# Patient Record
Sex: FEMALE | Race: WHITE | NOT HISPANIC OR LATINO | Employment: OTHER | URBAN - METROPOLITAN AREA
[De-identification: names, ages, dates, MRNs, and addresses within clinical notes are randomized per-mention and may not be internally consistent; named-entity substitution may affect disease eponyms.]

---

## 2020-01-22 PROBLEM — M54.2 NECK PAIN, CHRONIC: Status: ACTIVE | Noted: 2020-01-22

## 2020-01-22 PROBLEM — G89.29 NECK PAIN, CHRONIC: Status: ACTIVE | Noted: 2020-01-22

## 2020-01-27 PROBLEM — M50.90 CERVICAL DISC DISEASE: Status: ACTIVE | Noted: 2020-01-27

## 2020-07-01 PROBLEM — G56.21 ULNAR NEUROPATHY AT ELBOW OF RIGHT UPPER EXTREMITY: Status: ACTIVE | Noted: 2020-07-01

## 2020-12-11 PROBLEM — G43.019 INTRACTABLE MIGRAINE WITHOUT AURA AND WITHOUT STATUS MIGRAINOSUS: Status: ACTIVE | Noted: 2020-12-11

## 2022-04-19 PROBLEM — D25.1 INTRAMURAL LEIOMYOMA OF UTERUS: Status: ACTIVE | Noted: 2022-04-19

## 2022-04-19 PROBLEM — G43.019 INTRACTABLE MIGRAINE WITHOUT AURA AND WITHOUT STATUS MIGRAINOSUS: Status: RESOLVED | Noted: 2020-12-11 | Resolved: 2022-04-19

## 2022-04-19 PROBLEM — N92.1 MENOMETRORRHAGIA: Status: ACTIVE | Noted: 2022-04-19

## 2022-04-19 PROBLEM — R10.2 PAIN IN PELVIS: Status: ACTIVE | Noted: 2022-04-19

## 2022-07-19 PROBLEM — N92.1 MENOMETRORRHAGIA: Status: RESOLVED | Noted: 2022-04-19 | Resolved: 2022-07-19

## 2022-07-19 PROBLEM — D25.1 INTRAMURAL LEIOMYOMA OF UTERUS: Status: RESOLVED | Noted: 2022-04-19 | Resolved: 2022-07-19

## 2022-07-19 PROBLEM — R10.2 PAIN IN PELVIS: Status: RESOLVED | Noted: 2022-04-19 | Resolved: 2022-07-19

## 2022-07-19 PROBLEM — Z90.710 STATUS POST HYSTERECTOMY: Status: ACTIVE | Noted: 2022-07-19

## 2022-09-07 PROBLEM — R50.9 FEVER: Status: ACTIVE | Noted: 2022-09-07

## 2022-09-07 PROBLEM — J18.9 PNEUMONIA: Status: ACTIVE | Noted: 2022-09-07

## 2022-09-07 PROBLEM — E87.6 HYPOKALEMIA: Status: ACTIVE | Noted: 2022-09-07

## 2022-09-07 PROBLEM — E87.1 HYPONATREMIA: Status: ACTIVE | Noted: 2022-09-07

## 2022-09-07 PROBLEM — D64.9 ANEMIA: Status: ACTIVE | Noted: 2022-09-07

## 2022-09-07 PROBLEM — D72.829 LEUKOCYTOSIS: Status: ACTIVE | Noted: 2022-09-07

## 2022-09-07 PROBLEM — R79.89 ELEVATED LIVER FUNCTION TESTS: Status: ACTIVE | Noted: 2022-09-07

## 2022-09-07 PROBLEM — N17.9 AKI (ACUTE KIDNEY INJURY) (HCC): Status: ACTIVE | Noted: 2022-09-07

## 2022-09-07 PROBLEM — A41.9 SEPSIS (HCC): Status: ACTIVE | Noted: 2022-09-07

## 2022-09-08 PROBLEM — I33.0 INFECTIVE ENDOCARDITIS OF TRICUSPID VALVE: Status: ACTIVE | Noted: 2022-09-08

## 2022-09-08 PROBLEM — B95.2 BACTEREMIA DUE TO ENTEROCOCCUS: Status: ACTIVE | Noted: 2022-09-08

## 2022-09-08 PROBLEM — R78.81 POSITIVE BLOOD CULTURE: Status: ACTIVE | Noted: 2022-09-08

## 2022-09-09 ENCOUNTER — HOSPITAL ENCOUNTER (INPATIENT)
Facility: MEDICAL CENTER | Age: 52
LOS: 15 days | DRG: 219 | End: 2022-09-24
Attending: INTERNAL MEDICINE | Admitting: INTERNAL MEDICINE
Payer: COMMERCIAL

## 2022-09-09 DIAGNOSIS — J90 PLEURAL EFFUSION: ICD-10-CM

## 2022-09-09 DIAGNOSIS — I33.0 INFECTIVE ENDOCARDITIS OF TRICUSPID VALVE: ICD-10-CM

## 2022-09-09 DIAGNOSIS — I33.0 ACUTE BACTERIAL ENDOCARDITIS: ICD-10-CM

## 2022-09-09 DIAGNOSIS — F41.9 ANXIETY: ICD-10-CM

## 2022-09-09 DIAGNOSIS — Z98.890 S/P TRICUSPID VALVE REPAIR: ICD-10-CM

## 2022-09-09 DIAGNOSIS — J94.2 HEMOTHORAX: ICD-10-CM

## 2022-09-09 DIAGNOSIS — Z98.890 S/P AORTIC VALVE REPAIR: ICD-10-CM

## 2022-09-09 DIAGNOSIS — R78.81 BACTEREMIA DUE TO ENTEROCOCCUS: ICD-10-CM

## 2022-09-09 DIAGNOSIS — B95.2 BACTEREMIA DUE TO ENTEROCOCCUS: ICD-10-CM

## 2022-09-09 DIAGNOSIS — K27.9 PEPTIC ULCER DISEASE: ICD-10-CM

## 2022-09-09 PROBLEM — I38 ENDOCARDITIS: Status: ACTIVE | Noted: 2022-09-09

## 2022-09-09 LAB
FIBRINOGEN PPP-MCNC: 586 MG/DL (ref 215–460)
HGB BLD-MCNC: 9.7 G/DL (ref 12–16)
HGB RETIC QN AUTO: 29.4 PG/CELL (ref 29–35)
IMM RETICS NFR: 17.8 % (ref 9.3–17.4)
LDH SERPL L TO P-CCNC: 313 U/L (ref 107–266)
RETICS # AUTO: 0.08 M/UL (ref 0.04–0.06)
RETICS/RBC NFR: 2.5 % (ref 0.8–2.1)

## 2022-09-09 PROCEDURE — 700111 HCHG RX REV CODE 636 W/ 250 OVERRIDE (IP): Performed by: INTERNAL MEDICINE

## 2022-09-09 PROCEDURE — 700105 HCHG RX REV CODE 258: Performed by: INTERNAL MEDICINE

## 2022-09-09 PROCEDURE — 83010 ASSAY OF HAPTOGLOBIN QUANT: CPT

## 2022-09-09 PROCEDURE — 99223 1ST HOSP IP/OBS HIGH 75: CPT | Performed by: INTERNAL MEDICINE

## 2022-09-09 PROCEDURE — 700102 HCHG RX REV CODE 250 W/ 637 OVERRIDE(OP): Performed by: INTERNAL MEDICINE

## 2022-09-09 PROCEDURE — 85384 FIBRINOGEN ACTIVITY: CPT

## 2022-09-09 PROCEDURE — 770020 HCHG ROOM/CARE - TELE (206)

## 2022-09-09 PROCEDURE — 85046 RETICYTE/HGB CONCENTRATE: CPT

## 2022-09-09 PROCEDURE — 83615 LACTATE (LD) (LDH) ENZYME: CPT

## 2022-09-09 PROCEDURE — A9270 NON-COVERED ITEM OR SERVICE: HCPCS | Performed by: INTERNAL MEDICINE

## 2022-09-09 PROCEDURE — 36415 COLL VENOUS BLD VENIPUNCTURE: CPT

## 2022-09-09 PROCEDURE — 85018 HEMOGLOBIN: CPT | Mod: 91

## 2022-09-09 RX ORDER — OXYCODONE HYDROCHLORIDE 5 MG/1
5 TABLET ORAL EVERY 6 HOURS PRN
Status: DISCONTINUED | OUTPATIENT
Start: 2022-09-09 | End: 2022-09-09

## 2022-09-09 RX ORDER — ONDANSETRON 4 MG/1
4 TABLET, ORALLY DISINTEGRATING ORAL EVERY 4 HOURS PRN
Status: DISCONTINUED | OUTPATIENT
Start: 2022-09-09 | End: 2022-09-16

## 2022-09-09 RX ORDER — SODIUM CHLORIDE 9 MG/ML
INJECTION, SOLUTION INTRAVENOUS CONTINUOUS
Status: DISCONTINUED | OUTPATIENT
Start: 2022-09-09 | End: 2022-09-12

## 2022-09-09 RX ORDER — GUAIFENESIN 600 MG/1
1200 TABLET, EXTENDED RELEASE ORAL EVERY 12 HOURS
Status: DISCONTINUED | OUTPATIENT
Start: 2022-09-09 | End: 2022-09-24 | Stop reason: HOSPADM

## 2022-09-09 RX ORDER — OXYCODONE HYDROCHLORIDE 10 MG/1
10 TABLET ORAL
Status: DISCONTINUED | OUTPATIENT
Start: 2022-09-09 | End: 2022-09-10

## 2022-09-09 RX ORDER — OXYCODONE HYDROCHLORIDE 5 MG/1
5 TABLET ORAL
Status: DISCONTINUED | OUTPATIENT
Start: 2022-09-09 | End: 2022-09-10

## 2022-09-09 RX ORDER — BENZONATATE 100 MG/1
100 CAPSULE ORAL 3 TIMES DAILY PRN
Status: DISCONTINUED | OUTPATIENT
Start: 2022-09-09 | End: 2022-09-16

## 2022-09-09 RX ORDER — BISACODYL 10 MG
10 SUPPOSITORY, RECTAL RECTAL
Status: DISCONTINUED | OUTPATIENT
Start: 2022-09-09 | End: 2022-09-16

## 2022-09-09 RX ORDER — AMOXICILLIN 250 MG
2 CAPSULE ORAL 2 TIMES DAILY
Status: DISCONTINUED | OUTPATIENT
Start: 2022-09-09 | End: 2022-09-16

## 2022-09-09 RX ORDER — HYDROMORPHONE HYDROCHLORIDE 1 MG/ML
0.5 INJECTION, SOLUTION INTRAMUSCULAR; INTRAVENOUS; SUBCUTANEOUS
Status: DISCONTINUED | OUTPATIENT
Start: 2022-09-09 | End: 2022-09-10

## 2022-09-09 RX ORDER — ACETAMINOPHEN 325 MG/1
650 TABLET ORAL EVERY 6 HOURS PRN
Status: DISCONTINUED | OUTPATIENT
Start: 2022-09-09 | End: 2022-09-14

## 2022-09-09 RX ORDER — POLYETHYLENE GLYCOL 3350 17 G/17G
1 POWDER, FOR SOLUTION ORAL
Status: DISCONTINUED | OUTPATIENT
Start: 2022-09-09 | End: 2022-09-16

## 2022-09-09 RX ORDER — PROMETHAZINE HYDROCHLORIDE 25 MG/1
12.5-25 TABLET ORAL EVERY 4 HOURS PRN
Status: DISCONTINUED | OUTPATIENT
Start: 2022-09-09 | End: 2022-09-16

## 2022-09-09 RX ORDER — PROMETHAZINE HYDROCHLORIDE 25 MG/1
12.5-25 SUPPOSITORY RECTAL EVERY 4 HOURS PRN
Status: DISCONTINUED | OUTPATIENT
Start: 2022-09-09 | End: 2022-09-16

## 2022-09-09 RX ORDER — DEXTROSE MONOHYDRATE 25 G/50ML
50 INJECTION, SOLUTION INTRAVENOUS PRN
Status: DISCONTINUED | OUTPATIENT
Start: 2022-09-09 | End: 2022-09-16

## 2022-09-09 RX ORDER — PROCHLORPERAZINE EDISYLATE 5 MG/ML
5-10 INJECTION INTRAMUSCULAR; INTRAVENOUS EVERY 4 HOURS PRN
Status: DISCONTINUED | OUTPATIENT
Start: 2022-09-09 | End: 2022-09-16

## 2022-09-09 RX ORDER — ONDANSETRON 2 MG/ML
4 INJECTION INTRAMUSCULAR; INTRAVENOUS EVERY 4 HOURS PRN
Status: DISCONTINUED | OUTPATIENT
Start: 2022-09-09 | End: 2022-09-14

## 2022-09-09 RX ADMIN — ONDANSETRON 4 MG: 2 INJECTION INTRAMUSCULAR; INTRAVENOUS at 17:40

## 2022-09-09 RX ADMIN — SODIUM CHLORIDE: 9 INJECTION, SOLUTION INTRAVENOUS at 15:23

## 2022-09-09 RX ADMIN — CEFEPIME 2 G: 2 INJECTION, POWDER, FOR SOLUTION INTRAVENOUS at 17:40

## 2022-09-09 RX ADMIN — ACETAMINOPHEN 650 MG: 325 TABLET, FILM COATED ORAL at 20:08

## 2022-09-09 RX ADMIN — DOCUSATE SODIUM 50 MG AND SENNOSIDES 8.6 MG 2 TABLET: 8.6; 5 TABLET, FILM COATED ORAL at 17:40

## 2022-09-09 RX ADMIN — VANCOMYCIN HYDROCHLORIDE 750 MG: 500 INJECTION, POWDER, LYOPHILIZED, FOR SOLUTION INTRAVENOUS at 15:33

## 2022-09-09 RX ADMIN — OXYCODONE HYDROCHLORIDE 10 MG: 10 TABLET ORAL at 19:18

## 2022-09-09 RX ADMIN — GUAIFENESIN 1200 MG: 600 TABLET, EXTENDED RELEASE ORAL at 17:40

## 2022-09-09 ASSESSMENT — ENCOUNTER SYMPTOMS
FALLS: 0
BACK PAIN: 0
DIARRHEA: 0
ABDOMINAL PAIN: 0
HALLUCINATIONS: 0
PALPITATIONS: 0
CHILLS: 1
COUGH: 0
BLURRED VISION: 0
DOUBLE VISION: 0
SEIZURES: 0
LOSS OF CONSCIOUSNESS: 0
VOMITING: 0
FEVER: 1
SHORTNESS OF BREATH: 0
SORE THROAT: 0
NAUSEA: 0

## 2022-09-09 ASSESSMENT — LIFESTYLE VARIABLES: SUBSTANCE_ABUSE: 0

## 2022-09-09 NOTE — ASSESSMENT & PLAN NOTE
Reports history of several weeks of melena at home, in the context of max dosages of NSAIDs for ongoing back pain. Melena has not recurred during admission.  Initial fecal occult blood is negative.  She required 2 units of RBCs at outside hospital prior to transfer.  Anemia may be multifactorial with contributions from hemolysis due to endocarditis, iron deficiency anemia due to GI losses.

## 2022-09-09 NOTE — H&P
Hospital Medicine History & Physical Note    Date of Service  9/9/2022    Primary Care Physician  CASSANDRA Marshall.    Consultants  cardiology and infectious disease    Specialist Names: Dr. Abbasi, Dr. Wyatt    Code Status  Full Code    Chief Complaint  No chief complaint on file.      History of Presenting Illness  Sarah Buckner is a 51 y.o. female who presented 9/9/2022 with endocarditis.  Patient has medical history significant for chronic back pain.  Patient presented to outside facility with fever, malaise, cough.  Patient was treated for pneumonia with IV antibiotics as well as TAZ which improved.  Patient is now on room air.  She was found to have Enterococcus bacteremia.  TTE demonstrated vegetation of the tricuspid valve.  Patient works in healthcare.  Patient denies IV drug use, recent travel, patient was born in the [place].  Patient additionally noted to have melanotic stool.  Patient endorses taking significant amount of NSAIDs over the last several weeks for a back injury.  Patient has occult negative.  General surgery at outside facility did not feel necessary for endoscopic intervention.  Patient was transferred to [facility] for continuation of care and further investigation of endocarditis.  We will plan for KEO and infectious disease consultation.    I discussed the plan of care with patient and cardiology and infectious disease.    Review of Systems  Review of Systems   Constitutional:  Positive for chills and fever.   HENT:  Negative for congestion and sore throat.    Eyes:  Negative for blurred vision and double vision.   Respiratory:  Negative for cough and shortness of breath.    Cardiovascular:  Negative for chest pain and palpitations.   Gastrointestinal:  Negative for abdominal pain, diarrhea, nausea and vomiting.   Genitourinary:  Negative for dysuria and frequency.   Musculoskeletal:  Negative for back pain and falls.   Skin:  Negative for rash.   Neurological:   "Negative for seizures and loss of consciousness.   Psychiatric/Behavioral:  Negative for hallucinations and substance abuse.      Past Medical History   has a past medical history of Hypertension, Migraine, and PONV (postoperative nausea and vomiting).    Surgical History   has a past surgical history that includes primary c section; lumpectomy; other orthopedic surgery; pr lap,rmv  adnexal structure (Bilateral, 12/8/2020); vaginal hysterectomy scope total (N/A, 12/8/2020); and cystoscopy (N/A, 12/8/2020).     Family History  family history includes Clotting Disorder in her mother.   Family history reviewed with patient. There Dictation #1  MRN:[id number]  CSN:[id number]  family history that is pertinent to the chief complaint.     Social History   reports that she has never smoked. She has never used smokeless tobacco. She reports current alcohol use of about 3.6 oz per week. She reports that she does not use drugs.    Allergies  Allergies   Allergen Reactions    Hydrocodone-Acetaminophen Unspecified     hallucinations      Morphine Unspecified     Severe itchiness  \"It makes me itch\"       Medications  Prior to Admission Medications   Prescriptions Last Dose Informant Patient Reported? Taking?   Melatonin Gummies 2.5 MG Chew Tab   Yes No   Sig: Chew.   NS 0.9% SOLN 100 mL with ampicillin 2 GM SOLR 2 g   No No   Sig: Infuse 2 g into a venous catheter every 4 hours.   NS SOLN 100 mL with gentamicin 40 MG/ML SOLN 60 mg   No No   Sig: Infuse 60 mg into a venous catheter every 8 hours.   Vancomycin HCl (VANCOMYCIN 750 MG/250ML) 750 mg   No No   Sig: Infuse 250 mL into a venous catheter every 12 hours.   asa/apap/caffeine (EXCEDRIN) 250-250-65 MG Tab   Yes No   Sig: Take 1 Tab by mouth every 6 hours as needed for Headache.   benzonatate (TESSALON) 100 MG Cap   No No   Sig: Take 1 Capsule by mouth 3 times a day as needed for Cough.   cyclobenzaprine (FLEXERIL) 10 mg Tab   No No   Sig: Take 1 Tablet by mouth 2 times a day as " needed for Moderate Pain or Muscle Spasms.   guaiFENesin ER 1200 MG TABLET SR 12 HR   No No   Sig: Take 1 Tablet by mouth every 12 hours.   meloxicam (MOBIC) 7.5 MG Tab   Yes No   Sig: Take 7.5 mg by mouth 1 time a day as needed.   oxyCODONE immediate-release (ROXICODONE) 5 MG Tab   No No   Sig: Take 1 Tablet by mouth every 6 hours as needed for Severe Pain for up to 1 day.   sumatriptan (IMITREX) 100 MG tablet   Yes No   Sig: TAKE 1 TABLET BY MOUTH AS NEEDED FOR MIGRAINE, REPEAT IN 3 HOURS AS NEEDED . DO NOT EXCEED 3 TABS PER WEEK      Facility-Administered Medications: None       Physical Exam  Temp:  [36.8 °C (98.2 °F)-38.9 °C (102 °F)] 37.1 °C (98.8 °F)  Pulse:  [105-139] 116  Resp:  [16-27] 18  BP: (108-146)/(66-86) 146/82  SpO2:  [90 %-98 %] 92 %                          Physical Exam  Vitals and nursing note reviewed.   Constitutional:       General: She is not in acute distress.     Appearance: She is normal weight. She is ill-appearing. She is not toxic-appearing.      Comments: Pleasant, conversational   HENT:      Head: Normocephalic.      Right Ear: External ear normal.      Left Ear: External ear normal.      Nose: No congestion.      Mouth/Throat:      Mouth: Mucous membranes are moist.      Pharynx: No oropharyngeal exudate.   Eyes:      General: No scleral icterus.     Pupils: Pupils are equal, round, and reactive to light.   Cardiovascular:      Rate and Rhythm: Normal rate and regular rhythm.      Heart sounds: No murmur heard.  Pulmonary:      Breath sounds: No wheezing.   Abdominal:      Palpations: Abdomen is soft.      Tenderness: There is no abdominal tenderness. There is no guarding or rebound.   Musculoskeletal:         General: No deformity.      Cervical back: No rigidity or tenderness.   Skin:     Findings: No bruising.   Neurological:      General: No focal deficit present.      Mental Status: She is alert and oriented to person, place, and time.      Motor: No weakness.   Psychiatric:          Mood and Affect: Mood normal.         Behavior: Behavior normal.       Laboratory:  Recent Labs     09/07/22  1556 09/08/22  0605 09/08/22  1200 09/08/22 2125 09/08/22  2135 09/09/22  0432 09/09/22  0910   WBC 14.5* 7.2  --   --   --  11.5*  --    RBC 3.32* 2.61*  --   --   --  2.64*  --    HEMOGLOBIN 9.1* 7.2*   < > 6.4*  --  7.4* 8.9*   HEMATOCRIT 28.5* 22.4*  --   --  20.1* 22.7*  --    MCV 85.8 85.8  --   --   --  86.0  --    MCH 27.4 27.6  --   --   --  28.0  --    MCHC 31.9* 32.1*  --   --   --  32.6*  --    RDW 16.3* 16.4*  --   --   --  16.8*  --    PLATELETCT 547* 426*  --   --   --  486*  --    MPV 11.0* 11.0*  --   --   --  10.6*  --     < > = values in this interval not displayed.     Recent Labs     09/07/22  1556 09/08/22  0605 09/09/22 0432   SODIUM 134* 138 139   POTASSIUM 3.4* 3.5 3.6   CHLORIDE 97* 103 105   CO2 23 25 24   GLUCOSE 103* 99 102*   BUN 35* 26* 11   CREATININE 1.7* 1.1* 0.7   CALCIUM 9.1 8.6 8.3*     Recent Labs     09/07/22  1556 09/08/22  0605 09/09/22  0432   ALTSGPT 211* 128* 81*   ASTSGOT 319* 137* 60*   ALKPHOSPHAT 1433* 979* 752*   TBILIRUBIN 0.9 0.6 0.5   LIPASE 414*  --   --    GLUCOSE 103* 99 102*     Recent Labs     09/07/22  1556   INR 1.10     No results for input(s): NTPROBNP in the last 72 hours.      No results for input(s): TROPONINT in the last 72 hours.    Imaging:  EC-KEO W/O CONT    (Results Pending)       Assessment/Plan:  Justification for Admission Status  I anticipate this patient will require at least two midnights for appropriate medical management, necessitating inpatient admission because endocarditis requiring IV antibiotics and KEO will require greater than 48 hours to stabilize and treat    Patient will need a Telemetry bed on CARDIOLOGY service .  The need is secondary to endocarditis.    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 Wyatt  Plan for KEO 09/12 - Dr. Abbasi aware    Endocarditis  Assessment & Plan  TTE with concern for tricuspid vegetation  Continue Vanc    TAZ (acute kidney injury) (HCC)- (present on admission)  Assessment & Plan  resolved    Anemia- (present on admission)  Assessment & Plan  Reportedly occult negative at [facility]   No indication for scope per GS at [facility]  Received transfusions  Will monitor for now  Hemolysis workup -haptoglobin, fibrinogen, LDH, reticulocyte count    Pneumonia- (present on admission)  Assessment & Plan  As demonstrated on CT chest  Cefepime, Vanc    Cavitary lesion if left lower lobe      VTE prophylaxis: SCDs/TEDs   - - -

## 2022-09-09 NOTE — PROGRESS NOTES
Pharmacy Vancomycin Kinetics Note for 9/9/2022     51 y.o. female on Vancomycin day # 2     Vancomycin Indication (AUC Dosing): Endocarditis  Provider specified end date: 09/16/22    Active Antibiotics (From admission, onward)      Ordered     Ordering Provider       Fri Sep 9, 2022  1:27 PM    09/09/22 1327  vancomycin (VANCOCIN) 750 mg in  mL IVPB  (vancomycin (VANCOCIN) IV (LD + Maintenance))  EVERY 12 HOURS         Edi Doll M.D.       Fri Sep 9, 2022 12:56 PM    09/09/22 1256  MD Alert...Vancomycin per Pharmacy  (Vancomycin Dosing Per Pharmacy)  PRN        Question:  Indication(s) for vancomycin?  Answer:  Other (comments)  Comment:  endocarditis    Antonio Goodman M.D.            Dosing Weight: 60.8 kg (134 lb 0.6 oz)      Admission History: Admitted on (Not on file) for Endocarditis [I38]  Pertinent history: 50 y/o F presenting as [facility] transfer for e faecalis bacteremia + endocarditis.  Awaiting ID c/s, was started on vanc/gent/ampicillin at OSH.    Allergies:     Hydrocodone-acetaminophen and Morphine     Pertinent cultures to date:     Results       Procedure Component Value Units Date/Time    MRSA By PCR (Amp) [[id number]]     Order Status: No result Specimen: Respirate from Nares             Labs:     Estimated Creatinine Clearance: 85.8 mL/min (by C-G formula based on SCr of 0.7 mg/dL).  Recent Labs     09/07/22  1556 09/08/22  0605 09/09/22  0432   WBC 14.5* 7.2 11.5*     Recent Labs     09/07/22  1556 09/08/22  0605 09/09/22  0432   BUN 35* 26* 11   CREATININE 1.7* 1.1* 0.7   ALBUMIN 2.4* 1.8* 1.8*     No intake or output data in the 24 hours ending 09/09/22 1325   There were no vitals taken for this visit. No data recorded.      List concerns for Vancomycin clearance:     Nephrotoxic drugs;Malnutrition/Low albumin    Pharmacokinetics:   AUC kinetics:   Ke (hr ^-1): 0.0756 hr^-1  Half life: 9.17 hr  Clearance: 2.988  Estimated TDD: 1494  Estimated Dose: 697  Estimated  interval: 11.2      A/P:     -  Vancomycin dose: received 1250 mg load 9/8@1402 and maintenance of 750 mg q12h (last dose 9/9@0200).      -  Next vancomycin level(s): if continued, due 9/11    -  Predicted vancomycin AUC from initial AUC test calculator: 502 mg·hr/L    -  Comments: Reached out to ID Pharmacy at [facility] and discussion had regarding discontinuing gentamicin and ampicillin.  Vancomycin monotherapy appropriate until final susceptibilities result for enterococcus faecalis.      Zenaida Pizano, PharmD  [id number]

## 2022-09-09 NOTE — PROGRESS NOTES
[facility]PITAL[facility] TRIAGE OFFICER DIRECT ADMISSION REPORT  Transferring facility: [facility]  Transferring physician: Dr Garcia  Transferring facility/physician contact number:   Chief complaint: Fever, cough, weight loss  Pertinent history & patient course: 51 y.o. female with past medical history of migraine, who was admitted at [facility] for 1 month history of fever, cough, night sweats, and unintentional weight loss.  blood cultures came back positive for Enterococcus faecalis x 2.  An echocardiogram was obtained which showed probable vegetation on the tricuspid valve, with mild tricuspid regurgitation, with normal EF of 60% and normal diastolic function.  Her antibiotics was expanded to IV cefepime and vancomycin.  Cardiology recommended a KEO, and due to this need, along with need for ID consult  Pertinent imaging & lab results:   Code Status: full per transferring provider, I personally verified with the transferring provider patient's code status and the transferring provider has confirmed this with the patient.  Further work up or recommendations per triage officer prior to transfer:   Consultants called prior to transfer and pertinent input from consultants:   Patient accepted for transfer: Yes  Consultants to be called upon arrival: ID, card  Admission status: .   Floor requested: tele  If ICU transfer, name of intensivist case discussed with and pertinent input from critical care:     Please inform the triage officer upon arrival of the patient to [facility] for assignment of a hospitalist to perform admission.     For any question or concerns regarding the care of this patient, please reach out to the assigned hospitalist.

## 2022-09-09 NOTE — ASSESSMENT & PLAN NOTE
TTE with concern for tricuspid vegetation, Enterococcus faecalis bacteremia.  Last positive blood culture was 9/8.    Denies IV drug use, possible dental carries (chronic), melena reported on scale of weeks. GI consulted to work-up for possible GI source of Enterococcus bacteremia, including possible malignancy.   Colonoscopy and EGD 9/12 showing healed erosive gastritis, few sigmoid diverticula, 7mm pendunculated sigmoid polyp  Status post aortic valve and tricuspid valve repairs by Dr. Bradley on 9/16  Infectious disease has consulted  IV ampicillin and rocephin through 10/27/22. Weekly CBC and CMP while on IV antibiotics.  Likely post-acute placement for IV antibiotics and further rehab purposes.   IWill reach out to ID regarding other options for q4hrs ampicillin

## 2022-09-10 ENCOUNTER — APPOINTMENT (OUTPATIENT)
Dept: RADIOLOGY | Facility: MEDICAL CENTER | Age: 52
DRG: 219 | End: 2022-09-10
Attending: STUDENT IN AN ORGANIZED HEALTH CARE EDUCATION/TRAINING PROGRAM
Payer: COMMERCIAL

## 2022-09-10 LAB
ALBUMIN SERPL BCP-MCNC: 2.4 G/DL (ref 3.2–4.9)
BASOPHILS # BLD AUTO: 0.4 % (ref 0–1.8)
BASOPHILS # BLD: 0.05 K/UL (ref 0–0.12)
BUN SERPL-MCNC: 5 MG/DL (ref 8–22)
CALCIUM SERPL-MCNC: 8.6 MG/DL (ref 8.5–10.5)
CHLORIDE SERPL-SCNC: 105 MMOL/L (ref 96–112)
CO2 SERPL-SCNC: 23 MMOL/L (ref 20–33)
CREAT SERPL-MCNC: 0.49 MG/DL (ref 0.5–1.4)
EOSINOPHIL # BLD AUTO: 0 K/UL (ref 0–0.51)
EOSINOPHIL NFR BLD: 0 % (ref 0–6.9)
ERYTHROCYTE [DISTWIDTH] IN BLOOD BY AUTOMATED COUNT: 51.5 FL (ref 35.9–50)
GFR SERPLBLD CREATININE-BSD FMLA CKD-EPI: 114 ML/MIN/1.73 M 2
GLUCOSE SERPL-MCNC: 92 MG/DL (ref 65–99)
HCT VFR BLD AUTO: 27.4 % (ref 37–47)
HGB BLD-MCNC: 10.3 G/DL (ref 12–16)
HGB BLD-MCNC: 8.9 G/DL (ref 12–16)
HGB BLD-MCNC: 9 G/DL (ref 12–16)
IMM GRANULOCYTES # BLD AUTO: 0.19 K/UL (ref 0–0.11)
IMM GRANULOCYTES NFR BLD AUTO: 1.4 % (ref 0–0.9)
LYMPHOCYTES # BLD AUTO: 2.27 K/UL (ref 1–4.8)
LYMPHOCYTES NFR BLD: 17 % (ref 22–41)
MAGNESIUM SERPL-MCNC: 1.9 MG/DL (ref 1.5–2.5)
MCH RBC QN AUTO: 28.6 PG (ref 27–33)
MCHC RBC AUTO-ENTMCNC: 32.8 G/DL (ref 33.6–35)
MCV RBC AUTO: 87 FL (ref 81.4–97.8)
MONOCYTES # BLD AUTO: 0.9 K/UL (ref 0–0.85)
MONOCYTES NFR BLD AUTO: 6.7 % (ref 0–13.4)
NEUTROPHILS # BLD AUTO: 9.95 K/UL (ref 2–7.15)
NEUTROPHILS NFR BLD: 74.5 % (ref 44–72)
NRBC # BLD AUTO: 0 K/UL
NRBC BLD-RTO: 0 /100 WBC
PHOSPHATE SERPL-MCNC: 4.5 MG/DL (ref 2.5–4.5)
PLATELET # BLD AUTO: 471 K/UL (ref 164–446)
PMV BLD AUTO: 10.3 FL (ref 9–12.9)
POTASSIUM SERPL-SCNC: 4.3 MMOL/L (ref 3.6–5.5)
RBC # BLD AUTO: 3.15 M/UL (ref 4.2–5.4)
SODIUM SERPL-SCNC: 137 MMOL/L (ref 135–145)
WBC # BLD AUTO: 13.4 K/UL (ref 4.8–10.8)

## 2022-09-10 PROCEDURE — 36415 COLL VENOUS BLD VENIPUNCTURE: CPT

## 2022-09-10 PROCEDURE — 700105 HCHG RX REV CODE 258: Performed by: INTERNAL MEDICINE

## 2022-09-10 PROCEDURE — 85018 HEMOGLOBIN: CPT

## 2022-09-10 PROCEDURE — A9270 NON-COVERED ITEM OR SERVICE: HCPCS | Performed by: INTERNAL MEDICINE

## 2022-09-10 PROCEDURE — 87077 CULTURE AEROBIC IDENTIFY: CPT

## 2022-09-10 PROCEDURE — 83735 ASSAY OF MAGNESIUM: CPT

## 2022-09-10 PROCEDURE — 99254 IP/OBS CNSLTJ NEW/EST MOD 60: CPT | Performed by: INTERNAL MEDICINE

## 2022-09-10 PROCEDURE — 700102 HCHG RX REV CODE 250 W/ 637 OVERRIDE(OP): Performed by: INTERNAL MEDICINE

## 2022-09-10 PROCEDURE — 87186 SC STD MICRODIL/AGAR DIL: CPT

## 2022-09-10 PROCEDURE — 770020 HCHG ROOM/CARE - TELE (206)

## 2022-09-10 PROCEDURE — 87040 BLOOD CULTURE FOR BACTERIA: CPT

## 2022-09-10 PROCEDURE — 99233 SBSQ HOSP IP/OBS HIGH 50: CPT | Mod: GC | Performed by: HOSPITALIST

## 2022-09-10 PROCEDURE — 700111 HCHG RX REV CODE 636 W/ 250 OVERRIDE (IP): Performed by: INTERNAL MEDICINE

## 2022-09-10 PROCEDURE — 700111 HCHG RX REV CODE 636 W/ 250 OVERRIDE (IP): Performed by: STUDENT IN AN ORGANIZED HEALTH CARE EDUCATION/TRAINING PROGRAM

## 2022-09-10 PROCEDURE — 80069 RENAL FUNCTION PANEL: CPT

## 2022-09-10 PROCEDURE — 70355 PANORAMIC X-RAY OF JAWS: CPT

## 2022-09-10 PROCEDURE — A9270 NON-COVERED ITEM OR SERVICE: HCPCS | Performed by: STUDENT IN AN ORGANIZED HEALTH CARE EDUCATION/TRAINING PROGRAM

## 2022-09-10 PROCEDURE — 700102 HCHG RX REV CODE 250 W/ 637 OVERRIDE(OP): Performed by: STUDENT IN AN ORGANIZED HEALTH CARE EDUCATION/TRAINING PROGRAM

## 2022-09-10 PROCEDURE — 85025 COMPLETE CBC W/AUTO DIFF WBC: CPT

## 2022-09-10 RX ORDER — SUMATRIPTAN 50 MG/1
50 TABLET, FILM COATED ORAL EVERY 6 HOURS PRN
Status: DISCONTINUED | OUTPATIENT
Start: 2022-09-10 | End: 2022-09-10

## 2022-09-10 RX ORDER — HYDROMORPHONE HYDROCHLORIDE 1 MG/ML
0.5 INJECTION, SOLUTION INTRAMUSCULAR; INTRAVENOUS; SUBCUTANEOUS EVERY 4 HOURS PRN
Status: DISCONTINUED | OUTPATIENT
Start: 2022-09-10 | End: 2022-09-14

## 2022-09-10 RX ORDER — SUMATRIPTAN 25 MG/1
TABLET, FILM COATED ORAL
Status: CANCELLED | OUTPATIENT
Start: 2022-09-10

## 2022-09-10 RX ORDER — SUMATRIPTAN 50 MG/1
100 TABLET, FILM COATED ORAL EVERY 6 HOURS PRN
Status: DISCONTINUED | OUTPATIENT
Start: 2022-09-10 | End: 2022-09-16

## 2022-09-10 RX ORDER — TRAMADOL HYDROCHLORIDE 50 MG/1
50 TABLET ORAL EVERY 6 HOURS PRN
Status: DISCONTINUED | OUTPATIENT
Start: 2022-09-10 | End: 2022-09-14

## 2022-09-10 RX ORDER — DIPHENHYDRAMINE HCL 25 MG
25 TABLET ORAL EVERY 6 HOURS PRN
Status: DISCONTINUED | OUTPATIENT
Start: 2022-09-10 | End: 2022-09-16

## 2022-09-10 RX ORDER — OXYCODONE HYDROCHLORIDE 5 MG/1
5 TABLET ORAL EVERY 4 HOURS PRN
Status: DISCONTINUED | OUTPATIENT
Start: 2022-09-10 | End: 2022-09-14

## 2022-09-10 RX ADMIN — CEFTRIAXONE SODIUM 2 G: 10 INJECTION, POWDER, FOR SOLUTION INTRAVENOUS at 17:02

## 2022-09-10 RX ADMIN — AMPICILLIN SODIUM 2000 MG: 2 INJECTION, POWDER, FOR SOLUTION INTRAVENOUS at 22:13

## 2022-09-10 RX ADMIN — VANCOMYCIN HYDROCHLORIDE 750 MG: 500 INJECTION, POWDER, LYOPHILIZED, FOR SOLUTION INTRAVENOUS at 04:15

## 2022-09-10 RX ADMIN — AMPICILLIN SODIUM 2000 MG: 2 INJECTION, POWDER, FOR SOLUTION INTRAVENOUS at 14:19

## 2022-09-10 RX ADMIN — AMPICILLIN SODIUM 2000 MG: 2 INJECTION, POWDER, FOR SOLUTION INTRAVENOUS at 18:00

## 2022-09-10 RX ADMIN — TRAMADOL HYDROCHLORIDE 50 MG: 50 TABLET, COATED ORAL at 12:01

## 2022-09-10 RX ADMIN — GUAIFENESIN 1200 MG: 600 TABLET, EXTENDED RELEASE ORAL at 17:02

## 2022-09-10 RX ADMIN — CEFTRIAXONE SODIUM 2 G: 10 INJECTION, POWDER, FOR SOLUTION INTRAVENOUS at 10:44

## 2022-09-10 RX ADMIN — CEFEPIME 2 G: 2 INJECTION, POWDER, FOR SOLUTION INTRAVENOUS at 05:23

## 2022-09-10 RX ADMIN — OXYCODONE 5 MG: 5 TABLET ORAL at 19:45

## 2022-09-10 RX ADMIN — HYDROMORPHONE HYDROCHLORIDE 0.5 MG: 1 INJECTION, SOLUTION INTRAMUSCULAR; INTRAVENOUS; SUBCUTANEOUS at 14:58

## 2022-09-10 RX ADMIN — DOCUSATE SODIUM 50 MG AND SENNOSIDES 8.6 MG 2 TABLET: 8.6; 5 TABLET, FILM COATED ORAL at 17:01

## 2022-09-10 RX ADMIN — SODIUM CHLORIDE: 9 INJECTION, SOLUTION INTRAVENOUS at 10:06

## 2022-09-10 RX ADMIN — ACETAMINOPHEN 650 MG: 325 TABLET, FILM COATED ORAL at 08:17

## 2022-09-10 RX ADMIN — GUAIFENESIN 1200 MG: 600 TABLET, EXTENDED RELEASE ORAL at 05:23

## 2022-09-10 RX ADMIN — SUMATRIPTAN SUCCINATE 50 MG: 50 TABLET ORAL at 10:43

## 2022-09-10 ASSESSMENT — ENCOUNTER SYMPTOMS
FEVER: 0
ABDOMINAL PAIN: 0
HEADACHES: 1
CHILLS: 0
WEAKNESS: 0
SHORTNESS OF BREATH: 0
BLURRED VISION: 0
SPEECH CHANGE: 0
SORE THROAT: 0
COUGH: 0
BACK PAIN: 1
SENSORY CHANGE: 0
LOSS OF CONSCIOUSNESS: 0
MYALGIAS: 0

## 2022-09-10 ASSESSMENT — PAIN DESCRIPTION - PAIN TYPE
TYPE: ACUTE PAIN
TYPE: ACUTE PAIN

## 2022-09-10 ASSESSMENT — FIBROSIS 4 INDEX
FIB4 SCORE: 0.71
FIB4 SCORE: 0.71

## 2022-09-10 ASSESSMENT — LIFESTYLE VARIABLES: SUBSTANCE_ABUSE: 0

## 2022-09-10 NOTE — PROGRESS NOTES
Report received from Rn. Assumed pt care. Pt A&O x 4. Fall precautions in place, call light and belongings within reach, bed in lowest position. No signs of distress.      Agree with previous RN's assessment.

## 2022-09-10 NOTE — CONSULTS
KLEBER Goodman yesterday am to switch patient to ampicillin +ceftriaxone at endocarditis dosing  Attempted to place order but Epic not allowing orders to be placed-stating antibiotics cannot be ordered as there is no patient weight-however weight is documented  Will kleber pharmacy and hospitalist

## 2022-09-10 NOTE — PROGRESS NOTES
4 Eyes Skin Assessment Completed by KEDAR Spaulding and KEDAR Young.    Head WDL  Ears WDL  Nose WDL  Mouth WDL  Neck WDL  Breast/Chest WDL  Shoulder Blades WDL  Spine WDL  (R) Arm/Elbow/Hand WDL  (L) Arm/Elbow/Hand WDL  Abdomen WDL  Groin WDL  Scrotum/Coccyx/Buttocks WDL  (R) Leg WDL  (L) Leg WDL  (R) Heel/Foot/Toe WDL  (L) Heel/Foot/Toe WDL          Devices In Places Tele Box      Interventions In Place N/A    Possible Skin Injury No    Pictures Uploaded Into Epic N/A  Wound Consult Placed N/A  RN Wound Prevention Protocol Ordered No

## 2022-09-10 NOTE — PROGRESS NOTES
[facility] Internal Medicine Daily Progress Note    Date of Service  9/10/2022    R Team: R IM White Team   Attending: ZOHAIB Williamson M.d.  Senior Resident: Dr. Evans  Intern:  Dr. Rowley  Contact Number: [phone]    Chief Complaint  Sarah Buckner is a 51 y.o. female admitted 9/9/2022 with bacterial endocarditis growing out enterococcus faecaecalis on Bcs.     Hospital Course  No notes on file    Interval Problem Update  -Admitted overnight  -ID changed abx form vanc/ cefepime to ampicillin ceftriaxone  -repeat blood cultures drawn  -ordered dental imaging, noted to have possible carries on left side of jaw  -plan for KEO on 9/12    I have discussed this patient's plan of care and discharge plan at IDT rounds today with Case Management, Nursing, Nursing leadership, and other members of the IDT team.    Consultants/Specialty  cardiology    Code Status  Full Code    Disposition  Patient is not medically cleared for discharge.   Anticipate discharge to to home with close outpatient follow-up.  I have placed the appropriate orders for post-discharge needs.    Review of Systems  Review of Systems   Constitutional:  Negative for chills and fever.   HENT:  Negative for sore throat.    Eyes:  Negative for blurred vision.   Respiratory:  Negative for cough and shortness of breath.    Cardiovascular:  Negative for chest pain.   Gastrointestinal:  Negative for abdominal pain.   Genitourinary:  Negative for dysuria and urgency.   Musculoskeletal:  Positive for back pain and joint pain. Negative for myalgias.   Neurological:  Positive for headaches. Negative for sensory change, speech change, loss of consciousness and weakness.   Psychiatric/Behavioral:  Negative for substance abuse.       Physical Exam  Temp:  [36.1 °C (97 °F)-37.8 °C (100 °F)] 36.1 °C (97 °F)  Pulse:  [] 116  Resp:  [18] 18  BP: (100-137)/(69-93) 136/88  SpO2:  [90 %-96 %] 95 %    Physical Exam  Vitals and nursing note reviewed.    Constitutional:       Appearance: Normal appearance.   HENT:      Head: Normocephalic.      Mouth/Throat:      Mouth: Mucous membranes are moist.      Comments: Possible carries on left side of jaw teeth  Eyes:      General: No scleral icterus.  Cardiovascular:      Rate and Rhythm: Normal rate.      Pulses: Normal pulses.   Pulmonary:      Effort: No respiratory distress.      Breath sounds: No wheezing.   Abdominal:      Palpations: Abdomen is soft.      Tenderness: There is no abdominal tenderness.   Musculoskeletal:      Right lower leg: No edema.      Left lower leg: No edema.   Skin:     General: Skin is warm and dry.   Neurological:      Mental Status: She is alert and oriented to person, place, and time.      Cranial Nerves: No cranial nerve deficit.      Motor: No weakness.   Psychiatric:         Mood and Affect: Mood normal.         Behavior: Behavior normal.       Fluids    Intake/Output Summary (Last 24 hours) at 9/10/2022 1538  Last data filed at 9/10/2022 0615  Gross per 24 hour   Intake 2096.25 ml   Output --   Net 2096.25 ml       Laboratory  Recent Labs     09/08/22  0605 09/08/22  1200 09/08/22  2135 09/09/22  0432 09/09/22  0910 09/09/22  1642 09/10/22  0049 09/10/22  0940   WBC 7.2  --   --  11.5*  --   --  13.4*  --    RBC 2.61*  --   --  2.64*  --   --  3.15*  --    HEMOGLOBIN 7.2*   < >  --  7.4*   < > 9.7* 9.0* 8.9*   HEMATOCRIT 22.4*  --  20.1* 22.7*  --   --  27.4*  --    MCV 85.8  --   --  86.0  --   --  87.0  --    MCH 27.6  --   --  28.0  --   --  28.6  --    MCHC 32.1*  --   --  32.6*  --   --  32.8*  --    RDW 16.4*  --   --  16.8*  --   --  51.5*  --    PLATELETCT 426*  --   --  486*  --   --  471*  --    MPV 11.0*  --   --  10.6*  --   --  10.3  --     < > = values in this interval not displayed.     Recent Labs     09/08/22  0605 09/09/22  0432 09/10/22  0049   SODIUM 138 139 137   POTASSIUM 3.5 3.6 4.3   CHLORIDE 103 105 105   CO2 25 24 23   GLUCOSE 99 102* 92   BUN 26* 11 5*    CREATININE 1.1* 0.7 0.49*   CALCIUM 8.6 8.3* 8.6     Recent Labs     09/07/22  1556   INR 1.10               Imaging  [id number]   Final Result      Multiple dental caries.      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 Waytt, appreciate recs  -Antibiotics: ampicillin, ceftriaxone  -repeat blood cultures drawn on 9/10, repeat every 48 hours per ID  -Plan for KEO 09/12 - Dr. Abbasi aware, NPO 9/11 midnight  -Dental panoramic XR to assess for possible source    Endocarditis  Assessment & Plan  TTE with concern for tricuspid vegetation  See above problem    TAZ (acute kidney injury) (HCC)- (present on admission)  Assessment & Plan  resolved    Anemia- (present on admission)  Assessment & Plan  -reports melena at home in context of NSAID use  Occult negative  No indication for scope per GS at [facility]  Received transfusions at [facility]  -LDH elevated likely from infection  -iron studies suggestive of likely inflammatory, high ferritin  PLAN  -will continue to monitor  -transfuse at hgb less than 7  -no nsaids  -haptoglobin level pending      Pneumonia- (present on admission)  Assessment & Plan  As demonstrated on CT chest  -antibiotics as above    Cavitary lesion noted in left lower lobe    Migraine with aura- (present on admission)  Assessment & Plan  -resumed home imitrex       VTE prophylaxis: SCDs/TEDs    I have performed a physical exam and reviewed and updated ROS and Plan today (9/10/2022). In review of yesterday's note (9/9/2022), there are no changes except as documented above.

## 2022-09-10 NOTE — CONSULTS
"INFECTIOUS DISEASES INPATIENT CONSULT NOTE     Date of Service:9/10/2022    Consult Requested By: Valentin Vera M.D.    Reason for Consultation: endocarditis-antibiotic recommendation    History of Present Illness:   Sarah Buckner is a 51 y.o. female admitted 9/9/2022 as transfer from [place] for KEO.  Per admitting MD she presented there with \"fever, malaise, cough.  Patient was treated for pneumonia with IV antibiotics as well as TAZ which improved... She was found to have Enterococcus bacteremia.  TTE demonstrated vegetation of the tricuspid valve.  Patient works in healthcare.  Patient denies IV drug use, recent travel, patient was born in the [place].  Patient additionally noted to have melanotic stool.  Patient endorses taking significant amount of NSAIDs over the last several weeks for a back injury.  Patient has occult negative.  General surgery at outside facility did not feel necessary for endoscopic intervention.  Patient was transferred to [facility] for continuation of care and further investigation of endocarditis. \"  Te  Afebrile since transfer. +Leukocytosis 13 Currently sleeping soundly and did not awaken to voice.  On vancomycin and cefepime  Infectious Diseases consulted for antibiotic recommendation and management  Records reviewed care everywhere    Review Of Systems:  No fever or dyspnea limited by somnolence      PMH:   Past Medical History:   Diagnosis Date    Hypertension     Migraine     PONV (postoperative nausea and vomiting)          PSH:  Past Surgical History:   Procedure Laterality Date    AL LAP,RMV  ADNEXAL STRUCTURE Bilateral 12/8/2020    Procedure: SALPINGO-OOPHORECTOMY, BILATERAL, LAPAROSCOPIC;  Surgeon: Brayan Hunt M.D.;  Location: SURGERY [facility];  Service: Gynecology    VAGINAL HYSTERECTOMY SCOPE TOTAL N/A 12/8/2020    Procedure: LAPAROSCOPIC  SUBTOTAL HYSTERECTOMY;  Surgeon: Brayan Hunt M.D.;  Location: SURGERY [facility];  Service: " "Gynecology    CYSTOSCOPY N/A 12/8/2020    Procedure: CYSTOSCOPY;  Surgeon: Brayan Hunt M.D.;  Location: SURGERY [facility];  Service: Gynecology    LUMPECTOMY      breast reduction, implants     OTHER ORTHOPEDIC SURGERY      orif right wrist hardware    PRIMARY C SECTION      3       FAMILY HX:  Family History   Problem Relation Age of Onset    Clotting Disorder Mother    Colon cancer mother    SOCIAL HX:  Social History     Socioeconomic History    Marital status:      Spouse name: Not on file    Number of children: Not on file    Years of education: Not on file    Highest education level: Not on file   Occupational History    Not on file   Tobacco Use    Smoking status: Never    Smokeless tobacco: Never   Vaping Use    Vaping Use: Never used   Substance and Sexual Activity    Alcohol use: Yes     Alcohol/week: 3.6 oz     Types: 6 Glasses of wine per week    Drug use: Never    Sexual activity: Not on file   Other Topics Concern    Not on file   Social History Narrative    Not on file     Social Determinants of Health     Financial Resource Strain: Not on file   Food Insecurity: Not on file   Transportation Needs: Not on file   Physical Activity: Not on file   Stress: Not on file   Social Connections: Not on file   Intimate Partner Violence: Not on file   Housing Stability: Not on file     Social History     Tobacco Use   Smoking Status Never   Smokeless Tobacco Never     Social History     Substance and Sexual Activity   Alcohol Use Yes    Alcohol/week: 3.6 oz    Types: 6 Glasses of wine per week       Allergies/Intolerances:  Allergies   Allergen Reactions    Hydrocodone-Acetaminophen Unspecified     hallucinations      Morphine Unspecified     Severe itchiness  \"It makes me itch\"         Other Current Medications:    Current Facility-Administered Medications:     traMADol (Ultram) 50 MG tablet 50 mg, 50 mg, Oral, Q6HRS PRN, Garry Evans M.D.    SUMAtriptan (IMITREX) tablet 50 mg, 50 mg, Oral, Q6HRS " PRN, Garry Evans M.D., 50 mg at 09/10/22 1043    ampicillin (Omnipen) 2,000 mg in  mL IVPB, 2,000 mg, Intravenous, Q4HRS, Maria Luisa Wyatt M.D.    cefTRIAXone (Rocephin) syringe 2 g, 2 g, Intravenous, Q12HRS, Maria Luisa Wyatt M.D., 2 g at 09/10/22 1044    acetaminophen (Tylenol) tablet 650 mg, 650 mg, Oral, Q6HRS PRN, Antonio Goodman M.D., 650 mg at 09/10/22 0817    benzonatate (TESSALON) capsule 100 mg, 100 mg, Oral, TID PRN, Antonio Goodman M.D.    dextrose 50% (D50W) injection 50 mL, 50 mL, Intravenous, PRN, Antonio Goodman M.D.    guaiFENesin ER (MUCINEX) tablet 1,200 mg, 1,200 mg, Oral, Q12HRS, Antonio Goodman M.D., 1,200 mg at 09/10/22 0523    NS infusion, , Intravenous, Continuous, Antonio Goodman M.D., Last Rate: 75 mL/hr at 09/10/22 1006, New Bag at 09/10/22 1006    senna-docusate (PERICOLACE or SENOKOT S) 8.6-50 MG per tablet 2 Tablet, 2 Tablet, Oral, BID, 2 Tablet at 09/09/22 1740 **AND** polyethylene glycol/lytes (MIRALAX) PACKET 1 Packet, 1 Packet, Oral, QDAY PRN **AND** magnesium hydroxide (MILK OF MAGNESIA) suspension 30 mL, 30 mL, Oral, QDAY PRN **AND** bisacodyl (DULCOLAX) suppository 10 mg, 10 mg, Rectal, QDAY PRN, Vladislav Vera DConcettaO.    ondansetron (ZOFRAN) syringe/vial injection 4 mg, 4 mg, Intravenous, Q4HRS PRN, Vladislav Vera DConcettaO., 4 mg at 09/09/22 1740    ondansetron (ZOFRAN ODT) dispertab 4 mg, 4 mg, Oral, Q4HRS PRN, Vladislav Vera D.O.    promethazine (PHENERGAN) tablet 12.5-25 mg, 12.5-25 mg, Oral, Q4HRS PRN, Vladislav Vera D.O.    promethazine (PHENERGAN) suppository 12.5-25 mg, 12.5-25 mg, Rectal, Q4HRS PRN, Vladislav Vera D.O.    prochlorperazine (COMPAZINE) injection 5-10 mg, 5-10 mg, Intravenous, Q4HRS PRN, Vladislav Vera D.O.    Pharmacy Consult Request ...Pain Management Review 1 Each, 1 Each, Other, PHARMACY TO DOSE, Vladislav Vera D.O.      Most Recent Vital Signs:  /69   Pulse (!) 105   Temp 36.3  °C (97.3 °F) (Temporal)   Resp 18   Wt 66.1 kg (145 lb 12.8 oz)   LMP 2020   SpO2 90%   BMI 24.41 kg/m²   Temp  Av.8 °C (98.3 °F)  Min: 36.3 °C (97.3 °F)  Max: 37.8 °C (100 °F)    Physical Exam:  General: well-appearing, well nourished no acute distress  HEENT: NCAT,  anicteric, Pno eye drainage  Neck: supple, no JVD  Chest: CTAB, unlabored.No wheeze  Cardiac: RRR,  no m/r/g   Abdomen: non-distended  Extremities: No cyanosis, clubbing. no edema  Skin: no visiblerashes   Neuro: somnolent  Psych: not able to assess    Pertinent Lab Results:  Recent Labs     09/08/22  0605 09/09/22  0432 09/10/22  0049   WBC 7.2 11.5* 13.4*      Recent Labs     22  0622  1200 22  2135 22  04322  0910 22  1642 09/10/22  0049 09/10/22  0940   HEMOGLOBIN 7.2*   < >  --  7.4*   < > 9.7* 9.0* 8.9*   HEMATOCRIT 22.4*  --  20.1* 22.7*  --   --  27.4*  --    MCV 85.8  --   --  86.0  --   --  87.0  --    MCH 27.6  --   --  28.0  --   --  28.6  --    PLATELETCT 426*  --   --  486*  --   --  471*  --     < > = values in this interval not displayed.         Recent Labs     09/08/22  0605 09/09/22  0432 09/10/22  0049   SODIUM 138 139 137   POTASSIUM 3.5 3.6 4.3   CHLORIDE 103 105 105   CO2 25 24 23   CREATININE 1.1* 0.7 0.49*        Recent Labs     22  0605 09/09/22  0432 09/10/22  0049   ALBUMIN 1.8* 1.8* 2.4*        Pertinent Micro:  Results       Procedure Component Value Units Date/Time    MRSA By PCR (Amp) [[id number]]     Order Status: Canceled Specimen: Respirate from Nares           Blood Culture   Date Value Ref Range Status   2022 (A)  Final    Growth detected by Bactec instrument.  Enterococcus isolated: See blood culture result from specimen  drawn on 22 at 15:56 for identification and  sensitivities.          Studies:    IMPRESSION:   TV endocarditis  Sepsis E faecalis with TAZ and abnormal LFTs  Fam history colon cancer  Thrombocytosis  ABnormal alk phos and  LFTs  TAZ resolved  Elevated lipase  Anemia    PLAN:     DC vanco and cefepime  Start ampicillin/ceftraixone at endocarditis dosing  BCxs + E faecalis 9/7 and 9/8  Repeat Bcxs every 48 hours until neg  PICC when Bcxs neg 48 hours  Recommend KEO   Anticipate 6 weeks IV abx from date of negative blood cxs  Needs GI work up for source enterococcus    Plan of care discussed with LEATHA Vera/Mari Goodman M.D. Will continue to follow    Maria Luisa Wyatt M.D.

## 2022-09-11 PROBLEM — R74.8 ELEVATED ALKALINE PHOSPHATASE LEVEL: Status: ACTIVE | Noted: 2022-09-11

## 2022-09-11 LAB
ALBUMIN SERPL BCP-MCNC: 2.2 G/DL (ref 3.2–4.9)
ALBUMIN/GLOB SERPL: 0.6 G/DL
ALP SERPL-CCNC: 592 U/L (ref 30–99)
ALT SERPL-CCNC: 32 U/L (ref 2–50)
ANION GAP SERPL CALC-SCNC: 8 MMOL/L (ref 7–16)
AST SERPL-CCNC: 22 U/L (ref 12–45)
BASOPHILS # BLD AUTO: 0 % (ref 0–1.8)
BASOPHILS # BLD AUTO: 0.2 % (ref 0–1.8)
BASOPHILS # BLD: 0 K/UL (ref 0–0.12)
BASOPHILS # BLD: 0.03 K/UL (ref 0–0.12)
BILIRUB SERPL-MCNC: 0.4 MG/DL (ref 0.1–1.5)
BUN SERPL-MCNC: 4 MG/DL (ref 8–22)
CALCIUM SERPL-MCNC: 8.3 MG/DL (ref 8.5–10.5)
CHLORIDE SERPL-SCNC: 100 MMOL/L (ref 96–112)
CO2 SERPL-SCNC: 23 MMOL/L (ref 20–33)
CREAT SERPL-MCNC: 0.47 MG/DL (ref 0.5–1.4)
CRP SERPL HS-MCNC: 11.05 MG/DL (ref 0–0.75)
EOSINOPHIL # BLD AUTO: 0 K/UL (ref 0–0.51)
EOSINOPHIL # BLD AUTO: 0 K/UL (ref 0–0.51)
EOSINOPHIL NFR BLD: 0 % (ref 0–6.9)
EOSINOPHIL NFR BLD: 0 % (ref 0–6.9)
ERYTHROCYTE [DISTWIDTH] IN BLOOD BY AUTOMATED COUNT: 50.3 FL (ref 35.9–50)
ERYTHROCYTE [DISTWIDTH] IN BLOOD BY AUTOMATED COUNT: 51 FL (ref 35.9–50)
GFR SERPLBLD CREATININE-BSD FMLA CKD-EPI: 115 ML/MIN/1.73 M 2
GGT SERPL-CCNC: 172 U/L (ref 7–34)
GLOBULIN SER CALC-MCNC: 3.8 G/DL (ref 1.9–3.5)
GLUCOSE SERPL-MCNC: 94 MG/DL (ref 65–99)
HAPTOGLOB SERPL-MCNC: 331 MG/DL (ref 30–200)
HCT VFR BLD AUTO: 25.3 % (ref 37–47)
HCT VFR BLD AUTO: 30.7 % (ref 37–47)
HGB BLD-MCNC: 10.1 G/DL (ref 12–16)
HGB BLD-MCNC: 8.3 G/DL (ref 12–16)
IMM GRANULOCYTES # BLD AUTO: 0.15 K/UL (ref 0–0.11)
IMM GRANULOCYTES NFR BLD AUTO: 1.2 % (ref 0–0.9)
LYMPHOCYTES # BLD AUTO: 1.2 K/UL (ref 1–4.8)
LYMPHOCYTES # BLD AUTO: 2.13 K/UL (ref 1–4.8)
LYMPHOCYTES NFR BLD: 16.6 % (ref 22–41)
LYMPHOCYTES NFR BLD: 9.3 % (ref 22–41)
MAGNESIUM SERPL-MCNC: 1.6 MG/DL (ref 1.5–2.5)
MANUAL DIFF BLD: ABNORMAL
MCH RBC QN AUTO: 28.4 PG (ref 27–33)
MCH RBC QN AUTO: 28.5 PG (ref 27–33)
MCHC RBC AUTO-ENTMCNC: 32.8 G/DL (ref 33.6–35)
MCHC RBC AUTO-ENTMCNC: 32.9 G/DL (ref 33.6–35)
MCV RBC AUTO: 86.5 FL (ref 81.4–97.8)
MCV RBC AUTO: 86.6 FL (ref 81.4–97.8)
MONOCYTES # BLD AUTO: 0.22 K/UL (ref 0–0.85)
MONOCYTES # BLD AUTO: 0.98 K/UL (ref 0–0.85)
MONOCYTES NFR BLD AUTO: 1.7 % (ref 0–13.4)
MONOCYTES NFR BLD AUTO: 7.6 % (ref 0–13.4)
NEUTROPHILS # BLD AUTO: 11.48 K/UL (ref 2–7.15)
NEUTROPHILS # BLD AUTO: 9.57 K/UL (ref 2–7.15)
NEUTROPHILS NFR BLD: 74.4 % (ref 44–72)
NEUTROPHILS NFR BLD: 89 % (ref 44–72)
NRBC # BLD AUTO: 0 K/UL
NRBC BLD-RTO: 0 /100 WBC
PLATELET # BLD AUTO: 458 K/UL (ref 164–446)
PLATELET # BLD AUTO: 538 K/UL (ref 164–446)
PMV BLD AUTO: 9.6 FL (ref 9–12.9)
PMV BLD AUTO: 9.7 FL (ref 9–12.9)
POTASSIUM SERPL-SCNC: 3.8 MMOL/L (ref 3.6–5.5)
PROT SERPL-MCNC: 6 G/DL (ref 6–8.2)
RBC # BLD AUTO: 2.92 M/UL (ref 4.2–5.4)
RBC # BLD AUTO: 3.55 M/UL (ref 4.2–5.4)
SODIUM SERPL-SCNC: 131 MMOL/L (ref 135–145)
WBC # BLD AUTO: 12.9 K/UL (ref 4.8–10.8)
WBC # BLD AUTO: 12.9 K/UL (ref 4.8–10.8)

## 2022-09-11 PROCEDURE — 700105 HCHG RX REV CODE 258: Performed by: INTERNAL MEDICINE

## 2022-09-11 PROCEDURE — 85007 BL SMEAR W/DIFF WBC COUNT: CPT

## 2022-09-11 PROCEDURE — 85652 RBC SED RATE AUTOMATED: CPT

## 2022-09-11 PROCEDURE — 82977 ASSAY OF GGT: CPT

## 2022-09-11 PROCEDURE — 700102 HCHG RX REV CODE 250 W/ 637 OVERRIDE(OP): Performed by: STUDENT IN AN ORGANIZED HEALTH CARE EDUCATION/TRAINING PROGRAM

## 2022-09-11 PROCEDURE — A9270 NON-COVERED ITEM OR SERVICE: HCPCS | Performed by: STUDENT IN AN ORGANIZED HEALTH CARE EDUCATION/TRAINING PROGRAM

## 2022-09-11 PROCEDURE — 700111 HCHG RX REV CODE 636 W/ 250 OVERRIDE (IP): Performed by: INTERNAL MEDICINE

## 2022-09-11 PROCEDURE — A9270 NON-COVERED ITEM OR SERVICE: HCPCS | Performed by: NURSE PRACTITIONER

## 2022-09-11 PROCEDURE — 85027 COMPLETE CBC AUTOMATED: CPT

## 2022-09-11 PROCEDURE — 99233 SBSQ HOSP IP/OBS HIGH 50: CPT | Performed by: INTERNAL MEDICINE

## 2022-09-11 PROCEDURE — A9270 NON-COVERED ITEM OR SERVICE: HCPCS | Performed by: INTERNAL MEDICINE

## 2022-09-11 PROCEDURE — 700102 HCHG RX REV CODE 250 W/ 637 OVERRIDE(OP): Performed by: INTERNAL MEDICINE

## 2022-09-11 PROCEDURE — 99233 SBSQ HOSP IP/OBS HIGH 50: CPT | Mod: GC | Performed by: HOSPITALIST

## 2022-09-11 PROCEDURE — 80053 COMPREHEN METABOLIC PANEL: CPT

## 2022-09-11 PROCEDURE — 86140 C-REACTIVE PROTEIN: CPT

## 2022-09-11 PROCEDURE — 83735 ASSAY OF MAGNESIUM: CPT

## 2022-09-11 PROCEDURE — 36415 COLL VENOUS BLD VENIPUNCTURE: CPT

## 2022-09-11 PROCEDURE — 700111 HCHG RX REV CODE 636 W/ 250 OVERRIDE (IP)

## 2022-09-11 PROCEDURE — 700102 HCHG RX REV CODE 250 W/ 637 OVERRIDE(OP): Performed by: NURSE PRACTITIONER

## 2022-09-11 PROCEDURE — A9270 NON-COVERED ITEM OR SERVICE: HCPCS

## 2022-09-11 PROCEDURE — 85025 COMPLETE CBC W/AUTO DIFF WBC: CPT

## 2022-09-11 PROCEDURE — 700102 HCHG RX REV CODE 250 W/ 637 OVERRIDE(OP)

## 2022-09-11 PROCEDURE — 770020 HCHG ROOM/CARE - TELE (206)

## 2022-09-11 RX ORDER — PEG-3350, SODIUM SULFATE, SODIUM CHLORIDE, POTASSIUM CHLORIDE, SODIUM ASCORBATE AND ASCORBIC ACID 7.5-2.691G
100 KIT ORAL 2 TIMES DAILY
Status: COMPLETED | OUTPATIENT
Start: 2022-09-11 | End: 2022-09-12

## 2022-09-11 RX ORDER — MAGNESIUM SULFATE HEPTAHYDRATE 40 MG/ML
2 INJECTION, SOLUTION INTRAVENOUS ONCE
Status: COMPLETED | OUTPATIENT
Start: 2022-09-11 | End: 2022-09-11

## 2022-09-11 RX ORDER — POTASSIUM CHLORIDE 20 MEQ/1
20 TABLET, EXTENDED RELEASE ORAL ONCE
Status: COMPLETED | OUTPATIENT
Start: 2022-09-11 | End: 2022-09-11

## 2022-09-11 RX ADMIN — GUAIFENESIN 1200 MG: 600 TABLET, EXTENDED RELEASE ORAL at 16:48

## 2022-09-11 RX ADMIN — SUMATRIPTAN SUCCINATE 100 MG: 50 TABLET ORAL at 16:52

## 2022-09-11 RX ADMIN — POTASSIUM CHLORIDE 20 MEQ: 1500 TABLET, EXTENDED RELEASE ORAL at 08:52

## 2022-09-11 RX ADMIN — AMPICILLIN SODIUM 2000 MG: 2 INJECTION, POWDER, FOR SOLUTION INTRAVENOUS at 10:00

## 2022-09-11 RX ADMIN — AMPICILLIN SODIUM 2000 MG: 2 INJECTION, POWDER, FOR SOLUTION INTRAVENOUS at 16:55

## 2022-09-11 RX ADMIN — SODIUM CHLORIDE: 9 INJECTION, SOLUTION INTRAVENOUS at 03:37

## 2022-09-11 RX ADMIN — OXYCODONE 5 MG: 5 TABLET ORAL at 14:28

## 2022-09-11 RX ADMIN — AMPICILLIN SODIUM 2000 MG: 2 INJECTION, POWDER, FOR SOLUTION INTRAVENOUS at 21:22

## 2022-09-11 RX ADMIN — AMPICILLIN SODIUM 2000 MG: 2 INJECTION, POWDER, FOR SOLUTION INTRAVENOUS at 03:36

## 2022-09-11 RX ADMIN — TRAMADOL HYDROCHLORIDE 50 MG: 50 TABLET, COATED ORAL at 19:31

## 2022-09-11 RX ADMIN — OXYCODONE 5 MG: 5 TABLET ORAL at 07:38

## 2022-09-11 RX ADMIN — SUMATRIPTAN SUCCINATE 100 MG: 50 TABLET ORAL at 09:45

## 2022-09-11 RX ADMIN — CEFTRIAXONE SODIUM 2 G: 10 INJECTION, POWDER, FOR SOLUTION INTRAVENOUS at 18:00

## 2022-09-11 RX ADMIN — ONDANSETRON 4 MG: 4 TABLET, ORALLY DISINTEGRATING ORAL at 08:53

## 2022-09-11 RX ADMIN — PEG-3350, SODIUM SULFATE, SODIUM CHLORIDE, POTASSIUM CHLORIDE, SODIUM ASCORBATE AND ASCORBIC ACID 100 G: KIT at 15:04

## 2022-09-11 RX ADMIN — GUAIFENESIN 1200 MG: 600 TABLET, EXTENDED RELEASE ORAL at 04:36

## 2022-09-11 RX ADMIN — MAGNESIUM SULFATE HEPTAHYDRATE 2 G: 40 INJECTION, SOLUTION INTRAVENOUS at 08:53

## 2022-09-11 RX ADMIN — CEFTRIAXONE SODIUM 2 G: 10 INJECTION, POWDER, FOR SOLUTION INTRAVENOUS at 05:44

## 2022-09-11 RX ADMIN — AMPICILLIN SODIUM 2000 MG: 2 INJECTION, POWDER, FOR SOLUTION INTRAVENOUS at 14:26

## 2022-09-11 RX ADMIN — TRAMADOL HYDROCHLORIDE 50 MG: 50 TABLET, COATED ORAL at 04:35

## 2022-09-11 RX ADMIN — AMPICILLIN SODIUM 2000 MG: 2 INJECTION, POWDER, FOR SOLUTION INTRAVENOUS at 05:44

## 2022-09-11 RX ADMIN — DOCUSATE SODIUM 50 MG AND SENNOSIDES 8.6 MG 2 TABLET: 8.6; 5 TABLET, FILM COATED ORAL at 16:48

## 2022-09-11 ASSESSMENT — ENCOUNTER SYMPTOMS
NERVOUS/ANXIOUS: 1
MYALGIAS: 0
COUGH: 0
FEVER: 1
SENSORY CHANGE: 0
CHILLS: 1
SPEECH CHANGE: 0
BACK PAIN: 1
WEAKNESS: 0
ABDOMINAL PAIN: 0
HEADACHES: 1
SHORTNESS OF BREATH: 0
CHILLS: 0
LOSS OF CONSCIOUSNESS: 0
CARDIOVASCULAR NEGATIVE: 1
WEAKNESS: 1
SHORTNESS OF BREATH: 1
CONSTIPATION: 1
BLURRED VISION: 0
EYES NEGATIVE: 1
PSYCHIATRIC NEGATIVE: 1
SORE THROAT: 0
FEVER: 0

## 2022-09-11 ASSESSMENT — PAIN DESCRIPTION - PAIN TYPE: TYPE: ACUTE PAIN

## 2022-09-11 ASSESSMENT — LIFESTYLE VARIABLES: SUBSTANCE_ABUSE: 0

## 2022-09-11 NOTE — HOSPITAL COURSE
Sarah Buckner is a 51 y.o. female admitted 9/9/2022 with bacterial endocarditis growing enterococcus faecalis on blood cultures prior to 09/08/22, with possible streptococcus on 09/10/22 blood cultures.  Also found to have significant anemia, reportedly had melena outpatient but none inpatient. Patient was placed on ampicillin and ceftriaxone IV. Work-up of Enterococcus bacteremia including EGD, Colonoscopy revealed mild diverticulosis, antral gastritis and sigmoid polypectomy of 7mm pedunculated polyp. Cardiology was consulted and ordered a KEO found a perferation in the tricuspid valve, with open PFO and aortic valve involvement as well.  At that point, cardiothoracic surgery was consulted for assessment and possible valve replacement/repair.     Patient was placed on 6 weeks of antibiotic therapy starting from last negative blood cultures taken 09/13/2022.

## 2022-09-11 NOTE — CONSULTS
Gastroenterology Consult Note:    JOSE Martínez  Date & Time note created:    9/11/2022   12:54 PM     Referring MD:  Dr. Dami Williamson    Patient ID:  Name:             Sarah Buckner   YOB: 1970  Age:                 51 y.o.  female   MRN:               [id number]                                                             Reason for Consult:      TV endocarditis    History of Present Illness:    This is a 52 yo female PMH HTN, Kidney stones who was admitted to the hospital 9/9/22 after being transferred from [facility] for KEO. For the past couple of  months, complaints of fevers, malaise, cough. Diagnosed with pneumonia. Culture: positive for enterococcus bacteremia. KEO demonstrated vegetation of tricuspid valve. ID was consulted who recommended endoscopic evaluation to evaluate source of enterococcus. For a couple of months, complaints of AM emesis of phlegm but she attributed this to taking multiple OTC medications including NSAID's. In Feb. 2022, she had a couple episodes of hematochezia with defecation which resolved.  Denies abdominal pain, GERD, changes in bowel habits. Has noticed black stools but FOBT negative. Never had a colonoscopy/EGD. Notable labs: WBC: 12.9. Hgb: 10.3-->8.3. Sodium: 131. BUN: 4. Crt: 0.47. Alkaline phosphatase: 592. Lipase: 414. LDH: 313. Iron: 19. TIB: 166. % sat: 11. Ferritin: 1842. CT scan abdomen/chest: pneumonia. No acute GI abnormalities. US: mild hepatomegaly.         Review of Systems:      Review of Systems   Constitutional:  Positive for chills, fever and malaise/fatigue.   HENT: Negative.     Eyes: Negative.    Respiratory:  Positive for shortness of breath.    Cardiovascular: Negative.    Gastrointestinal:  Positive for constipation.   Genitourinary: Negative.    Musculoskeletal:  Positive for back pain.   Skin: Negative.    Neurological:  Positive for weakness.   Psychiatric/Behavioral: Negative.             Physical  Exam:  Vitals/ General Appearance:   Weight/BMI: Body mass index is 25.77 kg/m².    Vitals:    09/11/22 0424 09/11/22 0441 09/11/22 0727 09/11/22 1127   BP: 125/83 123/53 125/83 (!) 152/95   Pulse: (!) 116 78 (!) 106 (!) 110   Resp: 18 18 18 18   Temp: 36.7 °C (98 °F) 36.6 °C (97.8 °F) 36.4 °C (97.6 °F) 36.5 °C (97.7 °F)   TempSrc: Temporal Temporal Temporal Temporal   SpO2: 93% 93% 96% 92%   Weight:         Oxygen Therapy:  Pulse Oximetry: 92 %, O2 (LPM): 1, O2 Delivery Device: Silicone Nasal Cannula    Physical Exam  Vitals reviewed.   Constitutional:       Appearance: She is ill-appearing.   HENT:      Head: Normocephalic and atraumatic.      Nose: Nose normal.      Mouth/Throat:      Mouth: Mucous membranes are moist.   Eyes:      Extraocular Movements: Extraocular movements intact.      Pupils: Pupils are equal, round, and reactive to light.   Cardiovascular:      Rate and Rhythm: Regular rhythm. Tachycardia present.      Pulses: Normal pulses.      Heart sounds: Normal heart sounds.   Pulmonary:      Breath sounds: Decreased air movement present. Examination of the right-lower field reveals wheezing. Examination of the left-lower field reveals wheezing. Wheezing present.   Abdominal:      General: Bowel sounds are normal.      Palpations: Abdomen is soft.      Tenderness: There is no abdominal tenderness.   Skin:     General: Skin is warm and dry.      Coloration: Skin is pale.   Neurological:      Mental Status: She is alert and oriented to person, place, and time.   Psychiatric:         Attention and Perception: Attention normal.         Mood and Affect: Mood normal.         Speech: Speech normal.         Behavior: Behavior normal.         Thought Content: Thought content normal.         Cognition and Memory: Cognition normal.         Judgment: Judgment normal.       Past Medical History:   Past Medical History:   Diagnosis Date    Hypertension     Migraine     PONV (postoperative nausea and vomiting)         Past Surgical History:  Past Surgical History:   Procedure Laterality Date    WA LAP,RMV  ADNEXAL STRUCTURE Bilateral 12/8/2020    Procedure: SALPINGO-OOPHORECTOMY, BILATERAL, LAPAROSCOPIC;  Surgeon: Brayan Hunt M.D.;  Location: SURGERY [facility];  Service: Gynecology    VAGINAL HYSTERECTOMY SCOPE TOTAL N/A 12/8/2020    Procedure: LAPAROSCOPIC  SUBTOTAL HYSTERECTOMY;  Surgeon: Brayan Hunt M.D.;  Location: [facility];  Service: Gynecology    CYSTOSCOPY N/A 12/8/2020    Procedure: CYSTOSCOPY;  Surgeon: Brayan Hunt M.D.;  Location: [facility];  Service: Gynecology    LUMPECTOMY      breast reduction, implants     OTHER ORTHOPEDIC SURGERY      orif right wrist hardware    PRIMARY C SECTION      3       Hospital Medications:    Current Facility-Administered Medications:     traMADol (Ultram) 50 MG tablet 50 mg, 50 mg, Oral, Q6HRS PRN, Garry Evans M.D., 50 mg at 09/11/22 0435    ampicillin (Omnipen) 2,000 mg in  mL IVPB, 2,000 mg, Intravenous, Q4HRS, Maria Luisa Wyatt M.D., Stopped at 09/11/22 1030    cefTRIAXone (Rocephin) syringe 2 g, 2 g, Intravenous, Q12HRS, Maria Luisa Wyatt M.D., 2 g at 09/11/22 0544    HYDROmorphone (Dilaudid) injection 0.5 mg, 0.5 mg, Intravenous, Q4HRS PRN, Garry Evans M.D., 0.5 mg at 09/10/22 1458    oxyCODONE immediate-release (ROXICODONE) tablet 5 mg, 5 mg, Oral, Q4HRS PRN, Garry Evans M.D., 5 mg at 09/11/22 1149    diphenhydrAMINE (BENADRYL) tablet/capsule 25 mg, 25 mg, Oral, Q6HRS PRN, Garry Evans M.D.    SUMAtriptan (IMITREX) tablet 100 mg, 100 mg, Oral, Q6HRS PRN, Garry Evans M.D., 100 mg at 09/11/22 0945    acetaminophen (Tylenol) tablet 650 mg, 650 mg, Oral, Q6HRS PRN, Antonio Goodman M.D., 650 mg at 09/10/22 0817    benzonatate (TESSALON) capsule 100 mg, 100 mg, Oral, TID PRN, Antonio Goodman M.D.    dextrose 50% (D50W) injection 50 mL, 50 mL, Intravenous, PRN, Antonio Goodman M.D.     guaiFENesin ER (MUCINEX) tablet 1,200 mg, 1,200 mg, Oral, Q12HRS, Antoino Goodman M.D., 1,200 mg at 09/11/22 0436    NS infusion, , Intravenous, Continuous, Antonio Goodman M.D., Last Rate: 75 mL/hr at 09/11/22 0337, New Bag at 09/11/22 0337    senna-docusate (PERICOLACE or SENOKOT S) 8.6-50 MG per tablet 2 Tablet, 2 Tablet, Oral, BID, 2 Tablet at 09/10/22 1701 **AND** polyethylene glycol/lytes (MIRALAX) PACKET 1 Packet, 1 Packet, Oral, QDAY PRN **AND** magnesium hydroxide (MILK OF MAGNESIA) suspension 30 mL, 30 mL, Oral, QDAY PRN **AND** bisacodyl (DULCOLAX) suppository 10 mg, 10 mg, Rectal, QDAY PRN, Vladislav Vera D.O.    ondansetron (ZOFRAN) syringe/vial injection 4 mg, 4 mg, Intravenous, Q4HRS PRN, MIGUEL A AcostaO., 4 mg at 09/09/22 1740    ondansetron (ZOFRAN ODT) dispertab 4 mg, 4 mg, Oral, Q4HRS PRN, MIGUEL A AcostaO., 4 mg at 09/11/22 0853    promethazine (PHENERGAN) tablet 12.5-25 mg, 12.5-25 mg, Oral, Q4HRS PRN, Vladislav eVra D.O.    promethazine (PHENERGAN) suppository 12.5-25 mg, 12.5-25 mg, Rectal, Q4HRS PRN, Vladislav Vera D.O.    prochlorperazine (COMPAZINE) injection 5-10 mg, 5-10 mg, Intravenous, Q4HRS PRN, Vladislav Vera D.O.    Pharmacy Consult Request ...Pain Management Review 1 Each, 1 Each, Other, PHARMACY TO DOSE, Vladislav Vera D.O.    Current Outpatient Medications:  Current Facility-Administered Medications   Medication Dose Route Frequency Provider Last Rate Last Admin    traMADol (Ultram) 50 MG tablet 50 mg  50 mg Oral Q6HRS PRN Garry Evans M.D.   50 mg at 09/11/22 0435    ampicillin (Omnipen) 2,000 mg in  mL IVPB  2,000 mg Intravenous Q4HRS Maria Luisa Wyatt M.D.   Stopped at 09/11/22 1030    cefTRIAXone (Rocephin) syringe 2 g  2 g Intravenous Q12HRS Maria Luisa Wyatt M.D.   2 g at 09/11/22 0544    HYDROmorphone (Dilaudid) injection 0.5 mg  0.5 mg Intravenous Q4HRS PRN Garry Evans M.D.   0.5 mg at 09/10/22 6618    oxyCODONE  immediate-release (ROXICODONE) tablet 5 mg  5 mg Oral Q4HRS PRN Garry Evans M.D.   5 mg at 09/11/22 1149    diphenhydrAMINE (BENADRYL) tablet/capsule 25 mg  25 mg Oral Q6HRS PRN Garry Evans M.D.        SUMAtriptan (IMITREX) tablet 100 mg  100 mg Oral Q6HRS PRN Garry Evans M.D.   100 mg at 09/11/22 0945    acetaminophen (Tylenol) tablet 650 mg  650 mg Oral Q6HRS PRN Antonio Goodman M.D.   650 mg at 09/10/22 0817    benzonatate (TESSALON) capsule 100 mg  100 mg Oral TID PRN Antonio Goodman M.D.        dextrose 50% (D50W) injection 50 mL  50 mL Intravenous PRN Antonio Goodman M.D.        guaiFENesin ER (MUCINEX) tablet 1,200 mg  1,200 mg Oral Q12HRS Antonio Goodman M.D.   1,200 mg at 09/11/22 0436    NS infusion   Intravenous Continuous Antonio Goodman M.D. 75 mL/hr at 09/11/22 0337 New Bag at 09/11/22 0337    senna-docusate (PERICOLACE or SENOKOT S) 8.6-50 MG per tablet 2 Tablet  2 Tablet Oral BID Vladislav Vera D.O.   2 Tablet at 09/10/22 1701    And    polyethylene glycol/lytes (MIRALAX) PACKET 1 Packet  1 Packet Oral QDAY PRN Vladislav Vera D.O.        And    magnesium hydroxide (MILK OF MAGNESIA) suspension 30 mL  30 mL Oral QDAY PRN Vladislav Vera D.O.        And    bisacodyl (DULCOLAX) suppository 10 mg  10 mg Rectal QDAY PRN Vladislav Vera D.O.        ondansetron (ZOFRAN) syringe/vial injection 4 mg  4 mg Intravenous Q4HRS PRN Vladislav Vera D.O.   4 mg at 09/09/22 1740    ondansetron (ZOFRAN ODT) dispertab 4 mg  4 mg Oral Q4HRS PRN Vladislav Vera D.O.   4 mg at 09/11/22 0853    promethazine (PHENERGAN) tablet 12.5-25 mg  12.5-25 mg Oral Q4HRS PRN Vladislav Vera D.O.        promethazine (PHENERGAN) suppository 12.5-25 mg  12.5-25 mg Rectal Q4HRS PRN Vladislav Vera D.O.        prochlorperazine (COMPAZINE) injection 5-10 mg  5-10 mg Intravenous Q4HRS PRN Vladislav Vera D.O.        Pharmacy Consult Request ...Pain Management Review 1 Each  1  "Each Other PHARMACY TO DOSE Vladislav Vera D.O.           Medication Allergy:  Allergies   Allergen Reactions    Hydrocodone-Acetaminophen Unspecified     hallucinations      Morphine Unspecified     Severe itchiness  \"It makes me itch\"       Family History:  Family History   Problem Relation Age of Onset    Clotting Disorder Mother        Social History:  Social History     Socioeconomic History    Marital status:      Spouse name: Not on file    Number of children: Not on file    Years of education: Not on file    Highest education level: Not on file   Occupational History    Not on file   Tobacco Use    Smoking status: Never    Smokeless tobacco: Never   Vaping Use    Vaping Use: Never used   Substance and Sexual Activity    Alcohol use: Yes     Alcohol/week: 3.6 oz     Types: 6 Glasses of wine per week    Drug use: Never    Sexual activity: Not on file   Other Topics Concern    Not on file   Social History Narrative    Not on file     Social Determinants of Health     Financial Resource Strain: Not on file   Food Insecurity: Not on file   Transportation Needs: Not on file   Physical Activity: Not on file   Stress: Not on file   Social Connections: Not on file   Intimate Partner Violence: Not on file   Housing Stability: Not on file         MDM (Data Review):     Records reviewed and summarized in current documentation    Lab Data Review:  Recent Results (from the past 24 hour(s))   HGB    Collection Time: 09/10/22  4:51 PM   Result Value Ref Range    Hemoglobin 10.3 (L) 12.0 - 16.0 g/dL   BLOOD CULTURE    Collection Time: 09/10/22  4:51 PM    Specimen: Peripheral; Blood   Result Value Ref Range    Significant Indicator NEG     Source BLD     Site PERIPHERAL     Culture Result       No Growth  Note: Blood cultures are incubated for 5 days and  are monitored continuously.Positive blood cultures  are called to the RN and reported as soon as  they are identified.     BLOOD CULTURE    Collection Time: " 09/10/22  4:51 PM    Specimen: Peripheral; Blood   Result Value Ref Range    Significant Indicator NEG     Source BLD     Site PERIPHERAL     Culture Result       No Growth  Note: Blood cultures are incubated for 5 days and  are monitored continuously.Positive blood cultures  are called to the RN and reported as soon as  they are identified.     CBC WITH DIFFERENTIAL    Collection Time: 09/11/22  1:29 AM   Result Value Ref Range    WBC 12.9 (H) 4.8 - 10.8 K/uL    RBC 2.92 (L) 4.20 - 5.40 M/uL    Hemoglobin 8.3 (L) 12.0 - 16.0 g/dL    Hematocrit 25.3 (L) 37.0 - 47.0 %    MCV 86.6 81.4 - 97.8 fL    MCH 28.4 27.0 - 33.0 pg    MCHC 32.8 (L) 33.6 - 35.0 g/dL    RDW 51.0 (H) 35.9 - 50.0 fL    Platelet Count 458 (H) 164 - 446 K/uL    MPV 9.7 9.0 - 12.9 fL    Neutrophils-Polys 74.40 (H) 44.00 - 72.00 %    Lymphocytes 16.60 (L) 22.00 - 41.00 %    Monocytes 7.60 0.00 - 13.40 %    Eosinophils 0.00 0.00 - 6.90 %    Basophils 0.20 0.00 - 1.80 %    Immature Granulocytes 1.20 (H) 0.00 - 0.90 %    Nucleated RBC 0.00 /100 WBC    Neutrophils (Absolute) 9.57 (H) 2.00 - 7.15 K/uL    Lymphs (Absolute) 2.13 1.00 - 4.80 K/uL    Monos (Absolute) 0.98 (H) 0.00 - 0.85 K/uL    Eos (Absolute) 0.00 0.00 - 0.51 K/uL    Baso (Absolute) 0.03 0.00 - 0.12 K/uL    Immature Granulocytes (abs) 0.15 (H) 0.00 - 0.11 K/uL    NRBC (Absolute) 0.00 K/uL   Comp Metabolic Panel    Collection Time: 09/11/22  1:29 AM   Result Value Ref Range    Sodium 131 (L) 135 - 145 mmol/L    Potassium 3.8 3.6 - 5.5 mmol/L    Chloride 100 96 - 112 mmol/L    Co2 23 20 - 33 mmol/L    Anion Gap 8.0 7.0 - 16.0    Glucose 94 65 - 99 mg/dL    Bun 4 (L) 8 - 22 mg/dL    Creatinine 0.47 (L) 0.50 - 1.40 mg/dL    Calcium 8.3 (L) 8.5 - 10.5 mg/dL    AST(SGOT) 22 12 - 45 U/L    ALT(SGPT) 32 2 - 50 U/L    Alkaline Phosphatase 592 (H) 30 - 99 U/L    Total Bilirubin 0.4 0.1 - 1.5 mg/dL    Albumin 2.2 (L) 3.2 - 4.9 g/dL    Total Protein 6.0 6.0 - 8.2 g/dL    Globulin 3.8 (H) 1.9 - 3.5 g/dL     A-G Ratio 0.6 g/dL   MAGNESIUM    Collection Time: 09/11/22  1:29 AM   Result Value Ref Range    Magnesium 1.6 1.5 - 2.5 mg/dL   ESTIMATED GFR    Collection Time: 09/11/22  1:29 AM   Result Value Ref Range    GFR (CKD-EPI) 115 >60 mL/min/1.73 m 2       Imaging/Procedures Review:      [id number]   Final Result      Multiple dental caries.      EC-KEO W/O CONT    (Results Pending)   MR-LUMBAR SPINE-WITH & W/O    (Results Pending)        MDM (Assessment and Plan):     Patient Active Problem List    Diagnosis Date Noted    Endocarditis 09/09/2022    Bacteremia due to Enterococcus 09/08/2022    Infective endocarditis of tricuspid valve 09/08/2022    Fever 09/07/2022    Sepsis (HCC) 09/07/2022    Pneumonia 09/07/2022    Leukocytosis 09/07/2022    Anemia 09/07/2022    TAZ (acute kidney injury) (HCC) 09/07/2022    Elevated liver function tests 09/07/2022    Hyponatremia 09/07/2022    Hypokalemia 09/07/2022    Status post hysterectomy 07/19/2022    Ulnar neuropathy at elbow of right upper extremity 07/01/2020    Cervical disc disease 01/27/2020    Neck pain, chronic 01/22/2020    Migraine with aura 03/10/2015    Calculus of kidney 05/11/2010     This is a 52 yo female, who works in Healthcare as a scrub technician, admitted to the hospital 9/9/22 for KEO due to endocarditis. Previously admitted to [facility] for fevers, chills, malaise for a couple of months. Findings of pneumonia and endocarditis. Culture came back with Enterococcus Bacteremia. KEO: multiple vegetation of Tricuspid valve. Denies IV drug use, recent travel. For several months, she has been experiencing daily emesis of clear phlegm in the morning, which she attributed to taking multiple OTC medications and NSAID's. Denies abdominal pain, GERD, changes in bowel habits. Over past 2 weeks, noticed dark colored stools. FOBT negative. Infectious Disease consulted who recommended endoscopic evaluation to identify Enterococcus source. Never had a  colonoscopy.     ASSESSMENT:  Endocarditis  Enterococcus Bacteremia concern for colon source   Pneumonia  Anemia plan to do an EGD for further evaluation of anemia  Elevated alkaline phosphatase  Elevated lipase with no abdominal pain    PLAN:  1.Risks, benefits, and alternatives of EGD/colonoscopy procedures were discussed with patient and/or family member(s).  Consenting person(s) were given opportunities to ask questions and discuss other options.  Risks including but not limited to perforation, infection, bleeding, missed lesion(s), possible need for surgery(ies) and/or interventional radiology, possible need for repeat procedure(s) and/or additional testing, hospitalization possibly prolonged, cardiac and/or pulmonary event, aspiration, hypoxia, stroke, medication and/or anesthesia reaction, indefinite diagnosis, discomfort, unsuccessful and/or incomplete procedure, ineffective therapy and/or persistent symptoms, damage to adjacent organs and/or vascular structures, and other adverse events possibly life-threatening.  Interactive discussion was undertaken with Layman's terms. I answered questions in full and to satisfaction.  Consenting person(s) stated understanding and acceptance of these risks, and wished to proceed.  Informed consent was given in clear state of mind.        -Clear liquids, no reds  -NPO after midnight  -Movie Prep  -Trend Hgb and transfuse >7  -Appreciate infectious disease recommendations  -Iso enzymes of alkaline phosphatase and GGT    Thank your for the opportunity to assist in the care of your patient.  Please call for any questions or concerns.    SHAYNA Martínez.

## 2022-09-11 NOTE — CARE PLAN
Problem: Knowledge Deficit - Standard  Goal: Patient and family/care givers will demonstrate understanding of plan of care, disease process/condition, diagnostic tests and medications  Outcome: Progressing     Problem: Pain - Standard  Goal: Alleviation of pain or a reduction in pain to the patient’s comfort goal  Outcome: Progressing   The patient is Stable - Low risk of patient condition declining or worsening    Shift Goals  Clinical Goals: pain control and remain hemodynamically stable  Patient Goals: rest and pain control    Progress made toward(s) clinical / shift goals:      Patient is not progressing towards the following goals:

## 2022-09-11 NOTE — PROGRESS NOTES
"Infectious Disease Progress Note    Author: Maria Luisa Wyatt M.D. Date & Time of service: 2022  1:52 PM    Chief Complaint:  TV endocarditis    Interval History:  51 y.o. female admitted 2022 as transfer from [place] for [facility]. Blood cultures +Efaecalis. TTE with vegetation TV and acute anemia Hgb  down to 6.4 (baseline 13-14)   AF WBC 12.9 Patient alert today. Additional history obtained.  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  Normally active and regularly able to 50+#.  States she felt well until end of May after a 3 hour car ride had the sudden onset of back pain-she saw her PCP, spine and was referred to pain management.  She did not want to take all the narcotics she was given so had been self treating with NSAIDS.  The pain in her back was initially upper back but would change-sometimes low back and varied from side to side.  She was concerned that \"something else\" was going on. MRI done 2022 showed possible myelofibrosis.  On , started feeling feverish, very fatigued, and noted pallor \"I looked sick... like a zombie\"     Labs Reviewed, Medications Reviewed, and Radiology Reviewed.    Review of Systems:  Review of Systems   Constitutional:  Positive for malaise/fatigue. Negative for fever.   Musculoskeletal:  Positive for back pain.   Neurological:  Positive for weakness.   Psychiatric/Behavioral:  The patient is nervous/anxious.      Hemodynamics:  Temp (24hrs), Av.6 °C (97.8 °F), Min:36.1 °C (97 °F), Max:36.8 °C (98.3 °F)  Temperature: 36.5 °C (97.7 °F)  Pulse  Av.3  Min: 78  Max: 144   Blood Pressure: (!) 152/95       Physical Exam:  Physical Exam  Vitals and nursing note reviewed.   Constitutional:       General: She is not in acute distress.     Appearance: She is not ill-appearing, toxic-appearing or diaphoretic.   HENT:      Nose: No rhinorrhea.   Eyes:      General: No scleral icterus.     Extraocular Movements: Extraocular movements intact.      " Pupils: Pupils are equal, round, and reactive to light.   Cardiovascular:      Rate and Rhythm: Tachycardia present.      Heart sounds: Murmur heard.   Abdominal:      General: There is no distension.      Palpations: Abdomen is soft.      Tenderness: There is no abdominal tenderness.   Skin:     Coloration: Skin is pale. Skin is not jaundiced.      Comments: No osler or Janeway lesions   Neurological:      General: No focal deficit present.      Mental Status: She is alert and oriented to person, place, and time.       Meds:    Current Facility-Administered Medications:     peg-electrolyte solution    traMADol    ampicillin    cefTRIAXone (ROCEPHIN) IV    HYDROmorphone    oxyCODONE immediate-release    diphenhydrAMINE    SUMAtriptan    acetaminophen    benzonatate    dextrose 50%    guaiFENesin ER    NS    senna-docusate **AND** polyethylene glycol/lytes **AND** magnesium hydroxide **AND** bisacodyl    ondansetron    ondansetron    promethazine    promethazine    prochlorperazine    Pharmacy Consult Request    Labs:  Recent Labs     09/09/22  0432 09/09/22  0910 09/10/22  0049 09/10/22  0940 09/10/22  1651 09/11/22  0129   WBC 11.5*  --  13.4*  --   --  12.9*   RBC 2.64*  --  3.15*  --   --  2.92*   HEMOGLOBIN 7.4*   < > 9.0* 8.9* 10.3* 8.3*   HEMATOCRIT 22.7*  --  27.4*  --   --  25.3*   MCV 86.0  --  87.0  --   --  86.6   MCH 28.0  --  28.6  --   --  28.4   RDW 16.8*  --  51.5*  --   --  51.0*   PLATELETCT 486*  --  471*  --   --  458*   MPV 10.6*  --  10.3  --   --  9.7   NEUTSPOLYS  --   --  74.50*  --   --  74.40*   LYMPHOCYTES  --   --  17.00*  --   --  16.60*   MONOCYTES  --   --  6.70  --   --  7.60   EOSINOPHILS  --   --  0.00  --   --  0.00   BASOPHILS  --   --  0.40  --   --  0.20    < > = values in this interval not displayed.     Recent Labs     09/09/22  0432 09/10/22  0049 09/11/22  0129   SODIUM 139 137 131*   POTASSIUM 3.6 4.3 3.8   CHLORIDE 105 105 100   CO2 24 23 23   GLUCOSE 102* 92 94   BUN 11  5* 4*     Recent Labs     09/09/22  0432 09/10/22  0049 09/11/22  0129   ALBUMIN 1.8* 2.4* 2.2*   TBILIRUBIN 0.5  --  0.4   ALKPHOSPHAT 752*  --  592*   TOTPROTEIN 6.3*  --  6.0   ALTSGPT 81*  --  32   ASTSGOT 60*  --  22   CREATININE 0.7 0.49* 0.47*       Imaging:  CT-CHEST (THORAX) WITH    Result Date: 9/8/2022   CT of the chest, abdomen and pelvis HISTORY/REASON FOR EXAM:  concern for malignancy TECHNIQUE/EXAM DESCRIPTION: CT scan of the chest, abdomen and pelvis with contrast.  Both automated exposure control and Automated adjustment of tube current based on patient size are utilized.  9/8/2022 8:33 AM. Total DLP: 470.34 mGy*cm COMPARISON: None. FINDINGS: Chest: There are multifocal areas of consolidation involving predominantly the upper lobes and to a lesser degree the lower lobes.  One of them is cavitary in the left lower lobe measuring 1.3 cm.  No pleural effusion or pneumothorax. The heart is normal in size.  There is trace pericardial effusion. The great vessel origins are unremarkable.  The aorta is normal in caliber. No pathologically enlarged adenopathy. There are no concerning osseous lesions.  No fractures.  Bilateral breast implants. Abdomen pelvis: Solid organs: The liver, pancreas and adrenal glands are unremarkable.  There is a cyst in the anterior spleen. The gallbladder is present. Kidneys and Bladder: There is no nephrolithiasis or hydronephrosis.  Mild focal scarring left upper pole kidney. No enhancing renal masses. The kidneys enhance symmetrically. No ureteral or bladder stones are seen. The bladder is grossly unremarkable. Bowel and Appendix: No dilated bowel. No perienteric inflammatory changes. No extraluminal air or free fluid. The appendix is visualized and appears normal. No pelvic masses. Lymphatics:  No abdominal, pelvic, or retroperitoneal lymphadenopathy. Other: No concerning osseous lesions. No fractures.     1.  Multifocal consolidation concerning for pneumonia, with small  cavitary lesion in the left lower lobe. 2.  No acute abnormality or evidence of malignancy to the abdomen or pelvis. Jazzy Huberbury 9/8/2022 9:00 AM DLP Reporting Thresholds for Incorrect/Repeated Exams - DLP in mGy*cm Head/Neck:  0-year-old 3840, 1-year-old 5880, 5-year-old 8770, 10-year-old 32518 and adult 70109 Head:  0-year-old 4540, 1-year-old 7460, 5-year-old 01922, 10-year-old 03040 and adult 50023 Neck:  0-year-old 2940, 1-year-old 4160, 5-year-old 4550, 10-year-old 6320 and adult 8470 Chest:  0-year-old 550, 1-year-old 830, 5-year-old 1200, 10-year-old 3840 and adult 3570 Abd/pelvis:  0-year-old 440, 1-year-old 720, 5-year-old 1080, 10-year-old 3330 and adult 3330 Trunk(C/A/P):  0-year-old 490, 1-year-old 770, 5-year-old 1140, 10-year-old 3570 and adult 3330    CT-HEAD W/O    Result Date: 9/7/2022   CT head without contrast HISTORY/REASON FOR EXAM:  Headache, new or worsening (Age >= 50y). TECHNIQUE/EXAM DESCRIPTION: CT scan of the brain without contrast. Both automated exposure control and Automated adjustment of tube current based on patient size are utilized. 9/7/2022 4:19 PM. CT scan of the brain was carried out without contrast in the normal manner. Total DLP: 766.10 mGy*cm COMPARISON: None. FINDINGS: The brain parenchyma is unremarkable. The gray-white matter differentiation is well preserved throughout. There is no evidence of mass, mass effect, hemorrhage, or extraaxial fluid collection.  There is no midline shift. The orbits and calvarium are intact. No evidence of fracture. The visualized paranasal sinuses are clear. The mastoid air cells are well pneumatized.     No acute intracranial abnormality. Jazzy Huberbury 9/7/2022 4:23 PM DLP Reporting Thresholds for Incorrect/Repeated Exams - DLP in mGy*cm Head/Neck:  0-year-old 3840, 1-year-old 5880, 5-year-old 8770, 10-year-old 99319 and adult 89573 Head:  0-year-old 4540, 1-year-old 7460, 5-year-old 82833, 10-year-old 68786 and adult 19713 Neck:   0-year-old 2940, 1-year-old 4160, 5-year-old 4550, 10-year-old 6320 and adult 8470 Chest:  0-year-old 550, 1-year-old 830, 5-year-old 1200, 10-year-old 3840 and adult 3570 Abd/pelvis:  0-year-old 440, 1-year-old 720, 5-year-old 1080, 10-year-old 3330 and adult 3330 Trunk(C/A/P):  0-year-old 490, 1-year-old 770, 5-year-old 1140, 10-year-old 3570 and adult 3330    DX-CHEST-PORTABLE (1 VIEW)    Result Date: 9/7/2022  HISTORY/REASON FOR EXAM:  Cough. TECHNIQUE/EXAM DESCRIPTION AND NUMBER OF VIEWS: Portable chest (one view), 9/7/2022 4:22 PM. COMPARISON: None. FINDINGS: There is mild multifocal groundglass consolidation left upper lobe and right upper lobe, to a lesser degree left lower lobe.  No pleural effusion or pneumothorax. The cardiomediastinal silhouette is normal in size. The bones are intact.     Multifocal bilateral consolidation concerning for pneumonia. Jazzy Huberbury 9/7/2022 8:06 PM    [id number]    Result Date: 9/10/2022  9/10/2022 1:54 PM HISTORY/REASON FOR EXAM:  Bacterial endocarditis. TECHNIQUE/EXAM DESCRIPTION AND NUMBER OF VIEWS:  1 view(s) of the mandible. COMPARISON:  None. FINDINGS: No evidence of fracture. No lytic or sclerotic lesion. There are multiple dental caries.     Multiple dental caries.    US-ABDOMEN COMPLETE SURVEY    Result Date: 9/7/2022  HISTORY/REASON FOR EXAM: Abnormal Labs; elevated liver function tests, with elevated alk phos. TECHNIQUE/EXAM DESCRIPTION: Ultrasound abdomen complete.9/7/2022 7:57 PM COMPARISON: None FINDINGS: Pancreas: The tail is suboptimally visualized due to overlying bowel gas. Aorta and IVC: Normal in caliber. Liver: Normal.  The Liver Measures 19.6 cm. The portal vein is patent with flow in the proper direction into the liver.  This is normal. Gallbladder: Normal. Common bile duct: 3 mm, which is normal. Spleen: Normal in size and echogenicity. Kidneys: The kidneys are normal in size and echogenicity. There is No ascites.     Mild  hepatomegaly.  Otherwise unremarkable exam. Jazzy Bellamy 9/7/2022 8:27 PM    CT ABDOMEN-PELVIS WITH IV CONTRAST    Result Date: 9/8/2022   CT of the chest, abdomen and pelvis HISTORY/REASON FOR EXAM:  concern for malignancy TECHNIQUE/EXAM DESCRIPTION: CT scan of the chest, abdomen and pelvis with contrast.  Both automated exposure control and Automated adjustment of tube current based on patient size are utilized.  9/8/2022 8:33 AM. Total DLP: 470.34 mGy*cm COMPARISON: None. FINDINGS: Chest: There are multifocal areas of consolidation involving predominantly the upper lobes and to a lesser degree the lower lobes.  One of them is cavitary in the left lower lobe measuring 1.3 cm.  No pleural effusion or pneumothorax. The heart is normal in size.  There is trace pericardial effusion. The great vessel origins are unremarkable.  The aorta is normal in caliber. No pathologically enlarged adenopathy. There are no concerning osseous lesions.  No fractures.  Bilateral breast implants. Abdomen pelvis: Solid organs: The liver, pancreas and adrenal glands are unremarkable.  There is a cyst in the anterior spleen. The gallbladder is present. Kidneys and Bladder: There is no nephrolithiasis or hydronephrosis.  Mild focal scarring left upper pole kidney. No enhancing renal masses. The kidneys enhance symmetrically. No ureteral or bladder stones are seen. The bladder is grossly unremarkable. Bowel and Appendix: No dilated bowel. No perienteric inflammatory changes. No extraluminal air or free fluid. The appendix is visualized and appears normal. No pelvic masses. Lymphatics:  No abdominal, pelvic, or retroperitoneal lymphadenopathy. Other: No concerning osseous lesions. No fractures.     1.  Multifocal consolidation concerning for pneumonia, with small cavitary lesion in the left lower lobe. 2.  No acute abnormality or evidence of malignancy to the abdomen or pelvis. Jazzy Bellamy 9/8/2022 9:00 AM DLP Reporting Thresholds  for Incorrect/Repeated Exams - DLP in mGy*cm Head/Neck:  0-year-old 3840, 1-year-old 5880, 5-year-old 8770, 10-year-old 88125 and adult 96264 Head:  0-year-old 4540, 1-year-old 7460, 5-year-old 24964, 10-year-old 13001 and adult 83674 Neck:  0-year-old 2940, 1-year-old 4160, 5-year-old 4550, 10-year-old 6320 and adult 8470 Chest:  0-year-old 550, 1-year-old 830, 5-year-old 1200, 10-year-old 3840 and adult 3570 Abd/pelvis:  0-year-old 440, 1-year-old 720, 5-year-old 1080, 10-year-old 3330 and adult 3330 Trunk(C/A/P):  0-year-old 490, 1-year-old 770, 5-year-old 1140, 10-year-old 3570 and adult 3330    EC-ECHOCARDIOGRAM COMPLETE W/O CONT    Result Date: 2022  Transthoracic Echo Report Echocardiography Laboratory CONCLUSIONS No prior study is available for comparison. Technically difficult study - adequate information is obtained. Probable vegetation seen on the tricuspid valve in subcostal view. Normal left ventricular systolic function. The left ventricular ejection fraction is visually estimated to be 60%. Normal diastolic function. The right ventricle is normal in size and systolic function. Mild aortic insufficiency. These findings were communicated to the medical service. AMAURYRUDDY Exam Date:         2022                    14:12 Exam Location:     Echo Lab Priority:          Routine Ordering Physician:        CATHI CAMARENA Referring Physician: Sonographer:               Sissy Ontiveros Age:    51     Gender:    F MRN:    [id number] :    1970 BSA:    1.65   Ht (in):    64.6   Wt (lb):    132 Exam Type:     Complete Indications:     Bacteremia ICD Codes: CPT Codes:       04634 BP:   113    /   77     HR:   117 Technical Quality:       Technically difficult study -                          adequate information is obtained MEASUREMENTS  (Male / Female) Normal Values 2D ECHO LV Diastolic Diameter PLAX        4.2 cm                4.2 - 5.9 / 3.9 - 5.3 cm LV  Systolic Diameter PLAX         3 cm                  2.1 - 4.0 cm IVS Diastolic Thickness           0.67 cm               LVPW Diastolic Thickness          0.68 cm               LVOT Diameter                     2 cm                  Aortic Root Diameter              2.6 cm                Estimated LV Ejection Fraction    60 %                  IVC Diameter                      1.8 cm                DOPPLER AV Peak Velocity                  2.3 m/s               AV Peak Gradient                  21.6 mmHg             AV Mean Gradient                  14.3 mmHg             AI Pressure Half Time             500 ms                LVOT Peak Velocity                1.4 m/s               AV Area Cont Eq vti               1.5 cm2               MV Velocity Time Integral         13.7 cm               Mitral E Point Velocity           0.61 m/s              Mitral E to A Ratio               0.65                  MV Pressure Half Time             45.5 ms               MV Area PHT                       4.8 cm2               MV Deceleration Time              157 ms                TR Peak Velocity                  186 cm/s              Right Atrial Pressure             8 mmHg                Right Ventricular Systolic Press  21.8 mmHg             PV Peak Velocity                  1 m/s                 PV Peak Gradient                  4.3 mmHg              RVOT Peak Velocity                0.65 m/s              * Indicates values subject to auto-interpretation LV EF:  60    % FINDINGS Left Ventricle The left ventricular ejection fraction is visually estimated to be 60%. Normal left ventricular systolic function. Normal left ventricular wall thickness. Normal left ventricular chamber size. Abnormal regional wall motion. Abnormal septal motion. Normal diastolic function. Right Ventricle The right ventricle is normal in size and systolic function. Right Atrium The right atrium is normal in size. Normal inferior vena cava size without  inspiratory collapse. Left Atrium The left atrium is normal in size. Left atrial volume index is 10 mL/sq m. Mitral Valve Structurally normal mitral valve without significant stenosis. Trace mitral regurgitation. Aortic Valve Mild aortic insufficiency. Pressure half time is 500 msec. Aortic valve sclerosis without significant stenosis. Tricuspid aortic valve. Tricuspid Valve Structurally normal tricuspid valve without significant stenosis. Mild tricuspid regurgitation. Right atrial pressure is estimated to be 8 mmHg. Estimated right ventricular systolic pressure is 20 mmHg. Probable vegetation seen on the tricuspid valve in subcostal view. Pulmonic Valve Structurally normal pulmonic valve without significant stenosis or regurgitation. Pericardium Plural effusion present. Aorta Normal aortic root for body surface area. The ascending aorta diameter is 2.9 cm. Mark Sykes MD (Electronically Signed) Final Date:     08 September 2022                 17:56    MRI 7/31/2022  FINDINGS:  Spinal column:  There is decreased T1/T2 signal noted within the vertebral marrow homogeneously throughout the spinal column, raising the question of infiltrative disease, such as myelofibrosis.     No destructive osseous process is appreciated.     Vertebral body morphology is normal.     No fracture is identified..     Spinal cord:     The conus medullaris is somewhat low, noted the level of L2-L3.     I see no evidence of syrinx.     Distal spinal cord appears unremarkable.     Cauda equina appear unremarkable.     Intervertebral disks:     At the level of L1-L2, the intervertebral disk, the central spinal canal, and the neural foramina appear normal.     At the level of L2-L3,  there is mild diffuse disc desiccation. There is minimal facet arthrosis.     Central spinal canal and neural foramina appear normal.     At the level of L3-L4, there is mild disc desiccation. There is a minimal circumferential disc bulge as well as  "minimal facet arthrosis. There is no significant foraminal, or central canal narrowing.     At the level of L4-L5, there is mild disc desiccation.     There is mild facet arthrosis.     Central spinal canal and neural foramina appear normal.     At the level of L5-S1, there is mild disc space narrowing.  There is mild disc desiccation.  Central spinal canal and neural foramina appear normal.     IMPRESSION:     I see no evidence of destructive osseous process and no evidence of significant narrowing of central spinal canal or the neural foramina.       Micro:  Results       Procedure Component Value Units Date/Time    BLOOD CULTURE [[id number]] Collected: 09/10/22 1651    Order Status: Completed Specimen: Blood from Peripheral Updated: 09/11/22 0742     Significant Indicator NEG     Source BLD     Site PERIPHERAL     Culture Result No Growth  Note: Blood cultures are incubated for 5 days and  are monitored continuously.Positive blood cultures  are called to the RN and reported as soon as  they are identified.      Narrative:      Per Hospital Policy: Only change Specimen Src: to \"Line\" if  specified by physician order.  Left AC    BLOOD CULTURE [[id number]] Collected: 09/10/22 1651    Order Status: Completed Specimen: Blood from Peripheral Updated: 09/11/22 0742     Significant Indicator NEG     Source BLD     Site PERIPHERAL     Culture Result No Growth  Note: Blood cultures are incubated for 5 days and  are monitored continuously.Positive blood cultures  are called to the RN and reported as soon as  they are identified.      Narrative:      Per Hospital Policy: Only change Specimen Src: to \"Line\" if  specified by physician order.  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]     TAZ (acute kidney injury) ([facility]) [N17.9]     Pneumonia [J18.9]     " Anemia [D64.9]     Migraine with aura [G43.109]      TV endocarditis by TTE with mild AI  Sepsis E faecalis with TAZ and abnormal LFTs  Fam history colon cancer  Thrombocytosis  ABnormal alk phos and LFTs  TAZ resolved  Elevated lipase  Anemia     PLAN:   Continue ampicillin/ceftraixone at endocarditis dosing  BCxs + E faecalis 9/7 and 9/8  Repeat Bcxs every 48 hours until neg  PICC when Bcxs neg 48 hours  Recommend KEO   Anticipate 6 weeks IV abx from date of negative blood cxs  Appreciate GI consult and plan to work up for source enterococcus and anemia with EGD and colonoscopy  Efaecalis is intestinal not oral keyon: Her dental caries may be related to oral streps such as strep mutans, but not Efaecalis

## 2022-09-11 NOTE — ASSESSMENT & PLAN NOTE
Alk phos has been significantly elevated throughout admission.  Isozymes confirm liver source.  Alk phos went as high as 1,433 and has trended down  Follow weekly and this is appropriate for outpatient follow up.

## 2022-09-11 NOTE — PROGRESS NOTES
Received bedside report from KEDAR valles, pt care assumed. VS stable, pt assessment complete. Pt A&Ox4, chronic c/o  migraine headache pain at this time. POC discussed with pt and verbalizes no questions. Pt denies any additional needs at this time. Bed locked and in lowest position, bed alarm off. Pt educated on fall risk and verbalized understanding, call light within reach, hourly rounding initiated.  In a sinus rhythm.

## 2022-09-11 NOTE — PROGRESS NOTES
[facility] Internal Medicine Daily Progress Note    Date of Service  9/11/2022    [employer] Team: R IM White Team   Attending: ZOHAIB Williamson M.d.  Senior Resident: Dr. Evans  Intern:  Dr. Rowley  Contact Number: [phone]    Chief Complaint  Sarah Buckner is a 51 y.o. female admitted 9/9/2022 with bacterial endocarditis growing out enterococcus faecaecalis on Bcs.     Hospital Course  Sarah Buckner is a 51 y.o. female admitted 9/9/2022 with bacterial endocarditis growing out enterococcus faecalis on blood cultures.  Also found to have significant anemia, reportedly had melena outpatient but none inpatient.  Ongoing work-up for possible etiology of Enterococcus bacteremia.    Interval Problem Update  -GI consulted  -plan for KEO, EGD, colonoscopy on 9/12  -Patient has no new symptoms    I have discussed this patient's plan of care and discharge plan at IDT rounds today with Case Management, Nursing, Nursing leadership, and other members of the IDT team.    Consultants/Specialty  cardiology    Code Status  Full Code    Disposition  Patient is not medically cleared for discharge.   Anticipate discharge to to home with close outpatient follow-up.  I have placed the appropriate orders for post-discharge needs.    Review of Systems  Review of Systems   Constitutional:  Negative for chills and fever.   HENT:  Negative for sore throat.    Eyes:  Negative for blurred vision.   Respiratory:  Negative for cough and shortness of breath.    Cardiovascular:  Negative for chest pain.   Gastrointestinal:  Negative for abdominal pain.   Genitourinary:  Negative for dysuria and urgency.   Musculoskeletal:  Positive for back pain and joint pain. Negative for myalgias.   Neurological:  Positive for headaches. Negative for sensory change, speech change, loss of consciousness and weakness.   Psychiatric/Behavioral:  Negative for substance abuse.       Physical Exam  Temp:  [36.4 °C (97.6 °F)-36.8 °C (98.3 °F)] 36.7 °C (98  °F)  Pulse:  [] 120  Resp:  [18] 18  BP: (123-152)/(53-95) 148/95  SpO2:  [92 %-96 %] 93 %    Physical Exam  Vitals and nursing note reviewed.   Constitutional:       Appearance: Normal appearance.   HENT:      Head: Normocephalic.      Mouth/Throat:      Mouth: Mucous membranes are moist.      Comments: Possible carries on left side of jaw teeth  Eyes:      General: No scleral icterus.  Cardiovascular:      Rate and Rhythm: Normal rate.      Pulses: Normal pulses.   Pulmonary:      Effort: No respiratory distress.      Breath sounds: No wheezing.   Abdominal:      Palpations: Abdomen is soft.      Tenderness: There is no abdominal tenderness.   Musculoskeletal:      Right lower leg: No edema.      Left lower leg: No edema.   Skin:     General: Skin is warm and dry.   Neurological:      Mental Status: She is alert and oriented to person, place, and time.      Cranial Nerves: No cranial nerve deficit.      Motor: No weakness.   Psychiatric:         Mood and Affect: Mood normal.         Behavior: Behavior normal.       Fluids  No intake or output data in the 24 hours ending 09/11/22 1629      Laboratory  Recent Labs     09/10/22  0049 09/10/22  0940 09/10/22  1651 09/11/22  0129 09/11/22  1308   WBC 13.4*  --   --  12.9* 12.9*   RBC 3.15*  --   --  2.92* 3.55*   HEMOGLOBIN 9.0*   < > 10.3* 8.3* 10.1*   HEMATOCRIT 27.4*  --   --  25.3* 30.7*   MCV 87.0  --   --  86.6 86.5   MCH 28.6  --   --  28.4 28.5   MCHC 32.8*  --   --  32.8* 32.9*   RDW 51.5*  --   --  51.0* 50.3*   PLATELETCT 471*  --   --  458* 538*   MPV 10.3  --   --  9.7 9.6    < > = values in this interval not displayed.     Recent Labs     09/09/22  0432 09/10/22  0049 09/11/22  0129   SODIUM 139 137 131*   POTASSIUM 3.6 4.3 3.8   CHLORIDE 105 105 100   CO2 24 23 23   GLUCOSE 102* 92 94   BUN 11 5* 4*   CREATININE 0.7 0.49* 0.47*   CALCIUM 8.3* 8.6 8.3*                     Imaging  [id number]   Final Result      Multiple dental caries.     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  Last positive blood culture was 9/8.    She works as a surgical technologist.  No reported history of IV drug use.  Dental panoramic does show multiple dental caries, which can cause bacteremia and endocarditis, however Enterococcus would be unusual as it is typically an enteric, not an oral source. GI consulted to work-up for possible GI source of Enterococcus bacteremia, including possible malignancy.   Of note, she has had a several month history of back pain that was initially upper and most recently lower. She had MRI L spine 7/19 which showed \"decreased T1/T2 signal noted within the vertebral marrow homogeneously throughout the spinal column, raising the question of infiltrative disease, such as myelofibrosis.\"  Given to the new finding of Enterococcus faecalis bacteremia and endocarditis, will repeat lumbar spine MRI to evaluate for possible infectious process.  -MRI L spine  -N.p.o. at midnight for colonoscopy and EGD 9/12  -Follow-up urine drug screen  - ID consulted, Dr. Wyatt, appreciate recs  -Antibiotics: ampicillin, ceftriaxone  -repeat blood cultures drawn on 9/10, repeat every 48 hours per ID  -Plan for KEO 09/12 - Dr. Abbasi aware, NPO 9/11 midnight  -Anticipate 6 weeks IV antibiotics via PICC from date of negative blood cxs    Anemia- (present on admission)  Assessment & Plan  Reports history of several weeks of melena at home, in the context of max dosages of NSAIDs for ongoing back pain. Melena has not recurred during admission.  Initial fecal occult blood is negative.  She required 2 units of RBCs at outside hospital prior to transfer.  Anemia may be multifactorial with contributions from " hemolysis due to endocarditis, iron deficiency anemia due to GI losses.  LDH is high, which can be seen during infection.  Haptoglobin pending.  No schistocytes seen on peripheral smear.  -Repeat fecal occult blood x3  -GI consult, planning for EGD and colonoscopy 9/12  -will continue to monitor  -transfuse at hgb less than 7  -no nsaids, hold DVT prophylaxis  -haptoglobin level pending      Elevated alkaline phosphatase level  Assessment & Plan  Alk phos has been significantly elevated throughout admission.  GGT is also elevated, indicating that this may be hepatic in origin.  Another less likely possibility is myelofibrosis, which was suggested on MRI of the lumbar spine in July 2022.  Myelofibrosis often presents with anemia, thrombocytosis, leukocytosis, constitutional symptoms, joint pain, all of which the patient has, but may be better explained by endocarditis and/or GI bleed.  Peripheral blood smear was unrevealing (no dacrocytes).  Myelofibrosis is a diagnosis of exclusion.  -Follow-up lumbar spine MRI  - GI following  -If suspicion remains for myelofibrosis, consider genetic testing (JAK2 mutation: 50-60% of patients, CALR mutation: 18-32% of patients, MPL mutation: 5-9% of patients)    Endocarditis  Assessment & Plan  TTE with concern for tricuspid vegetation  See above problem    TAZ (acute kidney injury) (HCC)- (present on admission)  Assessment & Plan  resolved    Pneumonia- (present on admission)  Assessment & Plan  As demonstrated on CT chest  -antibiotics as above    Cavitary lesion noted in left lower lobe    Migraine with aura- (present on admission)  Assessment & Plan  -resumed home imitrex       VTE prophylaxis: SCDs/TEDs    I have performed a physical exam and reviewed and updated ROS and Plan today (9/11/2022). In review of yesterday's note (9/10/2022), there are no changes except as documented above.

## 2022-09-12 ENCOUNTER — ANESTHESIA EVENT (OUTPATIENT)
Dept: SURGERY | Facility: MEDICAL CENTER | Age: 52
DRG: 219 | End: 2022-09-12
Payer: COMMERCIAL

## 2022-09-12 ENCOUNTER — ANESTHESIA (OUTPATIENT)
Dept: SURGERY | Facility: MEDICAL CENTER | Age: 52
DRG: 219 | End: 2022-09-12
Payer: COMMERCIAL

## 2022-09-12 LAB
ALBUMIN SERPL BCP-MCNC: 2.5 G/DL (ref 3.2–4.9)
ALBUMIN/GLOB SERPL: 0.7 G/DL
ALP SERPL-CCNC: 545 U/L (ref 30–99)
ALT SERPL-CCNC: 31 U/L (ref 2–50)
ANION GAP SERPL CALC-SCNC: 9 MMOL/L (ref 7–16)
AST SERPL-CCNC: 24 U/L (ref 12–45)
BASOPHILS # BLD AUTO: 0.3 % (ref 0–1.8)
BASOPHILS # BLD: 0.03 K/UL (ref 0–0.12)
BILIRUB SERPL-MCNC: 0.5 MG/DL (ref 0.1–1.5)
BUN SERPL-MCNC: 2 MG/DL (ref 8–22)
CALCIUM SERPL-MCNC: 8.1 MG/DL (ref 8.5–10.5)
CHLORIDE SERPL-SCNC: 102 MMOL/L (ref 96–112)
CO2 SERPL-SCNC: 25 MMOL/L (ref 20–33)
CREAT SERPL-MCNC: 0.39 MG/DL (ref 0.5–1.4)
EKG IMPRESSION: NORMAL
EOSINOPHIL # BLD AUTO: 0.01 K/UL (ref 0–0.51)
EOSINOPHIL NFR BLD: 0.1 % (ref 0–6.9)
ERYTHROCYTE [DISTWIDTH] IN BLOOD BY AUTOMATED COUNT: 50.2 FL (ref 35.9–50)
ERYTHROCYTE [SEDIMENTATION RATE] IN BLOOD BY WESTERGREN METHOD: 102 MM/HOUR (ref 0–25)
GFR SERPLBLD CREATININE-BSD FMLA CKD-EPI: 121 ML/MIN/1.73 M 2
GGT SERPL-CCNC: 143 U/L (ref 7–34)
GLOBULIN SER CALC-MCNC: 3.7 G/DL (ref 1.9–3.5)
GLUCOSE SERPL-MCNC: 107 MG/DL (ref 65–99)
HCT VFR BLD AUTO: 28.1 % (ref 37–47)
HGB BLD-MCNC: 9.1 G/DL (ref 12–16)
IMM GRANULOCYTES # BLD AUTO: 0.16 K/UL (ref 0–0.11)
IMM GRANULOCYTES NFR BLD AUTO: 1.5 % (ref 0–0.9)
LYMPHOCYTES # BLD AUTO: 2.09 K/UL (ref 1–4.8)
LYMPHOCYTES NFR BLD: 19.4 % (ref 22–41)
MAGNESIUM SERPL-MCNC: 1.9 MG/DL (ref 1.5–2.5)
MCH RBC QN AUTO: 28 PG (ref 27–33)
MCHC RBC AUTO-ENTMCNC: 32.4 G/DL (ref 33.6–35)
MCV RBC AUTO: 86.5 FL (ref 81.4–97.8)
MONOCYTES # BLD AUTO: 0.77 K/UL (ref 0–0.85)
MONOCYTES NFR BLD AUTO: 7.1 % (ref 0–13.4)
NEUTROPHILS # BLD AUTO: 7.74 K/UL (ref 2–7.15)
NEUTROPHILS NFR BLD: 71.6 % (ref 44–72)
NRBC # BLD AUTO: 0 K/UL
NRBC BLD-RTO: 0 /100 WBC
PATHOLOGY CONSULT NOTE: NORMAL
PHOSPHATE SERPL-MCNC: 2.6 MG/DL (ref 2.5–4.5)
PLATELET # BLD AUTO: 492 K/UL (ref 164–446)
PMV BLD AUTO: 9.3 FL (ref 9–12.9)
POTASSIUM SERPL-SCNC: 3.5 MMOL/L (ref 3.6–5.5)
PROT SERPL-MCNC: 6.2 G/DL (ref 6–8.2)
RBC # BLD AUTO: 3.25 M/UL (ref 4.2–5.4)
SODIUM SERPL-SCNC: 136 MMOL/L (ref 135–145)
TROPONIN T SERPL-MCNC: 7 NG/L (ref 6–19)
VIT B12 SERPL-MCNC: 1911 PG/ML (ref 211–911)
WBC # BLD AUTO: 10.8 K/UL (ref 4.8–10.8)

## 2022-09-12 PROCEDURE — 160203 HCHG ENDO MINUTES - 1ST 30 MINS LEVEL 4: Performed by: INTERNAL MEDICINE

## 2022-09-12 PROCEDURE — 99233 SBSQ HOSP IP/OBS HIGH 50: CPT | Mod: GC | Performed by: HOSPITALIST

## 2022-09-12 PROCEDURE — 110371 HCHG SHELL REV 272: Performed by: INTERNAL MEDICINE

## 2022-09-12 PROCEDURE — 99140 ANES COMP EMERGENCY COND: CPT | Performed by: ANESTHESIOLOGY

## 2022-09-12 PROCEDURE — 85025 COMPLETE CBC W/AUTO DIFF WBC: CPT

## 2022-09-12 PROCEDURE — 160048 HCHG OR STATISTICAL LEVEL 1-5: Performed by: INTERNAL MEDICINE

## 2022-09-12 PROCEDURE — 93010 ELECTROCARDIOGRAM REPORT: CPT | Performed by: INTERNAL MEDICINE

## 2022-09-12 PROCEDURE — A9270 NON-COVERED ITEM OR SERVICE: HCPCS | Performed by: NURSE PRACTITIONER

## 2022-09-12 PROCEDURE — A9270 NON-COVERED ITEM OR SERVICE: HCPCS | Performed by: INTERNAL MEDICINE

## 2022-09-12 PROCEDURE — 83735 ASSAY OF MAGNESIUM: CPT

## 2022-09-12 PROCEDURE — 0DBN8ZX EXCISION OF SIGMOID COLON, VIA NATURAL OR ARTIFICIAL OPENING ENDOSCOPIC, DIAGNOSTIC: ICD-10-PCS | Performed by: INTERNAL MEDICINE

## 2022-09-12 PROCEDURE — 700105 HCHG RX REV CODE 258: Performed by: ANESTHESIOLOGY

## 2022-09-12 PROCEDURE — 84100 ASSAY OF PHOSPHORUS: CPT

## 2022-09-12 PROCEDURE — 700102 HCHG RX REV CODE 250 W/ 637 OVERRIDE(OP): Performed by: NURSE PRACTITIONER

## 2022-09-12 PROCEDURE — 700111 HCHG RX REV CODE 636 W/ 250 OVERRIDE (IP): Performed by: ANESTHESIOLOGY

## 2022-09-12 PROCEDURE — 770001 HCHG ROOM/CARE - MED/SURG/GYN PRIV*

## 2022-09-12 PROCEDURE — 84075 ASSAY ALKALINE PHOSPHATASE: CPT

## 2022-09-12 PROCEDURE — 99253 IP/OBS CNSLTJ NEW/EST LOW 45: CPT | Performed by: INTERNAL MEDICINE

## 2022-09-12 PROCEDURE — 160009 HCHG ANES TIME/MIN: Performed by: INTERNAL MEDICINE

## 2022-09-12 PROCEDURE — 0DB68ZX EXCISION OF STOMACH, VIA NATURAL OR ARTIFICIAL OPENING ENDOSCOPIC, DIAGNOSTIC: ICD-10-PCS | Performed by: INTERNAL MEDICINE

## 2022-09-12 PROCEDURE — 700105 HCHG RX REV CODE 258: Performed by: INTERNAL MEDICINE

## 2022-09-12 PROCEDURE — 84080 ASSAY ALKALINE PHOSPHATASES: CPT

## 2022-09-12 PROCEDURE — 700102 HCHG RX REV CODE 250 W/ 637 OVERRIDE(OP): Performed by: STUDENT IN AN ORGANIZED HEALTH CARE EDUCATION/TRAINING PROGRAM

## 2022-09-12 PROCEDURE — 700102 HCHG RX REV CODE 250 W/ 637 OVERRIDE(OP): Performed by: INTERNAL MEDICINE

## 2022-09-12 PROCEDURE — 82977 ASSAY OF GGT: CPT

## 2022-09-12 PROCEDURE — 88305 TISSUE EXAM BY PATHOLOGIST: CPT

## 2022-09-12 PROCEDURE — 700101 HCHG RX REV CODE 250: Performed by: ANESTHESIOLOGY

## 2022-09-12 PROCEDURE — 80053 COMPREHEN METABOLIC PANEL: CPT

## 2022-09-12 PROCEDURE — 84484 ASSAY OF TROPONIN QUANT: CPT

## 2022-09-12 PROCEDURE — 160208 HCHG ENDO MINUTES - EA ADDL 1 MIN LEVEL 4: Performed by: INTERNAL MEDICINE

## 2022-09-12 PROCEDURE — 93005 ELECTROCARDIOGRAM TRACING: CPT

## 2022-09-12 PROCEDURE — 36415 COLL VENOUS BLD VENIPUNCTURE: CPT

## 2022-09-12 PROCEDURE — 160002 HCHG RECOVERY MINUTES (STAT): Performed by: INTERNAL MEDICINE

## 2022-09-12 PROCEDURE — 00813 ANES UPR LWR GI NDSC PX: CPT | Performed by: ANESTHESIOLOGY

## 2022-09-12 PROCEDURE — 88312 SPECIAL STAINS GROUP 1: CPT

## 2022-09-12 PROCEDURE — 82607 VITAMIN B-12: CPT

## 2022-09-12 PROCEDURE — 160035 HCHG PACU - 1ST 60 MINS PHASE I: Performed by: INTERNAL MEDICINE

## 2022-09-12 PROCEDURE — 700111 HCHG RX REV CODE 636 W/ 250 OVERRIDE (IP): Performed by: INTERNAL MEDICINE

## 2022-09-12 PROCEDURE — A9270 NON-COVERED ITEM OR SERVICE: HCPCS | Performed by: STUDENT IN AN ORGANIZED HEALTH CARE EDUCATION/TRAINING PROGRAM

## 2022-09-12 PROCEDURE — 0DBN8ZZ EXCISION OF SIGMOID COLON, VIA NATURAL OR ARTIFICIAL OPENING ENDOSCOPIC: ICD-10-PCS | Performed by: INTERNAL MEDICINE

## 2022-09-12 RX ORDER — ALBUTEROL SULFATE 2.5 MG/3ML
2.5 SOLUTION RESPIRATORY (INHALATION)
Status: DISCONTINUED | OUTPATIENT
Start: 2022-09-12 | End: 2022-09-12 | Stop reason: HOSPADM

## 2022-09-12 RX ORDER — SODIUM CHLORIDE, SODIUM LACTATE, POTASSIUM CHLORIDE, CALCIUM CHLORIDE 600; 310; 30; 20 MG/100ML; MG/100ML; MG/100ML; MG/100ML
INJECTION, SOLUTION INTRAVENOUS CONTINUOUS
Status: DISCONTINUED | OUTPATIENT
Start: 2022-09-12 | End: 2022-09-12 | Stop reason: HOSPADM

## 2022-09-12 RX ORDER — HALOPERIDOL 5 MG/ML
1 INJECTION INTRAMUSCULAR
Status: DISCONTINUED | OUTPATIENT
Start: 2022-09-12 | End: 2022-09-12 | Stop reason: HOSPADM

## 2022-09-12 RX ORDER — HYDRALAZINE HYDROCHLORIDE 20 MG/ML
5 INJECTION INTRAMUSCULAR; INTRAVENOUS
Status: DISCONTINUED | OUTPATIENT
Start: 2022-09-12 | End: 2022-09-12 | Stop reason: HOSPADM

## 2022-09-12 RX ORDER — MIDAZOLAM HYDROCHLORIDE 1 MG/ML
INJECTION INTRAMUSCULAR; INTRAVENOUS PRN
Status: DISCONTINUED | OUTPATIENT
Start: 2022-09-12 | End: 2022-09-12 | Stop reason: SURG

## 2022-09-12 RX ORDER — OMEPRAZOLE 20 MG/1
20 CAPSULE, DELAYED RELEASE ORAL DAILY
Status: DISCONTINUED | OUTPATIENT
Start: 2022-09-13 | End: 2022-09-16

## 2022-09-12 RX ORDER — SODIUM CHLORIDE, SODIUM LACTATE, POTASSIUM CHLORIDE, CALCIUM CHLORIDE 600; 310; 30; 20 MG/100ML; MG/100ML; MG/100ML; MG/100ML
INJECTION, SOLUTION INTRAVENOUS
Status: DISCONTINUED | OUTPATIENT
Start: 2022-09-12 | End: 2022-09-12 | Stop reason: SURG

## 2022-09-12 RX ORDER — ONDANSETRON 2 MG/ML
4 INJECTION INTRAMUSCULAR; INTRAVENOUS
Status: DISCONTINUED | OUTPATIENT
Start: 2022-09-12 | End: 2022-09-12 | Stop reason: HOSPADM

## 2022-09-12 RX ORDER — GAUZE BANDAGE 2" X 2"
100 BANDAGE TOPICAL DAILY
Status: COMPLETED | OUTPATIENT
Start: 2022-09-13 | End: 2022-09-14

## 2022-09-12 RX ORDER — DIPHENHYDRAMINE HYDROCHLORIDE 50 MG/ML
12.5 INJECTION INTRAMUSCULAR; INTRAVENOUS
Status: DISCONTINUED | OUTPATIENT
Start: 2022-09-12 | End: 2022-09-12 | Stop reason: HOSPADM

## 2022-09-12 RX ORDER — PHENYLEPHRINE HYDROCHLORIDE 10 MG/ML
INJECTION, SOLUTION INTRAMUSCULAR; INTRAVENOUS; SUBCUTANEOUS PRN
Status: DISCONTINUED | OUTPATIENT
Start: 2022-09-12 | End: 2022-09-12 | Stop reason: SURG

## 2022-09-12 RX ORDER — KETAMINE HYDROCHLORIDE 50 MG/ML
INJECTION, SOLUTION INTRAMUSCULAR; INTRAVENOUS PRN
Status: DISCONTINUED | OUTPATIENT
Start: 2022-09-12 | End: 2022-09-12 | Stop reason: SURG

## 2022-09-12 RX ORDER — SODIUM CHLORIDE 9 MG/ML
500 INJECTION, SOLUTION INTRAVENOUS
Status: DISCONTINUED | OUTPATIENT
Start: 2022-09-12 | End: 2022-09-12 | Stop reason: HOSPADM

## 2022-09-12 RX ORDER — METOPROLOL TARTRATE 1 MG/ML
1 INJECTION, SOLUTION INTRAVENOUS
Status: DISCONTINUED | OUTPATIENT
Start: 2022-09-12 | End: 2022-09-12 | Stop reason: HOSPADM

## 2022-09-12 RX ORDER — NEOMYCIN SULFATE, POLYMYXIN B SULFATE AND GRAMICIDIN 1.75; 10000; .025 MG/ML; [USP'U]/ML; MG/ML
1 SOLUTION/ DROPS OPHTHALMIC EVERY 4 HOURS
Status: CANCELLED | OUTPATIENT
Start: 2022-09-12

## 2022-09-12 RX ORDER — POTASSIUM CHLORIDE 20 MEQ/1
40 TABLET, EXTENDED RELEASE ORAL ONCE
Status: DISCONTINUED | OUTPATIENT
Start: 2022-09-12 | End: 2022-09-13

## 2022-09-12 RX ORDER — LABETALOL HYDROCHLORIDE 5 MG/ML
5 INJECTION, SOLUTION INTRAVENOUS
Status: DISCONTINUED | OUTPATIENT
Start: 2022-09-12 | End: 2022-09-12 | Stop reason: HOSPADM

## 2022-09-12 RX ADMIN — AMPICILLIN SODIUM 2000 MG: 2 INJECTION, POWDER, FOR SOLUTION INTRAVENOUS at 21:00

## 2022-09-12 RX ADMIN — BENZONATATE 100 MG: 100 CAPSULE ORAL at 21:47

## 2022-09-12 RX ADMIN — SUMATRIPTAN SUCCINATE 100 MG: 50 TABLET ORAL at 14:59

## 2022-09-12 RX ADMIN — MIDAZOLAM HYDROCHLORIDE 2 MG: 1 INJECTION, SOLUTION INTRAMUSCULAR; INTRAVENOUS at 13:32

## 2022-09-12 RX ADMIN — GUAIFENESIN 1200 MG: 600 TABLET, EXTENDED RELEASE ORAL at 05:48

## 2022-09-12 RX ADMIN — CEFTRIAXONE SODIUM 2 G: 10 INJECTION, POWDER, FOR SOLUTION INTRAVENOUS at 06:10

## 2022-09-12 RX ADMIN — AMPICILLIN SODIUM 2000 MG: 2 INJECTION, POWDER, FOR SOLUTION INTRAVENOUS at 02:03

## 2022-09-12 RX ADMIN — OXYCODONE 5 MG: 5 TABLET ORAL at 10:06

## 2022-09-12 RX ADMIN — SODIUM CHLORIDE, POTASSIUM CHLORIDE, SODIUM LACTATE AND CALCIUM CHLORIDE: 600; 310; 30; 20 INJECTION, SOLUTION INTRAVENOUS at 13:05

## 2022-09-12 RX ADMIN — BENZONATATE 100 MG: 100 CAPSULE ORAL at 00:09

## 2022-09-12 RX ADMIN — AMPICILLIN SODIUM 2000 MG: 2 INJECTION, POWDER, FOR SOLUTION INTRAVENOUS at 10:00

## 2022-09-12 RX ADMIN — GUAIFENESIN 1200 MG: 600 TABLET, EXTENDED RELEASE ORAL at 16:34

## 2022-09-12 RX ADMIN — KETAMINE HYDROCHLORIDE 10 MG: 50 INJECTION INTRAMUSCULAR; INTRAVENOUS at 13:32

## 2022-09-12 RX ADMIN — SUMATRIPTAN SUCCINATE 100 MG: 50 TABLET ORAL at 05:48

## 2022-09-12 RX ADMIN — OXYCODONE 5 MG: 5 TABLET ORAL at 00:08

## 2022-09-12 RX ADMIN — AMPICILLIN SODIUM 2000 MG: 2 INJECTION, POWDER, FOR SOLUTION INTRAVENOUS at 05:53

## 2022-09-12 RX ADMIN — KETAMINE HYDROCHLORIDE 30 MG: 50 INJECTION INTRAMUSCULAR; INTRAVENOUS at 13:13

## 2022-09-12 RX ADMIN — CEFTRIAXONE SODIUM 2 G: 10 INJECTION, POWDER, FOR SOLUTION INTRAVENOUS at 18:00

## 2022-09-12 RX ADMIN — AMPICILLIN SODIUM 2000 MG: 2 INJECTION, POWDER, FOR SOLUTION INTRAVENOUS at 16:36

## 2022-09-12 RX ADMIN — OXYCODONE 5 MG: 5 TABLET ORAL at 15:04

## 2022-09-12 RX ADMIN — PROPOFOL 100 MG: 10 INJECTION, EMULSION INTRAVENOUS at 13:09

## 2022-09-12 RX ADMIN — PEG-3350, SODIUM SULFATE, SODIUM CHLORIDE, POTASSIUM CHLORIDE, SODIUM ASCORBATE AND ASCORBIC ACID 100 G: KIT at 02:04

## 2022-09-12 RX ADMIN — PHENYLEPHRINE HYDROCHLORIDE 100 MCG: 10 INJECTION INTRAVENOUS at 13:29

## 2022-09-12 RX ADMIN — KETAMINE HYDROCHLORIDE 10 MG: 50 INJECTION INTRAMUSCULAR; INTRAVENOUS at 13:17

## 2022-09-12 RX ADMIN — OXYCODONE 5 MG: 5 TABLET ORAL at 18:52

## 2022-09-12 ASSESSMENT — PAIN DESCRIPTION - PAIN TYPE
TYPE: ACUTE PAIN;CHRONIC PAIN
TYPE: SURGICAL PAIN

## 2022-09-12 ASSESSMENT — COGNITIVE AND FUNCTIONAL STATUS - GENERAL
MOBILITY SCORE: 24
SUGGESTED CMS G CODE MODIFIER MOBILITY: CH
DAILY ACTIVITIY SCORE: 24
SUGGESTED CMS G CODE MODIFIER DAILY ACTIVITY: CH

## 2022-09-12 ASSESSMENT — ENCOUNTER SYMPTOMS
PALPITATIONS: 0
FEVER: 0
CHILLS: 0
SPUTUM PRODUCTION: 0
HEADACHES: 1
SENSORY CHANGE: 0
DIZZINESS: 0
SHORTNESS OF BREATH: 0

## 2022-09-12 ASSESSMENT — FIBROSIS 4 INDEX: FIB4 SCORE: 0.44

## 2022-09-12 NOTE — DISCHARGE PLANNING
[facility] TCN chart review completed. Collaborated with FREDERICK Pizano prior to meeting with the pt. The most current review of medical record, knowledge of pt's PLOF and social support, LACE+ score of 15 was considered.  No 6 clicks scores.  Patient is not medically cleared for discharge.     Pt seen at bedside and reports she was working full-time, was independent with all ADL's, IADL's and driving. She reports no concerns with food, housing or transportation and feels she is at her baseline level of function.     Introduced TCN program. Provided education regarding post acute levels of care. Discussed HTH/[facility] plan benefits (Meds to Beds, medical uber and [facility] transitional care). Pt verbalizes understanding.     Choice proactively obtained for infusion, LTAC, HH, faxed to [facility] and given to FREDERICK.   Patient may be on ABX times 6 weeks.  Patient may need LTAC level of care if she is on Rocephin and dual IV antibiotics.  TCN will continue to follow and collaborate with discharge planning team as additional post acute needs arise. Thank you.     Completed today:  Choice obtained: HH, LTAC and infusion choice (IV ABX).  [facility] referral (N), patient has f/u appointments and is out of [facility] area.

## 2022-09-12 NOTE — DISCHARGE PLANNING
Received Choice form at 1227  Agency/Facility Name: [facility]   Referral not sent, need HH order.

## 2022-09-12 NOTE — ANESTHESIA POSTPROCEDURE EVALUATION
Patient: Sarah Buckner    Procedure Summary     Date: 09/12/22 Room / Location: [facility] ROOM 26 / SURGERY SAME DAY [facility]    Anesthesia Start: 1305 Anesthesia Stop: 1358    Procedures:       GASTROSCOPY (Esophagus)      COLONOSCOPY (Anus)      GASTROSCOPY, WITH BIOPSY (Esophagus)      COLONOSCOPY, WITH POLYPECTOMY (Anus) Diagnosis: (gastritis, diverticulosis and colon polyp)    Surgeons: Danyelle Curiel M.D. Responsible Provider: Mckenna Subramanian M.D.    Anesthesia Type: general ASA Status: 4 - Emergent          Final Anesthesia Type: general  Last vitals  BP   Blood Pressure: (!) 101/91    Temp   37 °C (98.6 °F)    Pulse   (!) 116   Resp   18    SpO2   99 %      Anesthesia Post Evaluation    Patient location during evaluation: PACU  Patient participation: complete - patient participated  Level of consciousness: awake and alert    Airway patency: patent  Anesthetic complications: no  Cardiovascular status: hemodynamically stable  Respiratory status: acceptable  Hydration status: euvolemic    PONV: none          No notable events documented.     Nurse Pain Score: 7 (NPRS)

## 2022-09-12 NOTE — PROGRESS NOTES
Back from EGD/ colonoscopy. Awake and alert with slight headache post anesthesia. Will provide medication as needed. No active bleeding noted.family at bedside.

## 2022-09-12 NOTE — ANESTHESIA PREPROCEDURE EVALUATION
Case: [id number] Date/Time: 09/12/22 1215    Procedures:       GASTROSCOPY (Esophagus)      COLONOSCOPY (Anus)    Anesthesia type: MAC    Pre-op diagnosis: anemia    Location: [facility] ROOM 26 / SURGERY SAME DAY [facility]    Surgeons: Danyelle Curiel M.D.          Relevant Problems   PULMONARY   (positive) Pneumonia      NEURO   (positive) Migraine with aura      CARDIAC   (positive) Migraine with aura         (positive) TAZ (acute kidney injury) (HCC)   (positive) Calculus of kidney       Physical Exam    Airway   Mallampati: II  TM distance: >3 FB  Neck ROM: full       Cardiovascular - normal exam  Rhythm: regular  Rate: abnormal  (-) murmur  Comments: tachycardia   Dental - normal exam           Pulmonary - normal exam  Breath sounds clear to auscultation     Abdominal    Neurological - normal exam                 Anesthesia Plan    ASA 4- EMERGENT   ASA physical status 4 criteria: other (comment)ASA physical status emergent criteria: acute deteriorating condition due to infection    Plan - general       Airway plan will be natural airway    (Endocarditis.  Lesion on tricuspid valve.  Tachycardia, O2 requirement.  Risk of PE.  )    Plan Factors:   Patient was previously instructed to abstain from smoking on day of procedure.  Patient did not smoke on day of procedure.      Induction: intravenous      Pertinent diagnostic labs and testing reviewed    Informed Consent:    Anesthetic plan and risks discussed with patient.    Use of blood products discussed with: patient whom consented to blood products.

## 2022-09-12 NOTE — OR NURSING
1352 - Pt to PACU from OR. Report from anesthesia and OR RN. On 10L O2 via mask. Respirations even and unlabored. Patient is tachycardic (baseline per anesthesia). Patient is also coughing, anesthesia states she has been coughing and is not new.     1415 - Patient awake and talking, denies pain and nausea at this time.    1436 - Patient states she wants to take imitrex PO for headache, but will wait until she gets back to her room. Report given to Derrell THACKER. Patient being transported to T709 with Chani THACKER, with chart and all personal belongings on 2L NC. Mother Cecelia at bedside.

## 2022-09-12 NOTE — PROGRESS NOTES
[facility] Internal Medicine Daily Progress Note    Date of Service  9/12/2022    R Team: R IM White Team   Attending: ZOHAIB Williamson M.d.  Senior Resident: Dr. Rosario  Intern:  Dr. Colbert  Contact Number: [phone]    Chief Complaint  Sarah Buckner is a 51 y.o. female admitted 9/9/2022 with chest pain that radiates to the back.     Hospital Course  Sarah Buckner is a 51 y.o. female admitted 9/9/2022 with bacterial endocarditis growing enterococcus faecalis on blood cultures.  Also found to have significant anemia, reportedly had melena outpatient but none inpatient.  Ongoing work-up for possible etiology of Enterococcus bacteremia including EGD, Colonoscopy. Possible KEO for further evaluation of valves.     Interval Problem Update  No acute events overnight  Scheduled for EGD, colonoscopy and possible KEO 09/12/22 = found healed erosive gastritis, few sigmoid diverticula, 7mm pendunculated sigmoid polyp.    I have discussed this patient's plan of care and discharge plan at IDT rounds today with Case Management, Nursing, Nursing leadership, and other members of the IDT team.    Consultants/Specialty  cardiology and GI    Code Status  Full Code    Disposition  Patient is not medically cleared for discharge.   Anticipate discharge to to home with close outpatient follow-up.  I have placed the appropriate orders for post-discharge needs.    Review of Systems  Review of Systems   Constitutional:  Negative for chills and fever.   Respiratory:  Negative for sputum production and shortness of breath.    Cardiovascular:  Negative for chest pain, palpitations and leg swelling.   Neurological:  Positive for headaches. Negative for dizziness and sensory change.   All other systems reviewed and are negative.     Physical Exam  Temp:  [36.7 °C (98 °F)-38.2 °C (100.7 °F)] 37 °C (98.6 °F)  Pulse:  [116-130] 130  Resp:  [17-18] 18  BP: (101-148)/(75-95) 124/75  SpO2:  [92 %-99 %] 96 %    Physical Exam  Vitals  and nursing note reviewed.   Constitutional:       General: She is not in acute distress.     Appearance: Normal appearance. She is normal weight.   HENT:      Head: Normocephalic and atraumatic.      Nose: Nose normal. No congestion.   Cardiovascular:      Rate and Rhythm: Normal rate and regular rhythm.      Pulses: Normal pulses.      Heart sounds: Normal heart sounds.      Comments: Unable to appreciate any murmurs  Pulmonary:      Effort: Pulmonary effort is normal. No respiratory distress.      Breath sounds: Normal breath sounds.   Musculoskeletal:         General: No swelling, deformity or signs of injury.      Right lower leg: No edema.      Left lower leg: No edema.   Skin:     General: Skin is warm.      Coloration: Skin is not jaundiced.      Findings: No erythema.   Neurological:      General: No focal deficit present.      Mental Status: She is alert and oriented to person, place, and time. Mental status is at baseline.   Psychiatric:         Mood and Affect: Mood normal.         Behavior: Behavior normal.         Thought Content: Thought content normal.         Judgment: Judgment normal.       Fluids    Intake/Output Summary (Last 24 hours) at 9/12/2022 1458  Last data filed at 9/12/2022 1340  Gross per 24 hour   Intake 800 ml   Output 0 ml   Net 800 ml       Laboratory  Recent Labs     09/11/22  0129 09/11/22  1308 09/12/22  0034   WBC 12.9* 12.9* 10.8   RBC 2.92* 3.55* 3.25*   HEMOGLOBIN 8.3* 10.1* 9.1*   HEMATOCRIT 25.3* 30.7* 28.1*   MCV 86.6 86.5 86.5   MCH 28.4 28.5 28.0   MCHC 32.8* 32.9* 32.4*   RDW 51.0* 50.3* 50.2*   PLATELETCT 458* 538* 492*   MPV 9.7 9.6 9.3     Recent Labs     09/10/22  0049 09/11/22  0129 09/12/22  0034   SODIUM 137 131* 136   POTASSIUM 4.3 3.8 3.5*   CHLORIDE 105 100 102   CO2 23 23 25   GLUCOSE 92 94 107*   BUN 5* 4* 2*   CREATININE 0.49* 0.47* 0.39*   CALCIUM 8.6 8.3* 8.1*                   Imaging  [id number]   Final Result      Multiple dental caries.     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  Last positive blood culture was 9/8.    Denies IV drug use, possible dental carries (chronic), melena reported on scale of weeks. GI consulted to work-up for possible GI source of Enterococcus bacteremia, including possible malignancy.   Of note, she has had a several month history of back pain that was initially upper and most recently lower. She had MRI L spine 7/19 which showed \"decreased T1/T2 signal noted within the vertebral marrow homogeneously throughout the spinal column, raising the question of infiltrative disease, such as myelofibrosis.\"  Given to the new finding of Enterococcus faecalis bacteremia and endocarditis, considering repeat lumbar spine MRI to evaluate for possible infectious or immunodeficiency process.  - considering MRI L spine  -Colonoscopy and EGD 9/12 showing healed erosive gastritis, few sigmoid diverticula, 7mm pendunculated sigmoid polyp  -Follow-up urine drug screen  -Dr. Wyatt with ID consulted; appreciate recs  -Antibiotics: ampicillin, ceftriaxone  -repeat blood cultures drawn on 9/10 negative as of 09/12/22. Repeating on 09/12 ( /48 hours per ID)  -Plan for KEO 09/12 - Dr. Abbasi aware, NPO 9/11 midnight  -Anticipate 6 weeks IV antibiotics via PICC from date of negative blood cxs    Anemia- (present on admission)  Assessment & Plan  Reports history of several weeks of melena at home, in the context of max dosages of NSAIDs for ongoing back pain. Melena has not recurred during admission.  Initial fecal occult blood is negative.  She required 2 units of RBCs at outside hospital prior to transfer.  Anemia may be " multifactorial with contributions from hemolysis due to endocarditis, iron deficiency anemia due to GI losses.  LDH is high, which can be seen during infection.  No schistocytes seen on peripheral smear.  -Pposv=829, OQY=351, severe iron deficiency  -fecal occult negative 09/08/22  -GI consult, planning for EGD and colonoscopy 9/12  -will continue to monitor  -transfuse at hgb less than 7  -no nsaids, hold DVT prophylaxis        Elevated alkaline phosphatase level  Assessment & Plan  Alk phos has been significantly elevated throughout admission.  GGT is also elevated, indicating that this may be hepatic in origin.  Another less likely possibility is myelofibrosis, which was suggested on MRI of the lumbar spine in July 2022.  Myelofibrosis often presents with anemia, thrombocytosis, leukocytosis, constitutional symptoms, joint pain, all of which the patient has, but may be better explained by endocarditis and/or GI bleed.  Peripheral blood smear was unrevealing (no dacrocytes).  Myelofibrosis is a diagnosis of exclusion.  -Follow-up lumbar spine MRI  - GI following  -If suspicion remains for myelofibrosis, consider genetic testing (JAK2 mutation: 50-60% of patients, CALR mutation: 18-32% of patients, MPL mutation: 5-9% of patients)    Endocarditis  Assessment & Plan  TTE with concern for tricuspid vegetation  See above problem    TAZ (acute kidney injury) (HCC)- (present on admission)  Assessment & Plan  resolved    Pneumonia- (present on admission)  Assessment & Plan  As demonstrated on CT chest  -antibiotics as above    Cavitary lesion noted in left lower lobe    Migraine with aura- (present on admission)  Assessment & Plan  -resumed home imitrex       VTE prophylaxis: SCDs/TEDs    I have performed a physical exam and reviewed and updated ROS and Plan today (9/12/2022). In review of yesterday's note (9/11/2022), there are no changes except as documented above.

## 2022-09-12 NOTE — OR SURGEON
Immediate Post OP Note    PreOp Diagnosis: melena, anemia      PostOp Diagnosis: healed erosive gastritis, few sigmoid diverticula, 7mm pendunculated sigmoid polyp      Procedure(s):  GASTROSCOPY - Wound Class: Clean Contaminated  COLONOSCOPY - Wound Class: Clean Contaminated  GASTROSCOPY, WITH BIOPSY - Wound Class: Clean Contaminated  COLONOSCOPY, WITH POLYPECTOMY - Wound Class: Clean Contaminated    Surgeon(s):  Danyelle Curiel M.D.    Anesthesiologist/Type of Anesthesia:  Anesthesiologist: Mckenna Subramanian M.D./JAYME    Surgical Staff:  Endoscopy Technician: Nikolay Case; Layla Gibbons  Endoscopy Nurse: Tete Schmidt R.N.    Specimens removed if any:  ID Type Source Tests Collected by Time Destination   A : R/O  H PYLORI Tissue Gastric PATHOLOGY SPECIMEN Danyelle Curiel M.D. 9/12/2022  1:18 PM    B : polyp at 15cm Polyp Colon - Sigmoid PATHOLOGY SPECIMEN Danyelle Curiel M.D. 9/12/2022  1:44 PM        Estimated Blood Loss: none    Findings: as above    Complications: none    Nothing else to add.  Will send patient path results in 7-10 days.  Please call if we can be of further assistance      9/12/2022 1:51 PM Danyelle Curiel M.D.

## 2022-09-12 NOTE — PROGRESS NOTES
Received bedside report from KEDAR valles, pt care assumed. VS stable, pt assessment complete. Pt A&Ox4, chronic c/o rib  pain at this time. POC discussed with pt and verbalizes no questions. Pt denies any additional needs at this time. Bed locked and in lowest position, bed alarm off. Pt educated on fall risk and verbalized understanding, call light within reach, hourly rounding initiated.  in a sinus rhythm

## 2022-09-13 ENCOUNTER — APPOINTMENT (OUTPATIENT)
Dept: CARDIOLOGY | Facility: MEDICAL CENTER | Age: 52
DRG: 219 | End: 2022-09-13
Attending: INTERNAL MEDICINE
Payer: COMMERCIAL

## 2022-09-13 LAB
ALBUMIN SERPL BCP-MCNC: 2.4 G/DL (ref 3.2–4.9)
ALBUMIN/GLOB SERPL: 0.6 G/DL
ALP SERPL-CCNC: 454 U/L (ref 30–99)
ALT SERPL-CCNC: 21 U/L (ref 2–50)
ANION GAP SERPL CALC-SCNC: 6 MMOL/L (ref 7–16)
AST SERPL-CCNC: 21 U/L (ref 12–45)
BASOPHILS # BLD AUTO: 0.2 % (ref 0–1.8)
BASOPHILS # BLD: 0.02 K/UL (ref 0–0.12)
BILIRUB SERPL-MCNC: 0.5 MG/DL (ref 0.1–1.5)
BUN SERPL-MCNC: 2 MG/DL (ref 8–22)
CALCIUM SERPL-MCNC: 8.2 MG/DL (ref 8.5–10.5)
CHLORIDE SERPL-SCNC: 102 MMOL/L (ref 96–112)
CHOLEST SERPL-MCNC: 138 MG/DL (ref 100–199)
CO2 SERPL-SCNC: 27 MMOL/L (ref 20–33)
CREAT SERPL-MCNC: 0.4 MG/DL (ref 0.5–1.4)
EKG IMPRESSION: NORMAL
EKG IMPRESSION: NORMAL
EOSINOPHIL # BLD AUTO: 0.01 K/UL (ref 0–0.51)
EOSINOPHIL NFR BLD: 0.1 % (ref 0–6.9)
ERYTHROCYTE [DISTWIDTH] IN BLOOD BY AUTOMATED COUNT: 51.2 FL (ref 35.9–50)
EST. AVERAGE GLUCOSE BLD GHB EST-MCNC: 97 MG/DL
GFR SERPLBLD CREATININE-BSD FMLA CKD-EPI: 120 ML/MIN/1.73 M 2
GLOBULIN SER CALC-MCNC: 3.8 G/DL (ref 1.9–3.5)
GLUCOSE SERPL-MCNC: 111 MG/DL (ref 65–99)
HBA1C MFR BLD: 5 % (ref 4–5.6)
HCT VFR BLD AUTO: 27.5 % (ref 37–47)
HDLC SERPL-MCNC: 18 MG/DL
HGB BLD-MCNC: 8.9 G/DL (ref 12–16)
HIV 1+2 AB+HIV1 P24 AG SERPL QL IA: NORMAL
IMM GRANULOCYTES # BLD AUTO: 0.11 K/UL (ref 0–0.11)
IMM GRANULOCYTES NFR BLD AUTO: 1.1 % (ref 0–0.9)
LDLC SERPL CALC-MCNC: 92 MG/DL
LYMPHOCYTES # BLD AUTO: 2.01 K/UL (ref 1–4.8)
LYMPHOCYTES NFR BLD: 19.4 % (ref 22–41)
MAGNESIUM SERPL-MCNC: 1.8 MG/DL (ref 1.5–2.5)
MCH RBC QN AUTO: 28.5 PG (ref 27–33)
MCHC RBC AUTO-ENTMCNC: 32.4 G/DL (ref 33.6–35)
MCV RBC AUTO: 88.1 FL (ref 81.4–97.8)
MONOCYTES # BLD AUTO: 0.87 K/UL (ref 0–0.85)
MONOCYTES NFR BLD AUTO: 8.4 % (ref 0–13.4)
NEUTROPHILS # BLD AUTO: 7.33 K/UL (ref 2–7.15)
NEUTROPHILS NFR BLD: 70.8 % (ref 44–72)
NRBC # BLD AUTO: 0 K/UL
NRBC BLD-RTO: 0 /100 WBC
PLATELET # BLD AUTO: 529 K/UL (ref 164–446)
PMV BLD AUTO: 9.5 FL (ref 9–12.9)
POTASSIUM SERPL-SCNC: 3.9 MMOL/L (ref 3.6–5.5)
PROT SERPL-MCNC: 6.2 G/DL (ref 6–8.2)
RBC # BLD AUTO: 3.12 M/UL (ref 4.2–5.4)
SODIUM SERPL-SCNC: 135 MMOL/L (ref 135–145)
TRIGL SERPL-MCNC: 140 MG/DL (ref 0–149)
TROPONIN T SERPL-MCNC: 7 NG/L (ref 6–19)
WBC # BLD AUTO: 10.4 K/UL (ref 4.8–10.8)

## 2022-09-13 PROCEDURE — 700105 HCHG RX REV CODE 258: Performed by: INTERNAL MEDICINE

## 2022-09-13 PROCEDURE — 83735 ASSAY OF MAGNESIUM: CPT

## 2022-09-13 PROCEDURE — 93010 ELECTROCARDIOGRAM REPORT: CPT | Performed by: INTERNAL MEDICINE

## 2022-09-13 PROCEDURE — 700111 HCHG RX REV CODE 636 W/ 250 OVERRIDE (IP): Performed by: INTERNAL MEDICINE

## 2022-09-13 PROCEDURE — 83036 HEMOGLOBIN GLYCOSYLATED A1C: CPT

## 2022-09-13 PROCEDURE — A9270 NON-COVERED ITEM OR SERVICE: HCPCS

## 2022-09-13 PROCEDURE — A9270 NON-COVERED ITEM OR SERVICE: HCPCS | Performed by: INTERNAL MEDICINE

## 2022-09-13 PROCEDURE — 700102 HCHG RX REV CODE 250 W/ 637 OVERRIDE(OP): Performed by: INTERNAL MEDICINE

## 2022-09-13 PROCEDURE — 80053 COMPREHEN METABOLIC PANEL: CPT

## 2022-09-13 PROCEDURE — A9270 NON-COVERED ITEM OR SERVICE: HCPCS | Performed by: HOSPITALIST

## 2022-09-13 PROCEDURE — 700102 HCHG RX REV CODE 250 W/ 637 OVERRIDE(OP): Performed by: HOSPITALIST

## 2022-09-13 PROCEDURE — A9270 NON-COVERED ITEM OR SERVICE: HCPCS | Performed by: STUDENT IN AN ORGANIZED HEALTH CARE EDUCATION/TRAINING PROGRAM

## 2022-09-13 PROCEDURE — 700102 HCHG RX REV CODE 250 W/ 637 OVERRIDE(OP): Performed by: STUDENT IN AN ORGANIZED HEALTH CARE EDUCATION/TRAINING PROGRAM

## 2022-09-13 PROCEDURE — 93005 ELECTROCARDIOGRAM TRACING: CPT | Performed by: STUDENT IN AN ORGANIZED HEALTH CARE EDUCATION/TRAINING PROGRAM

## 2022-09-13 PROCEDURE — 770001 HCHG ROOM/CARE - MED/SURG/GYN PRIV*

## 2022-09-13 PROCEDURE — 700111 HCHG RX REV CODE 636 W/ 250 OVERRIDE (IP): Performed by: STUDENT IN AN ORGANIZED HEALTH CARE EDUCATION/TRAINING PROGRAM

## 2022-09-13 PROCEDURE — 700102 HCHG RX REV CODE 250 W/ 637 OVERRIDE(OP)

## 2022-09-13 PROCEDURE — 99232 SBSQ HOSP IP/OBS MODERATE 35: CPT | Performed by: INTERNAL MEDICINE

## 2022-09-13 PROCEDURE — 87040 BLOOD CULTURE FOR BACTERIA: CPT

## 2022-09-13 PROCEDURE — 84484 ASSAY OF TROPONIN QUANT: CPT

## 2022-09-13 PROCEDURE — 85025 COMPLETE CBC W/AUTO DIFF WBC: CPT

## 2022-09-13 PROCEDURE — 700111 HCHG RX REV CODE 636 W/ 250 OVERRIDE (IP)

## 2022-09-13 PROCEDURE — 700101 HCHG RX REV CODE 250: Performed by: INTERNAL MEDICINE

## 2022-09-13 PROCEDURE — 80061 LIPID PANEL: CPT

## 2022-09-13 PROCEDURE — 87389 HIV-1 AG W/HIV-1&-2 AB AG IA: CPT

## 2022-09-13 PROCEDURE — 36415 COLL VENOUS BLD VENIPUNCTURE: CPT

## 2022-09-13 PROCEDURE — 99233 SBSQ HOSP IP/OBS HIGH 50: CPT | Mod: GC | Performed by: HOSPITALIST

## 2022-09-13 RX ORDER — MAGNESIUM SULFATE HEPTAHYDRATE 40 MG/ML
2 INJECTION, SOLUTION INTRAVENOUS ONCE
Status: COMPLETED | OUTPATIENT
Start: 2022-09-13 | End: 2022-09-13

## 2022-09-13 RX ORDER — POTASSIUM CHLORIDE 20 MEQ/1
40 TABLET, EXTENDED RELEASE ORAL ONCE
Status: COMPLETED | OUTPATIENT
Start: 2022-09-13 | End: 2022-09-13

## 2022-09-13 RX ADMIN — HYDROMORPHONE HYDROCHLORIDE 0.5 MG: 1 INJECTION, SOLUTION INTRAMUSCULAR; INTRAVENOUS; SUBCUTANEOUS at 15:08

## 2022-09-13 RX ADMIN — AMPICILLIN SODIUM 2000 MG: 2 INJECTION, POWDER, FOR SOLUTION INTRAVENOUS at 22:17

## 2022-09-13 RX ADMIN — AMPICILLIN SODIUM 2000 MG: 2 INJECTION, POWDER, FOR SOLUTION INTRAVENOUS at 10:55

## 2022-09-13 RX ADMIN — CEFTRIAXONE SODIUM 2 G: 10 INJECTION, POWDER, FOR SOLUTION INTRAVENOUS at 05:39

## 2022-09-13 RX ADMIN — Medication 100 MG: at 05:40

## 2022-09-13 RX ADMIN — AMPICILLIN SODIUM 2000 MG: 2 INJECTION, POWDER, FOR SOLUTION INTRAVENOUS at 05:41

## 2022-09-13 RX ADMIN — MAGNESIUM SULFATE HEPTAHYDRATE 2 G: 40 INJECTION, SOLUTION INTRAVENOUS at 05:30

## 2022-09-13 RX ADMIN — OXYCODONE 5 MG: 5 TABLET ORAL at 22:03

## 2022-09-13 RX ADMIN — ACETAMINOPHEN 650 MG: 325 TABLET, FILM COATED ORAL at 23:30

## 2022-09-13 RX ADMIN — SUMATRIPTAN SUCCINATE 100 MG: 50 TABLET ORAL at 20:29

## 2022-09-13 RX ADMIN — AMPICILLIN SODIUM 2000 MG: 2 INJECTION, POWDER, FOR SOLUTION INTRAVENOUS at 18:35

## 2022-09-13 RX ADMIN — OMEPRAZOLE 20 MG: 20 CAPSULE, DELAYED RELEASE ORAL at 05:40

## 2022-09-13 RX ADMIN — AMPICILLIN SODIUM 2000 MG: 2 INJECTION, POWDER, FOR SOLUTION INTRAVENOUS at 01:00

## 2022-09-13 RX ADMIN — POTASSIUM CHLORIDE 40 MEQ: 1500 TABLET, EXTENDED RELEASE ORAL at 05:39

## 2022-09-13 RX ADMIN — AMPICILLIN SODIUM 2000 MG: 2 INJECTION, POWDER, FOR SOLUTION INTRAVENOUS at 14:00

## 2022-09-13 RX ADMIN — OXYCODONE 5 MG: 5 TABLET ORAL at 00:53

## 2022-09-13 RX ADMIN — OXYCODONE 5 MG: 5 TABLET ORAL at 10:55

## 2022-09-13 RX ADMIN — BENZONATATE 100 MG: 100 CAPSULE ORAL at 10:55

## 2022-09-13 RX ADMIN — OXYCODONE 5 MG: 5 TABLET ORAL at 18:36

## 2022-09-13 RX ADMIN — CEFTRIAXONE SODIUM 2 G: 10 INJECTION, POWDER, FOR SOLUTION INTRAVENOUS at 18:35

## 2022-09-13 RX ADMIN — GUAIFENESIN 1200 MG: 600 TABLET, EXTENDED RELEASE ORAL at 05:39

## 2022-09-13 RX ADMIN — BENZONATATE 100 MG: 100 CAPSULE ORAL at 00:53

## 2022-09-13 RX ADMIN — ONDANSETRON 4 MG: 2 INJECTION INTRAMUSCULAR; INTRAVENOUS at 15:09

## 2022-09-13 RX ADMIN — DOCUSATE SODIUM 50 MG AND SENNOSIDES 8.6 MG 2 TABLET: 8.6; 5 TABLET, FILM COATED ORAL at 18:36

## 2022-09-13 RX ADMIN — ACETAMINOPHEN 650 MG: 325 TABLET, FILM COATED ORAL at 00:53

## 2022-09-13 RX ADMIN — GUAIFENESIN 1200 MG: 600 TABLET, EXTENDED RELEASE ORAL at 18:35

## 2022-09-13 ASSESSMENT — ENCOUNTER SYMPTOMS
SENSORY CHANGE: 0
MYALGIAS: 0
WEAKNESS: 1
BACK PAIN: 1
CHILLS: 0
EYE DISCHARGE: 0
FEVER: 0
PALPITATIONS: 0

## 2022-09-13 ASSESSMENT — PATIENT HEALTH QUESTIONNAIRE - PHQ9
2. FEELING DOWN, DEPRESSED, IRRITABLE, OR HOPELESS: NOT AT ALL
SUM OF ALL RESPONSES TO PHQ9 QUESTIONS 1 AND 2: 0
1. LITTLE INTEREST OR PLEASURE IN DOING THINGS: NOT AT ALL

## 2022-09-13 ASSESSMENT — PAIN DESCRIPTION - PAIN TYPE
TYPE: ACUTE PAIN

## 2022-09-13 ASSESSMENT — FIBROSIS 4 INDEX: FIB4 SCORE: 0.43

## 2022-09-13 NOTE — PROGRESS NOTES
[facility] [place] [facility] Medicine Daily Progress Note    Date of Service  9/13/2022    [facility] [place] Team: R IM White Team   Attending: Delroy Rebollar M.d.  Senior Resident: Dr. Rosario  Intern:  Dr. Colbert  Contact Number: [phone]    Chief Complaint  Sarah Buckner is a 51 y.o. female admitted 9/9/2022 with Chest Pain    Hospital Course  Sarah Buckner is a 51 y.o. female admitted 9/9/2022 with bacterial endocarditis growing enterococcus faecalis on blood cultures.  Also found to have significant anemia, reportedly had melena outpatient but none inpatient.  Work-up of Enterococcus bacteremia including EGD, Colonoscopy revealed mild diverticulosis, antral gastritis and sigmoid polypectomy of 7mm pedunculated polyp. Cardiology was consulted and ordered a KEO for 09/14/22 for further evaluation of tricuspid valve. Patient was placed on 6 weeks of antibiotic therapy starting from last negative blood cultures taken 09/10/2022.    Interval Problem Update  -No acute event overnight  -KEO ordered for 09/14/22. NPO at midnight of 09/13 to 09/14.    I have discussed this patient's plan of care and discharge plan at IDT rounds today with Case Management, Nursing, Nursing leadership, and other members of the IDT team.    Consultants/Specialty  cardiology and GI    Code Status  Full Code    Disposition  Patient is medically cleared for discharge.   Anticipate discharge to to home with close outpatient follow-up.  I have placed the appropriate orders for post-discharge needs.    Review of Systems  Review of Systems   Constitutional:  Negative for chills and fever.   Eyes:  Negative for discharge.   Cardiovascular:  Negative for chest pain and palpitations.   Musculoskeletal:  Negative for myalgias.   Skin:  Negative for rash.   Neurological:  Negative for sensory change.      Physical Exam  Temp:  [36.6 °C (97.8 °F)-38.1 °C (100.6 °F)] 36.9 °C (98.4 °F)  Pulse:  [107-129] 127  Resp:  [16-20] 16  BP: (112-156)/() 132/91  SpO2:   [92 %-96 %] 94 %    Physical Exam  Vitals and nursing note reviewed.   Constitutional:       General: She is not in acute distress.     Appearance: Normal appearance. She is normal weight. She is not toxic-appearing.   HENT:      Head: Normocephalic and atraumatic.      Nose: Nose normal.   Eyes:      Extraocular Movements: Extraocular movements intact.      Pupils: Pupils are equal, round, and reactive to light.   Cardiovascular:      Rate and Rhythm: Regular rhythm. Tachycardia present.      Pulses: Normal pulses.      Heart sounds: No murmur heard.    No friction rub.   Pulmonary:      Effort: Pulmonary effort is normal. No respiratory distress.      Breath sounds: Normal breath sounds.   Chest:      Chest wall: No tenderness.   Musculoskeletal:         General: No swelling, deformity or signs of injury.   Skin:     General: Skin is warm and dry.      Coloration: Skin is not jaundiced.      Findings: No rash.   Neurological:      Mental Status: She is alert. Mental status is at baseline.   Psychiatric:         Mood and Affect: Mood normal.         Behavior: Behavior normal.         Thought Content: Thought content normal.         Judgment: Judgment normal.       Fluids    Intake/Output Summary (Last 24 hours) at 9/13/2022 1615  Last data filed at 9/13/2022 1434  Gross per 24 hour   Intake 1140 ml   Output 0 ml   Net 1140 ml       Laboratory  Recent Labs     09/11/22  1308 09/12/22  0034 09/13/22  0034   WBC 12.9* 10.8 10.4   RBC 3.55* 3.25* 3.12*   HEMOGLOBIN 10.1* 9.1* 8.9*   HEMATOCRIT 30.7* 28.1* 27.5*   MCV 86.5 86.5 88.1   MCH 28.5 28.0 28.5   MCHC 32.9* 32.4* 32.4*   RDW 50.3* 50.2* 51.2*   PLATELETCT 538* 492* 529*   MPV 9.6 9.3 9.5     Recent Labs     09/11/22  0129 09/12/22  0034 09/13/22  0034   SODIUM 131* 136 135   POTASSIUM 3.8 3.5* 3.9   CHLORIDE 100 102 102   CO2 23 25 27   GLUCOSE 94 107* 111*   BUN 4* 2* 2*   CREATININE 0.47* 0.39* 0.40*   CALCIUM 8.3* 8.1* 8.2*             Recent Labs      "09/13/22  0815   TRIGLYCERIDE 140   HDL 18*   LDL 92       Imaging  [id number]   Final Result      Multiple dental caries.      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  Last positive blood culture was 9/8.    Denies IV drug use, possible dental carries (chronic), melena reported on scale of weeks. GI consulted to work-up for possible GI source of Enterococcus bacteremia, including possible malignancy.   Of note, she has had a several month history of back pain that was initially upper and most recently lower. She had MRI L spine 7/19 which showed \"decreased T1/T2 signal noted within the vertebral marrow homogeneously throughout the spinal column, raising the question of infiltrative disease, such as myelofibrosis.\"  Given to the new finding of Enterococcus faecalis bacteremia and endocarditis, considering repeat lumbar spine MRI to evaluate for possible infectious or immunodeficiency process.  - MRI spine likely to be completed outpatient.  -Colonoscopy and EGD 9/12 showing healed erosive gastritis, few sigmoid diverticula, 7mm pendunculated sigmoid polyp  -Follow-up urine drug screen  -Dr. Wyatt with ID consulted; appreciate recs  -Antibiotics: ampicillin, ceftriaxone  -repeat blood cultures drawn on 9/10 negative as of 09/12/22. Repeating on 09/12 ( /48 hours per ID)  -Plan for KEO 09/14, NPO 9/13 midnight  -Anticipate 6 weeks IV antibiotics via PICC from date of negative blood cxs 09/10/22    Anemia- (present on admission)  Assessment & Plan  Reports history of several weeks of melena at home, in the context of max dosages of NSAIDs for " ongoing back pain. Melena has not recurred during admission.  Initial fecal occult blood is negative.  She required 2 units of RBCs at outside hospital prior to transfer.  Anemia may be multifactorial with contributions from hemolysis due to endocarditis, iron deficiency anemia due to GI losses.  LDH is high, which can be seen during infection.  No schistocytes seen on peripheral smear.    -Bjplc=195, LNZ=258, severe iron deficiency  -fecal occult negative 09/08/22  -will continue to monitor  -transfuse at hgb less than 7  -no nsaids, hold DVT prophylaxis        Elevated alkaline phosphatase level  Assessment & Plan  Alk phos has been significantly elevated throughout admission.  GGT is also elevated, indicating that this may be hepatic in origin.  Another less likely possibility is myelofibrosis, which was suggested on MRI of the lumbar spine in July 2022.  Myelofibrosis often presents with anemia, thrombocytosis, leukocytosis, constitutional symptoms, joint pain, all of which the patient has, but may be better explained by endocarditis and/or GI bleed.  Peripheral blood smear was unrevealing (no dacrocytes).  Myelofibrosis is a diagnosis of exclusion.  -Spine MRI; most likely outpatient.  - GI following  -If suspicion remains for myelofibrosis, consider genetic testing (JAK2 mutation: 50-60% of patients, CALR mutation: 18-32% of patients, MPL mutation: 5-9% of patients)    Endocarditis  Assessment & Plan  TTE with concern for tricuspid vegetation  See above problem    TAZ (acute kidney injury) (HCC)- (present on admission)  Assessment & Plan  resolved    Pneumonia- (present on admission)  Assessment & Plan  Cavitary lesion noted in left lower lobe demonstrated on CT chest. Possible etiology of infectious emboli from tricuspid vegetations.     -antibiotics as above        Migraine with aura- (present on admission)  Assessment & Plan  -resumed home imitrex       VTE prophylaxis: SCDs/TEDs    I have performed a  physical exam and reviewed and updated ROS and Plan today (9/13/2022). In review of yesterday's note (9/12/2022), there are no changes except as documented above.

## 2022-09-13 NOTE — CARE PLAN
Problem: Knowledge Deficit - Standard  Goal: Patient and family/care givers will demonstrate understanding of plan of care, disease process/condition, diagnostic tests and medications  Note: Continue iv abx, pain control, possible picc placement for abx, possible stress test in AM      Problem: Pain - Standard  Goal: Alleviation of pain or a reduction in pain to the patient’s comfort goal  Note: Prn pain medication as needed    The patient is Watcher - Medium risk of patient condition declining or worsening    Shift Goals  Clinical Goals: pain control and remain hemodynamically stable  Patient Goals: rest and pain control    Progress made toward(s) clinical / shift goals:  prn pain medication as needed, tele monitor in place.     Patient is not progressing towards the following goals:

## 2022-09-13 NOTE — CARE PLAN
The patient is Stable - Low risk of patient condition declining or worsening    Shift Goals  Clinical Goals: pain control and remain hemodynamically stable  Patient Goals: rest and pain control    Progress made toward(s) clinical / shift goals:  progressing    Patient is not progressing towards the following goals:

## 2022-09-13 NOTE — PROGRESS NOTES
Report received. Patient oriented x4. Pain level 1 out of 10 back rib pain.  Denies SOB. Fall risk interventions in place, bed in low position, pt upself.

## 2022-09-13 NOTE — PROGRESS NOTES
Notifiied MD Carvalho of patient having 44 second run of VTACH, patient asymptomatic. Orders placed for mag replacement.

## 2022-09-13 NOTE — PROGRESS NOTES
Nuc med called stating they are cancelling stress test due to cardiology note stating no further cardiac testing at this time

## 2022-09-13 NOTE — FACE TO FACE
Face to Face Supporting Documentation - Home Health    The encounter with this patient was in whole or in part the primary reason for home health admission.    Date of encounter:   Patient:                    MRN:                       YOB: 2021  Sarah Buckner  [id number]  1970     Home health to see patient for:  Skilled Nursing care for assessment, interventions & education    Skilled need for:  Medication Management PICC line for Abx    Skilled nursing interventions to include:  Line/Drain/Airway education and care and Comment: PICC line care    Homebound status evidenced by:  Need the aid of supportive devices such as crutches, canes, wheelchairs or walkers. Leaving home requires a considerable and taxing effort. There is a normal inability to leave the home.    Community Physician to provide follow up care: [facility] Marshall     Optional Interventions? No patient is very weak and likely will require home O2      I certify the face to face encounter for this home health care referral meets the CMS requirements and the encounter/clinical assessment with the patient was, in whole, or in part, for the medical condition(s) listed above, which is the primary reason for home health care. Based on my clinical findings: the service(s) are medically necessary, support the need for home health care, and the homebound criteria are met.  I certify that this patient has had a face to face encounter by myself.  Kassandra Rosario M.D. - NPI: [id number]

## 2022-09-13 NOTE — PROCEDURES
DATE OF SERVICE:  09/12/2022     PROCEDURE:  Esophagogastroduodenoscopy with biopsies.     PREPROCEDURE DIAGNOSES:  A 51-year-old female with history of melena and   anemia, who has been taking NSAIDs.     POSTPROCEDURE DIAGNOSES:  Healed erosive antral gastritis, biopsied.     CONSENT:  Risks, benefits, alternatives explained to the patient.  The patient   gave consent to proceed.     ANESTHESIA:  Monitored anesthesia care.     ANESTHESIOLOGIST:  Mckenna Subramanian MD     DESCRIPTION OF PROCEDURE:  The patient was turned in the left lateral   decubitus position.  Anesthesia monitoring was in place.  The Olympus adult   flexible endoscope was inserted into the esophagus under direct visualization.    It was slowly advanced to the GE junction.  There were no mucosal   abnormalities.  The endoscope was advanced into the stomach and air was   insufflated.  Body of the stomach appeared normal.  In the antrum, there was   scattered erythema and scars from healed erosions.  Biopsies were taken for H.   pylori.  Retroflexion was performed and no abnormalities in the fundic cap or   at the cardia.  Retroflexion was taken down and the endoscope was advanced   easily through the pylorus into the first, second and third portions of the   duodenum.  There were no mucosal abnormalities.  The endoscope was pulled back   into the stomach.  Air was removed and the endoscope was removed.  The   patient tolerated the procedure well and there were no complications.        ______________________________  MD DEN SERRANO/JUAN/SULEMAN    DD:  09/12/2022 13:56  DT:  09/12/2022 14:53    Job#:  [id number]

## 2022-09-13 NOTE — PROCEDURES
DATE OF PROCEDURE:  09/12/2022     PROCEDURE:  Colonoscopy with polypectomy.     PREPROCEDURE DIAGNOSIS:  Melena and anemia with family history of colon   cancer.     POSTPROCEDURE DIAGNOSES:  1.  Mild sigmoid diverticulosis.  2.  Sigmoid polypectomy.     CONSENT:  Risks, benefits, alternatives explained to the patient.  The patient   gave consent to proceed.     ANESTHESIA:  Monitored anesthesia care.     ANESTHESIOLOGIST:  Mckenan Subramanian MD.     DESCRIPTION OF PROCEDURE:  The patient was turned in the left lateral   decubitus position and anesthesia monitoring was in place.  The Olympus adult   adjustable tip colonoscope was inserted into the rectum under direct   visualization.  It was advanced to the cecum without difficulty.  Appendiceal   orifice was identified.  The colonoscope was advanced into the terminal ileum,   which appeared normal.  Colonoscope was brought back to the cecum.  Overall,   the preparation of the colon was fair and able to appreciate polyps greater   than 6 mm.  The colonoscope was slowly withdrawn from the cecum through the   ascending colon with special attention to the hepatic flexure.  No mucosal   abnormalities noted.  The colonoscope was brought across the transverse colon   down to the splenic flexure into the descending colon.  No mucosal   abnormalities noted.  A few scattered diverticula were noted in the sigmoid   colon.  At 15 cm was a 7 mm pedunculated polyp.  This was removed with cold   snare.  Colonoscope was brought down to the rectum and retroflexion was   performed.  Part of the mucosa was obscured by vegetable matter.  Retroflexion   was taken down, air was removed, and the colonoscope was removed.  The   patient tolerated the procedure well.  No complications.        ______________________________  MD DEN SERRANO/JUAN/SULEMAN    DD:  09/12/2022 13:59  DT:  09/12/2022 14:56    Job#:  [id number]

## 2022-09-14 ENCOUNTER — APPOINTMENT (OUTPATIENT)
Dept: RADIOLOGY | Facility: MEDICAL CENTER | Age: 52
DRG: 219 | End: 2022-09-14
Payer: COMMERCIAL

## 2022-09-14 ENCOUNTER — APPOINTMENT (OUTPATIENT)
Dept: CARDIOLOGY | Facility: MEDICAL CENTER | Age: 52
DRG: 219 | End: 2022-09-14
Attending: INTERNAL MEDICINE
Payer: COMMERCIAL

## 2022-09-14 ENCOUNTER — TELEPHONE (OUTPATIENT)
Dept: CARDIOLOGY | Facility: MEDICAL CENTER | Age: 52
End: 2022-09-14
Payer: COMMERCIAL

## 2022-09-14 ENCOUNTER — ANESTHESIA (OUTPATIENT)
Dept: SURGERY | Facility: MEDICAL CENTER | Age: 52
DRG: 219 | End: 2022-09-14
Payer: COMMERCIAL

## 2022-09-14 ENCOUNTER — ANESTHESIA EVENT (OUTPATIENT)
Dept: SURGERY | Facility: MEDICAL CENTER | Age: 52
DRG: 219 | End: 2022-09-14
Payer: COMMERCIAL

## 2022-09-14 LAB
ALBUMIN SERPL BCP-MCNC: 2.5 G/DL (ref 3.2–4.9)
ALBUMIN/GLOB SERPL: 0.6 G/DL
ALP BONE SERPL-CCNC: 96 U/L (ref 0–55)
ALP ISOS SERPL HS-CCNC: 0 U/L
ALP LIVER SERPL-CCNC: 505 U/L (ref 0–94)
ALP SERPL-CCNC: 419 U/L (ref 30–99)
ALP SERPL-CCNC: 601 U/L (ref 40–120)
ALT SERPL-CCNC: 21 U/L (ref 2–50)
ANION GAP SERPL CALC-SCNC: 12 MMOL/L (ref 7–16)
AST SERPL-CCNC: 20 U/L (ref 12–45)
BASOPHILS # BLD AUTO: 0.4 % (ref 0–1.8)
BASOPHILS # BLD: 0.05 K/UL (ref 0–0.12)
BILIRUB SERPL-MCNC: 0.4 MG/DL (ref 0.1–1.5)
BUN SERPL-MCNC: 2 MG/DL (ref 8–22)
CALCIUM SERPL-MCNC: 8.2 MG/DL (ref 8.5–10.5)
CHLORIDE SERPL-SCNC: 99 MMOL/L (ref 96–112)
CO2 SERPL-SCNC: 24 MMOL/L (ref 20–33)
CREAT SERPL-MCNC: 0.41 MG/DL (ref 0.5–1.4)
EOSINOPHIL # BLD AUTO: 0 K/UL (ref 0–0.51)
EOSINOPHIL NFR BLD: 0 % (ref 0–6.9)
ERYTHROCYTE [DISTWIDTH] IN BLOOD BY AUTOMATED COUNT: 51 FL (ref 35.9–50)
GFR SERPLBLD CREATININE-BSD FMLA CKD-EPI: 119 ML/MIN/1.73 M 2
GLOBULIN SER CALC-MCNC: 3.9 G/DL (ref 1.9–3.5)
GLUCOSE SERPL-MCNC: 93 MG/DL (ref 65–99)
HCT VFR BLD AUTO: 26.9 % (ref 37–47)
HGB BLD-MCNC: 8.6 G/DL (ref 12–16)
IMM GRANULOCYTES # BLD AUTO: 0.13 K/UL (ref 0–0.11)
IMM GRANULOCYTES NFR BLD AUTO: 1 % (ref 0–0.9)
LYMPHOCYTES # BLD AUTO: 2.12 K/UL (ref 1–4.8)
LYMPHOCYTES NFR BLD: 16.1 % (ref 22–41)
MCH RBC QN AUTO: 28 PG (ref 27–33)
MCHC RBC AUTO-ENTMCNC: 32 G/DL (ref 33.6–35)
MCV RBC AUTO: 87.6 FL (ref 81.4–97.8)
MONOCYTES # BLD AUTO: 0.86 K/UL (ref 0–0.85)
MONOCYTES NFR BLD AUTO: 6.5 % (ref 0–13.4)
NEUTROPHILS # BLD AUTO: 9.98 K/UL (ref 2–7.15)
NEUTROPHILS NFR BLD: 76 % (ref 44–72)
NRBC # BLD AUTO: 0 K/UL
NRBC BLD-RTO: 0 /100 WBC
PLATELET # BLD AUTO: 513 K/UL (ref 164–446)
PMV BLD AUTO: 9.7 FL (ref 9–12.9)
POTASSIUM SERPL-SCNC: 4.4 MMOL/L (ref 3.6–5.5)
PROT SERPL-MCNC: 6.4 G/DL (ref 6–8.2)
RBC # BLD AUTO: 3.07 M/UL (ref 4.2–5.4)
SODIUM SERPL-SCNC: 135 MMOL/L (ref 135–145)
TROPONIN T SERPL-MCNC: 8 NG/L (ref 6–19)
WBC # BLD AUTO: 13.1 K/UL (ref 4.8–10.8)

## 2022-09-14 PROCEDURE — 700101 HCHG RX REV CODE 250: Performed by: INTERNAL MEDICINE

## 2022-09-14 PROCEDURE — A9270 NON-COVERED ITEM OR SERVICE: HCPCS | Performed by: STUDENT IN AN ORGANIZED HEALTH CARE EDUCATION/TRAINING PROGRAM

## 2022-09-14 PROCEDURE — 700102 HCHG RX REV CODE 250 W/ 637 OVERRIDE(OP): Performed by: STUDENT IN AN ORGANIZED HEALTH CARE EDUCATION/TRAINING PROGRAM

## 2022-09-14 PROCEDURE — 700111 HCHG RX REV CODE 636 W/ 250 OVERRIDE (IP): Performed by: ANESTHESIOLOGY

## 2022-09-14 PROCEDURE — B24BZZ4 ULTRASONOGRAPHY OF HEART WITH AORTA, TRANSESOPHAGEAL: ICD-10-PCS | Performed by: ANESTHESIOLOGY

## 2022-09-14 PROCEDURE — A9270 NON-COVERED ITEM OR SERVICE: HCPCS | Performed by: INTERNAL MEDICINE

## 2022-09-14 PROCEDURE — 93312 ECHO TRANSESOPHAGEAL: CPT | Mod: 26 | Performed by: INTERNAL MEDICINE

## 2022-09-14 PROCEDURE — 99233 SBSQ HOSP IP/OBS HIGH 50: CPT | Mod: GC | Performed by: HOSPITALIST

## 2022-09-14 PROCEDURE — 700105 HCHG RX REV CODE 258: Performed by: ANESTHESIOLOGY

## 2022-09-14 PROCEDURE — 160002 HCHG RECOVERY MINUTES (STAT)

## 2022-09-14 PROCEDURE — 99232 SBSQ HOSP IP/OBS MODERATE 35: CPT | Mod: FS | Performed by: INTERNAL MEDICINE

## 2022-09-14 PROCEDURE — B24CZZ4 ULTRASONOGRAPHY OF PERICARDIUM, TRANSESOPHAGEAL: ICD-10-PCS | Performed by: INTERNAL MEDICINE

## 2022-09-14 PROCEDURE — 700102 HCHG RX REV CODE 250 W/ 637 OVERRIDE(OP)

## 2022-09-14 PROCEDURE — 700111 HCHG RX REV CODE 636 W/ 250 OVERRIDE (IP): Performed by: STUDENT IN AN ORGANIZED HEALTH CARE EDUCATION/TRAINING PROGRAM

## 2022-09-14 PROCEDURE — 700111 HCHG RX REV CODE 636 W/ 250 OVERRIDE (IP): Performed by: INTERNAL MEDICINE

## 2022-09-14 PROCEDURE — 700101 HCHG RX REV CODE 250: Performed by: ANESTHESIOLOGY

## 2022-09-14 PROCEDURE — 93320 DOPPLER ECHO COMPLETE: CPT | Mod: 26 | Performed by: INTERNAL MEDICINE

## 2022-09-14 PROCEDURE — 160035 HCHG PACU - 1ST 60 MINS PHASE I

## 2022-09-14 PROCEDURE — 00500 ANES ALL PX ON ESOPHAGUS: CPT | Performed by: ANESTHESIOLOGY

## 2022-09-14 PROCEDURE — 93312 ECHO TRANSESOPHAGEAL: CPT

## 2022-09-14 PROCEDURE — 700102 HCHG RX REV CODE 250 W/ 637 OVERRIDE(OP): Performed by: INTERNAL MEDICINE

## 2022-09-14 PROCEDURE — 700105 HCHG RX REV CODE 258: Performed by: INTERNAL MEDICINE

## 2022-09-14 PROCEDURE — 770001 HCHG ROOM/CARE - MED/SURG/GYN PRIV*

## 2022-09-14 PROCEDURE — 99255 IP/OBS CONSLTJ NEW/EST HI 80: CPT | Mod: 57 | Performed by: THORACIC SURGERY (CARDIOTHORACIC VASCULAR SURGERY)

## 2022-09-14 PROCEDURE — 700102 HCHG RX REV CODE 250 W/ 637 OVERRIDE(OP): Performed by: HOSPITALIST

## 2022-09-14 PROCEDURE — A9270 NON-COVERED ITEM OR SERVICE: HCPCS | Performed by: HOSPITALIST

## 2022-09-14 PROCEDURE — 700101 HCHG RX REV CODE 250: Performed by: STUDENT IN AN ORGANIZED HEALTH CARE EDUCATION/TRAINING PROGRAM

## 2022-09-14 PROCEDURE — A9270 NON-COVERED ITEM OR SERVICE: HCPCS

## 2022-09-14 RX ORDER — ACETAMINOPHEN 500 MG
1000 TABLET ORAL EVERY 6 HOURS PRN
Status: DISCONTINUED | OUTPATIENT
Start: 2022-09-19 | End: 2022-09-16

## 2022-09-14 RX ORDER — LIDOCAINE HYDROCHLORIDE 20 MG/ML
INJECTION, SOLUTION EPIDURAL; INFILTRATION; INTRACAUDAL; PERINEURAL PRN
Status: DISCONTINUED | OUTPATIENT
Start: 2022-09-14 | End: 2022-09-15 | Stop reason: HOSPADM

## 2022-09-14 RX ORDER — HYDROMORPHONE HYDROCHLORIDE 1 MG/ML
0.4 INJECTION, SOLUTION INTRAMUSCULAR; INTRAVENOUS; SUBCUTANEOUS
Status: DISCONTINUED | OUTPATIENT
Start: 2022-09-14 | End: 2022-09-14

## 2022-09-14 RX ORDER — OXYCODONE HCL 5 MG/5 ML
10 SOLUTION, ORAL ORAL
Status: DISCONTINUED | OUTPATIENT
Start: 2022-09-14 | End: 2022-09-14

## 2022-09-14 RX ORDER — DIPHENHYDRAMINE HYDROCHLORIDE 50 MG/ML
12.5 INJECTION INTRAMUSCULAR; INTRAVENOUS
Status: DISCONTINUED | OUTPATIENT
Start: 2022-09-14 | End: 2022-09-14

## 2022-09-14 RX ORDER — TRAZODONE HYDROCHLORIDE 50 MG/1
25 TABLET ORAL
Status: COMPLETED | OUTPATIENT
Start: 2022-09-14 | End: 2022-09-14

## 2022-09-14 RX ORDER — HALOPERIDOL 5 MG/ML
1 INJECTION INTRAMUSCULAR
Status: DISCONTINUED | OUTPATIENT
Start: 2022-09-14 | End: 2022-09-14

## 2022-09-14 RX ORDER — HYDROMORPHONE HYDROCHLORIDE 1 MG/ML
0.5 INJECTION, SOLUTION INTRAMUSCULAR; INTRAVENOUS; SUBCUTANEOUS ONCE
Status: COMPLETED | OUTPATIENT
Start: 2022-09-14 | End: 2022-09-14

## 2022-09-14 RX ORDER — ALBUTEROL SULFATE 2.5 MG/3ML
2.5 SOLUTION RESPIRATORY (INHALATION)
Status: DISCONTINUED | OUTPATIENT
Start: 2022-09-14 | End: 2022-09-16 | Stop reason: HOSPADM

## 2022-09-14 RX ORDER — SODIUM CHLORIDE, SODIUM LACTATE, POTASSIUM CHLORIDE, CALCIUM CHLORIDE 600; 310; 30; 20 MG/100ML; MG/100ML; MG/100ML; MG/100ML
INJECTION, SOLUTION INTRAVENOUS
Status: DISCONTINUED | OUTPATIENT
Start: 2022-09-14 | End: 2022-09-15 | Stop reason: HOSPADM

## 2022-09-14 RX ORDER — LIDOCAINE 50 MG/G
1 PATCH TOPICAL EVERY 24 HOURS
Status: DISCONTINUED | OUTPATIENT
Start: 2022-09-14 | End: 2022-09-16

## 2022-09-14 RX ORDER — CHOLECALCIFEROL (VITAMIN D3) 125 MCG
10 CAPSULE ORAL NIGHTLY PRN
Status: DISCONTINUED | OUTPATIENT
Start: 2022-09-14 | End: 2022-09-16

## 2022-09-14 RX ORDER — MEPERIDINE HYDROCHLORIDE 25 MG/ML
12.5 INJECTION INTRAMUSCULAR; INTRAVENOUS; SUBCUTANEOUS
Status: DISCONTINUED | OUTPATIENT
Start: 2022-09-14 | End: 2022-09-14

## 2022-09-14 RX ORDER — HYDRALAZINE HYDROCHLORIDE 20 MG/ML
5 INJECTION INTRAMUSCULAR; INTRAVENOUS
Status: DISCONTINUED | OUTPATIENT
Start: 2022-09-14 | End: 2022-09-14

## 2022-09-14 RX ORDER — HYDROMORPHONE HYDROCHLORIDE 1 MG/ML
0.5 INJECTION, SOLUTION INTRAMUSCULAR; INTRAVENOUS; SUBCUTANEOUS
Status: DISCONTINUED | OUTPATIENT
Start: 2022-09-14 | End: 2022-09-24 | Stop reason: HOSPADM

## 2022-09-14 RX ORDER — HYDROMORPHONE HYDROCHLORIDE 1 MG/ML
0.1 INJECTION, SOLUTION INTRAMUSCULAR; INTRAVENOUS; SUBCUTANEOUS
Status: DISCONTINUED | OUTPATIENT
Start: 2022-09-14 | End: 2022-09-14

## 2022-09-14 RX ORDER — SODIUM CHLORIDE, SODIUM LACTATE, POTASSIUM CHLORIDE, CALCIUM CHLORIDE 600; 310; 30; 20 MG/100ML; MG/100ML; MG/100ML; MG/100ML
INJECTION, SOLUTION INTRAVENOUS CONTINUOUS
Status: DISCONTINUED | OUTPATIENT
Start: 2022-09-14 | End: 2022-09-14

## 2022-09-14 RX ORDER — METOPROLOL TARTRATE 1 MG/ML
1 INJECTION, SOLUTION INTRAVENOUS
Status: DISCONTINUED | OUTPATIENT
Start: 2022-09-14 | End: 2022-09-14

## 2022-09-14 RX ORDER — OXYCODONE HCL 5 MG/5 ML
5 SOLUTION, ORAL ORAL
Status: DISCONTINUED | OUTPATIENT
Start: 2022-09-14 | End: 2022-09-14

## 2022-09-14 RX ORDER — HYDROMORPHONE HYDROCHLORIDE 1 MG/ML
0.2 INJECTION, SOLUTION INTRAMUSCULAR; INTRAVENOUS; SUBCUTANEOUS
Status: DISCONTINUED | OUTPATIENT
Start: 2022-09-14 | End: 2022-09-14

## 2022-09-14 RX ORDER — ACETAMINOPHEN 500 MG
1000 TABLET ORAL EVERY 6 HOURS
Status: DISCONTINUED | OUTPATIENT
Start: 2022-09-14 | End: 2022-09-16

## 2022-09-14 RX ORDER — MIDAZOLAM HYDROCHLORIDE 1 MG/ML
1 INJECTION INTRAMUSCULAR; INTRAVENOUS
Status: DISCONTINUED | OUTPATIENT
Start: 2022-09-14 | End: 2022-09-14

## 2022-09-14 RX ORDER — ONDANSETRON 2 MG/ML
4 INJECTION INTRAMUSCULAR; INTRAVENOUS
Status: DISCONTINUED | OUTPATIENT
Start: 2022-09-14 | End: 2022-09-14

## 2022-09-14 RX ORDER — OXYCODONE HYDROCHLORIDE 10 MG/1
10 TABLET ORAL
Status: DISCONTINUED | OUTPATIENT
Start: 2022-09-14 | End: 2022-09-24 | Stop reason: HOSPADM

## 2022-09-14 RX ORDER — OXYCODONE HYDROCHLORIDE 5 MG/1
5 TABLET ORAL
Status: DISCONTINUED | OUTPATIENT
Start: 2022-09-14 | End: 2022-09-24 | Stop reason: HOSPADM

## 2022-09-14 RX ADMIN — CEFTRIAXONE SODIUM 2 G: 10 INJECTION, POWDER, FOR SOLUTION INTRAVENOUS at 05:06

## 2022-09-14 RX ADMIN — AMPICILLIN SODIUM 2000 MG: 2 INJECTION, POWDER, FOR SOLUTION INTRAVENOUS at 02:45

## 2022-09-14 RX ADMIN — PROPOFOL 25 MG: 10 INJECTION, EMULSION INTRAVENOUS at 13:32

## 2022-09-14 RX ADMIN — CEFTRIAXONE SODIUM 2 G: 10 INJECTION, POWDER, FOR SOLUTION INTRAVENOUS at 17:51

## 2022-09-14 RX ADMIN — GUAIFENESIN 1200 MG: 600 TABLET, EXTENDED RELEASE ORAL at 17:52

## 2022-09-14 RX ADMIN — PROPOFOL 25 MG: 10 INJECTION, EMULSION INTRAVENOUS at 13:24

## 2022-09-14 RX ADMIN — SODIUM CHLORIDE, POTASSIUM CHLORIDE, SODIUM LACTATE AND CALCIUM CHLORIDE: 600; 310; 30; 20 INJECTION, SOLUTION INTRAVENOUS at 16:46

## 2022-09-14 RX ADMIN — PROPOFOL 25 MG: 10 INJECTION, EMULSION INTRAVENOUS at 13:40

## 2022-09-14 RX ADMIN — PROPOFOL 25 MG: 10 INJECTION, EMULSION INTRAVENOUS at 13:30

## 2022-09-14 RX ADMIN — TRAZODONE HYDROCHLORIDE 25 MG: 50 TABLET ORAL at 21:01

## 2022-09-14 RX ADMIN — SODIUM CHLORIDE, POTASSIUM CHLORIDE, SODIUM LACTATE AND CALCIUM CHLORIDE: 600; 310; 30; 20 INJECTION, SOLUTION INTRAVENOUS at 13:05

## 2022-09-14 RX ADMIN — LIDOCAINE HYDROCHLORIDE 65 MG: 20 INJECTION, SOLUTION EPIDURAL; INFILTRATION; INTRACAUDAL at 13:15

## 2022-09-14 RX ADMIN — Medication 100 MG: at 05:04

## 2022-09-14 RX ADMIN — AMPICILLIN SODIUM 2000 MG: 2 INJECTION, POWDER, FOR SOLUTION INTRAVENOUS at 11:34

## 2022-09-14 RX ADMIN — AMPICILLIN SODIUM 2000 MG: 2 INJECTION, POWDER, FOR SOLUTION INTRAVENOUS at 15:22

## 2022-09-14 RX ADMIN — AMPICILLIN SODIUM 2000 MG: 2 INJECTION, POWDER, FOR SOLUTION INTRAVENOUS at 17:52

## 2022-09-14 RX ADMIN — PROPOFOL 25 MG: 10 INJECTION, EMULSION INTRAVENOUS at 13:26

## 2022-09-14 RX ADMIN — LIDOCAINE 1 PATCH: 50 PATCH TOPICAL at 21:04

## 2022-09-14 RX ADMIN — AMPICILLIN SODIUM 2000 MG: 2 INJECTION, POWDER, FOR SOLUTION INTRAVENOUS at 05:11

## 2022-09-14 RX ADMIN — PROPOFOL 50 MG: 10 INJECTION, EMULSION INTRAVENOUS at 13:15

## 2022-09-14 RX ADMIN — PROPOFOL 25 MG: 10 INJECTION, EMULSION INTRAVENOUS at 13:22

## 2022-09-14 RX ADMIN — PROPOFOL 25 MG: 10 INJECTION, EMULSION INTRAVENOUS at 13:28

## 2022-09-14 RX ADMIN — DOCUSATE SODIUM 50 MG AND SENNOSIDES 8.6 MG 2 TABLET: 8.6; 5 TABLET, FILM COATED ORAL at 17:52

## 2022-09-14 RX ADMIN — OMEPRAZOLE 20 MG: 20 CAPSULE, DELAYED RELEASE ORAL at 05:04

## 2022-09-14 RX ADMIN — ACETAMINOPHEN 1000 MG: 500 TABLET ORAL at 21:03

## 2022-09-14 RX ADMIN — FENTANYL CITRATE 100 MCG: 50 INJECTION, SOLUTION INTRAMUSCULAR; INTRAVENOUS at 13:15

## 2022-09-14 RX ADMIN — TRAMADOL HYDROCHLORIDE 50 MG: 50 TABLET, COATED ORAL at 05:04

## 2022-09-14 RX ADMIN — GUAIFENESIN 1200 MG: 600 TABLET, EXTENDED RELEASE ORAL at 05:04

## 2022-09-14 RX ADMIN — HYDROMORPHONE HYDROCHLORIDE 0.5 MG: 1 INJECTION, SOLUTION INTRAMUSCULAR; INTRAVENOUS; SUBCUTANEOUS at 21:00

## 2022-09-14 RX ADMIN — PROPOFOL 25 MG: 10 INJECTION, EMULSION INTRAVENOUS at 13:38

## 2022-09-14 RX ADMIN — PROPOFOL 50 MG: 10 INJECTION, EMULSION INTRAVENOUS at 13:18

## 2022-09-14 RX ADMIN — AMPICILLIN SODIUM 2000 MG: 2 INJECTION, POWDER, FOR SOLUTION INTRAVENOUS at 23:07

## 2022-09-14 RX ADMIN — PROPOFOL 25 MG: 10 INJECTION, EMULSION INTRAVENOUS at 13:34

## 2022-09-14 RX ADMIN — SUMATRIPTAN SUCCINATE 100 MG: 50 TABLET ORAL at 18:08

## 2022-09-14 ASSESSMENT — ENCOUNTER SYMPTOMS
PALPITATIONS: 0
SHORTNESS OF BREATH: 0
BRUISES/BLEEDS EASILY: 0
VOMITING: 0
NAUSEA: 0
CLAUDICATION: 0
ABDOMINAL PAIN: 0
MYALGIAS: 1
BACK PAIN: 1
BLOOD IN STOOL: 0
COUGH: 0
WEIGHT LOSS: 0
RESPIRATORY NEGATIVE: 1
ORTHOPNEA: 0
PND: 0
CONSTITUTIONAL NEGATIVE: 1
FEVER: 0
NERVOUS/ANXIOUS: 1
NEUROLOGICAL NEGATIVE: 1
BLURRED VISION: 0
CARDIOVASCULAR NEGATIVE: 1
EYES NEGATIVE: 1
GASTROINTESTINAL NEGATIVE: 1

## 2022-09-14 ASSESSMENT — FIBROSIS 4 INDEX: FIB4 SCORE: 0.43

## 2022-09-14 ASSESSMENT — PAIN DESCRIPTION - PAIN TYPE
TYPE: ACUTE PAIN

## 2022-09-14 NOTE — ANESTHESIA TIME REPORT
Anesthesia Start and Stop Event Times     Date Time Event    9/14/2022 1305 Ready for Procedure     1305 Anesthesia Start     1353 Anesthesia Stop        Responsible Staff  09/14/22    Name Role Begin End    Dami Mata D.O. Anesth 1305 1353        Overtime Reason:  no overtime (within assigned shift)    Comments:

## 2022-09-14 NOTE — CARE PLAN
"The patient is Stable - Low risk of patient condition declining or worsening    Shift Goals  Clinical Goals: Pain management. Patient will verbalize POC by end of shift  Patient Goals: \"Sleep well. Have my heart ultra sound tomorrow.\"  Family Goals: CHRIS    Progress made toward(s) clinical / shift goals:  Provided patient with a verbal discussion related to POC and verbalized understanding without any questions or concerns. Discussed plan for KEO this morning and importance of being NPO at midnight. Patient verbalized understanding. Educated patient about 1-10 pain scale, regular pain assessments, and available pharmaciological and non-pharmacological pain relief. Patient expressed understanding and had no further questions at this time.       Patient is not progressing towards the following goals: N/A      "

## 2022-09-14 NOTE — PROGRESS NOTES
Received bedside report from RN, pt care assumed. VS WDL, pt assessment complete. Pt A&Ox4, c/o 4/10 pain at this time. POC discussed with pt and verbalizes no questions. Pt denies any additional needs at this time. Bed locked and in lowest position, bed alarm of as client is up to self. Pt educated on fall risk and verbalized understanding, call light within reach, hourly rounding initiated.

## 2022-09-14 NOTE — DISCHARGE PLANNING
[facility] T[facility] chart review completed. Collaborated with FREDERICK Pizano.  Per collaboration, no changes or concerns at this time. Patient is not medically cleared for discharge. Patient seen at bedside. TCN will continue to follow and collaborate with discharge planning team as additional post acute needs arise. Thank you.    Previously completed:  Choice obtained: HH, LTAC and infusion choice (IV ABX).  [facility] referral (N), patient has f/u appointments and is out of [facility] area.

## 2022-09-14 NOTE — PROGRESS NOTES
Cardiology Note  Primary Diagnosis: TV endocarditis     HPI:  Chani Buckner, 51-year-old female with significant history of chronic back pain with chronic NSAID use.  Patient presented with several weeks of fever, malaise, cough.  Patient was found to have Enterococcus bacteremia.  TTE at [place] showed probable vegetation on tricuspid valve.  Transferred to [facility] for KEO.    Currently patient denies chest pain, shortness of breath or palpitations.     Medical history:   Past Medical History:   Diagnosis Date    Hypertension     Migraine     PONV (postoperative nausea and vomiting)        Surgical history:   Past Surgical History:   Procedure Laterality Date    DC UPPER GI ENDOSCOPY,DIAGNOSIS N/A 9/12/2022    Procedure: GASTROSCOPY;  Surgeon: Danyelle Curiel M.D.;  Location: SURGERY SAME DAY [facility];  Service: Gastroenterology    DC COLONOSCOPY,DIAGNOSTIC N/A 9/12/2022    Procedure: COLONOSCOPY;  Surgeon: Danyelle Curiel M.D.;  Location: SURGERY SAME DAY [facility];  Service: Gastroenterology    DC UPPER GI ENDOSCOPY,BIOPSY N/A 9/12/2022    Procedure: GASTROSCOPY, WITH BIOPSY;  Surgeon: Danyelle Curiel M.D.;  Location: SURGERY SAME DAY [facility];  Service: Gastroenterology    COLONOSCOPY WITH POLYP N/A 9/12/2022    Procedure: COLONOSCOPY, WITH POLYPECTOMY;  Surgeon: Danyelle Curiel M.D.;  Location: SURGERY SAME DAY [facility];  Service: Gastroenterology    DC LAP,RMV  ADNEXAL STRUCTURE Bilateral 12/8/2020    Procedure: SALPINGO-OOPHORECTOMY, BILATERAL, LAPAROSCOPIC;  Surgeon: Brayan Hunt M.D.;  Location: [facility];  Service: Gynecology    VAGINAL HYSTERECTOMY SCOPE TOTAL N/A 12/8/2020    Procedure: LAPAROSCOPIC  SUBTOTAL HYSTERECTOMY;  Surgeon: Brayan Hunt M.D.;  Location: [facility];  Service: Gynecology    CYSTOSCOPY N/A 12/8/2020    Procedure: CYSTOSCOPY;  Surgeon: Brayan Hunt M.D.;  Location: [facility];  Service: Gynecology    LUMPECTOMY      breast  "reduction, implants     OTHER ORTHOPEDIC SURGERY      orif right wrist hardware    PRIMARY C SECTION      3       Social history:   Social History     Tobacco Use   Smoking Status Never   Smokeless Tobacco Never      Social History     Substance and Sexual Activity   Alcohol Use Yes    Alcohol/week: 3.6 oz    Types: 6 Glasses of wine per week      Social History     Substance and Sexual Activity   Drug Use Never        Family history:   Family History   Problem Relation Age of Onset    Clotting Disorder Mother        Allergies:   Allergies   Allergen Reactions    Hydrocodone-Acetaminophen Unspecified     hallucinations      Morphine Unspecified     Severe itchiness  \"It makes me itch\"       Home medications:   Current Facility-Administered Medications:     omeprazole (PRILOSEC) capsule 20 mg, 20 mg, Oral, DAILY, ZOHAIB Williamson M.D., 20 mg at 09/14/22 0504    traMADol (Ultram) 50 MG tablet 50 mg, 50 mg, Oral, Q6HRS PRN, Garry Evans M.D., 50 mg at 09/14/22 0504    ampicillin (Omnipen) 2,000 mg in  mL IVPB, 2,000 mg, Intravenous, Q4HRS, Maria Luisa Wyatt M.D., Last Rate: 200 mL/hr at 09/14/22 0511, 2,000 mg at 09/14/22 0511    cefTRIAXone (Rocephin) syringe 2 g, 2 g, Intravenous, Q12HRS, Maria Luisa Wyatt M.D., 2 g at 09/14/22 0506    HYDROmorphone (Dilaudid) injection 0.5 mg, 0.5 mg, Intravenous, Q4HRS PRN, Garry Evans M.D., 0.5 mg at 09/13/22 1508    oxyCODONE immediate-release (ROXICODONE) tablet 5 mg, 5 mg, Oral, Q4HRS PRN, Garry Evans M.D., 5 mg at 09/13/22 2203    diphenhydrAMINE (BENADRYL) tablet/capsule 25 mg, 25 mg, Oral, Q6HRS PRN, Garry Evans M.D.    SUMAtriptan (IMITREX) tablet 100 mg, 100 mg, Oral, Q6HRS PRN, Garry Evans M.D., 100 mg at 09/13/22 2029    acetaminophen (Tylenol) tablet 650 mg, 650 mg, Oral, Q6HRS PRN, Antonio Goodman M.D., 650 mg at 09/13/22 2330    benzonatate (TESSALON) capsule 100 mg, 100 mg, Oral, TID PRN, Antonio Goodman M.D., 100 mg at " 09/13/22 1055    dextrose 50% (D50W) injection 50 mL, 50 mL, Intravenous, PRN, Antonio Goodman M.D.    guaiFENesin ER (MUCINEX) tablet 1,200 mg, 1,200 mg, Oral, Q12HRS, Antonio Goodman M.D., 1,200 mg at 09/14/22 0504    senna-docusate (PERICOLACE or SENOKOT S) 8.6-50 MG per tablet 2 Tablet, 2 Tablet, Oral, BID, 2 Tablet at 09/13/22 1836 **AND** polyethylene glycol/lytes (MIRALAX) PACKET 1 Packet, 1 Packet, Oral, QDAY PRN **AND** magnesium hydroxide (MILK OF MAGNESIA) suspension 30 mL, 30 mL, Oral, QDAY PRN **AND** bisacodyl (DULCOLAX) suppository 10 mg, 10 mg, Rectal, QDAY PRN, Vladislav Vera D.O.    ondansetron (ZOFRAN) syringe/vial injection 4 mg, 4 mg, Intravenous, Q4HRS PRN, MIGUEL A AcostaO., 4 mg at 09/13/22 1509    ondansetron (ZOFRAN ODT) dispertab 4 mg, 4 mg, Oral, Q4HRS PRN, MIGUEL A AcostaO., 4 mg at 09/11/22 0853    promethazine (PHENERGAN) tablet 12.5-25 mg, 12.5-25 mg, Oral, Q4HRS PRN, Vladislav Vera D.O.    promethazine (PHENERGAN) suppository 12.5-25 mg, 12.5-25 mg, Rectal, Q4HRS PRN, Vladislav Vera D.O.    prochlorperazine (COMPAZINE) injection 5-10 mg, 5-10 mg, Intravenous, Q4HRS PRN, Vladislav Vera D.O.    Pharmacy Consult Request ...Pain Management Review 1 Each, 1 Each, Other, PHARMACY TO DOSE, Vladislav Vera D.O.    Review of Systems:  Review of Systems   Constitutional:  Negative for fever, malaise/fatigue and weight loss.   Eyes:  Negative for blurred vision.   Respiratory:  Negative for cough and shortness of breath.    Cardiovascular:  Negative for chest pain, palpitations, orthopnea, claudication, leg swelling and PND.   Gastrointestinal:  Negative for abdominal pain, blood in stool, nausea and vomiting.   Musculoskeletal:  Positive for back pain.   Endo/Heme/Allergies:  Does not bruise/bleed easily.   Psychiatric/Behavioral:  The patient is nervous/anxious.      Physical Examination:  Physical Exam  Constitutional:       General: She is not in acute  distress.  Cardiovascular:      Rate and Rhythm: Normal rate and regular rhythm.      Pulses: Normal pulses.      Heart sounds: Normal heart sounds.   Pulmonary:      Effort: Pulmonary effort is normal. No respiratory distress.      Breath sounds: Normal breath sounds.   Musculoskeletal:      Right lower leg: No edema.      Left lower leg: No edema.   Skin:     General: Skin is warm and dry.   Neurological:      General: No focal deficit present.      Mental Status: She is alert and oriented to person, place, and time. Mental status is at baseline.       Impression:  TV Endocarditis    Plan:  NPO since midnight; Plan for KEO     The risks, benefits, and alternatives to transesophageal echocardiogram with IV sedation were discussed with the patient in specific detail, including oropharyngeal and esophageal traumas including hoarseness and dysphagia after the procedure. Rare cases demonstrating serious or fatal complications associated with transesophageal echocardiogram have been reported in the adult population, including cardiac, pulmonary and bleeding complications in less than 1% of people. Patients with an identified intracardiac thrombus are at increased risk for embolic events and this appears to be reduced with anticoagulant therapy.       The patient verbalized understanding of these potential complications and wishes to proceed with this procedure. The risks/benefits of the procedure will be further discussed with the consenting physician performing the procedure.      Please see Dr. Mcwilliams's attestation for further details and MDM.     I personally spent a total of 10 minutes which includes face-to-face time and non-face-to-face time spent on preparing to see the patient, reviewing hospital notes and tests, obtaining history from the patient, performing a medically appropriate exam, counseling and educating the patient, ordering medications/tests/procedures/referrals as clinically indicated, and  documenting information in the electronic medical record.    SHAYNA Gutierrez.   [facility]  [phone]

## 2022-09-14 NOTE — ANESTHESIA PREPROCEDURE EVALUATION
Date/Time: 09/14/22 1300    Scheduled providers: All Mcwilliams M.D.; Dami Mata D.O.    Procedure: EC-KEO W/O CONT    Diagnosis:             Endocarditis [I38]            Endocarditis [I38]    Location: [facility] IMAGING - ECHOCARDIOLOGY [facility]          Relevant Problems   PULMONARY   (positive) Pneumonia      NEURO   (positive) Migraine with aura      CARDIAC   (positive) Migraine with aura         (positive) TAZ (acute kidney injury) (HCC)   (positive) Calculus of kidney       Physical Exam    Airway   Mallampati: II  TM distance: >3 FB  Neck ROM: full       Cardiovascular - normal exam  Rhythm: regular  Rate: normal  (-) murmur     Dental - normal exam           Pulmonary - normal exam  Breath sounds clear to auscultation     Abdominal    Neurological - normal exam                 Anesthesia Plan    ASA 2       Plan - MAC               Induction: intravenous      Pertinent diagnostic labs and testing reviewed    Informed Consent:    Anesthetic plan and risks discussed with patient.

## 2022-09-14 NOTE — PROGRESS NOTES
[facility] Internal Medicine Daily Progress Note    Date of Service  9/14/2022    [facility] Team: R IM White Team   Attending: Delroy Rebollar M.d.  Senior Resident: Dr. Rosario  Intern:  Dr. Colbert  Contact Number: [phone]    Chief Complaint  Sarah Buckner is a 51 y.o. female admitted 9/9/2022 with Chest pain,     Hospital Course  Sarah Buckner is a 51 y.o. female admitted 9/9/2022 with bacterial endocarditis growing enterococcus faecalis on blood cultures.  Also found to have significant anemia, reportedly had melena outpatient but none inpatient.  Work-up of Enterococcus bacteremia including EGD, Colonoscopy revealed mild diverticulosis, antral gastritis and sigmoid polypectomy of 7mm pedunculated polyp. Cardiology was consulted and ordered a KEO for 09/14/22 for further evaluation of tricuspid valve.     Patient was placed on 6 weeks of antibiotic therapy starting from last negative blood cultures taken 09/10/2022.    Interval Problem Update  -no acute events overnight  -KEO scheduled for today  -Patient still without symptoms of CP, palpitations, LUE pain.     I have discussed this patient's plan of care and discharge plan at IDT rounds today with Case Management, Nursing, Nursing leadership, and other members of the IDT team.    Consultants/Specialty  cardiology    Code Status  Full Code    Disposition  Patient is not medically cleared for discharge.   Anticipate discharge to to home with close outpatient follow-up.  I have placed the appropriate orders for post-discharge needs.    Review of Systems  ROS     Physical Exam  Temp:  [36.2 °C (97.2 °F)-38 °C (100.4 °F)] 37.2 °C (99 °F)  Pulse:  [101-136] 123  Resp:  [15-21] 16  BP: (114-150)/(68-95) 133/91  SpO2:  [88 %-99 %] 88 %    Physical Exam    Fluids    Intake/Output Summary (Last 24 hours) at 9/14/2022 1707  Last data filed at 9/14/2022 1353  Gross per 24 hour   Intake 1183.33 ml   Output --   Net 1183.33 ml       Laboratory  Recent Labs      "09/12/22  0034 09/13/22  0034 09/13/22  2359   WBC 10.8 10.4 13.1*   RBC 3.25* 3.12* 3.07*   HEMOGLOBIN 9.1* 8.9* 8.6*   HEMATOCRIT 28.1* 27.5* 26.9*   MCV 86.5 88.1 87.6   MCH 28.0 28.5 28.0   MCHC 32.4* 32.4* 32.0*   RDW 50.2* 51.2* 51.0*   PLATELETCT 492* 529* 513*   MPV 9.3 9.5 9.7     Recent Labs     09/12/22 0034 09/13/22 0034 09/13/22  2359   SODIUM 136 135 135   POTASSIUM 3.5* 3.9 4.4   CHLORIDE 102 102 99   CO2 25 27 24   GLUCOSE 107* 111* 93   BUN 2* 2* 2*   CREATININE 0.39* 0.40* 0.41*   CALCIUM 8.1* 8.2* 8.2*             Recent Labs     09/13/22  0815   TRIGLYCERIDE 140   HDL 18*   LDL 92       Imaging  EC-KEO W/O CONT         [id number]   Final Result      Multiple dental caries.      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  Last positive blood culture was 9/8.    Denies IV drug use, possible dental carries (chronic), melena reported on scale of weeks. GI consulted to work-up for possible GI source of Enterococcus bacteremia, including possible malignancy.   Of note, she has had a several month history of back pain that was initially upper and most recently lower. She had MRI L spine 7/19 which showed \"decreased T1/T2 signal noted within the vertebral marrow homogeneously throughout the spinal column, raising the question of infiltrative disease, such as myelofibrosis.\"  Given to the new finding of Enterococcus faecalis bacteremia and endocarditis, considering repeat lumbar spine MRI to evaluate for possible infectious or immunodeficiency process.      -Colonoscopy and EGD 9/12 showing healed erosive gastritis, few sigmoid " diverticula, 7mm pendunculated sigmoid polyp  -Dr. Wyatt with ID consulted; appreciate recs  -Antibiotics: ampicillin, ceftriaxone  -repeat blood cultures drawn on 9/10 negative as of 09/12/22. Repeating on 09/12 ( /48 hours per ID)  -KEO completed 09/14  -Anticipate 6 weeks IV ampicillin, ceftriaxone via PICC from date of negative blood cxs 09/10/22  - MRI spine ordered    Anemia- (present on admission)  Assessment & Plan  Reports history of several weeks of melena at home, in the context of max dosages of NSAIDs for ongoing back pain. Melena has not recurred during admission.  Initial fecal occult blood is negative.  She required 2 units of RBCs at outside hospital prior to transfer.  Anemia may be multifactorial with contributions from hemolysis due to endocarditis, iron deficiency anemia due to GI losses.  LDH is high, which can be seen during infection.  No schistocytes seen on peripheral smear.    -Dlkoy=038, IUG=363, severe iron deficiency  -fecal occult negative 09/08/22  -will continue to monitor  -transfuse at hgb less than 7  -no nsaids, hold DVT prophylaxis        Elevated alkaline phosphatase level  Assessment & Plan  Alk phos has been significantly elevated throughout admission.  GGT is also elevated, indicating that this may be hepatic in origin.  Another less likely possibility is myelofibrosis, which was suggested on MRI of the lumbar spine in July 2022.  Myelofibrosis often presents with anemia, thrombocytosis, leukocytosis, constitutional symptoms, joint pain, all of which the patient has, but may be better explained by endocarditis and/or GI bleed.  Peripheral blood smear was unrevealing (no dacrocytes).  Myelofibrosis is a diagnosis of exclusion.    -Spine MRI ordered  - GI following  -If suspicion remains for myelofibrosis, consider genetic testing (JAK2 mutation: 50-60% of patients, CALR mutation: 18-32% of patients, MPL mutation: 5-9% of patients)    Endocarditis  Assessment & Plan  TTE with  concern for tricuspid vegetation  See above problem    TAZ (acute kidney injury) (HCC)- (present on admission)  Assessment & Plan  resolved    Pneumonia- (present on admission)  Assessment & Plan  Cavitary lesion noted in left lower lobe demonstrated on CT chest. Possible etiology of infectious emboli from tricuspid vegetations.     -antibiotics as above        Migraine with aura- (present on admission)  Assessment & Plan  -resumed home imitrex       VTE prophylaxis: SCDs/TEDs    I have performed a physical exam and reviewed and updated ROS and Plan today (9/14/2022). In review of yesterday's note (9/13/2022), there are no changes except as documented above.

## 2022-09-14 NOTE — TELEPHONE ENCOUNTER
----- Message from All Mcwilliams M.D. sent at 9/12/2022  5:37 PM PDT -----  Regarding: outpatient KEO scheduling  Sina Sevilla! Can we schedule Ms. Buckner for an outpatient KEO in 6 weeks to follow-up her endocarditis. Thanks!    -All

## 2022-09-14 NOTE — PROGRESS NOTES
Report received from [facility] shift RN, assumed patient care. Patient is calmly resting in bed, no signs of distress, even and unlabored breathing noted. Pt on 1L O2 via NC. Tele box on and in place. Patient has call light within reach, fall precautions in place. Patient remains safe and stable, will continue to monitor.

## 2022-09-14 NOTE — OR NURSING
1349 Patient arrived to PACU.  Report from anesthesia and RN.  Patient arouses to voice, denies discomfort, updated on plan of care.  1400 Patient spoke to mom Cecelia.  1430 Patient resting in rZanoni, tolerating oral intake.  1440 Report called to Tete THACKER.    1449 Patient transferred to  70 via [facility] with RN.  Phone at bedside.

## 2022-09-14 NOTE — TELEPHONE ENCOUNTER
Patient is scheduled on 10-25-22 for a 6 weeks f/u KEO w/anesthesia with Dr. Mcwilliams. No meds to stop and patient to check in at 11:00 for a 1:00 procedure. Updated H&P to be done on admit by NP. Pre admit to call and do a telephone pre admit due to patient living out of area.

## 2022-09-14 NOTE — PROGRESS NOTES
NO HARD CHART:     Patient to procedure Room for KEO with no hard chart, only loose papers. Per Floor RN, no hard chart available. Procedure RN completed Pre-Procedure Consent paperwork packet, including: WHO Yellow Sheet signed by RN and MD, Informed consent for KEO procedure signed by RN, patient, and Cardiology, and Informed consent for Anesthesia signed by Anesthesia MD and patient. Procedure Packet secured to loose papers and hand delivered to receiving PACU RN who acknowledged receipt and no hard chart.

## 2022-09-15 ENCOUNTER — APPOINTMENT (OUTPATIENT)
Dept: RADIOLOGY | Facility: MEDICAL CENTER | Age: 52
DRG: 219 | End: 2022-09-15
Payer: COMMERCIAL

## 2022-09-15 ENCOUNTER — APPOINTMENT (OUTPATIENT)
Dept: RADIOLOGY | Facility: MEDICAL CENTER | Age: 52
DRG: 219 | End: 2022-09-15
Attending: THORACIC SURGERY (CARDIOTHORACIC VASCULAR SURGERY)
Payer: COMMERCIAL

## 2022-09-15 ENCOUNTER — APPOINTMENT (OUTPATIENT)
Dept: RADIOLOGY | Facility: MEDICAL CENTER | Age: 52
DRG: 219 | End: 2022-09-15
Attending: NURSE PRACTITIONER
Payer: COMMERCIAL

## 2022-09-15 LAB
ABO + RH BLD: NORMAL
BACTERIA BLD CULT: NORMAL
EKG IMPRESSION: NORMAL
SIGNIFICANT IND 70042: NORMAL
SITE SITE: NORMAL
SOURCE SOURCE: NORMAL

## 2022-09-15 PROCEDURE — 93005 ELECTROCARDIOGRAM TRACING: CPT | Performed by: NURSE PRACTITIONER

## 2022-09-15 PROCEDURE — A9270 NON-COVERED ITEM OR SERVICE: HCPCS | Performed by: INTERNAL MEDICINE

## 2022-09-15 PROCEDURE — 770022 HCHG ROOM/CARE - ICU (200)

## 2022-09-15 PROCEDURE — 700101 HCHG RX REV CODE 250: Performed by: STUDENT IN AN ORGANIZED HEALTH CARE EDUCATION/TRAINING PROGRAM

## 2022-09-15 PROCEDURE — 700111 HCHG RX REV CODE 636 W/ 250 OVERRIDE (IP): Performed by: INTERNAL MEDICINE

## 2022-09-15 PROCEDURE — 700102 HCHG RX REV CODE 250 W/ 637 OVERRIDE(OP): Performed by: INTERNAL MEDICINE

## 2022-09-15 PROCEDURE — 700102 HCHG RX REV CODE 250 W/ 637 OVERRIDE(OP): Performed by: HOSPITALIST

## 2022-09-15 PROCEDURE — 93010 ELECTROCARDIOGRAM REPORT: CPT | Performed by: INTERNAL MEDICINE

## 2022-09-15 PROCEDURE — 71046 X-RAY EXAM CHEST 2 VIEWS: CPT

## 2022-09-15 PROCEDURE — 93880 EXTRACRANIAL BILAT STUDY: CPT

## 2022-09-15 PROCEDURE — A9270 NON-COVERED ITEM OR SERVICE: HCPCS | Performed by: STUDENT IN AN ORGANIZED HEALTH CARE EDUCATION/TRAINING PROGRAM

## 2022-09-15 PROCEDURE — 99232 SBSQ HOSP IP/OBS MODERATE 35: CPT | Mod: FS | Performed by: INTERNAL MEDICINE

## 2022-09-15 PROCEDURE — 99232 SBSQ HOSP IP/OBS MODERATE 35: CPT | Performed by: INTERNAL MEDICINE

## 2022-09-15 PROCEDURE — 99233 SBSQ HOSP IP/OBS HIGH 50: CPT | Mod: GC | Performed by: HOSPITALIST

## 2022-09-15 PROCEDURE — 700101 HCHG RX REV CODE 250: Performed by: INTERNAL MEDICINE

## 2022-09-15 PROCEDURE — A9270 NON-COVERED ITEM OR SERVICE: HCPCS | Performed by: HOSPITALIST

## 2022-09-15 PROCEDURE — 700102 HCHG RX REV CODE 250 W/ 637 OVERRIDE(OP): Performed by: STUDENT IN AN ORGANIZED HEALTH CARE EDUCATION/TRAINING PROGRAM

## 2022-09-15 PROCEDURE — 700105 HCHG RX REV CODE 258: Performed by: INTERNAL MEDICINE

## 2022-09-15 RX ORDER — LORAZEPAM 1 MG/1
1 TABLET ORAL EVERY 4 HOURS PRN
Status: DISCONTINUED | OUTPATIENT
Start: 2022-09-15 | End: 2022-09-16

## 2022-09-15 RX ADMIN — DOCUSATE SODIUM 50 MG AND SENNOSIDES 8.6 MG 2 TABLET: 8.6; 5 TABLET, FILM COATED ORAL at 05:51

## 2022-09-15 RX ADMIN — CEFTRIAXONE SODIUM 2 G: 10 INJECTION, POWDER, FOR SOLUTION INTRAVENOUS at 18:48

## 2022-09-15 RX ADMIN — GUAIFENESIN 1200 MG: 600 TABLET, EXTENDED RELEASE ORAL at 05:52

## 2022-09-15 RX ADMIN — ACETAMINOPHEN 1000 MG: 500 TABLET ORAL at 17:45

## 2022-09-15 RX ADMIN — AMPICILLIN SODIUM 2000 MG: 2 INJECTION, POWDER, FOR SOLUTION INTRAVENOUS at 05:53

## 2022-09-15 RX ADMIN — ACETAMINOPHEN 1000 MG: 500 TABLET ORAL at 11:18

## 2022-09-15 RX ADMIN — CEFTRIAXONE SODIUM 2 G: 10 INJECTION, POWDER, FOR SOLUTION INTRAVENOUS at 05:53

## 2022-09-15 RX ADMIN — GUAIFENESIN 1200 MG: 600 TABLET, EXTENDED RELEASE ORAL at 17:46

## 2022-09-15 RX ADMIN — AMPICILLIN SODIUM 2000 MG: 2 INJECTION, POWDER, FOR SOLUTION INTRAVENOUS at 02:46

## 2022-09-15 RX ADMIN — AMPICILLIN SODIUM 2000 MG: 2 INJECTION, POWDER, FOR SOLUTION INTRAVENOUS at 11:17

## 2022-09-15 RX ADMIN — OXYCODONE HYDROCHLORIDE 10 MG: 10 TABLET ORAL at 05:50

## 2022-09-15 RX ADMIN — DOCUSATE SODIUM 50 MG AND SENNOSIDES 8.6 MG 2 TABLET: 8.6; 5 TABLET, FILM COATED ORAL at 17:45

## 2022-09-15 RX ADMIN — AMPICILLIN SODIUM 2000 MG: 2 INJECTION, POWDER, FOR SOLUTION INTRAVENOUS at 23:27

## 2022-09-15 RX ADMIN — OMEPRAZOLE 20 MG: 20 CAPSULE, DELAYED RELEASE ORAL at 05:52

## 2022-09-15 RX ADMIN — ACETAMINOPHEN 1000 MG: 500 TABLET ORAL at 05:51

## 2022-09-15 RX ADMIN — BENZONATATE 100 MG: 100 CAPSULE ORAL at 05:55

## 2022-09-15 RX ADMIN — AMPICILLIN SODIUM 2000 MG: 2 INJECTION, POWDER, FOR SOLUTION INTRAVENOUS at 15:05

## 2022-09-15 RX ADMIN — AMPICILLIN SODIUM 2000 MG: 2 INJECTION, POWDER, FOR SOLUTION INTRAVENOUS at 18:48

## 2022-09-15 ASSESSMENT — COGNITIVE AND FUNCTIONAL STATUS - GENERAL
MOBILITY SCORE: 24
SUGGESTED CMS G CODE MODIFIER DAILY ACTIVITY: CH
MOBILITY SCORE: 24
DAILY ACTIVITIY SCORE: 24
SUGGESTED CMS G CODE MODIFIER MOBILITY: CH
SUGGESTED CMS G CODE MODIFIER DAILY ACTIVITY: CH
SUGGESTED CMS G CODE MODIFIER MOBILITY: CH
DAILY ACTIVITIY SCORE: 24

## 2022-09-15 ASSESSMENT — LIFESTYLE VARIABLES
DOES PATIENT WANT TO STOP DRINKING: NO
TOTAL SCORE: 0
AVERAGE NUMBER OF DAYS PER WEEK YOU HAVE A DRINK CONTAINING ALCOHOL: 2
ALCOHOL_USE: YES
HOW MANY TIMES IN THE PAST YEAR HAVE YOU HAD 5 OR MORE DRINKS IN A DAY: 1
EVER FELT BAD OR GUILTY ABOUT YOUR DRINKING: NO
CONSUMPTION TOTAL: POSITIVE
HAVE YOU EVER FELT YOU SHOULD CUT DOWN ON YOUR DRINKING: NO
HAVE PEOPLE ANNOYED YOU BY CRITICIZING YOUR DRINKING: NO
ON A TYPICAL DAY WHEN YOU DRINK ALCOHOL HOW MANY DRINKS DO YOU HAVE: 2
TOTAL SCORE: 0
TOTAL SCORE: 0
EVER HAD A DRINK FIRST THING IN THE MORNING TO STEADY YOUR NERVES TO GET RID OF A HANGOVER: NO

## 2022-09-15 ASSESSMENT — ENCOUNTER SYMPTOMS
SENSORY CHANGE: 0
SHORTNESS OF BREATH: 0
MYALGIAS: 1
BLURRED VISION: 0
CHILLS: 0
WEAKNESS: 1
BACK PAIN: 1
PALPITATIONS: 0
SPUTUM PRODUCTION: 0
HEMOPTYSIS: 0
DIZZINESS: 0
FEVER: 0
WHEEZING: 0

## 2022-09-15 ASSESSMENT — PAIN DESCRIPTION - PAIN TYPE
TYPE: ACUTE PAIN

## 2022-09-15 ASSESSMENT — FIBROSIS 4 INDEX: FIB4 SCORE: 0.43

## 2022-09-15 ASSESSMENT — PAIN SCALES - GENERAL: PAIN_LEVEL: 6

## 2022-09-15 NOTE — PROGRESS NOTES
Lab called with a result of a positive blood culture for this patient at 0200.  Blood culture that was drawn on 9/10 showed gram positive cocci in chains indicating possible strep. Critical lab result read back to lab.   Dr. Carvalho notified of critical lab result at 0211.  Critical lab result acknowledged by Dr. Carvalho.

## 2022-09-15 NOTE — DISCHARGE PLANNING
[facility] TCN chart review completed. Collaborated with FREDERICK Pizano.  Per chart review and collaboration with FREDERICK, patient is not medically cleared.  She was admitted  on 9/9/2022 with bacterial endocarditis growing enterococcus faecalis on blood cultures, anemia mild diverticulosis and antral gastritis. KEO scheduled for today for further evaluation of tricuspid valve.  Anticipate 6 weeks of IV antibiotic therapy for ampicillin and ceftriaxone via PICC starting on 9/10/22.      Patient seen at bedside and is anxious for KEO scheduled for this afternoon.  Patient inquiring if she needs LTAC if she could go to [facility] as it is closer to her home in [place].  Instructed patient that it is likely out of network but TCN's would get back to her tomorrow with a definitive answer. TCN will continue to follow and collaborate with discharge planning team as additional post acute needs arise. Thank you.    Previously completed:  Choice obtained: HH, LTAC and infusion choice (IV ABX).  [facility] referral (N), patient has f/u appointments and is out of [facility] area.     1845- KEO completed today. Plan for heart ultrasound tomorrow and open heart surgery this Friday

## 2022-09-15 NOTE — CONSULTS
REFERRING PHYSICIAN: All Mcwilliams MD.     CONSULTING PHYSICIAN: Truong Bradley DO     CHIEF COMPLAINT: Fevers    HISTORY OF PRESENT ILLNESS: The patient is a 51 y.o. female with history of migraines, chronic pain and hypertension. She presented to [facility] with fevers and generalized malaise getting worse over the last month. She was worked up and diagnosed with enterococcus bacteremia, TAZ and cavitary pneumonia. She was transferred to [facility] for KEO s/p TTE suspicious for tricuspid valve mass.   She has been started on a 6 week course of antibiotics. She denies shortness of breath, chest pain, lower extremity edema, dizziness, syncope, orthopnea, or PND.      PAST MEDICAL HISTORY:   Active Ambulatory Problems     Diagnosis Date Noted    Migraine with aura 03/10/2015    Neck pain, chronic 01/22/2020    Cervical disc disease 01/27/2020    Ulnar neuropathy at elbow of right upper extremity 07/01/2020    Calculus of kidney 05/11/2010    Status post hysterectomy 07/19/2022    Fever 09/07/2022    Sepsis (HCC) 09/07/2022    Pneumonia 09/07/2022    Leukocytosis 09/07/2022    Anemia 09/07/2022    TAZ (acute kidney injury) (HCC) 09/07/2022    Elevated liver function tests 09/07/2022    Hyponatremia 09/07/2022    Hypokalemia 09/07/2022    Bacteremia due to Enterococcus 09/08/2022    Infective endocarditis of tricuspid valve 09/08/2022    Endocarditis 09/09/2022     Resolved Ambulatory Problems     Diagnosis Date Noted    HTN (hypertension) 03/10/2015    Intractable migraine without aura and without status migrainosus 12/11/2020    Intramural leiomyoma of uterus 04/19/2022    Menometrorrhagia 04/19/2022    Pain in pelvis 04/19/2022     Past Medical History:   Diagnosis Date    Hypertension     Migraine     PONV (postoperative nausea and vomiting)        PAST SURGICAL HISTORY:   Past Surgical History:   Procedure Laterality Date    NC UPPER GI ENDOSCOPY,DIAGNOSIS N/A 9/12/2022    Procedure: GASTROSCOPY;  Surgeon: Danyelle  "MARA Curiel M.D.;  Location: SURGERY SAME DAY [facility];  Service: Gastroenterology    SD COLONOSCOPY,DIAGNOSTIC N/A 9/12/2022    Procedure: COLONOSCOPY;  Surgeon: Danyelle Curiel M.D.;  Location: SURGERY SAME DAY [facility];  Service: Gastroenterology    SD UPPER GI ENDOSCOPY,BIOPSY N/A 9/12/2022    Procedure: GASTROSCOPY, WITH BIOPSY;  Surgeon: Danyelle Curiel M.D.;  Location: SURGERY SAME DAY [facility];  Service: Gastroenterology    COLONOSCOPY WITH POLYP N/A 9/12/2022    Procedure: COLONOSCOPY, WITH POLYPECTOMY;  Surgeon: Danyelle Curiel M.D.;  Location: SURGERY SAME DAY [facility];  Service: Gastroenterology    SD LAP,RMV  ADNEXAL STRUCTURE Bilateral 12/8/2020    Procedure: SALPINGO-OOPHORECTOMY, BILATERAL, LAPAROSCOPIC;  Surgeon: Brayan Hunt M.D.;  Location: [facility];  Service: Gynecology    VAGINAL HYSTERECTOMY SCOPE TOTAL N/A 12/8/2020    Procedure: LAPAROSCOPIC  SUBTOTAL HYSTERECTOMY;  Surgeon: Brayan Hunt M.D.;  Location: SURGERY [facility];  Service: Gynecology    CYSTOSCOPY N/A 12/8/2020    Procedure: CYSTOSCOPY;  Surgeon: Brayan Hunt M.D.;  Location: [facility];  Service: Gynecology    LUMPECTOMY      breast reduction, implants     OTHER ORTHOPEDIC SURGERY      orif right wrist hardware    PRIMARY C SECTION      3        ALLERGIES:   Allergies   Allergen Reactions    Hydrocodone-Acetaminophen Unspecified     hallucinations      Morphine Unspecified     Severe itchiness  \"It makes me itch\"        CURRENT MEDICATIONS:   Current Facility-Administered Medications:     lactated ringers infusion, , Intravenous, Continuous, Dami Mata D.O., Last Rate: 50 mL/hr at 09/14/22 1646, New Bag at 09/14/22 1646    fentaNYL (SUBLIMAZE) injection 25 mcg, 25 mcg, Intravenous, Q2 MIN PRN **OR** fentaNYL (SUBLIMAZE) injection 50 mcg, 50 mcg, Intravenous, Q2 MIN PRN, Dami Mata D.O.    HYDROmorphone (Dilaudid) injection 0.1 mg, 0.1 mg, Intravenous, Q5 MIN PRN **OR** " HYDROmorphone (Dilaudid) injection 0.2 mg, 0.2 mg, Intravenous, Q5 MIN PRN **OR** HYDROmorphone (Dilaudid) injection 0.4 mg, 0.4 mg, Intravenous, Q5 MIN PRN, MIGUEL A PimentelOConcetta    oxyCODONE (ROXICODONE) oral solution 5 mg, 5 mg, Oral, Once PRN **OR** oxyCODONE (ROXICODONE) oral solution 10 mg, 10 mg, Oral, Once PRN, MIGUEL A PimentelO.    meperidine (DEMEROL) injection 12.5 mg, 12.5 mg, Intravenous, Q5 MIN PRN, MIGUEL A PimentelO.    midazolam (Versed) injection 1 mg, 1 mg, Intravenous, Q5 MIN PRN, MIGUEL A PimentelO.    ondansetron (ZOFRAN) syringe/vial injection 4 mg, 4 mg, Intravenous, Once PRN, Dami Mata D.O.    haloperidol lactate (HALDOL) injection 1 mg, 1 mg, Intravenous, Q15 MIN PRN, MIGUEL A PimentelO.    diphenhydrAMINE (BENADRYL) injection 12.5 mg, 12.5 mg, Intravenous, Q15 MIN PRN, MIGUEL A PimentelO.    ePHEDrine injection 5 mg, 5 mg, Intravenous, Q5 MIN PRN, MIGUEL A PimentelO.    Metoprolol Tartrate (LOPRESSOR) injection 1 mg, 1 mg, Intravenous, Q5 MIN PRN, MIGUEL A PimentelO.    hydrALAZINE (APRESOLINE) injection 5 mg, 5 mg, Intravenous, Q5 MIN PRN, MIGUEL A PimentelO.    albuterol (PROVENTIL) 2.5mg/3ml nebulizer solution 2.5 mg, 2.5 mg, Inhalation, Q10 MIN PRN, MIGUEL A PimentelOConcetta    omeprazole (PRILOSEC) capsule 20 mg, 20 mg, Oral, DAILY, ZOHAIB Williamson M.D., 20 mg at 09/14/22 0504    traMADol (Ultram) 50 MG tablet 50 mg, 50 mg, Oral, Q6HRS PRN, Garry Evans M.D., 50 mg at 09/14/22 0504    ampicillin (Omnipen) 2,000 mg in  mL IVPB, 2,000 mg, Intravenous, Q4HRS, Maria Luisa Wyatt M.D., Stopped. (Insulin or Heparin) at 09/14/22 1552    cefTRIAXone (Rocephin) syringe 2 g, 2 g, Intravenous, Q12HRS, Maria Luisa Wyatt M.D., 2 g at 09/14/22 0506    HYDROmorphone (Dilaudid) injection 0.5 mg, 0.5 mg, Intravenous, Q4HRS PRN, Garry Evans M.D., 0.5 mg at 09/13/22 1508    oxyCODONE immediate-release (ROXICODONE) tablet 5 mg, 5 mg, Oral, Q4HRS  PRN, Garry Evans M.D., 5 mg at 09/13/22 2203    diphenhydrAMINE (BENADRYL) tablet/capsule 25 mg, 25 mg, Oral, Q6HRS PRN, Garry Evans M.D.    SUMAtriptan (IMITREX) tablet 100 mg, 100 mg, Oral, Q6HRS PRN, Garry Evans M.D., 100 mg at 09/13/22 2029    acetaminophen (Tylenol) tablet 650 mg, 650 mg, Oral, Q6HRS PRN, Antonio Goodman M.D., 650 mg at 09/13/22 2330    benzonatate (TESSALON) capsule 100 mg, 100 mg, Oral, TID PRN, Antonio Goodman M.D., 100 mg at 09/13/22 1055    dextrose 50% (D50W) injection 50 mL, 50 mL, Intravenous, PRN, Antonio Goodman M.D.    guaiFENesin ER (MUCINEX) tablet 1,200 mg, 1,200 mg, Oral, Q12HRS, Antonio Goodman M.D., 1,200 mg at 09/14/22 0504    senna-docusate (PERICOLACE or SENOKOT S) 8.6-50 MG per tablet 2 Tablet, 2 Tablet, Oral, BID, 2 Tablet at 09/13/22 1836 **AND** polyethylene glycol/lytes (MIRALAX) PACKET 1 Packet, 1 Packet, Oral, QDAY PRN **AND** magnesium hydroxide (MILK OF MAGNESIA) suspension 30 mL, 30 mL, Oral, QDAY PRN **AND** bisacodyl (DULCOLAX) suppository 10 mg, 10 mg, Rectal, QDAY PRN, Vladislav Vera D.O.    ondansetron (ZOFRAN) syringe/vial injection 4 mg, 4 mg, Intravenous, Q4HRS PRN, MIGUEL A AcostaOConcetta, 4 mg at 09/13/22 1509    ondansetron (ZOFRAN ODT) dispertab 4 mg, 4 mg, Oral, Q4HRS PRN, Vladislav Vera D.O., 4 mg at 09/11/22 0853    promethazine (PHENERGAN) tablet 12.5-25 mg, 12.5-25 mg, Oral, Q4HRS PRN, Vladislav Vera D.O.    promethazine (PHENERGAN) suppository 12.5-25 mg, 12.5-25 mg, Rectal, Q4HRS PRN, Vladislav Vera D.O.    prochlorperazine (COMPAZINE) injection 5-10 mg, 5-10 mg, Intravenous, Q4HRS PRN, Vladislav Vera D.O.    Pharmacy Consult Request ...Pain Management Review 1 Each, 1 Each, Other, PHARMACY TO DOSE, Vladislav Vera D.O.    Facility-Administered Medications Ordered in Other Encounters:     lidocaine PF (XYLOCAINE-MPF) 2 % injection PF, , Intravenous, PRN, Dami Mata D.O., 65 mg at  09/14/22 1315    Lactated Ringers, , Intravenous, Intra-Op Continuous, Dami Mata D.O., New Bag at 09/14/22 1305    FAMILY HISTORY:   Family History   Problem Relation Age of Onset    Clotting Disorder Mother         SOCIAL HISTORY:   Social History     Socioeconomic History    Marital status:      Spouse name: Not on file    Number of children: Not on file    Years of education: Not on file    Highest education level: Not on file   Occupational History    Not on file   Tobacco Use    Smoking status: Never    Smokeless tobacco: Never   Vaping Use    Vaping Use: Never used   Substance and Sexual Activity    Alcohol use: Yes     Alcohol/week: 3.6 oz     Types: 6 Glasses of wine per week    Drug use: Never    Sexual activity: Not on file   Other Topics Concern    Not on file   Social History Narrative    Not on file     Social Determinants of Health     Financial Resource Strain: Not on file   Food Insecurity: Not on file   Transportation Needs: Not on file   Physical Activity: Not on file   Stress: Not on file   Social Connections: Not on file   Intimate Partner Violence: Not on file   Housing Stability: Not on file     REVIEW OF SYSTEMS:  Review of Systems   Constitutional: Negative.    HENT: Negative.     Eyes: Negative.    Respiratory: Negative.     Cardiovascular: Negative.    Gastrointestinal: Negative.    Genitourinary: Negative.    Musculoskeletal:  Positive for back pain and myalgias.   Skin: Negative.    Neurological: Negative.    Endo/Heme/Allergies: Negative.    Psychiatric/Behavioral:  The patient is nervous/anxious.      PHYSICAL EXAMINATION:    BP (!) 133/91   Pulse (!) 123   Temp 37.2 °C (99 °F) (Temporal)   Resp 16   Wt 66.6 kg (146 lb 13.2 oz)   LMP 11/23/2020   SpO2 88%   BMI 24.58 kg/m²     Physical Exam  Constitutional:       General: She is not in acute distress.     Appearance: Normal appearance.   HENT:      Head: Normocephalic and atraumatic.      Nose: Nose normal.       Mouth/Throat:      Pharynx: Uvula midline.   Eyes:      Pupils: Pupils are equal, round, and reactive to light.   Neck:      Vascular: No carotid bruit or JVD.   Cardiovascular:      Rate and Rhythm: Normal rate and regular rhythm.      Heart sounds: Murmur heard.   Systolic murmur is present with a grade of 3/6.   Pulmonary:      Effort: Pulmonary effort is normal.      Breath sounds: Normal breath sounds.   Abdominal:      General: Bowel sounds are normal. There is no distension.      Palpations: Abdomen is soft.      Tenderness: There is no abdominal tenderness.   Musculoskeletal:         General: Normal range of motion.   Skin:     General: Skin is warm and dry.      Nails: There is no clubbing.   Neurological:      Mental Status: She is alert and oriented to person, place, and time.   Psychiatric:         Mood and Affect: Mood and affect normal.       LABS REVIEWED:  Lab Results   Component Value Date/Time    SODIUM 135 09/13/2022 11:59 PM    POTASSIUM 4.4 09/13/2022 11:59 PM    CHLORIDE 99 09/13/2022 11:59 PM    CO2 24 09/13/2022 11:59 PM    GLUCOSE 93 09/13/2022 11:59 PM    BUN 2 (L) 09/13/2022 11:59 PM    CREATININE 0.41 (L) 09/13/2022 11:59 PM      Lab Results   Component Value Date/Time    PROTHROMBTM 11.9 (H) 09/07/2022 03:56 PM    INR 1.10 09/07/2022 03:56 PM      Lab Results   Component Value Date/Time    WBC 13.1 (H) 09/13/2022 11:59 PM    RBC 3.07 (L) 09/13/2022 11:59 PM    HEMOGLOBIN 8.6 (L) 09/13/2022 11:59 PM    HEMATOCRIT 26.9 (L) 09/13/2022 11:59 PM    MCV 87.6 09/13/2022 11:59 PM    MCH 28.0 09/13/2022 11:59 PM    MCHC 32.0 (L) 09/13/2022 11:59 PM    MPV 9.7 09/13/2022 11:59 PM    NEUTSPOLYS 76.00 (H) 09/13/2022 11:59 PM    LYMPHOCYTES 16.10 (L) 09/13/2022 11:59 PM    MONOCYTES 6.50 09/13/2022 11:59 PM    EOSINOPHILS 0.00 09/13/2022 11:59 PM    BASOPHILS 0.40 09/13/2022 11:59 PM        IMAGING REVIEWED AND INTERPRETED:    ECHOCARDIOGRAM [facility] 9/14/2022:  Report pending    ANGIOGRAM: none    CT  [place] [facility] [place]/8/2022:  1.  Multifocal consolidation concerning for pneumonia, with small cavitary lesion in the left lower lobe.     2.  No acute abnormality or evidence of malignancy to the abdomen or pelvis.      IMPRESSION:  Bacterial endocarditis, sepsis, severe tricuspid regurgitation, aortic valve vegetation, patent foramen ovale, septic pulmonary emboli with pneumonia, acute kidney injury      PLAN:  I recommend surgical repair versus replacement of tricuspid valve, aortic valve repair and closure of her PFO.  She has a large vegetation, and is now spread infection via her PFO to her aortic valve.  I believe a more aggressive a course is warranted in her case, as I do not believe her antibiotics will help resolve her tricuspid valve issue.  The procedure, its risks, benefits, potential complications and alternative treatments were discussed with the patient in detail including the risks should she decide not to undergo my recommended treatment. All of her questions were answered to her satisfaction and she is willing to proceed with the operation. The risks include death, stroke, infection: to include a rare bacterial infection related to the use of the heart/lung machine, misbah-operative myocardial infarction, dysrhythmias, diaphragmatic paralysis, chest wall paresthesia, tracheostomy, kidney or other organ failure, possible return to the operating room for bleeding, bleeding requiring transfusion with its attendant risks including AIDS or hepatitis, dehiscence of surgical incisions, respiratory complications including the need for prolonged ventilator support, Protamine or other drug reaction, peripheral neuropathy, loss of limb, and miscount of surgical items. The operative mortality risk is approximately 5%. The STS mortality risk score is 3.4% and the morbidity and mortality risk score is 36% for AVR. The scores were discussed with patient.    Thank you for this very challenging consultation and  participation in the patient’s care.  I will keep you apprised of all future developments.      The operation, tricuspid valve repair/replacement, possible aortic valve repair, closure of PFO is scheduled for September 16 at 10 AM AM at [facility].         Sincerely,     DO CASIMIRO Buck,  Truong Bradley DO performed a substantial portion of the EM visit face-to-face with the same patient on the same date of service with HAILE Mueller. I was personally involved in reviewing and interpreting the films and conducted elements of the history and physical exam. I performed all of the medical decision making for the patient.

## 2022-09-15 NOTE — PROGRESS NOTES
[facility] Cardiology Follow-up Note    Date of Service:    9/15/2022      Name:   Sarah Buckner   YOB: 1970  Age:   51 y.o.  female   MRN:   [id number]    Reason for cardiology consult: TV endocarditis, enterococcus bacteremia    Attending Provider: Dr. Rebollar    HPI:  Chani Buckner is a 51-year-old female with significant history of chronic back pain with chronic NSAID use, recent melena with healed erosive antral gastritis and sigmoid diverticula seen on EGD/colonoscopy.  Patient presented to [place] on 7/6/22 with several weeks of fever, malaise, cough.  Patient was found to have Enterococcus bacteremia, TAZ, and cavitary pneumonia.  TTE showed probable vegetation on tricuspid valve.  Transferred to [facility] for KEO.    KEO performed yesterday by Dr. Mcwilliams, no formal results dictated yet however discussed with CTS, Dr Bradley, who consulted on patient yesterday. He recommends surgical repair versus replacement of tricuspid valve, aortic valve repair and closure of her PFO. Surgery has been scheduled for tomorrow at 10am.      Interim Events:  9/15/22  - MRI of entire spine completed last night, results not yet noted  - Pt anxious/tearful when discussing surgery tomorrow  - Personal Telemetry interpretation: SR   - Overnight events: still coughing in afternoon/evening  - Vitals: -120's, 's-130's  - Labs reviewed: H/H 8.6/26.9    ROS  Constitutional: + fatigue.  Decreased appetite   Respiratory:  Denies shortness of breath, + cough.  Cardiovascular: Denies chest pain, does have R sided MSK pain, worse with coughing. Denies lower extremity edema.  Denies orthopnea or PND.  : denies polyuria, no dysuria.  GI:  Denies nausea/vomiting.  No abdominal distention.  Neuro:  Denies dizziness, syncope.  Hem/lymph: Denies easy bleeding/bruising.      All other review of systems reviewed and negative.    Past medical, surgical, social, and family history reviewed and unchanged from  admission except as noted in HPI.    Medications: Reviewed in MAR  Current Facility-Administered Medications   Medication Dose Frequency Provider Last Rate Last Admin    LORazepam (ATIVAN) tablet 1 mg  1 mg Q4HRS PRN Hipolito Colbert D.O.        albuterol (PROVENTIL) 2.5mg/3ml nebulizer solution 2.5 mg  2.5 mg Q10 MIN PRN Dami Mata D.O.        melatonin tablet 10 mg  10 mg HS PRN Hipolito Colbert D.O.        Pharmacy Consult Request ...Pain Management Review 1 Each  1 Each PHARMACY TO DOSE Kassandra Rosario M.D.        oxyCODONE immediate-release (ROXICODONE) tablet 5 mg  5 mg Q3HRS PRN Kassandra Rosario M.D.        Or    oxyCODONE immediate release (ROXICODONE) tablet 10 mg  10 mg Q3HRS PRN Kassandra Rosario M.D.   10 mg at 09/15/22 0550    Or    HYDROmorphone (Dilaudid) injection 0.5 mg  0.5 mg Q3HRS PRN Kassandra Rosario M.D.        acetaminophen (TYLENOL) tablet 1,000 mg  1,000 mg Q6HRS Kassandra Rosario M.D.   1,000 mg at 09/15/22 0551    Followed by    [START ON 9/19/2022] acetaminophen (TYLENOL) tablet 1,000 mg  1,000 mg Q6HRS PRN Kassandra Rosario M.D.        lidocaine (LIDODERM) 5 % 1 Patch  1 Patch Q24HR Kassandra Rosario M.D.   1 Patch at 09/14/22 2104    omeprazole (PRILOSEC) capsule 20 mg  20 mg DAILY ZOHAIB Williamson M.D.   20 mg at 09/15/22 0552    ampicillin (Omnipen) 2,000 mg in  mL IVPB  2,000 mg Q4HRS Maria Luisa Wyatt M.D.   Stopped at 09/15/22 0623    cefTRIAXone (Rocephin) syringe 2 g  2 g Q12HRS Maria Luisa Wyatt M.D.   2 g at 09/15/22 0553    diphenhydrAMINE (BENADRYL) tablet/capsule 25 mg  25 mg Q6HRS PRN Garry Evans M.D.        SUMAtriptan (IMITREX) tablet 100 mg  100 mg Q6HRS PRN Garry Evans M.D.   100 mg at 09/14/22 1808    benzonatate (TESSALON) capsule 100 mg  100 mg TID PRN Antonio Goodman M.D.   100 mg at 09/15/22 0555    dextrose 50% (D50W) injection 50 mL  50 mL PRN Antonio Goodman M.D.        guaiFENesin ER (MUCINEX)  "tablet 1,200 mg  1,200 mg Q12HRS Antonio Goodman M.D.   1,200 mg at 09/15/22 0552    senna-docusate (PERICOLACE or SENOKOT S) 8.6-50 MG per tablet 2 Tablet  2 Tablet BID MIGUEL A AcostaOConcetta   2 Tablet at 09/15/22 0551    And    polyethylene glycol/lytes (MIRALAX) PACKET 1 Packet  1 Packet QDAY PRN Vladislav Vera D.O.        And    magnesium hydroxide (MILK OF MAGNESIA) suspension 30 mL  30 mL QDAY PRN Vladislav Vera D.O.        And    bisacodyl (DULCOLAX) suppository 10 mg  10 mg QDAY PRN Vladislav Vera D.O.        ondansetron (ZOFRAN ODT) dispertab 4 mg  4 mg Q4HRS PRN MIGUEL A AcostaO.   4 mg at 09/11/22 0853    promethazine (PHENERGAN) tablet 12.5-25 mg  12.5-25 mg Q4HRS PRN Vladislav Vera D.O.        promethazine (PHENERGAN) suppository 12.5-25 mg  12.5-25 mg Q4HRS PRN Vladislav Vera D.O.        prochlorperazine (COMPAZINE) injection 5-10 mg  5-10 mg Q4HRS PRN Vladislav Vera D.O.       Last reviewed on 9/8/2022 11:26 AM by Sapna Pearson R.N.   Allergies   Allergen Reactions    Hydrocodone-Acetaminophen Unspecified     hallucinations      Morphine Unspecified     Severe itchiness  \"It makes me itch\"       Physical Exam  Body mass index is 24.62 kg/m². /78   Pulse (!) 109   Temp 36.2 °C (97.2 °F) (Temporal)   Resp 18   Wt 66.7 kg (147 lb 0.8 oz)   SpO2 91%    Vitals:    09/14/22 1601 09/14/22 2002 09/14/22 2320 09/15/22 0817   BP: (!) 133/91 (!) 137/95 110/77 112/78   Pulse: (!) 123 (!) 136 (!) 110 (!) 109   Resp: 16 17 18 18   Temp: 37.2 °C (99 °F) 37.9 °C (100.3 °F) 36.9 °C (98.4 °F) 36.2 °C (97.2 °F)   TempSrc: Temporal Oral Oral Temporal   SpO2: 88% 90% 95% 91%   Weight:  66.7 kg (147 lb 0.8 oz)      Oxygen Therapy:  Pulse Oximetry: 91 %, O2 (LPM): 0, O2 Delivery Device: None - Room Air    General: no acute distress,  well-appearing  Neck: No JVD, no bruits  Lungs: CTAB, normal effort. no wheezing, rales, or rhonchi  Heart: tachycardic, normal S1 /S2, systolic murmur 3/6, no " rub  EXT: No lower extremity edema, 2+ radial pulses. 2+ pedal pulses.   Abdomen: soft, non tender, non distended  Neurological: No focal deficits, no facial asymmetry.  Normal speech  Psychiatric: Appropriate affect, alert and oriented x 3  Skin: Warm and dry extremities, no rashes    Labs (personally reviewed):     Lab Results   Component Value Date/Time    SODIUM 135 09/13/2022 11:59 PM    POTASSIUM 4.4 09/13/2022 11:59 PM    CHLORIDE 99 09/13/2022 11:59 PM    CO2 24 09/13/2022 11:59 PM    GLUCOSE 93 09/13/2022 11:59 PM    BUN 2 (L) 09/13/2022 11:59 PM    CREATININE 0.41 (L) 09/13/2022 11:59 PM     Lab Results   Component Value Date/Time    ALKPHOSPHAT 419 (H) 09/13/2022 11:59 PM    ASTSGOT 20 09/13/2022 11:59 PM    ALTSGPT 21 09/13/2022 11:59 PM    TBILIRUBIN 0.4 09/13/2022 11:59 PM      Lab Results   Component Value Date/Time    CHOLSTRLTOT 138 09/13/2022 08:15 AM    LDL 92 09/13/2022 08:15 AM    HDL 18 (A) 09/13/2022 08:15 AM    TRIGLYCERIDE 140 09/13/2022 08:15 AM     Cardiac Imaging and Procedures Review:      EKG 9/13/22 : My Personal interpretation reveals     TTE 9/8/22:  No prior study is available for comparison.   Technically difficult study - adequate information is obtained.      Probable vegetation seen on the tricuspid valve in subcostal view.  Normal left ventricular systolic function.  The left ventricular ejection fraction is visually estimated to be 60%.  Normal diastolic function.  The right ventricle is normal in size and systolic function.  Mild aortic insufficiency.    KEO 9/13/22  (Results pending dictation)      Assessment and Medical Decision Making:    Bacterial endocarditis, sepsis  Tricuspid valve endocarditis, Severe tricuspid regurgitation, aortic valve endocarditis, patent foramen ovale  Septic pulmonary emboli with pneumonia  Acute kidney injury    -KEO completed  -Scheduled to undergo surgical repair vs. replacement of TV and repair of AV with closure of PFO tomorrow  -ID  following, per last note recommending antibiotics x 6 weeks, PICC placement       Thank you for allowing me to participate in this patients care.  Please contact me with any questions or concerns. General cardiology will sign off for now and F/U as outpatient 1 month post surgery.     I personally spent a total of 14 minutes which includes face-to-face time and non-face-to-face time spent on preparing to see the patient, reviewing hospital notes and tests, obtaining history from the patient, performing a medically appropriate exam, counseling and educating the patient, ordering medications/tests/procedures/referrals as clinically indicated, and documenting information in the electronic medical record.    Please see Dr. Mcwilliams's attestation for additions and further recommendations.    SHAYNA Cuevas.   [facility]  [phone]

## 2022-09-15 NOTE — PROGRESS NOTES
Patient transported to MRI via wheelchair at 2215.   MRI was unable to be completed due to patient's claustrophobia. Patient sent back up to floor via wheelchair and patient placed back on to tele box.

## 2022-09-15 NOTE — CARE PLAN
"  Problem: Knowledge Deficit - Standard  Goal: Patient and family/care givers will demonstrate understanding of plan of care, disease process/condition, diagnostic tests and medications  Outcome: Progressing     Problem: Pain - Standard  Goal: Alleviation of pain or a reduction in pain to the patient’s comfort goal  Outcome: Progressing     Problem: Psychosocial  Goal: Patient's level of anxiety will decrease  Outcome: Progressing  Goal: Patient's ability to verbalize feelings about condition will improve  Outcome: Progressing  Goal: Patient's ability to re-evaluate and adapt role responsibilities will improve  Outcome: Progressing  Goal: Patient and family will demonstrate ability to cope with life altering diagnosis and/or procedure  Outcome: Progressing  Goal: Spiritual and cultural needs incorporated into hospitalization  Outcome: Progressing     Problem: Communication  Goal: The ability to communicate needs accurately and effectively will improve  Outcome: Progressing     Problem: Infection - Standard  Goal: Patient will remain free from infection  Outcome: Progressing   The patient is Watcher - Medium risk of patient condition declining or worsening    Shift Goals  Clinical Goals: Pain management. Patient will verbalize POC by end of shift  Patient Goals: \"Sleep well. Have my heart ultra sound tomorrow.\"  Family Goals: CHRIS    Progress made toward(s) clinical / shift goals: KEO completed today. Plan for open heart surgery this Friday.     Patient is not progressing towards the following goals: N/A      "

## 2022-09-15 NOTE — ANESTHESIA POSTPROCEDURE EVALUATION
Patient: Sarah Buckner    Procedure Summary     Date: 09/14/22 Room / Location: KEO/CARDIO/TILT / SURGERY [facility] - ECHOCARDIOLOGY - [facility]    Anesthesia Start: 1305 Anesthesia Stop: 1353    Procedures:       EC-KEO W/O CONT      RECOVERY ONLY - IN-PATIENT - EC-KEO W/O CONT WITH ANESTHESIA - DR. WITT Diagnosis:             Endocarditis            Endocarditis    Scheduled Providers: [facility]; Dami Mata D.O.; All Witt M.D. Responsible Provider: Dami Mata D.O.    Anesthesia Type: MAC ASA Status: 2          Final Anesthesia Type: MAC  Last vitals  BP   Blood Pressure: 110/77    Temp   36.9 °C (98.4 °F)    Pulse   (!) 110   Resp   18    SpO2   95 %      Anesthesia Post Evaluation    Patient location during evaluation: PACU  Patient participation: complete - patient participated  Level of consciousness: awake and alert  Pain score: 6    Airway patency: patent  Anesthetic complications: no  Cardiovascular status: hemodynamically stable  Respiratory status: acceptable  Hydration status: euvolemic    PONV: none          No notable events documented.     Nurse Pain Score: 7 (NPRS)

## 2022-09-15 NOTE — PROGRESS NOTES
Report received from [facility] shift RN, assumed patient care. Patient is calmly resting in bed, no signs of distress, even and unlabored breathing noted. Pt on room air. Tele box on and in place. Patient has call light within reach, fall precautions in place. Patient remains safe and stable, will continue to monitor.

## 2022-09-15 NOTE — PROGRESS NOTES
"Infectious Disease Progress Note    Author: Aaron Yeh M.D. Date & Time of service: 9/15/2022  9:36 AM    Chief Complaint:  TV endocarditis    Interval History:  51 y.o. female admitted 2022 as transfer from [place] for KEO. Blood cultures +Efaecalis. TTE with vegetation TV and acute anemia Hgb  down to 6.4 (baseline 13-14)   AF WBC 12.9 Patient alert today. Additional history obtained.  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  Normally active and regularly able to 50+#.  States she felt well until end of May after a 3 hour car ride had the sudden onset of back pain-she saw her PCP, spine and was referred to pain management.  She did not want to take all the narcotics she was given so had been self treating with NSAIDS.  The pain in her back was initially upper back but would change-sometimes low back and varied from side to side.  She was concerned that \"something else\" was going on. MRI done 2022 showed possible myelofibrosis.  On , started feeling feverish, very fatigued, and noted pallor \"I looked sick... like a zombie\"    T-max 100.6, white count 10.4, tolerating antimicrobials.  Cardiology plan as below  9/15 T-max 100.3, tolerating antimicrobials.  KEO findings with change in management plans as below    Labs Reviewed, Medications Reviewed, and Radiology Reviewed.    Review of Systems:  Review of Systems   Constitutional:  Positive for malaise/fatigue. Negative for fever.   Musculoskeletal:  Positive for back pain.   Neurological:  Positive for weakness.     Hemodynamics:  Temp (24hrs), Av.1 °C (98.7 °F), Min:36.2 °C (97.2 °F), Max:37.9 °C (100.3 °F)  Temperature: 36.2 °C (97.2 °F)  Pulse  Av.2  Min: 78  Max: 144   Blood Pressure: 112/78       Physical Exam:  Physical Exam  Vitals and nursing note reviewed.   Constitutional:       General: She is not in acute distress.     Appearance: She is not ill-appearing, toxic-appearing or diaphoretic.   HENT:      " Nose: No rhinorrhea.   Eyes:      General: No scleral icterus.     Extraocular Movements: Extraocular movements intact.      Pupils: Pupils are equal, round, and reactive to light.   Cardiovascular:      Rate and Rhythm: Tachycardia present.      Heart sounds: Murmur heard.   Abdominal:      General: There is no distension.      Palpations: Abdomen is soft.      Tenderness: There is no abdominal tenderness.   Skin:     Coloration: Skin is pale. Skin is not jaundiced.      Comments: No osler or Janeway lesions   Neurological:      General: No focal deficit present.      Mental Status: She is alert and oriented to person, place, and time.       Meds:    Current Facility-Administered Medications:     LORazepam    albuterol    melatonin    Pharmacy Consult Request    oxyCODONE immediate-release **OR** oxyCODONE immediate-release **OR** HYDROmorphone    acetaminophen **FOLLOWED BY** [START ON 9/19/2022] acetaminophen    lidocaine    omeprazole    ampicillin    cefTRIAXone (ROCEPHIN) IV    diphenhydrAMINE    SUMAtriptan    benzonatate    dextrose 50%    guaiFENesin ER    senna-docusate **AND** polyethylene glycol/lytes **AND** magnesium hydroxide **AND** bisacodyl    ondansetron    promethazine    promethazine    prochlorperazine    Labs:  Recent Labs     09/13/22 0034 09/13/22 2359   WBC 10.4 13.1*   RBC 3.12* 3.07*   HEMOGLOBIN 8.9* 8.6*   HEMATOCRIT 27.5* 26.9*   MCV 88.1 87.6   MCH 28.5 28.0   RDW 51.2* 51.0*   PLATELETCT 529* 513*   MPV 9.5 9.7   NEUTSPOLYS 70.80 76.00*   LYMPHOCYTES 19.40* 16.10*   MONOCYTES 8.40 6.50   EOSINOPHILS 0.10 0.00   BASOPHILS 0.20 0.40       Recent Labs     09/13/22 0034 09/13/22 2359   SODIUM 135 135   POTASSIUM 3.9 4.4   CHLORIDE 102 99   CO2 27 24   GLUCOSE 111* 93   BUN 2* 2*       Recent Labs     09/13/22  0034 09/13/22 2359   ALBUMIN 2.4* 2.5*   TBILIRUBIN 0.5 0.4   ALKPHOSPHAT 454* 419*   TOTPROTEIN 6.2 6.4   ALTSGPT 21 21   ASTSGOT 21 20   CREATININE 0.40* 0.41*          Imaging:  CT-CHEST (THORAX) WITH    Result Date: 9/8/2022   CT of the chest, abdomen and pelvis HISTORY/REASON FOR EXAM:  concern for malignancy TECHNIQUE/EXAM DESCRIPTION: CT scan of the chest, abdomen and pelvis with contrast.  Both automated exposure control and Automated adjustment of tube current based on patient size are utilized.  9/8/2022 8:33 AM. Total DLP: 470.34 mGy*cm COMPARISON: None. FINDINGS: Chest: There are multifocal areas of consolidation involving predominantly the upper lobes and to a lesser degree the lower lobes.  One of them is cavitary in the left lower lobe measuring 1.3 cm.  No pleural effusion or pneumothorax. The heart is normal in size.  There is trace pericardial effusion. The great vessel origins are unremarkable.  The aorta is normal in caliber. No pathologically enlarged adenopathy. There are no concerning osseous lesions.  No fractures.  Bilateral breast implants. Abdomen pelvis: Solid organs: The liver, pancreas and adrenal glands are unremarkable.  There is a cyst in the anterior spleen. The gallbladder is present. Kidneys and Bladder: There is no nephrolithiasis or hydronephrosis.  Mild focal scarring left upper pole kidney. No enhancing renal masses. The kidneys enhance symmetrically. No ureteral or bladder stones are seen. The bladder is grossly unremarkable. Bowel and Appendix: No dilated bowel. No perienteric inflammatory changes. No extraluminal air or free fluid. The appendix is visualized and appears normal. No pelvic masses. Lymphatics:  No abdominal, pelvic, or retroperitoneal lymphadenopathy. Other: No concerning osseous lesions. No fractures.     1.  Multifocal consolidation concerning for pneumonia, with small cavitary lesion in the left lower lobe. 2.  No acute abnormality or evidence of malignancy to the abdomen or pelvis. [facility] 9/8/2022 9:00 AM DLP Reporting Thresholds for Incorrect/Repeated Exams - DLP in mGy*cm Head/Neck:  0-year-old 3840,  1-year-old 5880, 5-year-old 8770, 10-year-old 17362 and adult 71766 Head:  0-year-old 4540, 1-year-old 7460, 5-year-old 23527, 10-year-old 24378 and adult 44870 Neck:  0-year-old 2940, 1-year-old 4160, 5-year-old 4550, 10-year-old 6320 and adult 8470 Chest:  0-year-old 550, 1-year-old 830, 5-year-old 1200, 10-year-old 3840 and adult 3570 Abd/pelvis:  0-year-old 440, 1-year-old 720, 5-year-old 1080, 10-year-old 3330 and adult 3330 Trunk(C/A/P):  0-year-old 490, 1-year-old 770, 5-year-old 1140, 10-year-old 3570 and adult 3330    CT-HEAD W/O    Result Date: 9/7/2022   CT head without contrast HISTORY/REASON FOR EXAM:  Headache, new or worsening (Age >= 50y). TECHNIQUE/EXAM DESCRIPTION: CT scan of the brain without contrast. Both automated exposure control and Automated adjustment of tube current based on patient size are utilized. 9/7/2022 4:19 PM. CT scan of the brain was carried out without contrast in the normal manner. Total DLP: 766.10 mGy*cm COMPARISON: None. FINDINGS: The brain parenchyma is unremarkable. The gray-white matter differentiation is well preserved throughout. There is no evidence of mass, mass effect, hemorrhage, or extraaxial fluid collection.  There is no midline shift. The orbits and calvarium are intact. No evidence of fracture. The visualized paranasal sinuses are clear. The mastoid air cells are well pneumatized.     No acute intracranial abnormality. [facility] 9/7/2022 4:23 PM DLP Reporting Thresholds for Incorrect/Repeated Exams - DLP in mGy*cm Head/Neck:  0-year-old 3840, 1-year-old 5880, 5-year-old 8770, 10-year-old 34090 and adult 10585 Head:  0-year-old 4540, 1-year-old 7460, 5-year-old 76487, 10-year-old 03967 and adult 49046 Neck:  0-year-old 2940, 1-year-old 4160, 5-year-old 4550, 10-year-old 6320 and adult 8470 Chest:  0-year-old 550, 1-year-old 830, 5-year-old 1200, 10-year-old 3840 and adult 3570 Abd/pelvis:  0-year-old 440, 1-year-old 720, 5-year-old 1080, 10-year-old 3330  and adult 3330 Trunk(C/A/P):  0-year-old 490, 1-year-old 770, 5-year-old 1140, 10-year-old 3570 and adult 3330    DX-CHEST-PORTABLE (1 VIEW)    Result Date: 9/7/2022  HISTORY/REASON FOR EXAM:  Cough. TECHNIQUE/EXAM DESCRIPTION AND NUMBER OF VIEWS: Portable chest (one view), 9/7/2022 4:22 PM. COMPARISON: None. FINDINGS: There is mild multifocal groundglass consolidation left upper lobe and right upper lobe, to a lesser degree left lower lobe.  No pleural effusion or pneumothorax. The cardiomediastinal silhouette is normal in size. The bones are intact.     Multifocal bilateral consolidation concerning for pneumonia. Jazzy Bellamy 9/7/2022 8:06 PM    [id number]    Result Date: 9/10/2022  9/10/2022 1:54 PM HISTORY/REASON FOR EXAM:  Bacterial endocarditis. TECHNIQUE/EXAM DESCRIPTION AND NUMBER OF VIEWS:  1 view(s) of the mandible. COMPARISON:  None. FINDINGS: No evidence of fracture. No lytic or sclerotic lesion. There are multiple dental caries.     Multiple dental caries.    US-ABDOMEN COMPLETE SURVEY    Result Date: 9/7/2022  HISTORY/REASON FOR EXAM: Abnormal Labs; elevated liver function tests, with elevated alk phos. TECHNIQUE/EXAM DESCRIPTION: Ultrasound abdomen complete.9/7/2022 7:57 PM COMPARISON: None FINDINGS: Pancreas: The tail is suboptimally visualized due to overlying bowel gas. Aorta and IVC: Normal in caliber. Liver: Normal.  The Liver Measures 19.6 cm. The portal vein is patent with flow in the proper direction into the liver.  This is normal. Gallbladder: Normal. Common bile duct: 3 mm, which is normal. Spleen: Normal in size and echogenicity. Kidneys: The kidneys are normal in size and echogenicity. There is No ascites.     Mild hepatomegaly.  Otherwise unremarkable exam. Jazzy Bellamy 9/7/2022 8:27 PM    CT ABDOMEN-PELVIS WITH IV CONTRAST    Result Date: 9/8/2022   CT of the chest, abdomen and pelvis HISTORY/REASON FOR EXAM:  concern for malignancy TECHNIQUE/EXAM DESCRIPTION: CT scan  of the chest, abdomen and pelvis with contrast.  Both automated exposure control and Automated adjustment of tube current based on patient size are utilized.  9/8/2022 8:33 AM. Total DLP: 470.34 mGy*cm COMPARISON: None. FINDINGS: Chest: There are multifocal areas of consolidation involving predominantly the upper lobes and to a lesser degree the lower lobes.  One of them is cavitary in the left lower lobe measuring 1.3 cm.  No pleural effusion or pneumothorax. The heart is normal in size.  There is trace pericardial effusion. The great vessel origins are unremarkable.  The aorta is normal in caliber. No pathologically enlarged adenopathy. There are no concerning osseous lesions.  No fractures.  Bilateral breast implants. Abdomen pelvis: Solid organs: The liver, pancreas and adrenal glands are unremarkable.  There is a cyst in the anterior spleen. The gallbladder is present. Kidneys and Bladder: There is no nephrolithiasis or hydronephrosis.  Mild focal scarring left upper pole kidney. No enhancing renal masses. The kidneys enhance symmetrically. No ureteral or bladder stones are seen. The bladder is grossly unremarkable. Bowel and Appendix: No dilated bowel. No perienteric inflammatory changes. No extraluminal air or free fluid. The appendix is visualized and appears normal. No pelvic masses. Lymphatics:  No abdominal, pelvic, or retroperitoneal lymphadenopathy. Other: No concerning osseous lesions. No fractures.     1.  Multifocal consolidation concerning for pneumonia, with small cavitary lesion in the left lower lobe. 2.  No acute abnormality or evidence of malignancy to the abdomen or pelvis. [facility] 9/8/2022 9:00 AM DLP Reporting Thresholds for Incorrect/Repeated Exams - DLP in mGy*cm Head/Neck:  0-year-old 3840, 1-year-old 5880, 5-year-old 8770, 10-year-old 15498 and adult 02041 Head:  0-year-old 4540, 1-year-old 7460, 5-year-old 96370, 10-year-old 01044 and adult 25160 Neck:  0-year-old 2940,  1-year-old 4160, 5-year-old 4550, 10-year-old 6320 and adult 8470 Chest:  0-year-old 550, 1-year-old 830, 5-year-old 1200, 10-year-old 3840 and adult 3570 Abd/pelvis:  0-year-old 440, 1-year-old 720, 5-year-old 1080, 10-year-old 3330 and adult 3330 Trunk(C/A/P):  0-year-old 490, 1-year-old 770, 5-year-old 1140, 10-year-old 3570 and adult 3330    EC-ECHOCARDIOGRAM COMPLETE W/O CONT    Result Date: 2022  Transthoracic Echo Report Echocardiography Laboratory CONCLUSIONS No prior study is available for comparison. Technically difficult study - adequate information is obtained. Probable vegetation seen on the tricuspid valve in subcostal view. Normal left ventricular systolic function. The left ventricular ejection fraction is visually estimated to be 60%. Normal diastolic function. The right ventricle is normal in size and systolic function. Mild aortic insufficiency. These findings were communicated to the medical service. RUDDY REBOLLEDO Exam Date:         2022                    14:12 Exam Location:     Echo Lab Priority:          Routine Ordering Physician:        CATHI CAMARENA Referring Physician: Sonographer:               Sissy Ontiveros Age:    51     Gender:    F MRN:    [id number] :    1970 BSA:    1.65   Ht (in):    64.6   Wt (lb):    132 Exam Type:     Complete Indications:     Bacteremia ICD Codes: CPT Codes:       56560 BP:   113    /   77     HR:   117 Technical Quality:       Technically difficult study -                          adequate information is obtained MEASUREMENTS  (Male / Female) Normal Values 2D ECHO LV Diastolic Diameter PLAX        4.2 cm                4.2 - 5.9 / 3.9 - 5.3 cm LV Systolic Diameter PLAX         3 cm                  2.1 - 4.0 cm IVS Diastolic Thickness           0.67 cm               LVPW Diastolic Thickness          0.68 cm               LVOT Diameter                     2 cm                  Aortic Root Diameter               2.6 cm                Estimated LV Ejection Fraction    60 %                  IVC Diameter                      1.8 cm                DOPPLER AV Peak Velocity                  2.3 m/s               AV Peak Gradient                  21.6 mmHg             AV Mean Gradient                  14.3 mmHg             AI Pressure Half Time             500 ms                LVOT Peak Velocity                1.4 m/s               AV Area Cont Eq vti               1.5 cm2               MV Velocity Time Integral         13.7 cm               Mitral E Point Velocity           0.61 m/s              Mitral E to A Ratio               0.65                  MV Pressure Half Time             45.5 ms               MV Area PHT                       4.8 cm2               MV Deceleration Time              157 ms                TR Peak Velocity                  186 cm/s              Right Atrial Pressure             8 mmHg                Right Ventricular Systolic Press  21.8 mmHg             PV Peak Velocity                  1 m/s                 PV Peak Gradient                  4.3 mmHg              RVOT Peak Velocity                0.65 m/s              * Indicates values subject to auto-interpretation LV EF:  60    % FINDINGS Left Ventricle The left ventricular ejection fraction is visually estimated to be 60%. Normal left ventricular systolic function. Normal left ventricular wall thickness. Normal left ventricular chamber size. Abnormal regional wall motion. Abnormal septal motion. Normal diastolic function. Right Ventricle The right ventricle is normal in size and systolic function. Right Atrium The right atrium is normal in size. Normal inferior vena cava size without inspiratory collapse. Left Atrium The left atrium is normal in size. Left atrial volume index is 10 mL/sq m. Mitral Valve Structurally normal mitral valve without significant stenosis. Trace mitral regurgitation. Aortic Valve Mild aortic insufficiency. Pressure  half time is 500 msec. Aortic valve sclerosis without significant stenosis. Tricuspid aortic valve. Tricuspid Valve Structurally normal tricuspid valve without significant stenosis. Mild tricuspid regurgitation. Right atrial pressure is estimated to be 8 mmHg. Estimated right ventricular systolic pressure is 20 mmHg. Probable vegetation seen on the tricuspid valve in subcostal view. Pulmonic Valve Structurally normal pulmonic valve without significant stenosis or regurgitation. Pericardium Plural effusion present. Aorta Normal aortic root for body surface area. The ascending aorta diameter is 2.9 cm. Mark Sykes MD (Electronically Signed) Final Date:     08 September 2022                 17:56    MRI 7/31/2022  FINDINGS:  Spinal column:  There is decreased T1/T2 signal noted within the vertebral marrow homogeneously throughout the spinal column, raising the question of infiltrative disease, such as myelofibrosis.     No destructive osseous process is appreciated.     Vertebral body morphology is normal.     No fracture is identified..     Spinal cord:     The conus medullaris is somewhat low, noted the level of L2-L3.     I see no evidence of syrinx.     Distal spinal cord appears unremarkable.     Cauda equina appear unremarkable.     Intervertebral disks:     At the level of L1-L2, the intervertebral disk, the central spinal canal, and the neural foramina appear normal.     At the level of L2-L3,  there is mild diffuse disc desiccation. There is minimal facet arthrosis.     Central spinal canal and neural foramina appear normal.     At the level of L3-L4, there is mild disc desiccation. There is a minimal circumferential disc bulge as well as minimal facet arthrosis. There is no significant foraminal, or central canal narrowing.     At the level of L4-L5, there is mild disc desiccation.     There is mild facet arthrosis.     Central spinal canal and neural foramina appear normal.     At the level of  "L5-S1, there is mild disc space narrowing.  There is mild disc desiccation.  Central spinal canal and neural foramina appear normal.     IMPRESSION:     I see no evidence of destructive osseous process and no evidence of significant narrowing of central spinal canal or the neural foramina.       Micro:  Results       Procedure Component Value Units Date/Time    BLOOD CULTURE [[id number]]  (Abnormal) Collected: 09/10/22 1651    Order Status: Completed Specimen: Blood from Peripheral Updated: 09/15/22 0203     Significant Indicator POS     Source BLD     Site PERIPHERAL     Culture Result Growth detected by Bactec instrument. 09/15/2022  01:57  Gram Stain: Gram positive cocci: Possible Streptococcus sp.      Narrative:      CALL  [place]  171 tel. [phone],  CALLED  171 tel. [phone] 09/15/2022, 02:02, RB PERF. RESULTS CALLED TO: RN  [phone]  Per Hospital Policy: Only change Specimen Src: to \"Line\" if  specified by physician order.  Left AC    BLOOD CULTURE [[id number]] Collected: 09/13/22 0815    Order Status: Completed Specimen: Blood from Peripheral Updated: 09/14/22 0729     Significant Indicator NEG     Source BLD     Site PERIPHERAL     Culture Result No Growth  Note: Blood cultures are incubated for 5 days and  are monitored continuously.Positive blood cultures  are called to the RN and reported as soon as  they are identified.      Narrative:      Per Hospital Policy: Only change Specimen Src: to \"Line\" if  specified by physician order.  Left AC    BLOOD CULTURE [[id number]] Collected: 09/13/22 1446    Order Status: Completed Specimen: Blood from Peripheral Updated: 09/14/22 0729     Significant Indicator NEG     Source BLD     Site PERIPHERAL     Culture Result No Growth  Note: Blood cultures are incubated for 5 days and  are monitored continuously.Positive blood cultures  are called to the RN and reported as soon as  they are identified.      Narrative:      Per Hospital Policy: Only change Specimen Src: to " "\"Line\" if  specified by physician order.  Left AC    BLOOD CULTURE [[id number]] Collected: 09/10/22 1651    Order Status: Completed Specimen: Blood from Peripheral Updated: 09/11/22 0742     Significant Indicator NEG     Source BLD     Site PERIPHERAL     Culture Result No Growth  Note: Blood cultures are incubated for 5 days and  are monitored continuously.Positive blood cultures  are called to the RN and reported as soon as  they are identified.      Narrative:      Per Hospital Policy: Only change Specimen Src: to \"Line\" if  specified by physician order.  Right AC    MRSA By PCR (Amp) [[id number]]     Order Status: Canceled Specimen: Respirate from Nares             Assessment/Hospital course:  This is a 51-year-old female patient who presented to [place] with several weeks of fevers, malaise, cough, TTE noted a vegetation of the tricuspid valve.  She was transferred to [facility] for KEO.    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  KEO performed and CT surgery evaluated patient and plan is for tricuspid valve repair versus replacement on 9/16.  Formal KEO report pending  -GI was consulted to look for potential source and colonoscopy was performed, sigmoid polypectomy performed.  MRCP planned given isolated alk phos elevation  -Given neck, mid and lower back pain, recommend MRI of the whole spine -pending (patient with claustrophobia)  -Continue dual beta-lactam therapy with IV ampicillin 2 g every 4 hours + IV ceftriaxone 2 g every 12 hours.  Anticipate 6 weeks from day of surgery  -At least weekly CBC with differential, CMP while on IV antibiotics.      ID clinic follow-up in 4 to 6 weeks.    Disposition: Will need placement for IV antibiotic plan as above.  Currently awaiting cardiac surgery  Need for PICC line: Yes, okay to place    Discussed with Dr. Rebollar.  ID will follow.  Please " call with questions.

## 2022-09-15 NOTE — PROGRESS NOTES
Received Bedside report. Assumed care at 1900. This pt is AOx4, ambulatory with standby assistance, voiding adequately, 8/10 pain. Patient and RN discussed plan of care: questions answered. Labs noted, assessment complete, patient tolerating regular diet. Tele box in place. Pt is on 2 L of O2 via nasal cannula. Call light in place, fall precautions in place, patient educated on importance of calling for assistance. No additional needs at this time. VSS

## 2022-09-15 NOTE — DISCHARGE PLANNING
HTH/SCP TCN chart review completed. Collaborated with FREDERICK Pizano.  No new TCN needs identified at this time and note final dc planning will be highly dependent on post op status/function after OHS which is still planned for 9/16 @10AM. Anticipating pt will have PT and OT consults to assist with dc planning post op as well and TCN will monitor for these recs as well. TCN will continue to follow and collaborate with discharge planning team as additional post acute needs arise. Thank you.    Previously completed:  - Choice forms: LTAC, HH, IV ABX;see above; noted pt lives in [place] which may affect post acute choices/services  - GSC not sent; out of [facility] [place]

## 2022-09-15 NOTE — PROGRESS NOTES
[facility] Internal Medicine Daily Progress Note    Date of Service  9/15/2022    R Team: R IM White Team   Attending: Delroy Rebollar M.d.  Senior Resident: Dr. Rosario  Intern:  Dr. Colbert  Contact Number: [phone]    Chief Complaint  Sarah Buckner is a 51 y.o. female admitted 9/9/2022 with Chest pain.     Hospital Course  Sarah Buckner is a 51 y.o. female admitted 9/9/2022 with bacterial endocarditis growing enterococcus faecalis on blood cultures prior to 09/08/22, with possible streptococcus on 09/10/22 blood cultures.  Also found to have significant anemia, reportedly had melena outpatient but none inpatient. Patient was placed on ampicillin and ceftriaxone IV. Work-up of Enterococcus bacteremia including EGD, Colonoscopy revealed mild diverticulosis, antral gastritis and sigmoid polypectomy of 7mm pedunculated polyp. Cardiology was consulted and ordered a KOE found a perferation in the tricuspid valve, with open PFO and aortic valve involvement as well.  At that point, cardiothoracic surgery was consulted for assessment and possible valve replacement/repair.     Patient was placed on 6 weeks of antibiotic therapy starting from last negative blood cultures taken 09/13/2022.    Interval Problem Update  -No acute events overnight  -Patient had discussions with CT surgery and family yesterday regarding plan.   -Patient attempted getting MRI last night. Anxiety prevented completion. Will add ativan 09/15 PRN for 1 dose and attempt again.   -Holding off PICC today, will place when closer to discharge.  -09/10 culture resulted in gram positive cocci (results mention possible streptococcus)      I have discussed this patient's plan of care and discharge plan at IDT rounds today with Case Management, Nursing, Nursing leadership, and other members of the IDT team.    Consultants/Specialty  cardiology, cardiovascular surgeon, and GI    Code Status  Full Code    Disposition  Patient is not medically cleared for  "discharge.   Anticipate discharge to to home with close outpatient follow-up.  I have placed the appropriate orders for post-discharge needs.    Review of Systems  Review of Systems   Constitutional:  Negative for chills and fever.   Eyes:  Negative for blurred vision.   Respiratory:  Negative for hemoptysis, sputum production, shortness of breath and wheezing.    Cardiovascular:  Negative for chest pain, palpitations and leg swelling.   Musculoskeletal:  Positive for myalgias.   Skin:  Negative for rash.   Neurological:  Negative for dizziness and sensory change.      Physical Exam  Temp:  [36.1 °C (97 °F)-36.9 °C (98.4 °F)] 36.1 °C (97 °F)  Pulse:  [109-118] 118  Resp:  [18] 18  BP: (110-143)/() 141/106  SpO2:  [88 %-98 %] 98 %    Physical Exam  Vitals and nursing note reviewed.   Constitutional:       General: She is not in acute distress.     Appearance: Normal appearance. She is normal weight. She is not toxic-appearing.   HENT:      Head: Normocephalic and atraumatic.      Nose: Nose normal.   Eyes:      Extraocular Movements: Extraocular movements intact.      Pupils: Pupils are equal, round, and reactive to light.   Cardiovascular:      Rate and Rhythm: Regular rhythm. Tachycardia present.      Pulses: Normal pulses.      Heart sounds: No murmur heard.    No friction rub.      Comments: \"Rib pain\" reported, unable to recreate with palpation.  Pulmonary:      Effort: Pulmonary effort is normal. No respiratory distress.      Breath sounds: Normal breath sounds.   Chest:      Chest wall: No tenderness.   Abdominal:      General: Abdomen is flat. There is no distension.      Palpations: Abdomen is soft.   Musculoskeletal:         General: No swelling, deformity or signs of injury.   Skin:     General: Skin is warm and dry.      Capillary Refill: Capillary refill takes less than 2 seconds.      Coloration: Skin is not jaundiced.      Findings: No rash.   Neurological:      General: No focal deficit present. "      Mental Status: She is alert. Mental status is at baseline.   Psychiatric:         Mood and Affect: Mood normal.         Behavior: Behavior normal.         Thought Content: Thought content normal.         Judgment: Judgment normal.       Fluids    Intake/Output Summary (Last 24 hours) at 9/15/2022 2006  Last data filed at 9/15/2022 1800  Gross per 24 hour   Intake 2755 ml   Output --   Net 2755 ml       Laboratory  Recent Labs     09/13/22  0034 09/13/22  2359   WBC 10.4 13.1*   RBC 3.12* 3.07*   HEMOGLOBIN 8.9* 8.6*   HEMATOCRIT 27.5* 26.9*   MCV 88.1 87.6   MCH 28.5 28.0   MCHC 32.4* 32.0*   RDW 51.2* 51.0*   PLATELETCT 529* 513*   MPV 9.5 9.7     Recent Labs     09/13/22  0034 09/13/22  2359   SODIUM 135 135   POTASSIUM 3.9 4.4   CHLORIDE 102 99   CO2 27 24   GLUCOSE 111* 93   BUN 2* 2*   CREATININE 0.40* 0.41*   CALCIUM 8.2* 8.2*             Recent Labs     09/13/22  0815   TRIGLYCERIDE 140   HDL 18*   LDL 92       Imaging  US-CAROTID DOPPLER BILAT   Final Result      EC-KEO W/O CONT         KO-LLLYPDFL-EERVMLECO   Final Result      Multiple dental caries.      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  Last positive blood culture was 9/8.    Denies IV drug use, possible dental carries (chronic), melena reported on scale of weeks. GI consulted to work-up for possible GI source of Enterococcus bacteremia, including possible malignancy.   Of note, she has had a several month history of back pain that was initially upper and most recently lower. She had MRI L spine 7/19 which  "showed \"decreased T1/T2 signal noted within the vertebral marrow homogeneously throughout the spinal column, raising the question of infiltrative disease, such as myelofibrosis.\"  Given to the new finding of Enterococcus faecalis bacteremia and endocarditis, considering repeat lumbar spine MRI to evaluate for possible infectious or immunodeficiency process.      -Colonoscopy and EGD 9/12 showing healed erosive gastritis, few sigmoid diverticula, 7mm pendunculated sigmoid polyp  -Dr. Wyatt with ID consulted; appreciate recs  -Antibiotics: ampicillin, ceftriaxone  -repeat blood cultures drawn on 9/10 negative as of 09/12/22. Repeating on 09/12 ( /48 hours per ID)  -KEO completed 09/14  -Anticipate 6 weeks IV ampicillin, ceftriaxone via PICC from date of negative blood cxs   - MRI spine ordered    Anemia- (present on admission)  Assessment & Plan  Reports history of several weeks of melena at home, in the context of max dosages of NSAIDs for ongoing back pain. Melena has not recurred during admission.  Initial fecal occult blood is negative.  She required 2 units of RBCs at outside hospital prior to transfer.  Anemia may be multifactorial with contributions from hemolysis due to endocarditis, iron deficiency anemia due to GI losses.  LDH is high, which can be seen during infection.  No schistocytes seen on peripheral smear.    -Rwjwd=920, NSO=820, severe iron deficiency  -fecal occult negative 09/08/22  -will continue to monitor  -transfuse at hgb less than 7  -no nsaids, hold DVT prophylaxis        Elevated alkaline phosphatase level  Assessment & Plan  Alk phos has been significantly elevated throughout admission.  GGT is also elevated, indicating that this may be hepatic in origin.  Another less likely possibility is myelofibrosis, which was suggested on MRI of the lumbar spine in July 2022.  Myelofibrosis often presents with anemia, thrombocytosis, leukocytosis, constitutional symptoms, joint pain, all of which " the patient has, but may be better explained by endocarditis and/or GI bleed.  Peripheral blood smear was unrevealing (no dacrocytes).  Myelofibrosis is a diagnosis of exclusion.    -Spine MRI ordered  - GI following  -If suspicion remains for myelofibrosis, consider genetic testing (JAK2 mutation: 50-60% of patients, CALR mutation: 18-32% of patients, MPL mutation: 5-9% of patients)    Endocarditis  Assessment & Plan  TTE with concern for tricuspid vegetation  See above problem    TAZ (acute kidney injury) (HCC)- (present on admission)  Assessment & Plan  resolved    Pneumonia- (present on admission)  Assessment & Plan  Cavitary lesion noted in left lower lobe demonstrated on CT chest. Possible etiology of infectious emboli from tricuspid vegetations.     -antibiotics as above        Migraine with aura- (present on admission)  Assessment & Plan  -resumed home imitrex       VTE prophylaxis: SCDs/TEDs    I have performed a physical exam and reviewed and updated ROS and Plan today (9/15/2022). In review of yesterday's note (9/14/2022), there are no changes except as documented above.

## 2022-09-16 ENCOUNTER — ANESTHESIA (OUTPATIENT)
Dept: SURGERY | Facility: MEDICAL CENTER | Age: 52
DRG: 219 | End: 2022-09-16
Payer: COMMERCIAL

## 2022-09-16 ENCOUNTER — APPOINTMENT (OUTPATIENT)
Dept: CARDIOLOGY | Facility: MEDICAL CENTER | Age: 52
DRG: 219 | End: 2022-09-16
Attending: THORACIC SURGERY (CARDIOTHORACIC VASCULAR SURGERY)
Payer: COMMERCIAL

## 2022-09-16 ENCOUNTER — ANESTHESIA EVENT (OUTPATIENT)
Dept: SURGERY | Facility: MEDICAL CENTER | Age: 52
DRG: 219 | End: 2022-09-16
Payer: COMMERCIAL

## 2022-09-16 ENCOUNTER — APPOINTMENT (OUTPATIENT)
Dept: RADIOLOGY | Facility: MEDICAL CENTER | Age: 52
DRG: 219 | End: 2022-09-16
Attending: THORACIC SURGERY (CARDIOTHORACIC VASCULAR SURGERY)
Payer: COMMERCIAL

## 2022-09-16 PROBLEM — Z98.890 S/P TRICUSPID VALVE REPAIR: Status: ACTIVE | Noted: 2022-09-16

## 2022-09-16 PROBLEM — R11.2 PONV (POSTOPERATIVE NAUSEA AND VOMITING): Status: ACTIVE | Noted: 2022-09-16

## 2022-09-16 PROBLEM — Z99.11 ENCOUNTER FOR WEANING FROM VENTILATOR (HCC): Status: ACTIVE | Noted: 2022-09-16

## 2022-09-16 PROBLEM — Z98.890 PONV (POSTOPERATIVE NAUSEA AND VOMITING): Status: ACTIVE | Noted: 2022-09-16

## 2022-09-16 PROBLEM — Z87.74 S/P PATENT FORAMEN OVALE CLOSURE: Status: ACTIVE | Noted: 2022-09-16

## 2022-09-16 PROBLEM — Z98.890 S/P AORTIC VALVE REPAIR: Status: ACTIVE | Noted: 2022-09-16

## 2022-09-16 LAB
ABO GROUP BLD: NORMAL
ACT BLD: 126 SEC (ref 74–137)
ACT BLD: 155 SEC (ref 74–137)
ACT BLD: 381 SEC (ref 74–137)
ACT BLD: 439 SEC (ref 74–137)
ACT BLD: 503 SEC (ref 74–137)
ACT BLD: 521 SEC (ref 74–137)
ACT BLD: 596 SEC (ref 74–137)
ACT BLD: 671 SEC (ref 74–137)
ACT BLD: 880 SEC (ref 74–137)
ALBUMIN SERPL BCP-MCNC: 2.7 G/DL (ref 3.2–4.9)
ALBUMIN/GLOB SERPL: 0.6 G/DL
ALP SERPL-CCNC: 397 U/L (ref 30–99)
ALT SERPL-CCNC: 16 U/L (ref 2–50)
ANION GAP SERPL CALC-SCNC: 10 MMOL/L (ref 7–16)
APTT PPP: 30.4 SEC (ref 24.7–36)
APTT PPP: 83.9 SEC (ref 24.7–36)
AST SERPL-CCNC: 15 U/L (ref 12–45)
BARCODED ABORH UBTYP: 5100
BARCODED ABORH UBTYP: 7300
BARCODED PRD CODE UBPRD: NORMAL
BARCODED UNIT NUM UBUNT: NORMAL
BASE EXCESS BLDA CALC-SCNC: -1 MMOL/L (ref -4–3)
BASE EXCESS BLDA CALC-SCNC: -1 MMOL/L (ref -4–3)
BASE EXCESS BLDA CALC-SCNC: -2 MMOL/L (ref -4–3)
BASE EXCESS BLDA CALC-SCNC: -3 MMOL/L (ref -4–3)
BASE EXCESS BLDA CALC-SCNC: -4 MMOL/L (ref -4–3)
BASE EXCESS BLDA CALC-SCNC: 0 MMOL/L (ref -4–3)
BASE EXCESS BLDA CALC-SCNC: 0 MMOL/L (ref -4–3)
BASE EXCESS BLDA CALC-SCNC: 1 MMOL/L (ref -4–3)
BASE EXCESS BLDA CALC-SCNC: 1 MMOL/L (ref -4–3)
BASOPHILS # BLD AUTO: 0.3 % (ref 0–1.8)
BASOPHILS # BLD: 0.04 K/UL (ref 0–0.12)
BILIRUB SERPL-MCNC: 0.3 MG/DL (ref 0.1–1.5)
BLD GP AB SCN SERPL QL: NORMAL
BODY TEMPERATURE: ABNORMAL DEGREES
BUN SERPL-MCNC: 3 MG/DL (ref 8–22)
CA-I BLD ISE-SCNC: 0.85 MMOL/L (ref 1.1–1.3)
CA-I BLD ISE-SCNC: 0.92 MMOL/L (ref 1.1–1.3)
CA-I BLD ISE-SCNC: 0.97 MMOL/L (ref 1.1–1.3)
CA-I BLD ISE-SCNC: 1.16 MMOL/L (ref 1.1–1.3)
CA-I BLD ISE-SCNC: 1.17 MMOL/L (ref 1.1–1.3)
CA-I BLD ISE-SCNC: 1.21 MMOL/L (ref 1.1–1.3)
CA-I BLD ISE-SCNC: 1.39 MMOL/L (ref 1.1–1.3)
CALCIUM SERPL-MCNC: 8.7 MG/DL (ref 8.5–10.5)
CHLORIDE SERPL-SCNC: 101 MMOL/L (ref 96–112)
CO2 BLDA-SCNC: 24 MMOL/L (ref 20–33)
CO2 BLDA-SCNC: 24 MMOL/L (ref 20–33)
CO2 BLDA-SCNC: 25 MMOL/L (ref 20–33)
CO2 BLDA-SCNC: 27 MMOL/L (ref 20–33)
CO2 BLDA-SCNC: 28 MMOL/L (ref 20–33)
CO2 SERPL-SCNC: 25 MMOL/L (ref 20–33)
COMPONENT CT 8504CT: NORMAL
COMPONENT FT 8504FT: NORMAL
COMPONENT FT 8504FT: NORMAL
COMPONENT P 8504P: NORMAL
COMPONENT R 8504R: NORMAL
CREAT SERPL-MCNC: 0.35 MG/DL (ref 0.5–1.4)
DELSYS IDSYS: ABNORMAL
EKG IMPRESSION: NORMAL
EOSINOPHIL # BLD AUTO: 0 K/UL (ref 0–0.51)
EOSINOPHIL NFR BLD: 0 % (ref 0–6.9)
ERYTHROCYTE [DISTWIDTH] IN BLOOD BY AUTOMATED COUNT: 50.3 FL (ref 35.9–50)
FUNGUS SPEC FUNGUS STN: NORMAL
GFR SERPLBLD CREATININE-BSD FMLA CKD-EPI: 124 ML/MIN/1.73 M 2
GLOBULIN SER CALC-MCNC: 4.4 G/DL (ref 1.9–3.5)
GLUCOSE BLD STRIP.AUTO-MCNC: 194 MG/DL (ref 65–99)
GLUCOSE BLD STRIP.AUTO-MCNC: 196 MG/DL (ref 65–99)
GLUCOSE BLD STRIP.AUTO-MCNC: 198 MG/DL (ref 65–99)
GLUCOSE BLD STRIP.AUTO-MCNC: 203 MG/DL (ref 65–99)
GLUCOSE BLD STRIP.AUTO-MCNC: 225 MG/DL (ref 65–99)
GLUCOSE BLD STRIP.AUTO-MCNC: 87 MG/DL (ref 65–99)
GLUCOSE SERPL-MCNC: 94 MG/DL (ref 65–99)
GRAM STN SPEC: ABNORMAL
HCO3 BLDA-SCNC: 22.5 MMOL/L (ref 17–25)
HCO3 BLDA-SCNC: 22.9 MMOL/L (ref 17–25)
HCO3 BLDA-SCNC: 23.2 MMOL/L (ref 17–25)
HCO3 BLDA-SCNC: 23.7 MMOL/L (ref 17–25)
HCO3 BLDA-SCNC: 23.9 MMOL/L (ref 17–25)
HCO3 BLDA-SCNC: 25 MMOL/L (ref 17–25)
HCO3 BLDA-SCNC: 25.3 MMOL/L (ref 17–25)
HCO3 BLDA-SCNC: 25.6 MMOL/L (ref 17–25)
HCO3 BLDA-SCNC: 25.8 MMOL/L (ref 17–25)
HCO3 BLDA-SCNC: 26 MMOL/L (ref 17–25)
HCO3 BLDA-SCNC: 26.7 MMOL/L (ref 17–25)
HCT VFR BLD AUTO: 29.2 % (ref 37–47)
HCT VFR BLD CALC: 21 % (ref 37–47)
HCT VFR BLD CALC: 24 % (ref 37–47)
HCT VFR BLD CALC: 25 % (ref 37–47)
HCT VFR BLD CALC: 26 % (ref 37–47)
HCT VFR BLD CALC: 27 % (ref 37–47)
HCT VFR BLD CALC: 29 % (ref 37–47)
HGB BLD-MCNC: 7.1 G/DL (ref 12–16)
HGB BLD-MCNC: 8.2 G/DL (ref 12–16)
HGB BLD-MCNC: 8.5 G/DL (ref 12–16)
HGB BLD-MCNC: 8.8 G/DL (ref 12–16)
HGB BLD-MCNC: 9.2 G/DL (ref 12–16)
HGB BLD-MCNC: 9.4 G/DL (ref 12–16)
HGB BLD-MCNC: 9.9 G/DL (ref 12–16)
HOROWITZ INDEX BLDA+IHG-RTO: 232 MM[HG]
HOROWITZ INDEX BLDA+IHG-RTO: 239 MM[HG]
HOROWITZ INDEX BLDA+IHG-RTO: 277 MM[HG]
IMM GRANULOCYTES # BLD AUTO: 0.08 K/UL (ref 0–0.11)
IMM GRANULOCYTES NFR BLD AUTO: 0.7 % (ref 0–0.9)
INR PPP: 1.29 (ref 0.87–1.13)
INR PPP: 1.85 (ref 0.87–1.13)
LYMPHOCYTES # BLD AUTO: 1.65 K/UL (ref 1–4.8)
LYMPHOCYTES NFR BLD: 13.9 % (ref 22–41)
MAGNESIUM SERPL-MCNC: 3.2 MG/DL (ref 1.5–2.5)
MCH RBC QN AUTO: 28.1 PG (ref 27–33)
MCHC RBC AUTO-ENTMCNC: 32.2 G/DL (ref 33.6–35)
MCV RBC AUTO: 87.4 FL (ref 81.4–97.8)
MODE IMODE: ABNORMAL
MONOCYTES # BLD AUTO: 0.51 K/UL (ref 0–0.85)
MONOCYTES NFR BLD AUTO: 4.3 % (ref 0–13.4)
NEUTROPHILS # BLD AUTO: 9.59 K/UL (ref 2–7.15)
NEUTROPHILS NFR BLD: 80.8 % (ref 44–72)
NRBC # BLD AUTO: 0 K/UL
NRBC BLD-RTO: 0 /100 WBC
O2/TOTAL GAS SETTING VFR VENT: 100 %
O2/TOTAL GAS SETTING VFR VENT: 30 %
O2/TOTAL GAS SETTING VFR VENT: 50 %
PATHOLOGY CONSULT NOTE: NORMAL
PCO2 BLDA: 37.1 MMHG (ref 26–37)
PCO2 BLDA: 40.6 MMHG (ref 26–37)
PCO2 BLDA: 41.9 MMHG (ref 26–37)
PCO2 BLDA: 42.4 MMHG (ref 26–37)
PCO2 BLDA: 44.4 MMHG (ref 26–37)
PCO2 BLDA: 46.2 MMHG (ref 26–37)
PCO2 BLDA: 46.6 MMHG (ref 26–37)
PCO2 BLDA: 47.2 MMHG (ref 26–37)
PCO2 BLDA: 48.7 MMHG (ref 26–37)
PCO2 BLDA: 49.1 MMHG (ref 26–37)
PCO2 BLDA: 53.5 MMHG (ref 26–37)
PCO2 TEMP ADJ BLDA: 34 MMHG (ref 26–37)
PCO2 TEMP ADJ BLDA: 36.4 MMHG (ref 26–37)
PCO2 TEMP ADJ BLDA: 37 MMHG (ref 26–37)
PCO2 TEMP ADJ BLDA: 39 MMHG (ref 26–37)
PCO2 TEMP ADJ BLDA: 42.5 MMHG (ref 26–37)
PCO2 TEMP ADJ BLDA: 42.7 MMHG (ref 26–37)
PCO2 TEMP ADJ BLDA: 43.2 MMHG (ref 26–37)
PCO2 TEMP ADJ BLDA: 43.3 MMHG (ref 26–37)
PCO2 TEMP ADJ BLDA: 44.8 MMHG (ref 26–37)
PCO2 TEMP ADJ BLDA: 46.6 MMHG (ref 26–37)
PCO2 TEMP ADJ BLDA: 52.6 MMHG (ref 26–37)
PEEP END EXPIRATORY PRESSURE IPEEP: 8 CMH20
PERCENT MINUTE VOLUME IPMV: 130
PERCENT MINUTE VOLUME IPMV: 160
PERCENT MINUTE VOLUME IPMV: 160
PH BLDA: 7.25 [PH] (ref 7.4–7.5)
PH BLDA: 7.3 [PH] (ref 7.4–7.5)
PH BLDA: 7.31 [PH] (ref 7.4–7.5)
PH BLDA: 7.32 [PH] (ref 7.4–7.5)
PH BLDA: 7.33 [PH] (ref 7.4–7.5)
PH BLDA: 7.36 [PH] (ref 7.4–7.5)
PH BLDA: 7.36 [PH] (ref 7.4–7.5)
PH BLDA: 7.38 [PH] (ref 7.4–7.5)
PH BLDA: 7.39 [PH] (ref 7.4–7.5)
PH BLDA: 7.39 [PH] (ref 7.4–7.5)
PH BLDA: 7.41 [PH] (ref 7.4–7.5)
PH TEMP ADJ BLDA: 7.25 [PH] (ref 7.4–7.5)
PH TEMP ADJ BLDA: 7.33 [PH] (ref 7.4–7.5)
PH TEMP ADJ BLDA: 7.34 [PH] (ref 7.4–7.5)
PH TEMP ADJ BLDA: 7.35 [PH] (ref 7.4–7.5)
PH TEMP ADJ BLDA: 7.36 [PH] (ref 7.4–7.5)
PH TEMP ADJ BLDA: 7.37 [PH] (ref 7.4–7.5)
PH TEMP ADJ BLDA: 7.38 [PH] (ref 7.4–7.5)
PH TEMP ADJ BLDA: 7.4 [PH] (ref 7.4–7.5)
PH TEMP ADJ BLDA: 7.42 [PH] (ref 7.4–7.5)
PH TEMP ADJ BLDA: 7.43 [PH] (ref 7.4–7.5)
PH TEMP ADJ BLDA: 7.45 [PH] (ref 7.4–7.5)
PLATELET # BLD AUTO: 315 K/UL (ref 164–446)
PLATELET # BLD AUTO: 604 K/UL (ref 164–446)
PMV BLD AUTO: 9.6 FL (ref 9–12.9)
PO2 BLDA: 116 MMHG (ref 64–87)
PO2 BLDA: 239 MMHG (ref 64–87)
PO2 BLDA: 270 MMHG (ref 64–87)
PO2 BLDA: 293 MMHG (ref 64–87)
PO2 BLDA: 322 MMHG (ref 64–87)
PO2 BLDA: 408 MMHG (ref 64–87)
PO2 BLDA: 408 MMHG (ref 64–87)
PO2 BLDA: 415 MMHG (ref 64–87)
PO2 BLDA: 416 MMHG (ref 64–87)
PO2 BLDA: 81 MMHG (ref 64–87)
PO2 BLDA: 83 MMHG (ref 64–87)
PO2 TEMP ADJ BLDA: 104 MMHG (ref 64–87)
PO2 TEMP ADJ BLDA: 231 MMHG (ref 64–87)
PO2 TEMP ADJ BLDA: 270 MMHG (ref 64–87)
PO2 TEMP ADJ BLDA: 288 MMHG (ref 64–87)
PO2 TEMP ADJ BLDA: 315 MMHG (ref 64–87)
PO2 TEMP ADJ BLDA: 390 MMHG (ref 64–87)
PO2 TEMP ADJ BLDA: 398 MMHG (ref 64–87)
PO2 TEMP ADJ BLDA: 402 MMHG (ref 64–87)
PO2 TEMP ADJ BLDA: 405 MMHG (ref 64–87)
PO2 TEMP ADJ BLDA: 72 MMHG (ref 64–87)
PO2 TEMP ADJ BLDA: 73 MMHG (ref 64–87)
POTASSIUM BLD-SCNC: 3.7 MMOL/L (ref 3.6–5.5)
POTASSIUM BLD-SCNC: 4 MMOL/L (ref 3.6–5.5)
POTASSIUM BLD-SCNC: 4.1 MMOL/L (ref 3.6–5.5)
POTASSIUM BLD-SCNC: 4.4 MMOL/L (ref 3.6–5.5)
POTASSIUM BLD-SCNC: 4.4 MMOL/L (ref 3.6–5.5)
POTASSIUM BLD-SCNC: 4.5 MMOL/L (ref 3.6–5.5)
POTASSIUM BLD-SCNC: 5.3 MMOL/L (ref 3.6–5.5)
POTASSIUM SERPL-SCNC: 3.9 MMOL/L (ref 3.6–5.5)
POTASSIUM SERPL-SCNC: 4.3 MMOL/L (ref 3.6–5.5)
POTASSIUM SERPL-SCNC: 4.5 MMOL/L (ref 3.6–5.5)
PRODUCT TYPE UPROD: NORMAL
PROT SERPL-MCNC: 7.1 G/DL (ref 6–8.2)
PROTHROMBIN TIME: 15.9 SEC (ref 12–14.6)
PROTHROMBIN TIME: 20.9 SEC (ref 12–14.6)
RBC # BLD AUTO: 3.34 M/UL (ref 4.2–5.4)
RH BLD: NORMAL
RHODAMINE-AURAMINE STN SPEC: NORMAL
SAO2 % BLDA: 100 % (ref 93–99)
SAO2 % BLDA: 95 % (ref 93–99)
SAO2 % BLDA: 95 % (ref 93–99)
SAO2 % BLDA: 99 % (ref 93–99)
SIGNIFICANT IND 70042: ABNORMAL
SIGNIFICANT IND 70042: NORMAL
SIGNIFICANT IND 70042: NORMAL
SITE SITE: ABNORMAL
SITE SITE: NORMAL
SITE SITE: NORMAL
SODIUM BLD-SCNC: 136 MMOL/L (ref 135–145)
SODIUM BLD-SCNC: 137 MMOL/L (ref 135–145)
SODIUM BLD-SCNC: 139 MMOL/L (ref 135–145)
SODIUM BLD-SCNC: 139 MMOL/L (ref 135–145)
SODIUM BLD-SCNC: 140 MMOL/L (ref 135–145)
SODIUM BLD-SCNC: 143 MMOL/L (ref 135–145)
SODIUM SERPL-SCNC: 136 MMOL/L (ref 135–145)
SOURCE SOURCE: ABNORMAL
SOURCE SOURCE: NORMAL
SOURCE SOURCE: NORMAL
SPECIMEN DRAWN FROM PATIENT: ABNORMAL
UNIT STATUS USTAT: NORMAL
WBC # BLD AUTO: 11.9 K/UL (ref 4.8–10.8)

## 2022-09-16 PROCEDURE — 700105 HCHG RX REV CODE 258: Performed by: NURSE PRACTITIONER

## 2022-09-16 PROCEDURE — 87205 SMEAR GRAM STAIN: CPT | Mod: 91

## 2022-09-16 PROCEDURE — 700111 HCHG RX REV CODE 636 W/ 250 OVERRIDE (IP): Performed by: INTERNAL MEDICINE

## 2022-09-16 PROCEDURE — C1725 CATH, TRANSLUMIN NON-LASER: HCPCS | Performed by: THORACIC SURGERY (CARDIOTHORACIC VASCULAR SURGERY)

## 2022-09-16 PROCEDURE — 85347 COAGULATION TIME ACTIVATED: CPT | Mod: 91

## 2022-09-16 PROCEDURE — 160042 HCHG SURGERY MINUTES - EA ADDL 1 MIN LEVEL 5: Performed by: THORACIC SURGERY (CARDIOTHORACIC VASCULAR SURGERY)

## 2022-09-16 PROCEDURE — 82803 BLOOD GASES ANY COMBINATION: CPT

## 2022-09-16 PROCEDURE — 770022 HCHG ROOM/CARE - ICU (200)

## 2022-09-16 PROCEDURE — 700105 HCHG RX REV CODE 258: Performed by: ANESTHESIOLOGY

## 2022-09-16 PROCEDURE — P9016 RBC LEUKOCYTES REDUCED: HCPCS | Mod: 91

## 2022-09-16 PROCEDURE — 700102 HCHG RX REV CODE 250 W/ 637 OVERRIDE(OP): Performed by: NURSE PRACTITIONER

## 2022-09-16 PROCEDURE — P9034 PLATELETS, PHERESIS: HCPCS

## 2022-09-16 PROCEDURE — 85049 AUTOMATED PLATELET COUNT: CPT

## 2022-09-16 PROCEDURE — 33464 VALVULOPLASTY TRICUSPID: CPT | Performed by: THORACIC SURGERY (CARDIOTHORACIC VASCULAR SURGERY)

## 2022-09-16 PROCEDURE — 0W9C3ZZ DRAINAGE OF MEDIASTINUM, PERCUTANEOUS APPROACH: ICD-10-PCS | Performed by: THORACIC SURGERY (CARDIOTHORACIC VASCULAR SURGERY)

## 2022-09-16 PROCEDURE — A9270 NON-COVERED ITEM OR SERVICE: HCPCS | Performed by: NURSE PRACTITIONER

## 2022-09-16 PROCEDURE — 110372 HCHG SHELL REV 278: Performed by: THORACIC SURGERY (CARDIOTHORACIC VASCULAR SURGERY)

## 2022-09-16 PROCEDURE — 93312 ECHO TRANSESOPHAGEAL: CPT | Mod: 26,59 | Performed by: ANESTHESIOLOGY

## 2022-09-16 PROCEDURE — 700102 HCHG RX REV CODE 250 W/ 637 OVERRIDE(OP): Performed by: THORACIC SURGERY (CARDIOTHORACIC VASCULAR SURGERY)

## 2022-09-16 PROCEDURE — 02QJ0ZZ REPAIR TRICUSPID VALVE, OPEN APPROACH: ICD-10-PCS | Performed by: THORACIC SURGERY (CARDIOTHORACIC VASCULAR SURGERY)

## 2022-09-16 PROCEDURE — 5A1221Z PERFORMANCE OF CARDIAC OUTPUT, CONTINUOUS: ICD-10-PCS | Performed by: THORACIC SURGERY (CARDIOTHORACIC VASCULAR SURGERY)

## 2022-09-16 PROCEDURE — 85025 COMPLETE CBC W/AUTO DIFF WBC: CPT

## 2022-09-16 PROCEDURE — 700111 HCHG RX REV CODE 636 W/ 250 OVERRIDE (IP): Performed by: NURSE PRACTITIONER

## 2022-09-16 PROCEDURE — 160009 HCHG ANES TIME/MIN: Performed by: THORACIC SURGERY (CARDIOTHORACIC VASCULAR SURGERY)

## 2022-09-16 PROCEDURE — 02Q50ZZ REPAIR ATRIAL SEPTUM, OPEN APPROACH: ICD-10-PCS | Performed by: THORACIC SURGERY (CARDIOTHORACIC VASCULAR SURGERY)

## 2022-09-16 PROCEDURE — 84295 ASSAY OF SERUM SODIUM: CPT | Mod: 91

## 2022-09-16 PROCEDURE — 700101 HCHG RX REV CODE 250

## 2022-09-16 PROCEDURE — C1894 INTRO/SHEATH, NON-LASER: HCPCS | Performed by: THORACIC SURGERY (CARDIOTHORACIC VASCULAR SURGERY)

## 2022-09-16 PROCEDURE — C1751 CATH, INF, PER/CENT/MIDLINE: HCPCS | Performed by: THORACIC SURGERY (CARDIOTHORACIC VASCULAR SURGERY)

## 2022-09-16 PROCEDURE — 87075 CULTR BACTERIA EXCEPT BLOOD: CPT

## 2022-09-16 PROCEDURE — B24BZZ4 ULTRASONOGRAPHY OF HEART WITH AORTA, TRANSESOPHAGEAL: ICD-10-PCS | Performed by: THORACIC SURGERY (CARDIOTHORACIC VASCULAR SURGERY)

## 2022-09-16 PROCEDURE — 503001 HCHG PERFUSION: Performed by: THORACIC SURGERY (CARDIOTHORACIC VASCULAR SURGERY)

## 2022-09-16 PROCEDURE — 84132 ASSAY OF SERUM POTASSIUM: CPT | Mod: 91

## 2022-09-16 PROCEDURE — 110371 HCHG SHELL REV 272: Performed by: THORACIC SURGERY (CARDIOTHORACIC VASCULAR SURGERY)

## 2022-09-16 PROCEDURE — 87077 CULTURE AEROBIC IDENTIFY: CPT

## 2022-09-16 PROCEDURE — P9047 ALBUMIN (HUMAN), 25%, 50ML: HCPCS

## 2022-09-16 PROCEDURE — 87102 FUNGUS ISOLATION CULTURE: CPT

## 2022-09-16 PROCEDURE — 93320 DOPPLER ECHO COMPLETE: CPT | Mod: 26 | Performed by: ANESTHESIOLOGY

## 2022-09-16 PROCEDURE — 87070 CULTURE OTHR SPECIMN AEROBIC: CPT

## 2022-09-16 PROCEDURE — 93010 ELECTROCARDIOGRAM REPORT: CPT | Performed by: INTERNAL MEDICINE

## 2022-09-16 PROCEDURE — 87116 MYCOBACTERIA CULTURE: CPT

## 2022-09-16 PROCEDURE — 86923 COMPATIBILITY TEST ELECTRIC: CPT

## 2022-09-16 PROCEDURE — 88312 SPECIAL STAINS GROUP 1: CPT

## 2022-09-16 PROCEDURE — 85014 HEMATOCRIT: CPT | Mod: 91

## 2022-09-16 PROCEDURE — C1898 LEAD, PMKR, OTHER THAN TRANS: HCPCS | Performed by: THORACIC SURGERY (CARDIOTHORACIC VASCULAR SURGERY)

## 2022-09-16 PROCEDURE — 33641 REPAIR HEART SEPTUM DEFECT: CPT | Performed by: THORACIC SURGERY (CARDIOTHORACIC VASCULAR SURGERY)

## 2022-09-16 PROCEDURE — 00560 ANES PX HEART W/O PUMP OXTJ: CPT | Performed by: ANESTHESIOLOGY

## 2022-09-16 PROCEDURE — 700105 HCHG RX REV CODE 258

## 2022-09-16 PROCEDURE — 700111 HCHG RX REV CODE 636 W/ 250 OVERRIDE (IP): Performed by: STUDENT IN AN ORGANIZED HEALTH CARE EDUCATION/TRAINING PROGRAM

## 2022-09-16 PROCEDURE — 02BF0ZZ EXCISION OF AORTIC VALVE, OPEN APPROACH: ICD-10-PCS | Performed by: THORACIC SURGERY (CARDIOTHORACIC VASCULAR SURGERY)

## 2022-09-16 PROCEDURE — 93325 DOPPLER ECHO COLOR FLOW MAPG: CPT | Mod: 26 | Performed by: ANESTHESIOLOGY

## 2022-09-16 PROCEDURE — P9012 CRYOPRECIPITATE EACH UNIT: HCPCS | Mod: 91

## 2022-09-16 PROCEDURE — 85730 THROMBOPLASTIN TIME PARTIAL: CPT | Mod: 91

## 2022-09-16 PROCEDURE — B24CZZ4 ULTRASONOGRAPHY OF PERICARDIUM, TRANSESOPHAGEAL: ICD-10-PCS | Performed by: THORACIC SURGERY (CARDIOTHORACIC VASCULAR SURGERY)

## 2022-09-16 PROCEDURE — 86901 BLOOD TYPING SEROLOGIC RH(D): CPT

## 2022-09-16 PROCEDURE — 83735 ASSAY OF MAGNESIUM: CPT

## 2022-09-16 PROCEDURE — 4A1234Z MONITORING OF CARDIAC ELECTRICAL ACTIVITY, PERCUTANEOUS APPROACH: ICD-10-PCS | Performed by: THORACIC SURGERY (CARDIOTHORACIC VASCULAR SURGERY)

## 2022-09-16 PROCEDURE — 94150 VITAL CAPACITY TEST: CPT

## 2022-09-16 PROCEDURE — 93312 ECHO TRANSESOPHAGEAL: CPT

## 2022-09-16 PROCEDURE — 71045 X-RAY EXAM CHEST 1 VIEW: CPT

## 2022-09-16 PROCEDURE — 82330 ASSAY OF CALCIUM: CPT | Mod: 91

## 2022-09-16 PROCEDURE — 85610 PROTHROMBIN TIME: CPT | Mod: 91

## 2022-09-16 PROCEDURE — 87206 SMEAR FLUORESCENT/ACID STAI: CPT

## 2022-09-16 PROCEDURE — 5A2204Z RESTORATION OF CARDIAC RHYTHM, SINGLE: ICD-10-PCS | Performed by: THORACIC SURGERY (CARDIOTHORACIC VASCULAR SURGERY)

## 2022-09-16 PROCEDURE — C1729 CATH, DRAINAGE: HCPCS | Performed by: THORACIC SURGERY (CARDIOTHORACIC VASCULAR SURGERY)

## 2022-09-16 PROCEDURE — 700101 HCHG RX REV CODE 250: Performed by: ANESTHESIOLOGY

## 2022-09-16 PROCEDURE — 700105 HCHG RX REV CODE 258: Performed by: THORACIC SURGERY (CARDIOTHORACIC VASCULAR SURGERY)

## 2022-09-16 PROCEDURE — 99292 CRITICAL CARE ADDL 30 MIN: CPT | Performed by: INTERNAL MEDICINE

## 2022-09-16 PROCEDURE — 82962 GLUCOSE BLOOD TEST: CPT

## 2022-09-16 PROCEDURE — A9270 NON-COVERED ITEM OR SERVICE: HCPCS | Performed by: ANESTHESIOLOGY

## 2022-09-16 PROCEDURE — 160031 HCHG SURGERY MINUTES - 1ST 30 MINS LEVEL 5: Performed by: THORACIC SURGERY (CARDIOTHORACIC VASCULAR SURGERY)

## 2022-09-16 PROCEDURE — 86900 BLOOD TYPING SEROLOGIC ABO: CPT

## 2022-09-16 PROCEDURE — 33641 REPAIR HEART SEPTUM DEFECT: CPT | Mod: AS | Performed by: NURSE PRACTITIONER

## 2022-09-16 PROCEDURE — 700101 HCHG RX REV CODE 250: Performed by: INTERNAL MEDICINE

## 2022-09-16 PROCEDURE — 33390 VALVULOPLASTY AORTIC VALVE: CPT | Mod: AS | Performed by: NURSE PRACTITIONER

## 2022-09-16 PROCEDURE — 87186 SC STD MICRODIL/AGAR DIL: CPT

## 2022-09-16 PROCEDURE — 700111 HCHG RX REV CODE 636 W/ 250 OVERRIDE (IP)

## 2022-09-16 PROCEDURE — 94003 VENT MGMT INPAT SUBQ DAY: CPT

## 2022-09-16 PROCEDURE — 700105 HCHG RX REV CODE 258: Performed by: INTERNAL MEDICINE

## 2022-09-16 PROCEDURE — 700111 HCHG RX REV CODE 636 W/ 250 OVERRIDE (IP): Performed by: ANESTHESIOLOGY

## 2022-09-16 PROCEDURE — 88305 TISSUE EXAM BY PATHOLOGIST: CPT | Mod: 59

## 2022-09-16 PROCEDURE — 160048 HCHG OR STATISTICAL LEVEL 1-5: Performed by: THORACIC SURGERY (CARDIOTHORACIC VASCULAR SURGERY)

## 2022-09-16 PROCEDURE — 93005 ELECTROCARDIOGRAM TRACING: CPT | Performed by: NURSE PRACTITIONER

## 2022-09-16 PROCEDURE — 86850 RBC ANTIBODY SCREEN: CPT

## 2022-09-16 PROCEDURE — 33390 VALVULOPLASTY AORTIC VALVE: CPT | Performed by: THORACIC SURGERY (CARDIOTHORACIC VASCULAR SURGERY)

## 2022-09-16 PROCEDURE — 94002 VENT MGMT INPAT INIT DAY: CPT

## 2022-09-16 PROCEDURE — 700102 HCHG RX REV CODE 250 W/ 637 OVERRIDE(OP): Performed by: ANESTHESIOLOGY

## 2022-09-16 PROCEDURE — 33464 VALVULOPLASTY TRICUSPID: CPT | Mod: AS | Performed by: NURSE PRACTITIONER

## 2022-09-16 PROCEDURE — 80053 COMPREHEN METABOLIC PANEL: CPT

## 2022-09-16 PROCEDURE — 99291 CRITICAL CARE FIRST HOUR: CPT | Performed by: INTERNAL MEDICINE

## 2022-09-16 PROCEDURE — P9017 PLASMA 1 DONOR FRZ W/IN 8 HR: HCPCS

## 2022-09-16 PROCEDURE — 36430 TRANSFUSION BLD/BLD COMPNT: CPT

## 2022-09-16 PROCEDURE — 87015 SPECIMEN INFECT AGNT CONCNTJ: CPT | Mod: 91

## 2022-09-16 DEVICE — IMPLANTABLE DEVICE: Type: IMPLANTABLE DEVICE | Site: CHEST | Status: FUNCTIONAL

## 2022-09-16 RX ORDER — ETOMIDATE 2 MG/ML
INJECTION INTRAVENOUS PRN
Status: DISCONTINUED | OUTPATIENT
Start: 2022-09-16 | End: 2022-09-16 | Stop reason: SURG

## 2022-09-16 RX ORDER — ROCURONIUM BROMIDE 10 MG/ML
INJECTION, SOLUTION INTRAVENOUS PRN
Status: DISCONTINUED | OUTPATIENT
Start: 2022-09-16 | End: 2022-09-16 | Stop reason: SURG

## 2022-09-16 RX ORDER — PROMETHAZINE HYDROCHLORIDE 25 MG/1
25 SUPPOSITORY RECTAL EVERY 6 HOURS PRN
Status: DISCONTINUED | OUTPATIENT
Start: 2022-09-16 | End: 2022-09-24 | Stop reason: HOSPADM

## 2022-09-16 RX ORDER — DEXMEDETOMIDINE HYDROCHLORIDE 4 UG/ML
0-1.5 INJECTION, SOLUTION INTRAVENOUS CONTINUOUS
Status: DISCONTINUED | OUTPATIENT
Start: 2022-09-16 | End: 2022-09-20

## 2022-09-16 RX ORDER — DEXAMETHASONE SODIUM PHOSPHATE 4 MG/ML
INJECTION, SOLUTION INTRA-ARTICULAR; INTRALESIONAL; INTRAMUSCULAR; INTRAVENOUS; SOFT TISSUE PRN
Status: DISCONTINUED | OUTPATIENT
Start: 2022-09-16 | End: 2022-09-16 | Stop reason: SURG

## 2022-09-16 RX ORDER — EPINEPHRINE HCL IN 0.9 % NACL 4MG/250ML
0-.2 PLASTIC BAG, INJECTION (ML) INTRAVENOUS CONTINUOUS
Status: DISCONTINUED | OUTPATIENT
Start: 2022-09-16 | End: 2022-09-17

## 2022-09-16 RX ORDER — BISACODYL 10 MG
10 SUPPOSITORY, RECTAL RECTAL
Status: DISCONTINUED | OUTPATIENT
Start: 2022-09-16 | End: 2022-09-24 | Stop reason: HOSPADM

## 2022-09-16 RX ORDER — ALUMINA, MAGNESIA, AND SIMETHICONE 2400; 2400; 240 MG/30ML; MG/30ML; MG/30ML
30 SUSPENSION ORAL EVERY 4 HOURS PRN
Status: DISCONTINUED | OUTPATIENT
Start: 2022-09-16 | End: 2022-09-24 | Stop reason: HOSPADM

## 2022-09-16 RX ORDER — SODIUM CHLORIDE, SODIUM GLUCONATE, SODIUM ACETATE, POTASSIUM CHLORIDE AND MAGNESIUM CHLORIDE 526; 502; 368; 37; 30 MG/100ML; MG/100ML; MG/100ML; MG/100ML; MG/100ML
INJECTION, SOLUTION INTRAVENOUS PRN
Status: DISCONTINUED | OUTPATIENT
Start: 2022-09-16 | End: 2022-09-19

## 2022-09-16 RX ORDER — EPINEPHRINE HCL IN 0.9 % NACL 4MG/250ML
0-.2 PLASTIC BAG, INJECTION (ML) INTRAVENOUS CONTINUOUS
Status: DISCONTINUED | OUTPATIENT
Start: 2022-09-16 | End: 2022-09-16

## 2022-09-16 RX ORDER — METHADONE HYDROCHLORIDE 10 MG/ML
INJECTION, SOLUTION INTRAMUSCULAR; INTRAVENOUS; SUBCUTANEOUS PRN
Status: DISCONTINUED | OUTPATIENT
Start: 2022-09-16 | End: 2022-09-16 | Stop reason: SURG

## 2022-09-16 RX ORDER — POLYETHYLENE GLYCOL 3350 17 G/17G
1 POWDER, FOR SOLUTION ORAL DAILY
Status: DISCONTINUED | OUTPATIENT
Start: 2022-09-17 | End: 2022-09-24 | Stop reason: HOSPADM

## 2022-09-16 RX ORDER — ACETAMINOPHEN 500 MG
1000 TABLET ORAL EVERY 6 HOURS
Status: DISPENSED | OUTPATIENT
Start: 2022-09-16 | End: 2022-09-21

## 2022-09-16 RX ORDER — PROCHLORPERAZINE EDISYLATE 5 MG/ML
10 INJECTION INTRAMUSCULAR; INTRAVENOUS EVERY 6 HOURS PRN
Status: DISCONTINUED | OUTPATIENT
Start: 2022-09-16 | End: 2022-09-24 | Stop reason: HOSPADM

## 2022-09-16 RX ORDER — CALCIUM CHLORIDE 100 MG/ML
INJECTION INTRAVENOUS; INTRAVENTRICULAR PRN
Status: DISCONTINUED | OUTPATIENT
Start: 2022-09-16 | End: 2022-09-16 | Stop reason: SURG

## 2022-09-16 RX ORDER — OMEPRAZOLE 20 MG/1
20 CAPSULE, DELAYED RELEASE ORAL DAILY
Status: DISCONTINUED | OUTPATIENT
Start: 2022-09-17 | End: 2022-09-24 | Stop reason: HOSPADM

## 2022-09-16 RX ORDER — PROTAMINE SULFATE 10 MG/ML
INJECTION, SOLUTION INTRAVENOUS PRN
Status: DISCONTINUED | OUTPATIENT
Start: 2022-09-16 | End: 2022-09-16 | Stop reason: SURG

## 2022-09-16 RX ORDER — ACETAMINOPHEN 500 MG
1000 TABLET ORAL ONCE
Status: COMPLETED | OUTPATIENT
Start: 2022-09-16 | End: 2022-09-16

## 2022-09-16 RX ORDER — HEPARIN SODIUM,PORCINE 1000/ML
VIAL (ML) INJECTION PRN
Status: DISCONTINUED | OUTPATIENT
Start: 2022-09-16 | End: 2022-09-16 | Stop reason: SURG

## 2022-09-16 RX ORDER — DEXMEDETOMIDINE HYDROCHLORIDE 4 UG/ML
0-1.5 INJECTION, SOLUTION INTRAVENOUS CONTINUOUS
Status: DISCONTINUED | OUTPATIENT
Start: 2022-09-16 | End: 2022-09-16

## 2022-09-16 RX ORDER — ONDANSETRON 2 MG/ML
8 INJECTION INTRAMUSCULAR; INTRAVENOUS EVERY 6 HOURS PRN
Status: DISCONTINUED | OUTPATIENT
Start: 2022-09-16 | End: 2022-09-24 | Stop reason: HOSPADM

## 2022-09-16 RX ORDER — ONDANSETRON 2 MG/ML
INJECTION INTRAMUSCULAR; INTRAVENOUS PRN
Status: DISCONTINUED | OUTPATIENT
Start: 2022-09-16 | End: 2022-09-16 | Stop reason: SURG

## 2022-09-16 RX ORDER — METHADONE HYDROCHLORIDE 10 MG/ML
20 INJECTION, SOLUTION INTRAMUSCULAR; INTRAVENOUS; SUBCUTANEOUS ONCE
Status: DISCONTINUED | OUTPATIENT
Start: 2022-09-16 | End: 2022-09-16

## 2022-09-16 RX ORDER — DEXMEDETOMIDINE HYDROCHLORIDE 4 UG/ML
INJECTION, SOLUTION INTRAVENOUS
Status: DISCONTINUED | OUTPATIENT
Start: 2022-09-16 | End: 2022-09-16 | Stop reason: SURG

## 2022-09-16 RX ORDER — ACETAMINOPHEN 500 MG
1000 TABLET ORAL EVERY 6 HOURS PRN
Status: DISCONTINUED | OUTPATIENT
Start: 2022-09-21 | End: 2022-09-24 | Stop reason: HOSPADM

## 2022-09-16 RX ORDER — SODIUM CHLORIDE 9 MG/ML
INJECTION, SOLUTION INTRAVENOUS
Status: DISCONTINUED | OUTPATIENT
Start: 2022-09-16 | End: 2022-09-16 | Stop reason: SURG

## 2022-09-16 RX ORDER — TRAMADOL HYDROCHLORIDE 50 MG/1
50 TABLET ORAL EVERY 4 HOURS PRN
Status: DISCONTINUED | OUTPATIENT
Start: 2022-09-16 | End: 2022-09-24 | Stop reason: HOSPADM

## 2022-09-16 RX ORDER — MAGNESIUM SULFATE 1 G/100ML
1 INJECTION INTRAVENOUS DAILY
Status: COMPLETED | OUTPATIENT
Start: 2022-09-16 | End: 2022-09-18

## 2022-09-16 RX ORDER — SODIUM CHLORIDE, SODIUM LACTATE, POTASSIUM CHLORIDE, CALCIUM CHLORIDE 600; 310; 30; 20 MG/100ML; MG/100ML; MG/100ML; MG/100ML
INJECTION, SOLUTION INTRAVENOUS
Status: DISCONTINUED | OUTPATIENT
Start: 2022-09-16 | End: 2022-09-16 | Stop reason: SURG

## 2022-09-16 RX ORDER — SCOLOPAMINE TRANSDERMAL SYSTEM 1 MG/1
PATCH, EXTENDED RELEASE TRANSDERMAL
Status: COMPLETED
Start: 2022-09-16 | End: 2022-09-16

## 2022-09-16 RX ORDER — INSULIN LISPRO 100 [IU]/ML
0-14 INJECTION, SOLUTION INTRAVENOUS; SUBCUTANEOUS
Status: ACTIVE | OUTPATIENT
Start: 2022-09-16 | End: 2022-09-17

## 2022-09-16 RX ORDER — ACETAMINOPHEN 325 MG/1
650 TABLET ORAL EVERY 4 HOURS PRN
Status: DISCONTINUED | OUTPATIENT
Start: 2022-09-16 | End: 2022-09-24 | Stop reason: HOSPADM

## 2022-09-16 RX ORDER — AMOXICILLIN 250 MG
2 CAPSULE ORAL 2 TIMES DAILY
Status: DISCONTINUED | OUTPATIENT
Start: 2022-09-16 | End: 2022-09-24 | Stop reason: HOSPADM

## 2022-09-16 RX ORDER — ACETAMINOPHEN 650 MG/1
650 SUPPOSITORY RECTAL EVERY 4 HOURS PRN
Status: DISCONTINUED | OUTPATIENT
Start: 2022-09-16 | End: 2022-09-24 | Stop reason: HOSPADM

## 2022-09-16 RX ORDER — LIDOCAINE HYDROCHLORIDE 20 MG/ML
INJECTION, SOLUTION EPIDURAL; INFILTRATION; INTRACAUDAL; PERINEURAL PRN
Status: DISCONTINUED | OUTPATIENT
Start: 2022-09-16 | End: 2022-09-16 | Stop reason: SURG

## 2022-09-16 RX ORDER — METOCLOPRAMIDE HYDROCHLORIDE 5 MG/ML
INJECTION INTRAMUSCULAR; INTRAVENOUS PRN
Status: DISCONTINUED | OUTPATIENT
Start: 2022-09-16 | End: 2022-09-16 | Stop reason: SURG

## 2022-09-16 RX ORDER — NITROGLYCERIN 20 MG/100ML
0-100 INJECTION INTRAVENOUS CONTINUOUS
Status: DISCONTINUED | OUTPATIENT
Start: 2022-09-16 | End: 2022-09-17

## 2022-09-16 RX ORDER — AMIODARONE HYDROCHLORIDE 150 MG/3ML
INJECTION, SOLUTION INTRAVENOUS PRN
Status: DISCONTINUED | OUTPATIENT
Start: 2022-09-16 | End: 2022-09-16 | Stop reason: SURG

## 2022-09-16 RX ORDER — SODIUM CHLORIDE 9 MG/ML
INJECTION, SOLUTION INTRAVENOUS CONTINUOUS
Status: DISCONTINUED | OUTPATIENT
Start: 2022-09-16 | End: 2022-09-19

## 2022-09-16 RX ORDER — PHENYLEPHRINE HYDROCHLORIDE 10 MG/ML
INJECTION, SOLUTION INTRAMUSCULAR; INTRAVENOUS; SUBCUTANEOUS PRN
Status: DISCONTINUED | OUTPATIENT
Start: 2022-09-16 | End: 2022-09-16 | Stop reason: SURG

## 2022-09-16 RX ORDER — SODIUM CHLORIDE, SODIUM GLUCONATE, SODIUM ACETATE, POTASSIUM CHLORIDE AND MAGNESIUM CHLORIDE 526; 502; 368; 37; 30 MG/100ML; MG/100ML; MG/100ML; MG/100ML; MG/100ML
INJECTION, SOLUTION INTRAVENOUS
Status: DISCONTINUED | OUTPATIENT
Start: 2022-09-16 | End: 2022-09-16 | Stop reason: SURG

## 2022-09-16 RX ORDER — NOREPINEPHRINE BITARTRATE 0.03 MG/ML
0-30 INJECTION, SOLUTION INTRAVENOUS CONTINUOUS
Status: DISCONTINUED | OUTPATIENT
Start: 2022-09-16 | End: 2022-09-16

## 2022-09-16 RX ORDER — SCOLOPAMINE TRANSDERMAL SYSTEM 1 MG/1
PATCH, EXTENDED RELEASE TRANSDERMAL PRN
Status: DISCONTINUED | OUTPATIENT
Start: 2022-09-16 | End: 2022-09-16 | Stop reason: SURG

## 2022-09-16 RX ORDER — NOREPINEPHRINE BITARTRATE 0.03 MG/ML
0-30 INJECTION, SOLUTION INTRAVENOUS CONTINUOUS
Status: DISCONTINUED | OUTPATIENT
Start: 2022-09-16 | End: 2022-09-20

## 2022-09-16 RX ORDER — MIDAZOLAM HYDROCHLORIDE 1 MG/ML
INJECTION INTRAMUSCULAR; INTRAVENOUS PRN
Status: DISCONTINUED | OUTPATIENT
Start: 2022-09-16 | End: 2022-09-16 | Stop reason: SURG

## 2022-09-16 RX ORDER — MIDAZOLAM HYDROCHLORIDE 1 MG/ML
2 INJECTION INTRAMUSCULAR; INTRAVENOUS
Status: DISCONTINUED | OUTPATIENT
Start: 2022-09-16 | End: 2022-09-18

## 2022-09-16 RX ADMIN — AMINOCAPROIC ACID 5 G: 250 INJECTION, SOLUTION INTRAVENOUS at 10:10

## 2022-09-16 RX ADMIN — MAGNESIUM SULFATE HEPTAHYDRATE 1 G: 1 INJECTION, SOLUTION INTRAVENOUS at 15:28

## 2022-09-16 RX ADMIN — AMINOCAPROIC ACID 6.67 G: 250 INJECTION, SOLUTION INTRAVENOUS at 09:40

## 2022-09-16 RX ADMIN — SODIUM CHLORIDE, SODIUM GLUCONATE, SODIUM ACETATE, POTASSIUM CHLORIDE AND MAGNESIUM CHLORIDE: 526; 502; 368; 37; 30 INJECTION, SOLUTION INTRAVENOUS at 19:45

## 2022-09-16 RX ADMIN — ROCURONIUM BROMIDE 100 MG: 10 INJECTION, SOLUTION INTRAVENOUS at 09:14

## 2022-09-16 RX ADMIN — NOREPINEPHRINE BITARTRATE 4 MCG/MIN: 1 INJECTION, SOLUTION, CONCENTRATE INTRAVENOUS at 10:56

## 2022-09-16 RX ADMIN — AMPICILLIN SODIUM 2000 MG: 2 INJECTION, POWDER, FOR SOLUTION INTRAVENOUS at 23:14

## 2022-09-16 RX ADMIN — METOCLOPRAMIDE 10 MG: 5 INJECTION, SOLUTION INTRAMUSCULAR; INTRAVENOUS at 13:49

## 2022-09-16 RX ADMIN — ROCURONIUM BROMIDE 50 MG: 10 INJECTION, SOLUTION INTRAVENOUS at 10:43

## 2022-09-16 RX ADMIN — METHYLENE BLUE 200 MG: 10 INJECTION INTRAVENOUS at 13:05

## 2022-09-16 RX ADMIN — PHENYLEPHRINE HYDROCHLORIDE 100 MCG: 10 INJECTION INTRAVENOUS at 10:15

## 2022-09-16 RX ADMIN — PHENYLEPHRINE HYDROCHLORIDE 200 MCG: 10 INJECTION INTRAVENOUS at 10:24

## 2022-09-16 RX ADMIN — PHENYLEPHRINE HYDROCHLORIDE 200 MCG: 10 INJECTION INTRAVENOUS at 10:31

## 2022-09-16 RX ADMIN — HEPARIN SODIUM 10000 UNITS: 1000 INJECTION, SOLUTION INTRAVENOUS; SUBCUTANEOUS at 10:18

## 2022-09-16 RX ADMIN — METOPROLOL TARTRATE 12.5 MG: 25 TABLET, FILM COATED ORAL at 07:49

## 2022-09-16 RX ADMIN — CALCIUM CHLORIDE 1 G: 100 INJECTION INTRAVENOUS; INTRAVENTRICULAR at 13:04

## 2022-09-16 RX ADMIN — HYDROMORPHONE HYDROCHLORIDE 0.5 MG: 1 INJECTION, SOLUTION INTRAMUSCULAR; INTRAVENOUS; SUBCUTANEOUS at 22:21

## 2022-09-16 RX ADMIN — METHADONE HYDROCHLORIDE 10 MG: 10 INJECTION, SOLUTION INTRAMUSCULAR; INTRAVENOUS; SUBCUTANEOUS at 09:09

## 2022-09-16 RX ADMIN — AMIODARONE HYDROCHLORIDE 300 MG: 50 INJECTION, SOLUTION INTRAVENOUS at 13:01

## 2022-09-16 RX ADMIN — CEFTRIAXONE SODIUM 2 G: 10 INJECTION, POWDER, FOR SOLUTION INTRAVENOUS at 06:09

## 2022-09-16 RX ADMIN — VANCOMYCIN HYDROCHLORIDE 1 G: 1 INJECTION, POWDER, LYOPHILIZED, FOR SOLUTION INTRAVENOUS at 09:40

## 2022-09-16 RX ADMIN — PROPOFOL 100 MG: 10 INJECTION, EMULSION INTRAVENOUS at 09:14

## 2022-09-16 RX ADMIN — DEXAMETHASONE SODIUM PHOSPHATE 8 MG: 4 INJECTION, SOLUTION INTRA-ARTICULAR; INTRALESIONAL; INTRAMUSCULAR; INTRAVENOUS; SOFT TISSUE at 09:14

## 2022-09-16 RX ADMIN — SODIUM CHLORIDE 2 UNITS/HR: 9 INJECTION, SOLUTION INTRAVENOUS at 16:01

## 2022-09-16 RX ADMIN — HYDROMORPHONE HYDROCHLORIDE 0.5 MG: 1 INJECTION, SOLUTION INTRAMUSCULAR; INTRAVENOUS; SUBCUTANEOUS at 02:11

## 2022-09-16 RX ADMIN — SODIUM CHLORIDE, POTASSIUM CHLORIDE, SODIUM LACTATE AND CALCIUM CHLORIDE: 600; 310; 30; 20 INJECTION, SOLUTION INTRAVENOUS at 09:09

## 2022-09-16 RX ADMIN — PHENYLEPHRINE HYDROCHLORIDE 200 MCG: 10 INJECTION INTRAVENOUS at 10:36

## 2022-09-16 RX ADMIN — ROCURONIUM BROMIDE 50 MG: 10 INJECTION, SOLUTION INTRAVENOUS at 13:04

## 2022-09-16 RX ADMIN — ONDANSETRON 8 MG: 2 INJECTION INTRAMUSCULAR; INTRAVENOUS at 19:59

## 2022-09-16 RX ADMIN — ACETAMINOPHEN 1000 MG: 500 TABLET ORAL at 08:32

## 2022-09-16 RX ADMIN — VASOPRESSIN 0.03 UNITS/MIN: 20 INJECTION INTRAVENOUS at 11:09

## 2022-09-16 RX ADMIN — PHENYLEPHRINE HYDROCHLORIDE 200 MCG: 10 INJECTION INTRAVENOUS at 10:27

## 2022-09-16 RX ADMIN — EPHEDRINE SULFATE 10 MG: 50 INJECTION INTRAMUSCULAR; INTRAVENOUS; SUBCUTANEOUS at 13:50

## 2022-09-16 RX ADMIN — SCOPALAMINE 1 PATCH: 1 PATCH, EXTENDED RELEASE TRANSDERMAL at 09:00

## 2022-09-16 RX ADMIN — VANCOMYCIN HYDROCHLORIDE 1 G: 500 INJECTION, POWDER, LYOPHILIZED, FOR SOLUTION INTRAVENOUS at 20:51

## 2022-09-16 RX ADMIN — HEPARIN SODIUM 30000 UNITS: 1000 INJECTION, SOLUTION INTRAVENOUS; SUBCUTANEOUS at 10:05

## 2022-09-16 RX ADMIN — AMPICILLIN SODIUM 2000 MG: 2 INJECTION, POWDER, FOR SOLUTION INTRAVENOUS at 02:13

## 2022-09-16 RX ADMIN — PROTAMINE SULFATE 400 MG: 10 INJECTION, SOLUTION INTRAVENOUS at 13:32

## 2022-09-16 RX ADMIN — SODIUM CHLORIDE: 9 INJECTION, SOLUTION INTRAVENOUS at 15:12

## 2022-09-16 RX ADMIN — AMPICILLIN SODIUM 2000 MG: 2 INJECTION, POWDER, FOR SOLUTION INTRAVENOUS at 16:38

## 2022-09-16 RX ADMIN — ANTITHROMBIN III (HUMAN) 250 UNITS: KIT at 10:49

## 2022-09-16 RX ADMIN — MIDAZOLAM HYDROCHLORIDE 2 MG: 1 INJECTION, SOLUTION INTRAMUSCULAR; INTRAVENOUS at 09:09

## 2022-09-16 RX ADMIN — ANTITHROMBIN III (HUMAN) 250 UNITS: KIT at 11:09

## 2022-09-16 RX ADMIN — PROCHLORPERAZINE EDISYLATE 10 MG: 5 INJECTION INTRAMUSCULAR; INTRAVENOUS at 22:22

## 2022-09-16 RX ADMIN — ONDANSETRON 4 MG: 2 INJECTION INTRAMUSCULAR; INTRAVENOUS at 13:49

## 2022-09-16 RX ADMIN — PHENYLEPHRINE HYDROCHLORIDE 100 MCG: 10 INJECTION INTRAVENOUS at 10:23

## 2022-09-16 RX ADMIN — ETOMIDATE 10 MG: 2 INJECTION INTRAVENOUS at 09:14

## 2022-09-16 RX ADMIN — LIDOCAINE HYDROCHLORIDE 100 MG: 20 INJECTION, SOLUTION EPIDURAL; INFILTRATION; INTRACAUDAL at 09:14

## 2022-09-16 RX ADMIN — SODIUM CHLORIDE, SODIUM GLUCONATE, SODIUM ACETATE, POTASSIUM CHLORIDE AND MAGNESIUM CHLORIDE: 526; 502; 368; 37; 30 INJECTION, SOLUTION INTRAVENOUS at 17:16

## 2022-09-16 RX ADMIN — CEFTRIAXONE SODIUM 2 G: 10 INJECTION, POWDER, FOR SOLUTION INTRAVENOUS at 16:41

## 2022-09-16 RX ADMIN — SODIUM CHLORIDE 2 UNITS/HR: 9 INJECTION, SOLUTION INTRAVENOUS at 12:35

## 2022-09-16 RX ADMIN — DEXMEDETOMIDINE HYDROCHLORIDE 0.4 MCG/KG/HR: 100 INJECTION, SOLUTION INTRAVENOUS at 14:10

## 2022-09-16 RX ADMIN — AMPICILLIN SODIUM 2000 MG: 2 INJECTION, POWDER, FOR SOLUTION INTRAVENOUS at 06:09

## 2022-09-16 RX ADMIN — PHENYLEPHRINE HYDROCHLORIDE 200 MCG: 10 INJECTION INTRAVENOUS at 13:28

## 2022-09-16 RX ADMIN — SODIUM CHLORIDE: 9 INJECTION, SOLUTION INTRAVENOUS at 09:40

## 2022-09-16 RX ADMIN — AMIODARONE HYDROCHLORIDE 300 MG: 50 INJECTION, SOLUTION INTRAVENOUS at 13:04

## 2022-09-16 RX ADMIN — METHADONE HYDROCHLORIDE 10 MG: 10 INJECTION, SOLUTION INTRAMUSCULAR; INTRAVENOUS; SUBCUTANEOUS at 10:43

## 2022-09-16 RX ADMIN — SODIUM CHLORIDE, SODIUM GLUCONATE, SODIUM ACETATE, POTASSIUM CHLORIDE AND MAGNESIUM CHLORIDE: 526; 502; 368; 37; 30 INJECTION, SOLUTION INTRAVENOUS at 09:40

## 2022-09-16 ASSESSMENT — PAIN DESCRIPTION - PAIN TYPE
TYPE: ACUTE PAIN;SURGICAL PAIN
TYPE: ACUTE PAIN
TYPE: ACUTE PAIN

## 2022-09-16 ASSESSMENT — PULMONARY FUNCTION TESTS: FVC: 0.64

## 2022-09-16 ASSESSMENT — FIBROSIS 4 INDEX: FIB4 SCORE: 0.31

## 2022-09-16 NOTE — ASSESSMENT & PLAN NOTE
S/p valve replacement with associated septic pulmonary emboli   Due to Enterococcus faecalis  Continue with ampicillin and ceftriaxone per ID  Serial blood cultures

## 2022-09-16 NOTE — CARE PLAN
The patient is Watcher - Medium risk of patient condition declining or worsening    Shift Goals  Clinical Goals: Complete preop check list, hemodynamic stability  Patient Goals: Pain control  Family Goals: CHRIS.  No family present    Progress made toward(s) clinical / shift goals:        Problem: Pre Op  Goal: Optimal preparation for CABG/Heart Valve surgery  Intervention: Pre Op education to patient/significant other. Provide patient [facility] Patient Guideline for Cardiac Surgery (See Pt. Ed.)  Note: Pre op education provided by KEDAR Gutierres  Intervention: Consents obtained for Surgery, Anesthesia and Transfusion/Bloodless Program Participant  Note: Consent for surgery and blood transfusion signed by pt.   Intervention: Pre op checklist completed.  Sign and held orders released. Admit profile completed. Advanced directive verified.  Note: Checklist completed, orders released and admit profile competed.   Intervention: Pre op labs per MD order: CBC, CMP, INR, PTT, COD and UA if not done in past 72 hours.  HbA1C if diabetic.  Note: Labs collected  Intervention: Pre op diagnostics per MD order (EKG, echo, cath, CXR, Bilateral carotid doppler study and vein mapping)  Note: EKG, carotid doppler studies and venous duplex completed.  Intervention: Baseline assessment documented to include IS volume, weight, bilateral BP and peripheral pulses.  Note: 1000  Intervention: NPO at midnight except cardiac medications and PowerAde drink 2 hours prior to surgery (NO ASA, Coumadin, Plavix, Lovenox, or ACE/ARB)  Note: Pt NPO since midnight. Power aid drink to administered 2 hr prior to surgery. Day shift RN notified  Intervention: Shower with Chlorhexidine x 2 (12 hours apart), night before surgery and morning of surgery  Note: CHG bath completed x2  Intervention: Prep with clippers - chin to toes bedline  Note: Completed  Intervention: Remove dentures, valuables, jewelry, piercings, contacts, dentures and hearing aids  Note:  completed  Intervention: Beta blocker, gabapentin and Tylenol given at least 2 hours prior to surgery  Note: Ordered.   Intervention: If diabetic, FSBS in am and follow sliding scale if indicated  Note: Day shift RN will check.  Intervention: Chart back and consents sent to surgery with patient  Note: Consents signed and will be sent with pt.  Intervention: Transport to surgery with cardiac monitor, oxygen, and ensure hand off report is given to pre op RN  Note: to be completed by Day shift RN.             Problem: Knowledge Deficit - Standard  Goal: Patient and family/care givers will demonstrate understanding of plan of care, disease process/condition, diagnostic tests and medications  Outcome: Progressing     Problem: Pain - Standard  Goal: Alleviation of pain or a reduction in pain to the patient’s comfort goal  Outcome: Progressing     Problem: Hemodynamics  Goal: Patient's hemodynamics, fluid balance and neurologic status will be stable or improve  Outcome: Progressing     Problem: Respiratory  Goal: Patient will achieve/maintain optimum respiratory ventilation and gas exchange  Outcome: Progressing     Patient is not progressing towards the following goals:

## 2022-09-16 NOTE — PROGRESS NOTES
Patient to be transferred to CICU bed 621. Patient belongings gathered and patient escorted to new room. Report given to Luzma THACKRE at bedside. Patient remains safe and stable and intends to comply with treatment.

## 2022-09-16 NOTE — ANESTHESIA PROCEDURE NOTES
Central Venous Line  Performed by: Brad Ashton M.D.  Authorized by: Brad Ashton M.D.     Start Time:  9/16/2022 9:30 AM  End Time:  9/16/2022 9:35 AM  Patient Location:  OR  Indication: central venous access and hemodynamic monitoring        provider hand hygiene performed prior to central venous catheter insertion, all 5 sterile barriers used (gloves, gown, cap, mask, large sterile drape) during central venous catheter insertion and skin prep agent completely dried prior to procedure    Patient Position:  Trendelenburg  Laterality:  Right  Site:  Internal jugular  Prep:  Chlorhexidine  Catheter Size:  8.5 Fr  Catheter Length (cm):  10  Catheter Type:  Introducer  Number of Lumens:  Single lumen  target vein identified, needle advanced into vein and blood aspirated and guidewire advanced into vein    Seldinger Technique?: Yes    Ultrasound-Guided: ultrasound-guided  Image captured, interpreted and electronically stored.  Sterile Gel and Probe Cover Used for Ultrasound?: Yes    Intravenous Verification: verified by ultrasound, venous blood return and chest x-ray pending    all ports aspirated, all ports flushed easily, guidewire was removed intact, biopatch was applied, line was sutured in place and dressing was applied    Events: patient tolerated procedure well with no complications    PA Catheter Placed?: Yes    PA Catheter Type:  Oximetric  PA Catheter Size:  7  Laterality:  Right  Site:  Through introducer  PA Catheter Depth (cm):  20

## 2022-09-16 NOTE — ANESTHESIA PREPROCEDURE EVALUATION
Case: [id number] Anesthesia Start Date/Time: 09/16/22 0909    Procedures:       TRICUSPID VALVE REPAIR VS REPLACEMENT, POSSIBLE AORTIC VALVE REPLACEMENT WITH PATENT FORAMEN OVALE CLOSURE, TRANSESOPHAGEAL ESCHOCARDIOGRAM (Chest)      REPLACEMENT, AORTIC VALVE (Chest)      ECHOCARDIOGRAM, TRANSESOPHAGEAL, INTRAOPERATIVE (Esophagus)    Anesthesia type: General    Pre-op diagnosis: Infective endocarditis of tricuspid valve    Location: [facility] OR 02 / SURGERY [facility]    Surgeons: Truong Bradley D.O.          Relevant Problems   ANESTHESIA   (positive) PONV (postoperative nausea and vomiting)      PULMONARY   (positive) Pneumonia      NEURO   (positive) Migraine with aura      CARDIAC   (positive) Migraine with aura         (positive) TAZ (acute kidney injury) (HCC)      Other   (positive) Anemia   (positive) Elevated liver function tests   (positive) Infective endocarditis of tricuspid valve   (positive) Sepsis (HCC)       Physical Exam    Airway   Mallampati: II  TM distance: <3 FB  Neck ROM: full       Cardiovascular - normal exam  Rhythm: regular  Rate: abnormal  (-) murmur     Dental - normal exam           Pulmonary - normal exam  Breath sounds clear to auscultation     Abdominal    Neurological - normal exam                 Anesthesia Plan    ASA 4   ASA physical status 4 criteria: severe valve dysfunction    Plan - general       Airway plan will be ETT  KEO Planned        Induction: intravenous    Postoperative Plan: Postoperative administration of opioids is intended.    Pertinent diagnostic labs and testing reviewed    Informed Consent:    Anesthetic plan and risks discussed with patient and mother.    Use of blood products discussed with: patient and mother whom.

## 2022-09-16 NOTE — ANESTHESIA PROCEDURE NOTES
KEO    Date/Time: 9/16/2022 9:40 AM  Performed by: Brad Ashton M.D.  Authorized by: Brad Ashton M.D.     Start Time:9/16/2022 9:40 AM  Preanesthetic Checklist: patient identified, IV checked, site marked, risks and benefits discussed, surgical consent, monitors and equipment checked, pre-op evaluation and timeout performed    Indication for KEO: diagnostic   Patient Location: OR  Intubated: Yes  Bite Block: Yes  Heart Visualized: Yes  Insertion: atraumatic    **See FULL KEO report in patient's chart via CV Syn[employer]**

## 2022-09-16 NOTE — CONSULTS
PULMONARY AND CRITICAL CARE MEDICINE CONSULTATION    Date of Consultation:  9/16/2022    Requesting Physician:  Truong Bradley DO    Consulting Physician:  Antonio Saez MD    Reason for Consultation:  Management of mechanical ventilation and critical care management in lady who has undergone radical tricuspid valve repair, patent foramen ovale closure and aortic valve repair.    Chief Complaint:  Management of mechanical ventilation, critical care management    History of Present Illness:    I was kindly asked to see and evaluate Sarah Buckner, a 51 y.o. female for evaluation and management of the above problem.    This lady has a history of chronic back pain, primary hypertension and urolithiasis.  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  She has developed septic pulmonary emboli and has been treated with ampicillin and ceftriaxone per infectious diseases.  Today Dr. Bradley took him to the operating theater and performed radical tricuspid valve repair, patent foramen ovale closure and aortic valve repair.  She is now in the cardiac surgery unit.    Medications Prior to Admission:    No current facility-administered medications on file prior to encounter.     Current Outpatient Medications on File Prior to Encounter   Medication Sig Dispense Refill    NS SOLN 100 mL with gentamicin 40 MG/ML SOLN 60 mg Infuse 60 mg into a venous catheter every 8 hours.      NS 0.9% SOLN 100 mL with ampicillin 2 GM SOLR 2 g Infuse 2 g into a venous catheter every 4 hours.      benzonatate (TESSALON) 100 MG Cap Take 1 Capsule by mouth 3 times a day as needed for Cough. 60 Capsule 0    guaiFENesin ER 1200 MG TABLET SR 12 HR Take 1 Tablet by mouth every 12 hours. 28 Tablet     Vancomycin HCl (VANCOMYCIN 750 MG/250ML) 750 mg Infuse 250 mL into a venous catheter every 12  hours.      meloxicam (MOBIC) 7.5 MG Tab Take 7.5 mg by mouth 1 time a day as needed.      sumatriptan (IMITREX) 100 MG tablet TAKE 1 TABLET BY MOUTH AS NEEDED FOR MIGRAINE, REPEAT IN 3 HOURS AS NEEDED . DO NOT EXCEED 3 TABS PER WEEK      cyclobenzaprine (FLEXERIL) 10 mg Tab Take 1 Tablet by mouth 2 times a day as needed for Moderate Pain or Muscle Spasms. 60 Tablet 0    asa/apap/caffeine (EXCEDRIN) 250-250-65 MG Tab Take 1 Tab by mouth every 6 hours as needed for Headache.      Melatonin Gummies 2.5 MG Chew Tab Chew.         Current Medications:      Current Facility-Administered Medications:     insulin regular (HumuLIN R,NovoLIN R) injection, 0-14 Units, Intravenous, Once, Truong Bradley D.O.    insulin lispro (AdmeLOG,HumaLOG) injection, 0-14 Units, Subcutaneous, TID AC, ALIREZA Law.OConcetta    insulin regular (HumuLIN R) 100 Units in  mL Infusion, 0-29 Units/hr, Intravenous, Continuous, Truong Bradley D.O., Last Rate: 2 mL/hr at 09/16/22 1601, 2 Units/hr at 09/16/22 1601    dextrose 10 % BOLUS 12.5-25 g, 12.5-25 g, Intravenous, PRN, Truong Bradley D.O.    MD Alert...Pharmacy to initiate transition from insulin infusion to subcutaneous insulin for cardiothoracic surgery 1 Each, 1 Each, Other, Continuous, ALIREZA Law.OConcetta    EPINEPHrine (Adrenalin) infusion 4 mg/250 mL (premix), 0-0.2 mcg/kg/min, Intravenous, Continuous, Truong Bradley D.O., Dose not Required at 09/16/22 1500    norepinephrine (Levophed) 8 mg in 250 mL NS infusion (premix), 0-30 mcg/min, Intravenous, Continuous, Truong Bradley D.O., Last Rate: 7.5 mL/hr at 09/16/22 1610, 4 mcg/min at 09/16/22 1610    vasopressin (Vasostrict) 20 Units in  mL Infusion, 0.03 Units/min, Intravenous, Continuous, Truong Bradley D.O., Last Rate: 9 mL/hr at 09/16/22 1500, 0.03 Units/min at 09/16/22 1500    Respiratory Therapy Consult, , Nebulization, Continuous RT, JOSE Samson    NS  infusion, , Intravenous, Continuous, Joshua Goodson A.P.R.N., Last Rate: 10 mL/hr at 09/16/22 1512, New Bag at 09/16/22 1512    electrolyte-A (PLASMALYTE-A) infusion, , Intravenous, PRN, Joshua Goodson, A.P.R.N.    calcium CHLORIDE 1,000 mg in  mL IVPB, 1,000 mg, Intravenous, Once PRN, Joshua Goodson A.P.R.N.    magnesium sulfate in D5W IVPB premix 1 g, 1 g, Intravenous, DAILY, Joshua Goodson A.P.R.N., Last Rate: 100 mL/hr at 09/16/22 1528, 1 g at 09/16/22 1528    Potassium Serum (K), , , Q6H **AND** K+ Scale: Goal of 4.5, 1 Each, Intravenous, Q6HRS, Joshua Goodson A.P.R.N.    [START ON 9/17/2022] aspirin EC (ECOTRIN) tablet 81 mg, 81 mg, Oral, DAILY, Joshua Goodson, A.P.R.N.    clevidipine (Cleviprex) IV emulsion, 0-21 mg/hr, Intravenous, Continuous, Joshua Goodson A.P.R.N., Dose not Required at 09/16/22 1500    nitroglycerin 50 mg in D5W 250 ml infusion, 0-100 mcg/min, Intravenous, Continuous, Joshua Goodson A.P.R.N., Dose not Required at 09/16/22 1500    Pharmacy Consult Request ...Pain Management Review 1 Each, 1 Each, Other, PHARMACY TO DOSE, Joshua Goodson A.P.R.N.    acetaminophen (TYLENOL) tablet 1,000 mg, 1,000 mg, Oral, Q6HRS **FOLLOWED BY** [START ON 9/21/2022] acetaminophen (TYLENOL) tablet 1,000 mg, 1,000 mg, Oral, Q6HRS PRN, Joshua Goodson A.P.R.N.    traMADol (Ultram) 50 MG tablet 50 mg, 50 mg, Oral, Q4HRS PRN, Joshua Goodson A.P.R.N.    midazolam (Versed) injection 2 mg, 2 mg, Intravenous, Q HOUR PRN, ZOYA SamsonPConcettaRDANNY    dexmedetomidine (PRECEDEX) 400 mcg/100mL NS premix infusion, 0-1.5 mcg/kg/hr, Intravenous, Continuous, ZOYA SamsonP.RDANNY, Stopped at 09/16/22 1622    sodium bicarbonate 8.4 % injection 50 mEq, 50 mEq, Intravenous, Q HOUR PRN, ZOYA SamsonPConcettaRCARLEEN.    ondansetron (ZOFRAN) syringe/vial injection 8 mg, 8 mg, Intravenous, Q6HRS PRN **OR** prochlorperazine (COMPAZINE) injection 10 mg, 10 mg, Intravenous, Q6HRS PRN **OR** promethazine (PHENERGAN) suppository 25 mg, 25  mg, Rectal, Q6HRS PRN, Johsua Goodson A.P.R.N.    acetaminophen (Tylenol) tablet 650 mg, 650 mg, Oral, Q4HRS PRN **OR** acetaminophen (TYLENOL) suppository 650 mg, 650 mg, Rectal, Q4HRS PRN, Joshua Goodson A.P.R.N.    senna-docusate (PERICOLACE or SENOKOT S) 8.6-50 MG per tablet 2 Tablet, 2 Tablet, Oral, BID **AND** [START ON 9/17/2022] polyethylene glycol/lytes (MIRALAX) PACKET 1 Packet, 1 Packet, Oral, DAILY **AND** [START ON 9/18/2022] magnesium hydroxide (MILK OF MAGNESIA) suspension 30 mL, 30 mL, Oral, DAILY **AND** bisacodyl (DULCOLAX) suppository 10 mg, 10 mg, Rectal, QDAY PRN, Joshua Goodson A.P.R.N.    [START ON 9/17/2022] omeprazole (PRILOSEC) capsule 20 mg, 20 mg, Oral, DAILY, Joshua Goodson A.P.R.N.    mag hydrox-al hydrox-simeth (MAALOX PLUS ES or MYLANTA DS) suspension 30 mL, 30 mL, Oral, Q4HRS PRN, Joshua Goodson A.P.R.N.    vancomycin (VANCOCIN) 1 g in  mL IVPB, 1 g, Intravenous, Once, Joshua Goodson A.P.R.N.    mupirocin (BACTROBAN) 2 % ointment 1 Application, 1 Application, Topical, BID, Joshua Goodson A.P.R.N.    oxyCODONE immediate-release (ROXICODONE) tablet 5 mg, 5 mg, Oral, Q3HRS PRN **OR** oxyCODONE immediate release (ROXICODONE) tablet 10 mg, 10 mg, Oral, Q3HRS PRN, 10 mg at 09/15/22 0550 **OR** HYDROmorphone (Dilaudid) injection 0.5 mg, 0.5 mg, Intravenous, Q3HRS PRN, Kassandra Rosario M.D., 0.5 mg at 09/16/22 0211    ampicillin (Omnipen) 2,000 mg in  mL IVPB, 2,000 mg, Intravenous, Q4HRS, JOSE Samson, Stopped at 09/16/22 0639    cefTRIAXone (Rocephin) syringe 2 g, 2 g, Intravenous, Q12HRS, Maria Luisa Wyatt M.D., 2 g at 09/16/22 0609    guaiFENesin ER (MUCINEX) tablet 1,200 mg, 1,200 mg, Oral, Q12HRS, Antonio Goodman M.D., 1,200 mg at 09/15/22 1746    Allergies:    Hydrocodone-acetaminophen and Morphine    Past Surgical History:    Past Surgical History:   Procedure Laterality Date    ME UPPER GI ENDOSCOPY,DIAGNOSIS N/A 9/12/2022    Procedure:  GASTROSCOPY;  Surgeon: Danyelle Curiel M.D.;  Location: SURGERY SAME DAY [facility];  Service: Gastroenterology    IA COLONOSCOPY,DIAGNOSTIC N/A 9/12/2022    Procedure: COLONOSCOPY;  Surgeon: Danyelle Curiel M.D.;  Location: SURGERY SAME DAY [facility];  Service: Gastroenterology    IA UPPER GI ENDOSCOPY,BIOPSY N/A 9/12/2022    Procedure: GASTROSCOPY, WITH BIOPSY;  Surgeon: Danyelle Curiel M.D.;  Location: SURGERY SAME DAY [facility];  Service: Gastroenterology    COLONOSCOPY WITH POLYP N/A 9/12/2022    Procedure: COLONOSCOPY, WITH POLYPECTOMY;  Surgeon: Danyelle Curiel M.D.;  Location: SURGERY SAME DAY [facility];  Service: Gastroenterology    IA LAP,RMV  ADNEXAL STRUCTURE Bilateral 12/8/2020    Procedure: SALPINGO-OOPHORECTOMY, BILATERAL, LAPAROSCOPIC;  Surgeon: Brayan Hunt M.D.;  Location: [facility];  Service: Gynecology    VAGINAL HYSTERECTOMY SCOPE TOTAL N/A 12/8/2020    Procedure: LAPAROSCOPIC  SUBTOTAL HYSTERECTOMY;  Surgeon: Brayan Hunt M.D.;  Location: [facility];  Service: Gynecology    CYSTOSCOPY N/A 12/8/2020    Procedure: CYSTOSCOPY;  Surgeon: rBayan Hunt M.D.;  Location: [facility];  Service: Gynecology    LUMPECTOMY      breast reduction, implants     OTHER ORTHOPEDIC SURGERY      orif right wrist hardware    PRIMARY C SECTION      3       Past Medical History:    Past Medical History:   Diagnosis Date    Hypertension     Migraine     PONV (postoperative nausea and vomiting)        Social History:    Social History     Socioeconomic History    Marital status:      Spouse name: Not on file    Number of children: Not on file    Years of education: Not on file    Highest education level: Not on file   Occupational History    Not on file   Tobacco Use    Smoking status: Never    Smokeless tobacco: Never   Vaping Use    Vaping Use: Never used   Substance and Sexual Activity    Alcohol use: Yes     Alcohol/week: 3.6 oz     Types: 6 Glasses of wine  "per week    Drug use: Never    Sexual activity: Not on file   Other Topics Concern    Not on file   Social History Narrative    Not on file     Social Determinants of Health     Financial Resource Strain: Not on file   Food Insecurity: Not on file   Transportation Needs: Not on file   Physical Activity: Not on file   Stress: Not on file   Social Connections: Not on file   Intimate Partner Violence: Not on file   Housing Stability: Not on file       Family History:    Family History   Problem Relation Age of Onset    Clotting Disorder Mother        Review of System:    Review of Systems   Unable to perform ROS: Acuity of condition     Physical Examination:    /75   Pulse 68   Temp (!) 34.9 °C (94.8 °F) (Bladder)   Resp (!) 40   Ht 1.646 m (5' 4.8\")   Wt 64.4 kg (141 lb 15.6 oz)   LMP 11/23/2020   SpO2 97%   BMI 23.77 kg/m²   Physical Exam  Constitutional:       Comments: On ventilator   HENT:      Head: Normocephalic.      Mouth/Throat:      Pharynx: Oropharynx is clear.   Eyes:      Pupils: Pupils are equal, round, and reactive to light.   Cardiovascular:      Comments: Sinus rhythm  Pulmonary:      Breath sounds: Rales (Scattered coarse crackles) present. No wheezing.   Abdominal:      General: There is no distension.      Tenderness: There is no abdominal tenderness.   Musculoskeletal:      Cervical back: Normal range of motion.      Right lower leg: No edema.      Left lower leg: No edema.   Skin:     General: Skin is warm.      Capillary Refill: Capillary refill takes less than 2 seconds.   Neurological:      Comments: Sedated       Laboratory Data:    Recent Labs     09/16/22  0925 09/16/22  1013   ISTATAPH 7.388* 7.355*   ISTATAPCO2 44.4* 46.6*   ISTATAPO2 293* 270*   ISTATATCO2 28 27   MBYAXSM1FEX 100* 100*   ISTATARTHCO3 26.7* 26.0*   ISTATARTBE 1 0   ISTATTEMP 36.0 C 37.0 C   ISTATSPEC Arterial Arterial   ISTATAPHTC 7.402 7.355*   [id number] 288* 270*     Recent Labs     09/13/22  2359 " 09/16/22  0314 09/16/22  1445   WBC 13.1* 11.9*  --    RBC 3.07* 3.34*  --    HEMOGLOBIN 8.6* 9.4*  --    HEMATOCRIT 26.9* 29.2*  --    MCV 87.6 87.4  --    MCH 28.0 28.1  --    MCHC 32.0* 32.2*  --    RDW 51.0* 50.3*  --    PLATELETCT 513* 604* 315   MPV 9.7 9.6  --      Recent Labs     09/13/22  2359 09/16/22  0314 09/16/22  1445   SODIUM 135 136  --    POTASSIUM 4.4 3.9 4.5   CHLORIDE 99 101  --    CO2 24 25  --    GLUCOSE 93 94  --    BUN 2* 3*  --    CREATININE 0.41* 0.35*  --    CALCIUM 8.2* 8.7  --                    Imaging:    I personally viewed all the available CXR and CT scan images as well as reviewed the radiology interpretation reports.    DX-CHEST-PORTABLE (1 VIEW)   Final Result      1.  Bilateral nodular opacities are consistent with septic emboli seen on prior exams. Probable small bilateral pleural effusions.      2.  Endotracheal tube tip projects approximately 2.2 cm above the parag.      3.  Right internal jugular catheter tip projects over the cavoatrial junction. No pneumothorax is identified.      EC-KEO W/O CONT         DX-CHEST-2 VIEWS   Final Result      1.  Multifocal ill-defined bilateral airspace opacities suggesting infection.   2.  Small right pleural effusion.      US-CAROTID DOPPLER BILAT   Final Result      EC-KEO W/O CONT   Final Result      [id number]   Final Result      Multiple dental caries.      EC-KEO W/O CONT    (Results Pending)       Assessment and Plan:    * Infective endocarditis of tricuspid valve- (present on admission)  Assessment & Plan  Associated septic pulmonary emboli  Due to Enterococcus faecalis  Continue with ampicillin and ceftriaxone per ID    S/P aortic valve repair  Assessment & Plan  On 9/16 by Dr. Bradley    S/P patent foramen ovale closure  Assessment & Plan  On 9/16 by Dr. Bradley    S/P tricuspid valve repair  Assessment & Plan  On 9/16 by Dr. Bradley    Encounter for weaning from ventilator (HCC)  Assessment & Plan  Intubated  9/16  ABCDEF bundle  SAT/SBT as appropriate  I am weaning the ventilator based upon hemodynamics and cardiopulmonary status    I have assessed and reassessed her respiratory status with ventilator adjustments and spontaneous breathing trials, airway mechanics, ventilator waveforms, blood pressure, hemodynamics, cardiovascular status with titration of norepinephrine and her neurologic status.  She is at increased risk for worsening respiratory and cardiovascular system dysfunction.    High risk of deterioration and worsening vital organ dysfunction and death without the above critical care interventions.    Thank you for allowing me to participate in the care of this lady.  I will continue to follow her with great interest.    The patient is critically ill.  Critical care time = 120 minutes in directly providing and coordinating critical care and extensive data review.  No time overlap and excludes procedures.    Discussed with Dr. Bradley, Dr. Ashton, RN, RT    Antonio Saez MD  Pulmonary and Critical Care Medicine

## 2022-09-16 NOTE — ANESTHESIA TIME REPORT
Anesthesia Start and Stop Event Times     Date Time Event    9/16/2022 0757 Ready for Procedure     0909 Anesthesia Start     1454 Anesthesia Stop        Responsible Staff  09/16/22    Name Role Begin End    Brad Ashton M.D. Anesth 0909 1450        Overtime Reason:  no overtime (within assigned shift)    Comments:

## 2022-09-16 NOTE — PROGRESS NOTES
Patient is given the carb drink. Blood sugar 87 prior to.  New PIV 16 g established.   Patient transported to pre-op with family on room air.

## 2022-09-16 NOTE — DIETARY
Nutrition Services: Day 7 of admit.  Sarah Buckner is a 51 y.o. female with admitting DX of Endocarditis.    Consult received for Cardiac Rehab Diet Education.  S/p RADICAL TRICUSPID VALVE REPAIR, PATENT FORAMEN OVALE CLOSURE, AORTIC VALVE REPAIR.  Diet education is not indicated.

## 2022-09-16 NOTE — ANESTHESIA PROCEDURE NOTES
Arterial Line  Performed by: Brad Ashton M.D.  Authorized by: Brad Ashton M.D.     Start Time:  9/16/2022 9:20 AM  End Time:  9/16/2022 9:21 AM  Localization: ultrasound guidance  Image captured, interpreted and electronically stored.  Patient Location:  OR  Indication: continuous blood pressure monitoring and blood sampling needed        Catheter Size:  20 G  Seldinger Technique?: Yes    Laterality:  Left  Site:  Radial artery  Line Secured:  Antimicrobial disc, tape and transparent dressing  Events: patient tolerated procedure well with no complications

## 2022-09-16 NOTE — PROGRESS NOTES
4 Eyes Skin Assessment Completed by KEDAR Segal and Nenita RN.    Head WDL  Ears WDL  Nose WDL  Mouth WDL  Neck WDL  Breast/Chest WDL  Shoulder Blades WDL  Spine WDL  (R) Arm/Elbow/Hand WDL  (L) Arm/Elbow/Hand WDL  Abdomen WDL  Groin WDL  Scrotum/Coccyx/Buttocks WDL  (R) Leg WDL  (L) Leg WDL  (R) Heel/Foot/Toe WDL  (L) Heel/Foot/Toe WDL          Devices In Places Tele Box, Blood Pressure Cuff, Pulse Ox, and Nasal Cannula      Interventions In Place NC W/Ear Foams, Pillows, and Low Air Loss Mattress    Possible Skin Injury No    Pictures Uploaded Into Epic N/A  Wound Consult Placed N/A  RN Wound Prevention Protocol Ordered No

## 2022-09-16 NOTE — ANESTHESIA PROCEDURE NOTES
Airway    Date/Time: 9/16/2022 9:15 AM  Performed by: Brad Ashton M.D.  Authorized by: Brad Ashton M.D.     Location:  OR  Urgency:  Elective  Difficult Airway: No    Indications for Airway Management:  Anesthesia      Spontaneous Ventilation: absent    Sedation Level:  Deep  Preoxygenated: Yes    Patient Position:  Sniffing  Final Airway Type:  Endotracheal airway  Final Endotracheal Airway:  ETT  Cuffed: Yes    Technique Used for Successful ETT Placement:  Direct laryngoscopy  Devices/Methods Used in Placement:  Intubating stylet    Insertion Site:  Oral  Blade Type:  Paty  Laryngoscope Blade/Videolaryngoscope Blade Size:  3  ETT Size (mm):  8.0  Measured from:  Teeth  ETT to Teeth (cm):  21  Placement Verified by: auscultation and capnometry    Cormack-Lehane Classification:  Grade I - full view of glottis  Number of Attempts at Approach:  1

## 2022-09-16 NOTE — OP REPORT
Operative Report    PreOp Diagnosis: Tricuspid endocarditis,  Severe tricuspid regurgitation,  Aortic valve endocarditis, Mild aortic valve regurgitation, Patent foramen ovale, sepsis, septic pulmonary emboli      PostOp Diagnosis: Same as above      Procedure(s):  RADICAL TRICUSPID VALVE REPAIR, PATENT FORAMEN OVALE CLOSURE, AORTIC VALVE REPAIR,   TRANSESOPHAGEAL ESCHOCARDIOGRAM - Wound Class: Clean with Drain  ECHOCARDIOGRAM, TRANSESOPHAGEAL, INTRAOPERATIVE - Wound Class: None    Surgeon(s):  Truong Bradley D.O.    Anesthesiologist/Type of Anesthesia:  Anesthesiologist: Brad Ashton M.D./General    Surgical Staff:  Assistant: JOSE Samson  Circulator: Jennifer Dick R.N.; Pascale Bailey R.N.  Perfusionist: Robert Vogt  Scrub Person: Cassandra Foster; Caitlyn Partida    Specimens removed if any:  ID Type Source Tests Collected by Time Destination   1 : TRICUSPID VEGETATION  Tissue Heart AFB CULTURE, FUNGAL CULTURE, AEROBIC/ANAEROBIC CULTURE (SURGERY) Truong Bradley D.O. 9/16/2022 10:48 AM    A : TRICUSPID VEGETATION  Tissue Heart PATHOLOGY SPECIMEN Truong Bradley D.O. 9/16/2022 10:45 AM    B : AORTIC VALVE VEGETATION  Tissue Heart Valve PATHOLOGY SPECIMEN Truong Bradley D.O. 9/16/2022 12:44 PM        Estimated Blood Loss: CPB    Findings: Preoperative echo showed severe tricuspid regurgitation with obvious vegetations.  LV ejection fraction was down to 45%.  Vegetation noted on the noncoronary cusp of aortic valve with mild central AI.  PFO noted.    Postoperative echo showed successful repair of tricuspid valve with mild TR centrally, and a mean gradient of 1 mmHg across the valve.  No change in mild central AI.  Improvement of left ventricular ejection fraction with no inotropic support.    On separation from bypass, all suture lines were intact.  Patient did exhibit evidence of septic response while on pump with vasa plegia and generalized medical bleeding after protamine  administration.  Blood factors were given for reversal.    Patient weaned off bypass without issue.  She did require defibrillation x1 with cross-clamp removal.    Complications: None immediate    Patient condition: Guarded to ICU    Chest tubes: Mediastinal: 32 Macanese x1.  24 Macanese Isidoro drain placed retrocardiac    Endoscopic vein harvest: N/A    Pacing wires: RV x2     Pericardium: Open    Cross-clamp time: 137 minutes    Pump time: 171 minutes    Cardioplegia: Cold blood antegrade [place] cardioplegia given.  Initial induction with 1000 ml, redosed at 60 minutes with 500 mL.  Hotshot given antegrade with 250 mL of warm blood    Vent: Aortic root      Procedure:    After informed consent was obtained, the patient was brought to the operating room.  They were placed in supine position on the operating table.  General anesthesia was induced via endotracheal tube.  Lines, arterial line, [place] were placed by the nursing and anesthesia teams.  They received pre-incision antibiotics and beta-blockade.  The patient was prepped in a sterile fashion from their chin to their feet.  Drapes were placed in a sterile fashion.  The procedure was begun with a timeout.    I was joined throughout the case byHAILE Turner who assisted in all aspects.    , I made a median sternotomy incision and deepened to the sternum.  I divided the sternum in the midline, and obtain hemostasis with electrocautery and bone putty.  Systemic heparinization was performed.  Patient required second dose of heparin along with a T3 in order to achieve an ACT greater than 500 seconds.    Sternal retractor was used to open the chest.  Innominate vein and pericardium were exposed.  I opened the pericardium in the usual fashion and created a pericardial well with interrupted sutures.  Palpation of the aorta revealed no calcification.  I cannulated the aorta in a direct Seldinger's fashion using KEO guidance.  I then cannulated the SVC and IVC with  single-stage venous cannulas.  Finally I placed an antegrade needle in the mid ascending aorta.  Cardiopulmonary bypass was initiated, the patient was cooled to 34 °C.  I dissected the aorta away from the pulmonary artery and dissected out the root to facilitate exploration.  Next, I placed caval snares around both the SVC and IVC.  I then made atriotomy exposing the tricuspid valve.  I was able to resect the majority of the affected septal, posterior and anterior leaflets.  Affected papillary muscles were also resected.    I then decided to proceed with arrest of the heart at this point in order to facilitate reconstruction of the valve.  Cross-clamp was placed and the heart arrested with cold blood cardioplegia.  Ice slush was placed for additional protection.  I then finished debriding the valve and sent specimens.  The posterior leaflet had been completely resected.  Portions of the anterior and septal leaflet were also resected leaving roughly 60% to 70% of the annulus without leaflets.  Due to the patient's age, I decided to proceed with radical reconstruction of the valve.  I dissected out her autologous pericardium and prepped it.  I then anastomose to and along the annulus with a running 7-0 Prolene suture.  Following that I placed annular sutures for band annuloplasty in the usual fashion, using 2 oh Ethibonds.  She is sized for 30 mm band which was lowered into place and secured.  I then examined the valve and placed needle cords as needed to the nuke leaflet, and the anterior and septal remnants.  Upon completion I had acceptable trivial to mild regurgitation.  Following that, I examined her atrial wall and found to PFO's.  These were oversewn.  I then closed the right atriotomy in layers using running 4-0 Prolene suture.    I then proceeded with opening of the aorta via transverse oblique aortotomy.  Examination of the valve revealed vegetation on the noncoronary sinus that was easily cleared away.   Each cusp had vegetations on the inferior surface.  These were cleared away with sharp debridement.  Each site was also treated with iodine for further sterilization.  I then closed the aorta in 2 layers using a running 5-0 Prolene suture.    At that point, cross-clamp removed was removed and the patient de-aired in the usual fashion.  She did fibrillate with cross-clamp removal, and therefore was defibrillated with 20 J of energy.  She regained a normal sinus rhythm.  I placed 2 pacing leads on the right ventricle and brought them out through the epigastrium.  Chest tubes were placed in the mediastinum, and brought out through the epigastrium as well.  As she rewarmed, I checked for hemostasis and found to be adequate.  She was then  from cardiopulmonary bypass in the usual fashion.  Prior to removing the venous lines, KEO was performed and results were noted as above.  I then removed both the SVC and IVC cannula, and protamine was administered.  The patient was retransfused her blood volume and the other cannulas were removed subsequently with the site oversewn.  Sternum was reapproximated with #5 stainless steel wires, and the sternal incision closed in layers with absorbable sutures.  The patient did exhibit some low-level medical coagulopathy that was treated with blood products.  She was taken to the ICU in guarded condition.  All instrument counts were correct x2 and she tolerated the procedure well.                9/16/2022 2:11 PM Truong Bradley D.O.

## 2022-09-16 NOTE — CARE PLAN
The patient is Watcher - Medium risk of patient condition declining or worsening      Problem: Pre Op  Goal: Optimal preparation for CABG/Heart Valve surgery  Outcome: Progressing  Intervention: Pre Op education to patient/significant other. Provide patient [facility] Patient Guideline for Cardiac Surgery (See Pt. Ed.)  Note: Completed  Intervention: Pre op labs per MD order: CBC, CMP, INR, PTT, COD and UA if not done in past 72 hours.  HbA1C if diabetic.  Note: Ordered  Intervention: Pre op diagnostics per MD order (EKG, echo, cath, CXR, Bilateral carotid doppler study and vein mapping)  Note: Ordered  Intervention: Baseline assessment documented to include IS volume, weight, bilateral BP and peripheral pulses.  Note: Completed

## 2022-09-16 NOTE — ANESTHESIA PROCEDURE NOTES
Central Venous Line  Performed by: Brad Ashton M.D.  Authorized by: Brad Ashton M.D.     Start Time:  9/16/2022 9:25 AM  End Time:  9/16/2022 9:30 AM  Patient Location:  OR  Indication: central venous access        provider hand hygiene performed prior to central venous catheter insertion, all 5 sterile barriers used (gloves, gown, cap, mask, large sterile drape) during central venous catheter insertion and skin prep agent completely dried prior to procedure    Patient Position:  Trendelenburg  Laterality:  Right  Site:  Internal jugular  Prep:  Chlorhexidine  Catheter Size:  8 Fr  Catheter Length (cm):  16  Number of Lumens:  Double lumen  target vein identified, needle advanced into vein and blood aspirated and guidewire advanced into vein    Seldinger Technique?: Yes    Ultrasound-Guided: ultrasound-guided  Image captured, interpreted and electronically stored.  Sterile Gel and Probe Cover Used for Ultrasound?: Yes    Intravenous Verification: verified by ultrasound, venous blood return and chest x-ray pending    all ports aspirated, all ports flushed easily, guidewire was removed intact, biopatch was applied, line was sutured in place and dressing was applied    Events: patient tolerated procedure well with no complications

## 2022-09-17 ENCOUNTER — APPOINTMENT (OUTPATIENT)
Dept: RADIOLOGY | Facility: MEDICAL CENTER | Age: 52
DRG: 219 | End: 2022-09-17
Attending: NURSE PRACTITIONER
Payer: COMMERCIAL

## 2022-09-17 LAB
ANION GAP SERPL CALC-SCNC: 11 MMOL/L (ref 7–16)
BACTERIA BLD CULT: ABNORMAL
BACTERIA BLD CULT: ABNORMAL
BASE EXCESS BLDA CALC-SCNC: -1 MMOL/L (ref -4–3)
BASE EXCESS BLDA CALC-SCNC: -2 MMOL/L (ref -4–3)
BASE EXCESS BLDA CALC-SCNC: 1 MMOL/L (ref -4–3)
BODY TEMPERATURE: ABNORMAL DEGREES
BREATHS SETTING VENT: 24
BUN SERPL-MCNC: 10 MG/DL (ref 8–22)
CA-I BLD ISE-SCNC: 1.15 MMOL/L (ref 1.1–1.3)
CA-I BLD ISE-SCNC: 1.18 MMOL/L (ref 1.1–1.3)
CALCIUM SERPL-MCNC: 8.2 MG/DL (ref 8.5–10.5)
CHLORIDE SERPL-SCNC: 111 MMOL/L (ref 96–112)
CO2 BLDA-SCNC: 24 MMOL/L (ref 20–33)
CO2 BLDA-SCNC: 26 MMOL/L (ref 20–33)
CO2 BLDA-SCNC: 26 MMOL/L (ref 20–33)
CO2 SERPL-SCNC: 23 MMOL/L (ref 20–33)
CREAT SERPL-MCNC: 0.67 MG/DL (ref 0.5–1.4)
DELSYS IDSYS: ABNORMAL
EKG IMPRESSION: NORMAL
END TIDAL CARBON DIOXIDE IECO2: 32 MMHG
ERYTHROCYTE [DISTWIDTH] IN BLOOD BY AUTOMATED COUNT: 51.5 FL (ref 35.9–50)
GFR SERPLBLD CREATININE-BSD FMLA CKD-EPI: 106 ML/MIN/1.73 M 2
GLUCOSE BLD STRIP.AUTO-MCNC: 100 MG/DL (ref 65–99)
GLUCOSE BLD STRIP.AUTO-MCNC: 118 MG/DL (ref 65–99)
GLUCOSE BLD STRIP.AUTO-MCNC: 120 MG/DL (ref 65–99)
GLUCOSE BLD STRIP.AUTO-MCNC: 122 MG/DL (ref 65–99)
GLUCOSE BLD STRIP.AUTO-MCNC: 123 MG/DL (ref 65–99)
GLUCOSE BLD STRIP.AUTO-MCNC: 127 MG/DL (ref 65–99)
GLUCOSE BLD STRIP.AUTO-MCNC: 128 MG/DL (ref 65–99)
GLUCOSE BLD STRIP.AUTO-MCNC: 128 MG/DL (ref 65–99)
GLUCOSE BLD STRIP.AUTO-MCNC: 134 MG/DL (ref 65–99)
GLUCOSE BLD STRIP.AUTO-MCNC: 135 MG/DL (ref 65–99)
GLUCOSE BLD STRIP.AUTO-MCNC: 136 MG/DL (ref 65–99)
GLUCOSE BLD STRIP.AUTO-MCNC: 139 MG/DL (ref 65–99)
GLUCOSE BLD STRIP.AUTO-MCNC: 140 MG/DL (ref 65–99)
GLUCOSE BLD STRIP.AUTO-MCNC: 142 MG/DL (ref 65–99)
GLUCOSE BLD STRIP.AUTO-MCNC: 143 MG/DL (ref 65–99)
GLUCOSE BLD STRIP.AUTO-MCNC: 143 MG/DL (ref 65–99)
GLUCOSE BLD STRIP.AUTO-MCNC: 169 MG/DL (ref 65–99)
GLUCOSE SERPL-MCNC: 136 MG/DL (ref 65–99)
HCO3 BLDA-SCNC: 22.6 MMOL/L (ref 17–25)
HCO3 BLDA-SCNC: 24.8 MMOL/L (ref 17–25)
HCO3 BLDA-SCNC: 24.8 MMOL/L (ref 17–25)
HCT VFR BLD AUTO: 23 % (ref 37–47)
HCT VFR BLD CALC: 23 % (ref 37–47)
HCT VFR BLD CALC: 25 % (ref 37–47)
HGB BLD-MCNC: 7.5 G/DL (ref 12–16)
HGB BLD-MCNC: 7.8 G/DL (ref 12–16)
HGB BLD-MCNC: 8.5 G/DL (ref 12–16)
HOROWITZ INDEX BLDA+IHG-RTO: 220 MM[HG]
HOROWITZ INDEX BLDA+IHG-RTO: 317 MM[HG]
MAGNESIUM SERPL-MCNC: 3 MG/DL (ref 1.5–2.5)
MCH RBC QN AUTO: 28.8 PG (ref 27–33)
MCHC RBC AUTO-ENTMCNC: 32.6 G/DL (ref 33.6–35)
MCV RBC AUTO: 88.5 FL (ref 81.4–97.8)
MODE IMODE: ABNORMAL
MODE IMODE: ABNORMAL
O2/TOTAL GAS SETTING VFR VENT: 30 %
O2/TOTAL GAS SETTING VFR VENT: 30 %
PCO2 BLDA: 36.4 MMHG (ref 26–37)
PCO2 BLDA: 36.5 MMHG (ref 26–37)
PCO2 BLDA: 42.8 MMHG (ref 26–37)
PCO2 TEMP ADJ BLDA: 34.1 MMHG (ref 26–37)
PCO2 TEMP ADJ BLDA: 34.9 MMHG (ref 26–37)
PCO2 TEMP ADJ BLDA: 42 MMHG (ref 26–37)
PEEP END EXPIRATORY PRESSURE IPEEP: 8 CMH20
PEEP END EXPIRATORY PRESSURE IPEEP: 8 CMH20
PERCENT MINUTE VOLUME IPMV: 130
PH BLDA: 7.37 [PH] (ref 7.4–7.5)
PH BLDA: 7.4 [PH] (ref 7.4–7.5)
PH BLDA: 7.44 [PH] (ref 7.4–7.5)
PH TEMP ADJ BLDA: 7.38 [PH] (ref 7.4–7.5)
PH TEMP ADJ BLDA: 7.41 [PH] (ref 7.4–7.5)
PH TEMP ADJ BLDA: 7.46 [PH] (ref 7.4–7.5)
PLATELET # BLD AUTO: 345 K/UL (ref 164–446)
PMV BLD AUTO: 9.9 FL (ref 9–12.9)
PO2 BLDA: 65 MMHG (ref 64–87)
PO2 BLDA: 66 MMHG (ref 64–87)
PO2 BLDA: 95 MMHG (ref 64–87)
PO2 TEMP ADJ BLDA: 62 MMHG (ref 64–87)
PO2 TEMP ADJ BLDA: 63 MMHG (ref 64–87)
PO2 TEMP ADJ BLDA: 87 MMHG (ref 64–87)
POTASSIUM BLD-SCNC: 4.6 MMOL/L (ref 3.6–5.5)
POTASSIUM BLD-SCNC: 4.7 MMOL/L (ref 3.6–5.5)
POTASSIUM SERPL-SCNC: 4.7 MMOL/L (ref 3.6–5.5)
POTASSIUM SERPL-SCNC: 4.8 MMOL/L (ref 3.6–5.5)
RBC # BLD AUTO: 2.6 M/UL (ref 4.2–5.4)
SAO2 % BLDA: 92 % (ref 93–99)
SAO2 % BLDA: 93 % (ref 93–99)
SAO2 % BLDA: 98 % (ref 93–99)
SIGNIFICANT IND 70042: ABNORMAL
SITE SITE: ABNORMAL
SODIUM BLD-SCNC: 146 MMOL/L (ref 135–145)
SODIUM BLD-SCNC: 148 MMOL/L (ref 135–145)
SODIUM SERPL-SCNC: 145 MMOL/L (ref 135–145)
SOURCE SOURCE: ABNORMAL
SPECIMEN DRAWN FROM PATIENT: ABNORMAL
TIDAL VOLUME IVT: 360 ML
WBC # BLD AUTO: 23.8 K/UL (ref 4.8–10.8)

## 2022-09-17 PROCEDURE — A9270 NON-COVERED ITEM OR SERVICE: HCPCS | Performed by: INTERNAL MEDICINE

## 2022-09-17 PROCEDURE — 700105 HCHG RX REV CODE 258: Performed by: NURSE PRACTITIONER

## 2022-09-17 PROCEDURE — 700101 HCHG RX REV CODE 250: Performed by: THORACIC SURGERY (CARDIOTHORACIC VASCULAR SURGERY)

## 2022-09-17 PROCEDURE — 700111 HCHG RX REV CODE 636 W/ 250 OVERRIDE (IP): Performed by: STUDENT IN AN ORGANIZED HEALTH CARE EDUCATION/TRAINING PROGRAM

## 2022-09-17 PROCEDURE — 82962 GLUCOSE BLOOD TEST: CPT | Mod: 91

## 2022-09-17 PROCEDURE — 700111 HCHG RX REV CODE 636 W/ 250 OVERRIDE (IP): Performed by: NURSE PRACTITIONER

## 2022-09-17 PROCEDURE — 82330 ASSAY OF CALCIUM: CPT

## 2022-09-17 PROCEDURE — 99291 CRITICAL CARE FIRST HOUR: CPT | Performed by: STUDENT IN AN ORGANIZED HEALTH CARE EDUCATION/TRAINING PROGRAM

## 2022-09-17 PROCEDURE — 30243N1 TRANSFUSION OF NONAUTOLOGOUS RED BLOOD CELLS INTO CENTRAL VEIN, PERCUTANEOUS APPROACH: ICD-10-PCS | Performed by: STUDENT IN AN ORGANIZED HEALTH CARE EDUCATION/TRAINING PROGRAM

## 2022-09-17 PROCEDURE — 84295 ASSAY OF SERUM SODIUM: CPT | Mod: 91

## 2022-09-17 PROCEDURE — P9016 RBC LEUKOCYTES REDUCED: HCPCS

## 2022-09-17 PROCEDURE — 85014 HEMATOCRIT: CPT

## 2022-09-17 PROCEDURE — 700101 HCHG RX REV CODE 250: Performed by: NURSE PRACTITIONER

## 2022-09-17 PROCEDURE — 85027 COMPLETE CBC AUTOMATED: CPT

## 2022-09-17 PROCEDURE — 94003 VENT MGMT INPAT SUBQ DAY: CPT

## 2022-09-17 PROCEDURE — 99024 POSTOP FOLLOW-UP VISIT: CPT | Performed by: NURSE PRACTITIONER

## 2022-09-17 PROCEDURE — 94150 VITAL CAPACITY TEST: CPT

## 2022-09-17 PROCEDURE — A9270 NON-COVERED ITEM OR SERVICE: HCPCS | Performed by: STUDENT IN AN ORGANIZED HEALTH CARE EDUCATION/TRAINING PROGRAM

## 2022-09-17 PROCEDURE — 94640 AIRWAY INHALATION TREATMENT: CPT

## 2022-09-17 PROCEDURE — 86923 COMPATIBILITY TEST ELECTRIC: CPT

## 2022-09-17 PROCEDURE — 36430 TRANSFUSION BLD/BLD COMPNT: CPT

## 2022-09-17 PROCEDURE — 99233 SBSQ HOSP IP/OBS HIGH 50: CPT | Performed by: INTERNAL MEDICINE

## 2022-09-17 PROCEDURE — 83735 ASSAY OF MAGNESIUM: CPT

## 2022-09-17 PROCEDURE — 700111 HCHG RX REV CODE 636 W/ 250 OVERRIDE (IP): Performed by: THORACIC SURGERY (CARDIOTHORACIC VASCULAR SURGERY)

## 2022-09-17 PROCEDURE — 700105 HCHG RX REV CODE 258: Performed by: THORACIC SURGERY (CARDIOTHORACIC VASCULAR SURGERY)

## 2022-09-17 PROCEDURE — 94799 UNLISTED PULMONARY SVC/PX: CPT

## 2022-09-17 PROCEDURE — 770022 HCHG ROOM/CARE - ICU (200)

## 2022-09-17 PROCEDURE — 93010 ELECTROCARDIOGRAM REPORT: CPT | Performed by: INTERNAL MEDICINE

## 2022-09-17 PROCEDURE — 93005 ELECTROCARDIOGRAM TRACING: CPT | Performed by: NURSE PRACTITIONER

## 2022-09-17 PROCEDURE — A9270 NON-COVERED ITEM OR SERVICE: HCPCS | Performed by: NURSE PRACTITIONER

## 2022-09-17 PROCEDURE — 84132 ASSAY OF SERUM POTASSIUM: CPT | Mod: 91

## 2022-09-17 PROCEDURE — 700102 HCHG RX REV CODE 250 W/ 637 OVERRIDE(OP): Performed by: STUDENT IN AN ORGANIZED HEALTH CARE EDUCATION/TRAINING PROGRAM

## 2022-09-17 PROCEDURE — 82803 BLOOD GASES ANY COMBINATION: CPT | Mod: 91

## 2022-09-17 PROCEDURE — P9045 ALBUMIN (HUMAN), 5%, 250 ML: HCPCS | Performed by: NURSE PRACTITIONER

## 2022-09-17 PROCEDURE — 700102 HCHG RX REV CODE 250 W/ 637 OVERRIDE(OP): Performed by: NURSE PRACTITIONER

## 2022-09-17 PROCEDURE — 71045 X-RAY EXAM CHEST 1 VIEW: CPT

## 2022-09-17 PROCEDURE — 700111 HCHG RX REV CODE 636 W/ 250 OVERRIDE (IP): Performed by: INTERNAL MEDICINE

## 2022-09-17 PROCEDURE — 80048 BASIC METABOLIC PNL TOTAL CA: CPT

## 2022-09-17 PROCEDURE — 700102 HCHG RX REV CODE 250 W/ 637 OVERRIDE(OP): Performed by: INTERNAL MEDICINE

## 2022-09-17 RX ORDER — ALBUMIN, HUMAN INJ 5% 5 %
25 SOLUTION INTRAVENOUS ONCE
Status: COMPLETED | OUTPATIENT
Start: 2022-09-17 | End: 2022-09-17

## 2022-09-17 RX ADMIN — CEFTRIAXONE SODIUM 2 G: 10 INJECTION, POWDER, FOR SOLUTION INTRAVENOUS at 18:17

## 2022-09-17 RX ADMIN — HYDROMORPHONE HYDROCHLORIDE 0.5 MG: 1 INJECTION, SOLUTION INTRAMUSCULAR; INTRAVENOUS; SUBCUTANEOUS at 03:58

## 2022-09-17 RX ADMIN — NOREPINEPHRINE BITARTRATE 3 MCG/MIN: 1 INJECTION INTRAVENOUS at 22:24

## 2022-09-17 RX ADMIN — MUPIROCIN 1 APPLICATION: 20 OINTMENT TOPICAL at 17:54

## 2022-09-17 RX ADMIN — HYDROMORPHONE HYDROCHLORIDE 0.5 MG: 1 INJECTION, SOLUTION INTRAMUSCULAR; INTRAVENOUS; SUBCUTANEOUS at 11:53

## 2022-09-17 RX ADMIN — ALBUMIN (HUMAN) 25 G: 12.5 SOLUTION INTRAVENOUS at 09:03

## 2022-09-17 RX ADMIN — AMPICILLIN SODIUM 2000 MG: 2 INJECTION, POWDER, FOR SOLUTION INTRAVENOUS at 17:37

## 2022-09-17 RX ADMIN — AMPICILLIN SODIUM 2000 MG: 2 INJECTION, POWDER, FOR SOLUTION INTRAVENOUS at 22:25

## 2022-09-17 RX ADMIN — DEXMEDETOMIDINE 0.4 MCG/KG/HR: 200 INJECTION, SOLUTION INTRAVENOUS at 22:23

## 2022-09-17 RX ADMIN — ACETAMINOPHEN 1000 MG: 500 TABLET ORAL at 17:38

## 2022-09-17 RX ADMIN — AMPICILLIN SODIUM 2000 MG: 2 INJECTION, POWDER, FOR SOLUTION INTRAVENOUS at 05:26

## 2022-09-17 RX ADMIN — MAGNESIUM SULFATE HEPTAHYDRATE 1 G: 1 INJECTION, SOLUTION INTRAVENOUS at 05:39

## 2022-09-17 RX ADMIN — AMPICILLIN SODIUM 2000 MG: 2 INJECTION, POWDER, FOR SOLUTION INTRAVENOUS at 11:07

## 2022-09-17 RX ADMIN — OXYCODONE HYDROCHLORIDE 10 MG: 10 TABLET ORAL at 20:52

## 2022-09-17 RX ADMIN — AMPICILLIN SODIUM 2000 MG: 2 INJECTION, POWDER, FOR SOLUTION INTRAVENOUS at 14:41

## 2022-09-17 RX ADMIN — MUPIROCIN 1 APPLICATION: 20 OINTMENT TOPICAL at 05:39

## 2022-09-17 RX ADMIN — DEXMEDETOMIDINE 0.6 MCG/KG/HR: 200 INJECTION, SOLUTION INTRAVENOUS at 13:37

## 2022-09-17 RX ADMIN — VASOPRESSIN 0.03 UNITS/MIN: 20 INJECTION, SOLUTION INTRAMUSCULAR; SUBCUTANEOUS at 07:53

## 2022-09-17 RX ADMIN — OXYCODONE HYDROCHLORIDE 10 MG: 10 TABLET ORAL at 14:25

## 2022-09-17 RX ADMIN — SENNOSIDES AND DOCUSATE SODIUM 2 TABLET: 50; 8.6 TABLET ORAL at 17:38

## 2022-09-17 RX ADMIN — VASOPRESSIN 0.03 UNITS/MIN: 20 INJECTION, SOLUTION INTRAMUSCULAR; SUBCUTANEOUS at 19:13

## 2022-09-17 RX ADMIN — CEFTRIAXONE SODIUM 2 G: 10 INJECTION, POWDER, FOR SOLUTION INTRAVENOUS at 05:39

## 2022-09-17 RX ADMIN — GUAIFENESIN 1200 MG: 600 TABLET, EXTENDED RELEASE ORAL at 17:38

## 2022-09-17 RX ADMIN — HYDROMORPHONE HYDROCHLORIDE 0.5 MG: 1 INJECTION, SOLUTION INTRAMUSCULAR; INTRAVENOUS; SUBCUTANEOUS at 09:32

## 2022-09-17 RX ADMIN — HYDROMORPHONE HYDROCHLORIDE 0.5 MG: 1 INJECTION, SOLUTION INTRAMUSCULAR; INTRAVENOUS; SUBCUTANEOUS at 03:08

## 2022-09-17 RX ADMIN — AMPICILLIN SODIUM 2000 MG: 2 INJECTION, POWDER, FOR SOLUTION INTRAVENOUS at 03:27

## 2022-09-17 RX ADMIN — OXYCODONE HYDROCHLORIDE 10 MG: 10 TABLET ORAL at 17:38

## 2022-09-17 ASSESSMENT — PULMONARY FUNCTION TESTS: FVC: 0.5

## 2022-09-17 ASSESSMENT — FIBROSIS 4 INDEX: FIB4 SCORE: 0.54

## 2022-09-17 NOTE — PROGRESS NOTES
"Critical Care Progress Note    Date of admission  9/9/2022    Chief Complaint  51 y.o. female admitted 9/9/2022 with endocarditis with septic pulmonary emboli status post tricuspid aortic repair and closure of patent heck ovale.     Hospital Course  \"This lady has a history of chronic back pain, primary hypertension and urolithiasis.  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  She has developed septic pulmonary emboli and has been treated with ampicillin and ceftriaxone per infectious diseases.  Today Dr. Bradley took him to the operating theater and performed radical tricuspid valve repair, patent foramen ovale closure and aortic valve repair.  She is now in the cardiac surgery unit.\"  9/17-patient was unable to be extubated overnight due to decreased mental status, still on pressors.     Interval Problem Update  Reviewed last 24 hour events:  -Unable to extubate at 6-hour madisyn with tachypnea, low nif  -Vaso and levo both had to be started overnight, received 1 L fluid,  receiving 1 unit of packed red blood cells for hemoglobin 7.8  -Still with chest pain received pain meds x3  -On Precedex  -420 urine output  -Heart rate remaining 50s to 60s  -Chest tubex2 86 out  -Patient with continued tachypnea when asleep respiratory rate 25-30, when awake increases to the 40s.   -Volumes in the mid 300s  -High Rsbi  Patient with Septic emboli, and bilateral pleural effusions that have been worsening with septic emboli ?  If empyema.   -We will discuss with CVT with plan, could consider repeat CT to evaluate lung parenchyma, also will evaluate if possible abscess or complicated pleural effusions.   -If simple effusion could consider Thora on largest, and repeat Thora tomorrow if no pneumo  -Complicated effusion may need chest tube  -Or understanding patient has abnormal lung " parenchyma and likely will not obtain perfect parameters on SBT due to septic emboli, could consider trial of extubation, extubating to high flow, understanding high risk for reintubation however patient may improve significantly when not intubated as some of this seems very connected to anxiety from intubation.  Vs let pt rest and recover and re trial tomorrow am.     Review of Systems  ROS patient anxious, does note some chest pain improved with pain medicine, feels short of breath with ventilator, very anxious when first wakes up.  Denies dizziness.  Fever chills.     Vital Signs for last 24 hours   Temp:  [34.9 °C (94.8 °F)-36 °C (96.8 °F)] 36 °C (96.8 °F)  Pulse:  [] 59  Resp:  [7-43] 26  BP: ()/(50-92) 90/53  SpO2:  [93 %-100 %] 96 %    Hemodynamic parameters for last 24 hours  CVP:  [6 MM HG-15 MM HG] 9 MM HG    Respiratory Information for the last 24 hours  Vent Mode: Spont  Rate (breaths/min): 20  Vt Target (mL): 360  PEEP/CPAP: 8  P Support: 5  MAP: 11  Length of Weaning Trial (Hours): 1.5  Control VTE (exp VT): 380    Physical Exam   Physical Exam  Vitals and nursing note reviewed.   Constitutional:       General: She is not in acute distress.     Appearance: She is normal weight. She is not ill-appearing.      Interventions: She is intubated.      Comments: Anxious and tachypneic when awake, will improves when asleep   HENT:      Head: Normocephalic and atraumatic.      Right Ear: External ear normal.      Left Ear: External ear normal.      Nose: Nose normal.      Mouth/Throat:      Mouth: Mucous membranes are moist.   Eyes:      Extraocular Movements: Extraocular movements intact.      Conjunctiva/sclera: Conjunctivae normal.      Pupils: Pupils are equal, round, and reactive to light.   Cardiovascular:      Rate and Rhythm: Normal rate and regular rhythm.      Pulses: Normal pulses.      Heart sounds: Normal heart sounds.   Pulmonary:      Effort: Respiratory distress present. She is  intubated.      Breath sounds: Rhonchi present. No wheezing.      Comments: Tachypneic 25-30 when sleeping, up to 40s every time she wakes up with lower volumes extremely anxious with ET tube despite Precedex  Abdominal:      General: Abdomen is flat. Bowel sounds are normal. There is no distension.      Palpations: Abdomen is soft.   Musculoskeletal:         General: Normal range of motion.      Cervical back: Normal range of motion and neck supple.   Skin:     General: Skin is warm and dry.      Capillary Refill: Capillary refill takes less than 2 seconds.      Comments: Prevena to self suction.   Neurological:      General: No focal deficit present.      Mental Status: She is alert and oriented to person, place, and time. Mental status is at baseline.      Sensory: No sensory deficit.      Motor: No weakness.   Psychiatric:         Mood and Affect: Mood normal.         Behavior: Behavior normal.         Thought Content: Thought content normal.       Medications  Current Facility-Administered Medications   Medication Dose Route Frequency Provider Last Rate Last Admin    insulin regular (HumuLIN R,NovoLIN R) injection  2-9 Units Subcutaneous 4X/DAY ACHS Truong Bradley D.O.        And    dextrose 10 % BOLUS 25 g  25 g Intravenous Q15 MIN PRN MIGUEL A LawO.        insulin regular (HumuLIN R,NovoLIN R) injection  0-14 Units Intravenous Once Truong Bradley D.O.        insulin lispro (AdmeLOG,HumaLOG) injection  0-14 Units Subcutaneous TID AC Truong Bradley D.O.        insulin regular (HumuLIN R) 100 Units in  mL Infusion  0-29 Units/hr Intravenous Continuous Truong Bradley D.O.   Paused at 09/17/22 0300    MD Alert...Pharmacy to initiate transition from insulin infusion to subcutaneous insulin for cardiothoracic surgery 1 Each  1 Each Other Continuous Truong Bradley D.O.        norepinephrine (Levophed) 8 mg in 250 mL NS infusion (premix)  0-30 mcg/min Intravenous  Continuous Truong S Knackstedt, D.O. 3.8 mL/hr at 09/17/22 1255 2 mcg/min at 09/17/22 1255    vasopressin (Vasostrict) 20 Units in  mL Infusion  0.03 Units/min Intravenous Continuous Reasnor S Knackstedt, D.O. 9 mL/hr at 09/17/22 0753 0.03 Units/min at 09/17/22 0753    Respiratory Therapy Consult   Nebulization Continuous RT Joshua Goodson, A.P.R.N.        NS infusion   Intravenous Continuous Joshua Goodson, A.P.R.N. 10 mL/hr at 09/17/22 0705 Rate Verify at 09/17/22 0705    electrolyte-A (PLASMALYTE-A) infusion   Intravenous PRN Joshua Goodson, A.P.R.N. 999 mL/hr at 09/16/22 1945 New Bag at 09/16/22 1945    calcium CHLORIDE 1,000 mg in  mL IVPB  1,000 mg Intravenous Once PRN Joshua Goodson, A.P.R.N.        magnesium sulfate in D5W IVPB premix 1 g  1 g Intravenous DAILY Joshua Goodson, A.P.R.N.   Stopped at 09/17/22 0639    K+ Scale: Goal of 4.5  1 Each Intravenous Q6HRS Joshua Goodson, A.P.R.N.        aspirin EC (ECOTRIN) tablet 81 mg  81 mg Oral DAILY Joshua Goodson, A.P.R.N.        Pharmacy Consult Request ...Pain Management Review 1 Each  1 Each Other PHARMACY TO DOSE Joshua Goodson, A.P.R.N.        acetaminophen (TYLENOL) tablet 1,000 mg  1,000 mg Oral Q6HRS Joshua Goodson, A.P.R.N.        Followed by    [START ON 9/21/2022] acetaminophen (TYLENOL) tablet 1,000 mg  1,000 mg Oral Q6HRS PRN Joshua Goodson, A.P.R.N.        traMADol (Ultram) 50 MG tablet 50 mg  50 mg Oral Q4HRS PRN Joshua Goodson, A.P.R.N.        midazolam (Versed) injection 2 mg  2 mg Intravenous Q HOUR PRN Joshua Goodson, A.P.R.N.        dexmedetomidine (PRECEDEX) 400 mcg/100mL NS premix infusion  0-1.5 mcg/kg/hr Intravenous Continuous MARA Samson.P.R.N. 16.1 mL/hr at 09/17/22 1100 1 mcg/kg/hr at 09/17/22 1100    sodium bicarbonate 8.4 % injection 50 mEq  50 mEq Intravenous Q HOUR PRN MARA Samson.P.R.N.        ondansetron (ZOFRAN) syringe/vial injection 8 mg  8 mg Intravenous Q6HRS PRN MARA Samson.P.R.N.   8 mg at 09/16/22 1959     Or    prochlorperazine (COMPAZINE) injection 10 mg  10 mg Intravenous Q6HRS PRN Joshua Goodson, A.P.R.N.   10 mg at 09/16/22 2222    Or    promethazine (PHENERGAN) suppository 25 mg  25 mg Rectal Q6HRS PRN Joshua Goodson, A.P.R.N.        acetaminophen (Tylenol) tablet 650 mg  650 mg Oral Q4HRS PRN Joshua Goodson, A.P.R.N.        Or    acetaminophen (TYLENOL) suppository 650 mg  650 mg Rectal Q4HRS PRN Joshua Goodson, A.P.R.N.        senna-docusate (PERICOLACE or SENOKOT S) 8.6-50 MG per tablet 2 Tablet  2 Tablet Oral BID Joshua Goodson, A.P.R.N.        And    polyethylene glycol/lytes (MIRALAX) PACKET 1 Packet  1 Packet Oral DAILY Joshua Goodson, A.P.R.N.        And    [START ON 9/18/2022] magnesium hydroxide (MILK OF MAGNESIA) suspension 30 mL  30 mL Oral DAILY Joshua Goodson, A.P.R.N.        And    bisacodyl (DULCOLAX) suppository 10 mg  10 mg Rectal QDAY PRN Joshua Goodson, A.P.R.N.        omeprazole (PRILOSEC) capsule 20 mg  20 mg Oral DAILY Joshua Goodson, A.P.R.N.        mag hydrox-al hydrox-simeth (MAALOX PLUS ES or MYLANTA DS) suspension 30 mL  30 mL Oral Q4HRS PRN Joshua Goodson, A.P.R.N.        mupirocin (BACTROBAN) 2 % ointment 1 Application  1 Application Topical BID Joshua Goodson, A.P.R.N.   1 Application at 09/17/22 0539    oxyCODONE immediate-release (ROXICODONE) tablet 5 mg  5 mg Oral Q3HRS PRN Kassandra Rosario M.D.        Or    oxyCODONE immediate release (ROXICODONE) tablet 10 mg  10 mg Oral Q3HRS PRN Kassandra Rosario M.D.   10 mg at 09/15/22 0550    Or    HYDROmorphone (Dilaudid) injection 0.5 mg  0.5 mg Intravenous Q3HRS PRN Kassandra Rosario M.D.   0.5 mg at 09/17/22 1153    ampicillin (Omnipen) 2,000 mg in  mL IVPB  2,000 mg Intravenous Q4HRS JOSE Samson   Stopped at 09/17/22 1137    cefTRIAXone (Rocephin) syringe 2 g  2 g Intravenous Q12HRS Maria Luisa Wyatt M.D.   2 g at 09/17/22 0539    guaiFENesin ER (MUCINEX) tablet 1,200 mg  1,200 mg Oral Q12HRS Antonio Geurra  AYAN Goodman   1,200 mg at 09/15/22 1746       Fluids    Intake/Output Summary (Last 24 hours) at 9/17/2022 1322  Last data filed at 9/17/2022 1255  Gross per 24 hour   Intake 7919.78 ml   Output 2423 ml   Net 5496.78 ml       Laboratory  Recent Labs     09/16/22  1701 09/16/22  1813 09/17/22  0016 09/17/22  0839   ISTATAPH 7.312* 7.319* 7.440 7.401   ISTATAPCO2 47.2* 48.7* 36.5 36.4   ISTATAPO2 83 81 95* 66   ISTATATCO2 25 27 26 24   PUDPPEI1QZE 95 95 98 93   ISTATARTHCO3 23.9 25.0 24.8 22.6   ISTATARTBE -2 -1 1 -2   ISTATTEMP 35.0 C 35.1 C 35.5 C 36.1 C   ISTATFIO2 30  --  30 30   ISTATSPEC Arterial Arterial Arterial Arterial   ISTATAPHTC 7.341* 7.346* 7.462 7.414   YASEZXFN8HV 73 72 87 62*         Recent Labs     09/16/22  0314 09/16/22  1445 09/16/22  1707 09/17/22  0117 09/17/22  1130   SODIUM 136  --   --  145  --    POTASSIUM 3.9 4.5 4.3 4.8 4.7   CHLORIDE 101  --   --  111  --    CO2 25  --   --  23  --    BUN 3*  --   --  10  --    CREATININE 0.35*  --   --  0.67  --    MAGNESIUM  --  3.2*  --  3.0*  --    CALCIUM 8.7  --   --  8.2*  --      Recent Labs     09/16/22  0314 09/17/22  0117   ALTSGPT 16  --    ASTSGOT 15  --    ALKPHOSPHAT 397*  --    TBILIRUBIN 0.3  --    GLUCOSE 94 136*     Recent Labs     09/16/22 0314 09/17/22 0117   WBC 11.9* 23.8*   NEUTSPOLYS 80.80*  --    LYMPHOCYTES 13.90*  --    MONOCYTES 4.30  --    EOSINOPHILS 0.00  --    BASOPHILS 0.30  --    ASTSGOT 15  --    ALTSGPT 16  --    ALKPHOSPHAT 397*  --    TBILIRUBIN 0.3  --      Recent Labs     09/16/22  0314 09/16/22  1445 09/17/22  0117   RBC 3.34*  --  2.60*   HEMOGLOBIN 9.4*  --  7.5*   HEMATOCRIT 29.2*  --  23.0*   PLATELETCT 604* 315 345   PROTHROMBTM 15.9* 20.9*  --    APTT 30.4 83.9*  --    INR 1.29* 1.85*  --        Imaging  X-Ray:  I have personally reviewed the images and compared with prior images.  EKG:  I have personally reviewed the images and compared with prior images.  Echo:   Reviewed    Assessment/Plan  *  Infective endocarditis of tricuspid valve- (present on admission)  Assessment & Plan  Associated septic pulmonary emboli, status post surgery  Due to Enterococcus faecalis  Continue with ampicillin and ceftriaxone per ID  Patient has been having worsening back pain, ID recommending full spinal MRI when able, currently with pacer wires in place    S/P aortic valve repair  Assessment & Plan  On 9/16 by Dr. Bradley    S/P patent foramen ovale closure  Assessment & Plan  On 9/16 by Dr. Bradley    S/P tricuspid valve repair  Assessment & Plan  On 9/16 by Dr. Bradley    Encounter for weaning from ventilator (HCC)  Assessment & Plan  Intubated 9/16  ABCDEF bundle  SAT/SBT as appropriate  I am weaning the ventilator based upon hemodynamics and cardiopulmonary status  Not able to be extubated at 6-hour madisyn due to tachypnea, borderline nif and volumes  Patient with significant pulmonary dysfunction secondary to septic emboli, significant inflammation and consolidations throughout lung fields on 9/8 CT chest   And patient has had continued endocarditis with likely continued emboli since then  Patient also with bilateral pleural effusions-discussed with CVT around 1 L was drained yesterday during surgery, murky color fluid was not sent for evaluation, unclear if due to fluid overload, versus parapneumonic effusion due to septic emboli versus empyema(did not sound like it was pus when drained yesterday discussion with [facility] APRN)  Very unlikely patient will need perfect parameters as underlying lung parenchyma is extremely inflamed  When sleeping patient's respiratory rate between 25-30, with volumes in the 300s.  As soon as patient wakes up respiratory rate goes up to the high 30s to 40s and volumes decrease patient appears extremely anxious every time she wakes up despite Precedex, also was treated for pain and continued tachypnea when awake.     Discussion with [facility], could consider CT scanning to evaluate patient's  underlying lung parenchyma, and possible chest tube versus Thora to hopefully increase patient's lung capacity, versus trial of extubation as patient's parameters do meet criteria though very borderline when she is sleeping, and could be due to anxiety from intubation especially as worsens when awakes.  Did note patient would be a high risk extubation with a possibility of need for reintubation, would extubate to high flow nasal cannula to help support patient postextubation.     Also discussed ID had recommended MRI of full spine to evaluate for septic metastases to the spine as patient has been having pain however patient had not been able to do this due to claustrophobia.  Discussed option of waiting for another day intubated 1 pacing wire removed having MRI done and ability to sedate appropriately while intubated.     After full discussion with CT surgery, decision for trial of extubation at this time, extubated to high flow  Respiratory rate in the high 20s, no signs of respiratory distress or increased work of breathing, satting 100% will titrate down high flow as tolerated should keep liters per minute elevated for at least a couple hours.      TAZ (acute kidney injury) (HCC)- (present on admission)  Assessment & Plan  Creatinine doubled after his procedure, CVP low, and low urine output  Was given albumin and fluid boluses per CVT    Anemia- (present on admission)  Assessment & Plan  Transfusing for hemoglobins less than 8 per [facility] protocol       VTE:  Not Indicated  Ulcer: PPI  Lines: Central Line  Ongoing indication addressed, Arterial Line  Ongoing indication addressed, and [place] Catheter  Ongoing indication addressed    I have performed a physical exam and reviewed and updated ROS and Plan today (9/17/2022). In review of yesterday's note (9/16/2022), there are no changes except as documented above.     Discussed patient condition and risk of morbidity and/or mortality with Family, RN, RT, Charge nurse /  hot rounds, Patient, and [facility]  The patient remains critically ill.  Critical care time = 55  minutes in directly providing and coordinating critical care and extensive data review.  No time overlap and excludes procedures.

## 2022-09-17 NOTE — PROGRESS NOTES
"Infectious Disease Progress Note    Author: Aaron Yeh M.D. Date & Time of service: 2022  11:27 AM    Chief Complaint:  TV endocarditis    Interval History:  51 y.o. female admitted 2022 as transfer from [facility] for KEO. Blood cultures +Efaecalis. TTE with vegetation TV and acute anemia Hgb  down to 6.4 (baseline 13-14)   AF WBC 12.9 Patient alert today. Additional history obtained.  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  Normally active and regularly able to 50+#.  States she felt well until end of May after a 3 hour car ride had the sudden onset of back pain-she saw her PCP, spine and was referred to pain management.  She did not want to take all the narcotics she was given so had been self treating with NSAIDS.  The pain in her back was initially upper back but would change-sometimes low back and varied from side to side.  She was concerned that \"something else\" was going on. MRI done 2022 showed possible myelofibrosis.  On , started feeling feverish, very fatigued, and noted pallor \"I looked sick... like a zombie\"    T-max 100.6, white count 10.4, tolerating antimicrobials.  Cardiology plan as below  9/15 T-max 100.3, tolerating antimicrobials.  KEO findings with change in management plans as below   patient is afebrile now, cardiac surgery yesterday as below, white count bumped up today to 23.8.    Labs Reviewed, Medications Reviewed, and Radiology Reviewed.    Review of Systems:  Review of Systems   Unable to perform ROS: Other     Hemodynamics:  Temp (24hrs), Av.6 °C (96.1 °F), Min:34.9 °C (94.8 °F), Max:36 °C (96.8 °F)  Temperature: 36 °C (96.8 °F), Monitored Temp: 36.2 °C (97.2 °F)  Pulse  Av.4  Min: 55  Max: 144   Blood Pressure: (!) 96/52, Arterial BP: (!) 89/48  CVP (mm Hg): (!) 10 MM HG    Physical Exam:  Physical Exam  Vitals and nursing note reviewed.   Constitutional:       General: She is not in acute distress.     Appearance: She is " not ill-appearing, toxic-appearing or diaphoretic.      Comments: Currently resting, post cardiac surgery   HENT:      Nose: No rhinorrhea.      Comments: nasal cannula oxygen  Eyes:      General: No scleral icterus.     Extraocular Movements: Extraocular movements intact.      Pupils: Pupils are equal, round, and reactive to light.   Cardiovascular:      Rate and Rhythm: Tachycardia present.      Heart sounds: No murmur heard.  Abdominal:      General: There is no distension.      Palpations: Abdomen is soft.      Tenderness: There is no abdominal tenderness.   Skin:     Coloration: Skin is pale. Skin is not jaundiced.      Comments: No osler or Janeway lesions   Neurological:      General: No focal deficit present.      Mental Status: She is alert and oriented to person, place, and time.       Meds:    Current Facility-Administered Medications:     Discontinue sliding scale insulin orders and Accuchecks after post-op day #2 if 2 consecutive blood sugars are less than 150 mg/dL **AND** insulin regular **AND** POC blood glucose manual result **AND** NOTIFY MD and PharmD **AND** Administer 20 grams of glucose (approximately 8 ounces of fruit juice) every 15 minutes PRN FSBG less than 70 mg/dL **AND** dextrose bolus    insulin regular    insulin lispro    insulin regular 100 units in 100 mL 0.9% NaCl infusion    MD Alert...Pharmacy to initiate transition from insulin infusion to subcutaneous insulin for cardiothoracic surgery    norepinephrine (Levophed) infusion    vasopressin (PITRESSIN) infusion    Respiratory Therapy Consult    NS    electrolyte-A    Calcium CHLORIDE IVPB    magnesium sulfate    Potassium Serum (K) **AND** K+ Scale: Goal of 4.5    aspirin EC    Pharmacy Consult Request    acetaminophen **FOLLOWED BY** [START ON 9/21/2022] acetaminophen    traMADol    midazolam    dexmedetomidine (Precedex) infusion    sodium bicarbonate    ondansetron **OR** prochlorperazine **OR** promethazine    acetaminophen  **OR** acetaminophen    senna-docusate **AND** polyethylene glycol/lytes **AND** [START ON 9/18/2022] magnesium hydroxide **AND** bisacodyl    omeprazole    mag hydrox-al hydrox-simeth    mupirocin    oxyCODONE immediate-release **OR** oxyCODONE immediate-release **OR** HYDROmorphone    ampicillin    cefTRIAXone (ROCEPHIN) IV    guaiFENesin ER    Labs:  Recent Labs     09/16/22 0314 09/16/22 1445 09/17/22  0117   WBC 11.9*  --  23.8*   RBC 3.34*  --  2.60*   HEMOGLOBIN 9.4*  --  7.5*   HEMATOCRIT 29.2*  --  23.0*   MCV 87.4  --  88.5   MCH 28.1  --  28.8   RDW 50.3*  --  51.5*   PLATELETCT 604* 315 345   MPV 9.6  --  9.9   NEUTSPOLYS 80.80*  --   --    LYMPHOCYTES 13.90*  --   --    MONOCYTES 4.30  --   --    EOSINOPHILS 0.00  --   --    BASOPHILS 0.30  --   --        Recent Labs     09/16/22 0314 09/16/22 1445 09/16/22  1707 09/17/22  0117   SODIUM 136  --   --  145   POTASSIUM 3.9 4.5 4.3 4.8   CHLORIDE 101  --   --  111   CO2 25  --   --  23   GLUCOSE 94  --   --  136*   BUN 3*  --   --  10       Recent Labs     09/16/22 0314 09/17/22  0117   ALBUMIN 2.7*  --    TBILIRUBIN 0.3  --    ALKPHOSPHAT 397*  --    TOTPROTEIN 7.1  --    ALTSGPT 16  --    ASTSGOT 15  --    CREATININE 0.35* 0.67         Imaging:  CT-CHEST (THORAX) WITH    Result Date: 9/8/2022   CT of the chest, abdomen and pelvis HISTORY/REASON FOR EXAM:  concern for malignancy TECHNIQUE/EXAM DESCRIPTION: CT scan of the chest, abdomen and pelvis with contrast.  Both automated exposure control and Automated adjustment of tube current based on patient size are utilized.  9/8/2022 8:33 AM. Total DLP: 470.34 mGy*cm COMPARISON: None. FINDINGS: Chest: There are multifocal areas of consolidation involving predominantly the upper lobes and to a lesser degree the lower lobes.  One of them is cavitary in the left lower lobe measuring 1.3 cm.  No pleural effusion or pneumothorax. The heart is normal in size.  There is trace pericardial effusion. The great  vessel origins are unremarkable.  The aorta is normal in caliber. No pathologically enlarged adenopathy. There are no concerning osseous lesions.  No fractures.  Bilateral breast implants. Abdomen pelvis: Solid organs: The liver, pancreas and adrenal glands are unremarkable.  There is a cyst in the anterior spleen. The gallbladder is present. Kidneys and Bladder: There is no nephrolithiasis or hydronephrosis.  Mild focal scarring left upper pole kidney. No enhancing renal masses. The kidneys enhance symmetrically. No ureteral or bladder stones are seen. The bladder is grossly unremarkable. Bowel and Appendix: No dilated bowel. No perienteric inflammatory changes. No extraluminal air or free fluid. The appendix is visualized and appears normal. No pelvic masses. Lymphatics:  No abdominal, pelvic, or retroperitoneal lymphadenopathy. Other: No concerning osseous lesions. No fractures.     1.  Multifocal consolidation concerning for pneumonia, with small cavitary lesion in the left lower lobe. 2.  No acute abnormality or evidence of malignancy to the abdomen or pelvis. [facility] 9/8/2022 9:00 AM DLP Reporting Thresholds for Incorrect/Repeated Exams - DLP in mGy*cm Head/Neck:  0-year-old 3840, 1-year-old 5880, 5-year-old 8770, 10-year-old 52674 and adult 46716 Head:  0-year-old 4540, 1-year-old 7460, 5-year-old 53488, 10-year-old 03544 and adult 93401 Neck:  0-year-old 2940, 1-year-old 4160, 5-year-old 4550, 10-year-old 6320 and adult 8470 Chest:  0-year-old 550, 1-year-old 830, 5-year-old 1200, 10-year-old 3840 and adult 3570 Abd/pelvis:  0-year-old 440, 1-year-old 720, 5-year-old 1080, 10-year-old 3330 and adult 3330 Trunk(C/A/P):  0-year-old 490, 1-year-old 770, 5-year-old 1140, 10-year-old 3570 and adult 3330    CT-HEAD W/O    Result Date: 9/7/2022   CT head without contrast HISTORY/REASON FOR EXAM:  Headache, new or worsening (Age >= 50y). TECHNIQUE/EXAM DESCRIPTION: CT scan of the brain without contrast.  Both automated exposure control and Automated adjustment of tube current based on patient size are utilized. 9/7/2022 4:19 PM. CT scan of the brain was carried out without contrast in the normal manner. Total DLP: 766.10 mGy*cm COMPARISON: None. FINDINGS: The brain parenchyma is unremarkable. The gray-white matter differentiation is well preserved throughout. There is no evidence of mass, mass effect, hemorrhage, or extraaxial fluid collection.  There is no midline shift. The orbits and calvarium are intact. No evidence of fracture. The visualized paranasal sinuses are clear. The mastoid air cells are well pneumatized.     No acute intracranial abnormality. [facility] 9/7/2022 4:23 PM DLP Reporting Thresholds for Incorrect/Repeated Exams - DLP in mGy*cm Head/Neck:  0-year-old 3840, 1-year-old 5880, 5-year-old 8770, 10-year-old 64392 and adult 24446 Head:  0-year-old 4540, 1-year-old 7460, 5-year-old 93723, 10-year-old 57183 and adult 24035 Neck:  0-year-old 2940, 1-year-old 4160, 5-year-old 4550, 10-year-old 6320 and adult 8470 Chest:  0-year-old 550, 1-year-old 830, 5-year-old 1200, 10-year-old 3840 and adult 3570 Abd/pelvis:  0-year-old 440, 1-year-old 720, 5-year-old 1080, 10-year-old 3330 and adult 3330 Trunk(C/A/P):  0-year-old 490, 1-year-old 770, 5-year-old 1140, 10-year-old 3570 and adult 3330    DX-CHEST-PORTABLE (1 VIEW)    Result Date: 9/7/2022  HISTORY/REASON FOR EXAM:  Cough. TECHNIQUE/EXAM DESCRIPTION AND NUMBER OF VIEWS: Portable chest (one view), 9/7/2022 4:22 PM. COMPARISON: None. FINDINGS: There is mild multifocal groundglass consolidation left upper lobe and right upper lobe, to a lesser degree left lower lobe.  No pleural effusion or pneumothorax. The cardiomediastinal silhouette is normal in size. The bones are intact.     Multifocal bilateral consolidation concerning for pneumonia. [facility] 9/7/2022 8:06 PM    [id number]    Result Date: 9/10/2022  9/10/2022 1:54 PM  HISTORY/REASON FOR EXAM:  Bacterial endocarditis. TECHNIQUE/EXAM DESCRIPTION AND NUMBER OF VIEWS:  1 view(s) of the mandible. COMPARISON:  None. FINDINGS: No evidence of fracture. No lytic or sclerotic lesion. There are multiple dental caries.     Multiple dental caries.    US-ABDOMEN COMPLETE SURVEY    Result Date: 9/7/2022  HISTORY/REASON FOR EXAM: Abnormal Labs; elevated liver function tests, with elevated alk phos. TECHNIQUE/EXAM DESCRIPTION: Ultrasound abdomen complete.9/7/2022 7:57 PM COMPARISON: None FINDINGS: Pancreas: The tail is suboptimally visualized due to overlying bowel gas. Aorta and IVC: Normal in caliber. Liver: Normal.  The Liver Measures 19.6 cm. The portal vein is patent with flow in the proper direction into the liver.  This is normal. Gallbladder: Normal. Common bile duct: 3 mm, which is normal. Spleen: Normal in size and echogenicity. Kidneys: The kidneys are normal in size and echogenicity. There is No ascites.     Mild hepatomegaly.  Otherwise unremarkable exam. [facility] 9/7/2022 8:27 PM    CT ABDOMEN-PELVIS WITH IV CONTRAST    Result Date: 9/8/2022   CT of the chest, abdomen and pelvis HISTORY/REASON FOR EXAM:  concern for malignancy TECHNIQUE/EXAM DESCRIPTION: CT scan of the chest, abdomen and pelvis with contrast.  Both automated exposure control and Automated adjustment of tube current based on patient size are utilized.  9/8/2022 8:33 AM. Total DLP: 470.34 mGy*cm COMPARISON: None. FINDINGS: Chest: There are multifocal areas of consolidation involving predominantly the upper lobes and to a lesser degree the lower lobes.  One of them is cavitary in the left lower lobe measuring 1.3 cm.  No pleural effusion or pneumothorax. The heart is normal in size.  There is trace pericardial effusion. The great vessel origins are unremarkable.  The aorta is normal in caliber. No pathologically enlarged adenopathy. There are no concerning osseous lesions.  No fractures.  Bilateral breast  implants. Abdomen pelvis: Solid organs: The liver, pancreas and adrenal glands are unremarkable.  There is a cyst in the anterior spleen. The gallbladder is present. Kidneys and Bladder: There is no nephrolithiasis or hydronephrosis.  Mild focal scarring left upper pole kidney. No enhancing renal masses. The kidneys enhance symmetrically. No ureteral or bladder stones are seen. The bladder is grossly unremarkable. Bowel and Appendix: No dilated bowel. No perienteric inflammatory changes. No extraluminal air or free fluid. The appendix is visualized and appears normal. No pelvic masses. Lymphatics:  No abdominal, pelvic, or retroperitoneal lymphadenopathy. Other: No concerning osseous lesions. No fractures.     1.  Multifocal consolidation concerning for pneumonia, with small cavitary lesion in the left lower lobe. 2.  No acute abnormality or evidence of malignancy to the abdomen or pelvis. [facility] 9/8/2022 9:00 AM DLP Reporting Thresholds for Incorrect/Repeated Exams - DLP in mGy*cm Head/Neck:  0-year-old 3840, 1-year-old 5880, 5-year-old 8770, 10-year-old 09760 and adult 09608 Head:  0-year-old 4540, 1-year-old 7460, 5-year-old 81636, 10-year-old 92684 and adult 65827 Neck:  0-year-old 2940, 1-year-old 4160, 5-year-old 4550, 10-year-old 6320 and adult 8470 Chest:  0-year-old 550, 1-year-old 830, 5-year-old 1200, 10-year-old 3840 and adult 3570 Abd/pelvis:  0-year-old 440, 1-year-old 720, 5-year-old 1080, 10-year-old 3330 and adult 3330 Trunk(C/A/P):  0-year-old 490, 1-year-old 770, 5-year-old 1140, 10-year-old 3570 and adult 3330    EC-ECHOCARDIOGRAM COMPLETE W/O CONT    Result Date: 9/8/2022  Transthoracic Echo Report Echocardiography Laboratory CONCLUSIONS No prior study is available for comparison. Technically difficult study - adequate information is obtained. Probable vegetation seen on the tricuspid valve in subcostal view. Normal left ventricular systolic function. The left ventricular ejection  fraction is visually estimated to be 60%. Normal diastolic function. The right ventricle is normal in size and systolic function. Mild aortic insufficiency. These findings were communicated to the medical service. RUDDY REBOLLEDO Exam Date:         2022                    14:12 Exam Location:     Echo Lab Priority:          Routine Ordering Physician:        CATHI CAMARENA Referring Physician: Sonographer:               Sissy Ontiveros Age:    51     Gender:    F MRN:    [id number] :    1970 BSA:    1.65   Ht (in):    64.6   Wt (lb):    132 Exam Type:     Complete Indications:     Bacteremia ICD Codes: CPT Codes:       41859 BP:   113    /   77     HR:   117 Technical Quality:       Technically difficult study -                          adequate information is obtained MEASUREMENTS  (Male / Female) Normal Values 2D ECHO LV Diastolic Diameter PLAX        4.2 cm                4.2 - 5.9 / 3.9 - 5.3 cm LV Systolic Diameter PLAX         3 cm                  2.1 - 4.0 cm IVS Diastolic Thickness           0.67 cm               LVPW Diastolic Thickness          0.68 cm               LVOT Diameter                     2 cm                  Aortic Root Diameter              2.6 cm                Estimated LV Ejection Fraction    60 %                  IVC Diameter                      1.8 cm                DOPPLER AV Peak Velocity                  2.3 m/s               AV Peak Gradient                  21.6 mmHg             AV Mean Gradient                  14.3 mmHg             AI Pressure Half Time             500 ms                LVOT Peak Velocity                1.4 m/s               AV Area Cont Eq vti               1.5 cm2               MV Velocity Time Integral         13.7 cm               Mitral E Point Velocity           0.61 m/s              Mitral E to A Ratio               0.65                  MV Pressure Half Time             45.5 ms               MV Area PHT                        4.8 cm2               MV Deceleration Time              157 ms                TR Peak Velocity                  186 cm/s              Right Atrial Pressure             8 mmHg                Right Ventricular Systolic Press  21.8 mmHg             PV Peak Velocity                  1 m/s                 PV Peak Gradient                  4.3 mmHg              RVOT Peak Velocity                0.65 m/s              * Indicates values subject to auto-interpretation LV EF:  60    % FINDINGS Left Ventricle The left ventricular ejection fraction is visually estimated to be 60%. Normal left ventricular systolic function. Normal left ventricular wall thickness. Normal left ventricular chamber size. Abnormal regional wall motion. Abnormal septal motion. Normal diastolic function. Right Ventricle The right ventricle is normal in size and systolic function. Right Atrium The right atrium is normal in size. Normal inferior vena cava size without inspiratory collapse. Left Atrium The left atrium is normal in size. Left atrial volume index is 10 mL/sq m. Mitral Valve Structurally normal mitral valve without significant stenosis. Trace mitral regurgitation. Aortic Valve Mild aortic insufficiency. Pressure half time is 500 msec. Aortic valve sclerosis without significant stenosis. Tricuspid aortic valve. Tricuspid Valve Structurally normal tricuspid valve without significant stenosis. Mild tricuspid regurgitation. Right atrial pressure is estimated to be 8 mmHg. Estimated right ventricular systolic pressure is 20 mmHg. Probable vegetation seen on the tricuspid valve in subcostal view. Pulmonic Valve Structurally normal pulmonic valve without significant stenosis or regurgitation. Pericardium Plural effusion present. Aorta Normal aortic root for body surface area. The ascending aorta diameter is 2.9 cm. Mark Sykes MD (Electronically Signed) Final Date:     08 September 2022                 17:56    MRI  7/31/2022  FINDINGS:  Spinal column:  There is decreased T1/T2 signal noted within the vertebral marrow homogeneously throughout the spinal column, raising the question of infiltrative disease, such as myelofibrosis.     No destructive osseous process is appreciated.     Vertebral body morphology is normal.     No fracture is identified..     Spinal cord:     The conus medullaris is somewhat low, noted the level of L2-L3.     I see no evidence of syrinx.     Distal spinal cord appears unremarkable.     Cauda equina appear unremarkable.     Intervertebral disks:     At the level of L1-L2, the intervertebral disk, the central spinal canal, and the neural foramina appear normal.     At the level of L2-L3,  there is mild diffuse disc desiccation. There is minimal facet arthrosis.     Central spinal canal and neural foramina appear normal.     At the level of L3-L4, there is mild disc desiccation. There is a minimal circumferential disc bulge as well as minimal facet arthrosis. There is no significant foraminal, or central canal narrowing.     At the level of L4-L5, there is mild disc desiccation.     There is mild facet arthrosis.     Central spinal canal and neural foramina appear normal.     At the level of L5-S1, there is mild disc space narrowing.  There is mild disc desiccation.  Central spinal canal and neural foramina appear normal.     IMPRESSION:     I see no evidence of destructive osseous process and no evidence of significant narrowing of central spinal canal or the neural foramina.       Micro:  Results       Procedure Component Value Units Date/Time    BLOOD CULTURE [[id number]]  (Abnormal)  (Susceptibility) Collected: 09/10/22 1651    Order Status: Completed Specimen: Blood from Peripheral Updated: 09/17/22 0841     Significant Indicator POS     Source BLD     Site PERIPHERAL     Culture Result Growth detected by Bactec instrument. 09/15/2022  01:57      Enterococcus faecalis    Narrative:      CALL  [place]   "171 tel. [phone],  CALLED  171 tel. [phone] 09/15/2022, 02:02, RB PERF. RESULTS CALLED TO: RN  [id number]  Per Hospital Policy: Only change Specimen Src: to \"Line\" if  specified by physician order.  Left AC    Susceptibility       Enterococcus faecalis (1)       Antibiotic Interpretation Microscan   Method Status    Daptomycin Sensitive 1 mcg/mL JOHN Final    Gent Synergy Sensitive <=500 mcg/mL JOHN Final    Ampicillin Sensitive <=2 mcg/mL JOHN Final    Vancomycin Sensitive 1 mcg/mL JOHN Final    Penicillin Sensitive 1 mcg/mL JOHN Final                       Fungal Culture [[id number]] Collected: 09/16/22 1048    Order Status: Completed Specimen: Tissue Updated: 09/17/22 0711     Significant Indicator NEG     Source TISS     Site tricuspid vegetation     Culture Result Culture in progress.     Fungal Smear Results No fungal elements seen.    Narrative:      Surgery Specimen    AFB Culture [[id number]] Collected: 09/16/22 1048    Order Status: Completed Specimen: Tissue Updated: 09/17/22 0711     Significant Indicator NEG     Source TISS     Site tricuspid vegetation     Culture Result Culture in progress.     AFB Smear Results No acid fast bacilli seen.    Narrative:      Surgery Specimen    CULTURE TISSUE W/ GRM STAIN [[id number]]  (Abnormal) Collected: 09/16/22 1048    Order Status: No result Specimen: Tissue Updated: 09/17/22 0711     Significant Indicator NEG     Source TISS     Site tricuspid vegetation     Culture Result -     Gram Stain Result Few WBCs.  Moderate Gram positive cocci.      Narrative:      Surgery Specimen    Anaerobic Culture [[id number]] Collected: 09/16/22 1048    Order Status: Completed Specimen: Tissue Updated: 09/17/22 0711     Significant Indicator NEG     Source TISS     Site tricuspid vegetation     Culture Result Culture in progress.    Narrative:      Surgery Specimen    Acid Fast Stain [[id number]] Collected: 09/16/22 1048    Order Status: Completed Specimen: Tissue Updated: 09/16/22 2022    " " Significant Indicator NEG     Source TISS     Site tricuspid vegetation     AFB Smear Results No acid fast bacilli seen.    Narrative:      Surgery Specimen    GRAM STAIN [[id number]]  (Abnormal) Collected: 09/16/22 1048    Order Status: Completed Specimen: Tissue Updated: 09/16/22 2022     Significant Indicator .     Source TISS     Site tricuspid vegetation     Gram Stain Result Few WBCs.  Moderate Gram positive cocci.      Narrative:      Surgery Specimen    Fungal Smear [[id number]] Collected: 09/16/22 1048    Order Status: Completed Specimen: Tissue Updated: 09/16/22 2022     Significant Indicator NEG     Source TISS     Site tricuspid vegetation     Fungal Smear Results No fungal elements seen.    Narrative:      Surgery Specimen    BLOOD CULTURE [[id number]] Collected: 09/10/22 1651    Order Status: Completed Specimen: Blood from Peripheral Updated: 09/15/22 1900     Significant Indicator NEG     Source BLD     Site PERIPHERAL     Culture Result No growth after 5 days of incubation.    Narrative:      Per Hospital Policy: Only change Specimen Src: to \"Line\" if  specified by physician order.  Right AC    Urinalysis [[id number]]     Order Status: Canceled Specimen: Urine     BLOOD CULTURE [[id number]] Collected: 09/13/22 0815    Order Status: Completed Specimen: Blood from Peripheral Updated: 09/14/22 0729     Significant Indicator NEG     Source BLD     Site PERIPHERAL     Culture Result No Growth  Note: Blood cultures are incubated for 5 days and  are monitored continuously.Positive blood cultures  are called to the RN and reported as soon as  they are identified.      Narrative:      Per Hospital Policy: Only change Specimen Src: to \"Line\" if  specified by physician order.  Left AC    BLOOD CULTURE [[id number]] Collected: 09/13/22 1446    Order Status: Completed Specimen: Blood from Peripheral Updated: 09/14/22 0729     Significant Indicator NEG     Source BLD     Site PERIPHERAL     Culture Result No " "Growth  Note: Blood cultures are incubated for 5 days and  are monitored continuously.Positive blood cultures  are called to the RN and reported as soon as  they are identified.      Narrative:      Per Hospital Policy: Only change Specimen Src: to \"Line\" if  specified by physician order.  Left AC            Assessment/Hospital course:  This is a 51-year-old female patient who presented to [facility] with several weeks of fevers, malaise, cough, TTE noted a vegetation of the tricuspid valve.  She was transferred to [facility] for KEO.    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  MRCP planned given isolated alk phos elevation  -Given neck, mid and lower back pain, recommend MRI of the whole spine -pending (patient with claustrophobia, if possible would do it before extubation, otherwise may need sedation later)  -Continue dual beta-lactam therapy with IV ampicillin 2 g every 4 hours + IV ceftriaxone 2 g every 12 hours.  Anticipate 6 weeks through 10/27/2022  -At least weekly CBC with differential, CMP while on IV antibiotics.    Chest x-ray from today noted.  Bilateral pleural effusions    ID clinic follow-up in 4 to 6 weeks.    Disposition: Will need placement for IV antibiotic plan as above  Need for PICC line: Yes, okay to place    Discussed with Dr. Banda.  ID will follow.  Please call with questions.  "

## 2022-09-17 NOTE — PROGRESS NOTES
"Cardiovascular Surgery Progress Note    Name: Sarah Buckner  MRN: [id number]  : 1970  Admit Date: 2022  2:08 PM  1 Day Post-Op     Procedure:  Procedure(s) and Anesthesia Type:     * TRICUSPID VALVE REPAIR, PATENT FORAMEN OVALE CLOSURE, TRANSESOPHAGEAL ESCHOCARDIOGRAM - General     * ECHOCARDIOGRAM, TRANSESOPHAGEAL, INTRAOPERATIVE - General    Vitals:  Vitals:    22 0645 22 0700 22 0800 22 0832   BP:  101/60 (!) 99/59    Pulse: (!) 56 71 (!) 55    Resp: (!) 24 (!) 11 15    Temp:       TempSrc:   Bladder    SpO2: 95% 95% 95%    Weight:       Height:    1.646 m (5' 4.8\")      Temp (24hrs), Av.6 °C (96.1 °F), Min:34.9 °C (94.8 °F), Max:36 °C (96.8 °F)      Respiratory:  Vent Mode: ASV, Rate (breaths/min): 20, PEEP/CPAP: 8, PIP: 18, MAP: 11 Respiration: 15, Pulse Oximetry: 95 %       Fluids:    Intake/Output Summary (Last 24 hours) at 2022 0843  Last data filed at 2022 0800  Gross per 24 hour   Intake 7655.89 ml   Output 2225 ml   Net 5430.89 ml     Admit weight: Weight: 66.1 kg (145 lb 12.8 oz)  Current weight: Weight: 64.4 kg (141 lb 15.6 oz) (22 0400)    Labs:  Recent Labs     22  0314 22  1445 22  0117   WBC 11.9*  --  23.8*   RBC 3.34*  --  2.60*   HEMOGLOBIN 9.4*  --  7.5*   HEMATOCRIT 29.2*  --  23.0*   MCV 87.4  --  88.5   MCH 28.1  --  28.8   MCHC 32.2*  --  32.6*   RDW 50.3*  --  51.5*   PLATELETCT 604* 315 345   MPV 9.6  --  9.9     Recent Labs     09225 22  1707 22  0117   SODIUM 136  --   --  145   POTASSIUM 3.9 4.5 4.3 4.8   CHLORIDE 101  --   --  111   CO2 25  --   --  23   GLUCOSE 94  --   --  136*   BUN 3*  --   --  10   CREATININE 0.35*  --   --  0.67   CALCIUM 8.7  --   --  8.2*     Recent Labs     22   APTT 30.4 83.9*   INR 1.29* 1.85*         Medications:  Scheduled Medications   Medication Dose Frequency    insulin regular  0-14 Units Once    insulin lispro  " Physical Therapy   Initial Evaluation     Patient Name: Israel Aviles  Age:  56 y.o., Sex:  male  Medical Record #: [id number]  Today's Date: 7/6/2021          Assessment  Patient is 56 y.o. male who presented acutely c/o L sided weakness and L UE numbness.  Symptoms resolved upon arrival to [facility].  PMH includes CAD, DB, HLD, HTN, ?CVA, and L BKA.  Today patient with B LE strength grossly 5/5, no report of altered sensation.  He performed bed mobility and transfers with SPV and is able to celestino/doff prosthetic independently.  He was able to ambulate approx. 400 ft without AD, with SPV, steady gait, and no LOB.  He was able to ascend/descend 2 steps with single UE support with SPV.  Patient presents with no further acute mobility deficits.    Plan    Recommend Physical Therapy for Evaluation only.    DC Equipment Recommendations: None  Discharge Recommendations: Anticipate that the patient will have no further physical therapy needs after discharge from the hospital       Objective     07/06/21 0829   Precautions   Comments L BKA   Pain   Pain Scales 0 to 10 Scale    Intervention Declines   Pain 0 - 10 Group   Pain Rating Scale (NPRS) 0   Comfort Goal Comfort with Movement;Perform Activity   Therapist Pain Assessment Post Activity Pain Same as Prior to Activity   Prior Living Situation   Prior Services Home-Independent   Housing / Facility 1 Story House   Steps Into Home 1   Bathroom Set up Walk In Shower   Equipment Owned Front-Wheel Walker;Single Point Cane;Wheelchair   Lives with - Patient's Self Care Capacity Spouse   Prior Level of Functional Mobility   Bed Mobility Independent   Transfer Status Independent   Ambulation Independent   Distance Ambulation (Feet)   (community)   Assistive Devices Used None   Wheelchair Independent   Stairs Independent   Comments Pt states he only uses WC to transfer in/out of shower so he doesn't get prosthetic wet.  He does not use any other AD.   History of Falls   History  0-14 Units TID AC    magnesium sulfate  1 g DAILY    K+ Scale: Goal of 4.5  1 Each Q6HRS    aspirin EC  81 mg DAILY    Pharmacy Consult Request  1 Each PHARMACY TO DOSE    acetaminophen  1,000 mg Q6HRS    senna-docusate  2 Tablet BID    And    polyethylene glycol/lytes  1 Packet DAILY    And    [START ON 9/18/2022] magnesium hydroxide  30 mL DAILY    omeprazole  20 mg DAILY    mupirocin  1 Application BID    ampicillin  2,000 mg Q4HRS    cefTRIAXone (ROCEPHIN) IV  2 g Q12HRS    guaiFENesin ER  1,200 mg Q12HRS        Exam:   Physical Exam  Vitals and nursing note reviewed.   Constitutional:       General: She is not in acute distress.     Appearance: She is normal weight. She is ill-appearing.      Interventions: She is intubated.   HENT:      Head: Normocephalic and atraumatic.      Right Ear: External ear normal.      Left Ear: External ear normal.      Nose: Nose normal.      Mouth/Throat:      Mouth: Mucous membranes are moist.   Eyes:      Extraocular Movements: Extraocular movements intact.      Conjunctiva/sclera: Conjunctivae normal.      Pupils: Pupils are equal, round, and reactive to light.   Cardiovascular:      Rate and Rhythm: Normal rate and regular rhythm.      Pulses: Normal pulses.      Heart sounds: Normal heart sounds.   Pulmonary:      Effort: Pulmonary effort is normal. She is intubated.   Abdominal:      General: Abdomen is flat. Bowel sounds are normal. There is no distension.      Palpations: Abdomen is soft.   Musculoskeletal:         General: Normal range of motion.      Cervical back: Normal range of motion and neck supple.   Skin:     General: Skin is warm and dry.      Capillary Refill: Capillary refill takes less than 2 seconds.      Comments: Prevena to self suction.   Neurological:      General: No focal deficit present.      Mental Status: She is alert and oriented to person, place, and time. Mental status is at baseline.      Sensory: No sensory deficit.      Motor: No weakness.  of Falls No   Cognition    Cognition / Consciousness WDL   Level of Consciousness Alert   Comments Pleasant & cooperative   Active ROM Lower Body    Active ROM Lower Body  WDL   Strength Lower Body   Lower Body Strength  WDL   Comments B LE grossly 5/5   Sensation Lower Body   Lower Extremity Sensation   WDL   Comments No report of altered sensation   Balance Assessment   Sitting Balance (Static) Good   Sitting Balance (Dynamic) Fair +   Standing Balance (Static) Fair +   Standing Balance (Dynamic) Fair +   Weight Shift Sitting Good   Weight Shift Standing Fair   Comments no AD   Gait Analysis   Gait Level Of Assist Supervised   Assistive Device None   Distance (Feet) 400   # of Times Distance was Traveled 1   Deviation Antalgic   # of Stairs Climbed 2   Level of Assist with Stairs Supervised   Weight Bearing Status No restrictions   Bed Mobility    Comments Pt sitting EOB pre/post session   Functional Mobility   Sit to Stand Supervised   Bed, Chair, Wheelchair Transfer Supervised   Transfer Method Stand Step   Mobility Ambulation, stairs   Activity Tolerance   Sitting Edge of Bed Pre/post session   Standing 8 min   Session Information   Date / Session Number  7/6 - 1x only            Psychiatric:         Mood and Affect: Mood normal.         Behavior: Behavior normal.         Thought Content: Thought content normal.       Cardiac Medications:    ASA - Yes    Plavix - No; contraindicated because of Other No CAD    Post-operative Beta Blockers - No; contraindicated because of Hypotension    Ace/ARB- No; contraindicated because of Normal EF    Statin - No; contraindicated because of No CAD    Aldactone- No; contraindicated because of Normal EF    Ejection Fraction:  60%    Telemetry:   9/17 SR/ST 60-120s    Assessment/Plan:  POD 1 Intubated, on levo 2mcg/vaso gtt.  Neuro intact.  Abdomen soft.  Minimal output from chest tubes.  Cr stable. Hgb 7.5 overnight, given 1 unit PRBC, now 8.5.  UO decreased this AM. Plan: Wean ventilator as tolerated.  Albumin 5% 25g once.  Wean vasopressors as tolerated.  Encourage IS when extubated.    Disposition:  TBD

## 2022-09-17 NOTE — PROGRESS NOTES
Updated Joshua Goodson about the pt remaining on pressors Vaso and levo 2mcg/min. I informed him of an H/H of 7.8/23. He recommended 1 unit PRBC for hemoglobin < 8.0.

## 2022-09-17 NOTE — CARE PLAN
The patient is Unstable - High likelihood or risk of patient condition declining or worsening      Problem: Post Op Day 1 CABG/Heart Valve Replacement  Goal: Optimal care of the post op CABG/heart valve replacement Post Op Day 1  Intervention: EKG and CXR completed  Note: Completed  Intervention: Antibiotics are discontinued within 24 hours of anesthesia end time unless indication documented for continuation beyond 24 hours  Note: See MAR.  Intervention: Daily weights in the morning  Note: Completed.  Intervention: Up in chair for all meals  Note: In chair for dinner.  Intervention: Ambulate in am if stable. First ambulation 25 feet. Repeat x 3 as tolerated  Note: Will attempt first walk tonight or tomorrow morning.  Intervention: Discontinue damon catheter unless documented reason for continuation  Note: Reevaluate tomorrow.  Intervention: Assess surgical dressing and check provider orders for potential removal  Note: Sternal Prevena in place.  Intervention: OHS trained RN to remove chest tubes if ordered by provider  Note: N/A  Intervention: Knee high MARK hose, on during the day, off at night  Note: Waiting for MARK hose to be sent from central supply.  Intervention: Saline lock IV  Note: Completed.  Intervention: Transfer to [facility], begin VS q 4 hours  Note: N/A remains on vasopressors.  Intervention: After 24th hour post-anesthesia end time, transition patient to Cardiac Surgery SQ Insulin Protocol  Note: See MAR.  Intervention: If patient is CABG or on home beta-blocker, start/resume beta-blocker on POD 1 or POD 2 or document contraindication  Note: Remains on vasopressors.

## 2022-09-17 NOTE — PROGRESS NOTES
Contacted Joshua Goodson about increasing pressor requirements. At 1930 started vasopressin @ .03 units/Hr and started levophed reaching 6mcg/min. At this time I started a Liter of of plasmalyte AConcetta Clarke Recommendation was to monitor how she responded to fluid while attempting to come off levo. If she remained to require increased pressors allow the pt to rest on the vent overnight.

## 2022-09-17 NOTE — THERAPY
09/17/22 0757   Interdisciplinary Plan of Care Collaboration   Collaboration Comments OT consult received. Pt is POD# 1 OHS. Will follow up and complete eval POD# 2 as appropriate.

## 2022-09-17 NOTE — ASSESSMENT & PLAN NOTE
Acute on chronic following cardiothoracic surgery  Transfuse hgb < 8 CTS  Chest tube with hemothorax 9/18; removed 09/19/22

## 2022-09-17 NOTE — PROGRESS NOTES
Patient following commands, moves all extremities. Stable vitals on Levo and Vaso gtts, see MAR.    Updated Joshua Goodson APRN:    - Received 1unit RBC this morning with NOC shift. iSTAT ABG showed Hgb improved to 8.5 (from 7.5) and Hct 25 (from 23).  - Low urine output 20-30 mL/hr for the last several hours. Orders received for 25g 5%Albumin once.  - Remains on Levo 2 mcg/min and Vaso 0.03 units/hr. Per LARA Lewis to extubate on these two low dose pressors, but RSBI remains high (160s), respiratory rate 30-40s. Pulse ox 92-93% with Po2 62 on % / PEEP 8 / FiO2 30%. Will refer to intensivists for extubation plan.

## 2022-09-17 NOTE — HOSPITAL COURSE
"\"This lady has a history of chronic back pain, primary hypertension and urolithiasis.  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  She has developed septic pulmonary emboli and has been treated with ampicillin and ceftriaxone per infectious diseases.  Today Dr. Bradley took him to the operating theater and performed radical tricuspid valve repair, patent foramen ovale closure and aortic valve repair.  She is now in the cardiac surgery unit.\"  9/17-patient was unable to be extubated overnight due to decreased mental status, still on pressors.   9/18-patient extubated, doing well 4 L oxygen, still requiring pressors.   "

## 2022-09-17 NOTE — PROGRESS NOTES
Monitor Report   ST- 120s W/ rates in the 60s this AM.   Frequent PVCs  .114/.08/.482

## 2022-09-17 NOTE — PROGRESS NOTES
Pt was unable to be extubated at 6hr madisyn. D/T tachypnea and low Nif.  Updated intensives covering the floor.

## 2022-09-17 NOTE — CARE PLAN
Pt overnight needed to be restarted on vasopressors. I started Vasopressin .03units/min and levophed with a max dose @ 6mcg/min along with X1 liter plasmalyte. She was unable to be extubated last night d/t not meeting NiF and RR requirements along with the starting of pressors. I reached out to archie licona and pauline prather who are aware. This morning H/H came back at 7.5/23 X1 unit PRBC transfused. Complaints of pain in the chest which was relieved with PRN dilaudid .5mg x3. I educated the pt about opioids and extubation in which the more alert you are the easier it is to extubate.   Antwan- following commands, 3/3 strength pupils PERRLA 4s, temp 36c bladder temp.   CV- HR 50-60s, SB/ST, BP 90s/50s, +2 pulse, - edema,  Pulm 8.0 ETT, 22@ teeth, %, Peep 8, fio2 30%, lungs course, scant sputum, Spo2 > 95%.   GI- Npo, hypoactive BS,   - damon, UO 420Mll, urine blue  GTT- Levo 2mcg/min, vaso 0.03units/hr Precedex 4 Eyes   CT L M-86ml  R M-5Ml, no air leaks, sanguineus, -[id number]   noelle: Knowledge Deficit - Standard    Goal: Patient and family/care givers will demonstrate understanding of plan of care, disease process/condition, diagnostic tests and medications  Outcome: Progressing     Problem: Pain - Standard  Goal: Alleviation of pain or a reduction in pain to the patient’s comfort goal  Outcome: Progressing     Problem: Psychosocial  Goal: Patient's level of anxiety will decrease  Outcome: Progressing  Goal: Patient's ability to verbalize feelings about condition will improve  Outcome: Progressing  Goal: Patient's ability to re-evaluate and adapt role responsibilities will improve  Outcome: Progressing  Goal: Patient and family will demonstrate ability to cope with life altering diagnosis and/or procedure  Outcome: Progressing  Goal: Spiritual and cultural needs incorporated into hospitalization  Outcome: Progressing   The patient is Watcher - Medium risk of patient condition declining or worsening    Shift  Goals  Clinical Goals: extubate  Patient Goals: Pain control  Family Goals: CHRIS.  No family present    Progress made toward(s) clinical / shift goals:      Patient is not progressing towards the following goals:

## 2022-09-17 NOTE — PROGRESS NOTES
Patient's respiratory rate mid 20s-30s while sleeping. When awake 30s-50s, RSBI 130s.   mL, NIF -24. On Dex gtt. Dr. Almazan, Dr. Banda and Joshua Goodson all aware. Plan is to trial extubation to HFNC.    Extubated at 1242 to 30L 50% HFNC.

## 2022-09-17 NOTE — THERAPY
Physical Therapy Contact Note    Patient Name: Sarah Buckner  Age:  51 y.o., Sex:  female  Medical Record #: [id number]  Today's Date: 9/17/2022    PT orders received. Pt is POD#1 OHS, will re-attempt POD#2 as appropriate.    Pj Bautista PT, DPT

## 2022-09-18 ENCOUNTER — APPOINTMENT (OUTPATIENT)
Dept: RADIOLOGY | Facility: MEDICAL CENTER | Age: 52
DRG: 219 | End: 2022-09-18
Payer: COMMERCIAL

## 2022-09-18 ENCOUNTER — APPOINTMENT (OUTPATIENT)
Dept: RADIOLOGY | Facility: MEDICAL CENTER | Age: 52
DRG: 219 | End: 2022-09-18
Attending: STUDENT IN AN ORGANIZED HEALTH CARE EDUCATION/TRAINING PROGRAM
Payer: COMMERCIAL

## 2022-09-18 PROBLEM — J90 PLEURAL EFFUSION: Status: ACTIVE | Noted: 2022-09-18

## 2022-09-18 LAB
ANION GAP SERPL CALC-SCNC: 10 MMOL/L (ref 7–16)
APTT PPP: 52.4 SEC (ref 24.7–36)
BACTERIA BLD CULT: NORMAL
BACTERIA BLD CULT: NORMAL
BUN SERPL-MCNC: 19 MG/DL (ref 8–22)
CALCIUM SERPL-MCNC: 7.8 MG/DL (ref 8.5–10.5)
CFT BLD TEG: 3.2 MIN (ref 4.6–9.1)
CFT P HPASE BLD TEG: 3.1 MIN (ref 4.3–8.3)
CHLORIDE SERPL-SCNC: 108 MMOL/L (ref 96–112)
CLOT ANGLE BLD TEG: 40 DEGREES (ref 63–78)
CO2 SERPL-SCNC: 21 MMOL/L (ref 20–33)
CREAT SERPL-MCNC: 0.79 MG/DL (ref 0.5–1.4)
CT.EXTRINSIC BLD ROTEM: 0.9 MIN (ref 0.8–2.1)
ERYTHROCYTE [DISTWIDTH] IN BLOOD BY AUTOMATED COUNT: 63.2 FL (ref 35.9–50)
ERYTHROCYTE [DISTWIDTH] IN BLOOD BY AUTOMATED COUNT: 68.7 FL (ref 35.9–50)
FIBRINOGEN PPP-MCNC: 362 MG/DL (ref 215–460)
GFR SERPLBLD CREATININE-BSD FMLA CKD-EPI: 91 ML/MIN/1.73 M 2
GLUCOSE SERPL-MCNC: 121 MG/DL (ref 65–99)
HCT VFR BLD AUTO: 21.8 % (ref 37–47)
HCT VFR BLD AUTO: 28.6 % (ref 37–47)
HGB BLD-MCNC: 6.9 G/DL (ref 12–16)
HGB BLD-MCNC: 9.4 G/DL (ref 12–16)
INR PPP: 1.83 (ref 0.87–1.13)
MCF BLD TEG: 69.1 MM (ref 52–69)
MCF.PLATELET INHIB BLD ROTEM: 32.5 MM (ref 15–32)
MCH RBC QN AUTO: 26.6 PG (ref 27–33)
MCH RBC QN AUTO: 28.2 PG (ref 27–33)
MCHC RBC AUTO-ENTMCNC: 31.7 G/DL (ref 33.6–35)
MCHC RBC AUTO-ENTMCNC: 32.9 G/DL (ref 33.6–35)
MCV RBC AUTO: 84.2 FL (ref 81.4–97.8)
MCV RBC AUTO: 85.9 FL (ref 81.4–97.8)
PA AA BLD-ACNC: 7.2 % (ref 0–11)
PA ADP BLD-ACNC: 98.6 % (ref 0–17)
PLATELET # BLD AUTO: 285 K/UL (ref 164–446)
PLATELET # BLD AUTO: 315 K/UL (ref 164–446)
PMV BLD AUTO: 10.8 FL (ref 9–12.9)
PMV BLD AUTO: 10.8 FL (ref 9–12.9)
POTASSIUM SERPL-SCNC: 4.3 MMOL/L (ref 3.6–5.5)
PROTHROMBIN TIME: 20.7 SEC (ref 12–14.6)
RBC # BLD AUTO: 2.59 M/UL (ref 4.2–5.4)
RBC # BLD AUTO: 3.33 M/UL (ref 4.2–5.4)
SIGNIFICANT IND 70042: NORMAL
SIGNIFICANT IND 70042: NORMAL
SITE SITE: NORMAL
SITE SITE: NORMAL
SODIUM SERPL-SCNC: 139 MMOL/L (ref 135–145)
SOURCE SOURCE: NORMAL
SOURCE SOURCE: NORMAL
TEG ALGORITHM TGALG: ABNORMAL
WBC # BLD AUTO: 16.5 K/UL (ref 4.8–10.8)
WBC # BLD AUTO: 16.8 K/UL (ref 4.8–10.8)

## 2022-09-18 PROCEDURE — P9016 RBC LEUKOCYTES REDUCED: HCPCS | Mod: 91

## 2022-09-18 PROCEDURE — 85027 COMPLETE CBC AUTOMATED: CPT

## 2022-09-18 PROCEDURE — 700101 HCHG RX REV CODE 250: Performed by: NURSE PRACTITIONER

## 2022-09-18 PROCEDURE — 0W993ZZ DRAINAGE OF RIGHT PLEURAL CAVITY, PERCUTANEOUS APPROACH: ICD-10-PCS | Performed by: RADIOLOGY

## 2022-09-18 PROCEDURE — 71045 X-RAY EXAM CHEST 1 VIEW: CPT

## 2022-09-18 PROCEDURE — 700111 HCHG RX REV CODE 636 W/ 250 OVERRIDE (IP): Performed by: NURSE PRACTITIONER

## 2022-09-18 PROCEDURE — 700105 HCHG RX REV CODE 258: Performed by: THORACIC SURGERY (CARDIOTHORACIC VASCULAR SURGERY)

## 2022-09-18 PROCEDURE — 700105 HCHG RX REV CODE 258: Performed by: NURSE PRACTITIONER

## 2022-09-18 PROCEDURE — 700111 HCHG RX REV CODE 636 W/ 250 OVERRIDE (IP): Performed by: INTERNAL MEDICINE

## 2022-09-18 PROCEDURE — 700102 HCHG RX REV CODE 250 W/ 637 OVERRIDE(OP): Performed by: NURSE PRACTITIONER

## 2022-09-18 PROCEDURE — 99153 MOD SED SAME PHYS/QHP EA: CPT

## 2022-09-18 PROCEDURE — 32557 INSERT CATH PLEURA W/ IMAGE: CPT

## 2022-09-18 PROCEDURE — 700111 HCHG RX REV CODE 636 W/ 250 OVERRIDE (IP): Performed by: STUDENT IN AN ORGANIZED HEALTH CARE EDUCATION/TRAINING PROGRAM

## 2022-09-18 PROCEDURE — 0WJ93ZZ INSPECTION OF RIGHT PLEURAL CAVITY, PERCUTANEOUS APPROACH: ICD-10-PCS | Performed by: RADIOLOGY

## 2022-09-18 PROCEDURE — 85384 FIBRINOGEN ACTIVITY: CPT

## 2022-09-18 PROCEDURE — A9270 NON-COVERED ITEM OR SERVICE: HCPCS | Performed by: STUDENT IN AN ORGANIZED HEALTH CARE EDUCATION/TRAINING PROGRAM

## 2022-09-18 PROCEDURE — 700111 HCHG RX REV CODE 636 W/ 250 OVERRIDE (IP): Performed by: RADIOLOGY

## 2022-09-18 PROCEDURE — 85610 PROTHROMBIN TIME: CPT

## 2022-09-18 PROCEDURE — 700102 HCHG RX REV CODE 250 W/ 637 OVERRIDE(OP): Performed by: STUDENT IN AN ORGANIZED HEALTH CARE EDUCATION/TRAINING PROGRAM

## 2022-09-18 PROCEDURE — 770022 HCHG ROOM/CARE - ICU (200)

## 2022-09-18 PROCEDURE — A9270 NON-COVERED ITEM OR SERVICE: HCPCS | Performed by: INTERNAL MEDICINE

## 2022-09-18 PROCEDURE — 32551 INSERTION OF CHEST TUBE: CPT | Mod: RT | Performed by: STUDENT IN AN ORGANIZED HEALTH CARE EDUCATION/TRAINING PROGRAM

## 2022-09-18 PROCEDURE — 86923 COMPATIBILITY TEST ELECTRIC: CPT

## 2022-09-18 PROCEDURE — 99233 SBSQ HOSP IP/OBS HIGH 50: CPT | Performed by: INTERNAL MEDICINE

## 2022-09-18 PROCEDURE — A9270 NON-COVERED ITEM OR SERVICE: HCPCS | Performed by: NURSE PRACTITIONER

## 2022-09-18 PROCEDURE — 85730 THROMBOPLASTIN TIME PARTIAL: CPT

## 2022-09-18 PROCEDURE — 85576 BLOOD PLATELET AGGREGATION: CPT

## 2022-09-18 PROCEDURE — 700101 HCHG RX REV CODE 250: Performed by: INTERNAL MEDICINE

## 2022-09-18 PROCEDURE — 700111 HCHG RX REV CODE 636 W/ 250 OVERRIDE (IP): Performed by: THORACIC SURGERY (CARDIOTHORACIC VASCULAR SURGERY)

## 2022-09-18 PROCEDURE — 99024 POSTOP FOLLOW-UP VISIT: CPT | Performed by: NURSE PRACTITIONER

## 2022-09-18 PROCEDURE — 99291 CRITICAL CARE FIRST HOUR: CPT | Mod: 25 | Performed by: STUDENT IN AN ORGANIZED HEALTH CARE EDUCATION/TRAINING PROGRAM

## 2022-09-18 PROCEDURE — 32551 INSERTION OF CHEST TUBE: CPT

## 2022-09-18 PROCEDURE — 700111 HCHG RX REV CODE 636 W/ 250 OVERRIDE (IP)

## 2022-09-18 PROCEDURE — 36430 TRANSFUSION BLD/BLD COMPNT: CPT

## 2022-09-18 PROCEDURE — 700102 HCHG RX REV CODE 250 W/ 637 OVERRIDE(OP): Performed by: INTERNAL MEDICINE

## 2022-09-18 PROCEDURE — 85347 COAGULATION TIME ACTIVATED: CPT

## 2022-09-18 PROCEDURE — 80048 BASIC METABOLIC PNL TOTAL CA: CPT

## 2022-09-18 RX ORDER — LIDOCAINE HYDROCHLORIDE 20 MG/ML
INJECTION, SOLUTION INFILTRATION; PERINEURAL
Status: DISPENSED
Start: 2022-09-18 | End: 2022-09-19

## 2022-09-18 RX ORDER — SODIUM CHLORIDE 9 MG/ML
500 INJECTION, SOLUTION INTRAVENOUS
Status: ACTIVE | OUTPATIENT
Start: 2022-09-18 | End: 2022-09-18

## 2022-09-18 RX ORDER — MIDAZOLAM HYDROCHLORIDE 1 MG/ML
INJECTION INTRAMUSCULAR; INTRAVENOUS
Status: COMPLETED
Start: 2022-09-18 | End: 2022-09-18

## 2022-09-18 RX ORDER — ALPRAZOLAM 0.25 MG/1
0.25 TABLET ORAL 3 TIMES DAILY PRN
Status: DISCONTINUED | OUTPATIENT
Start: 2022-09-18 | End: 2022-09-24 | Stop reason: HOSPADM

## 2022-09-18 RX ORDER — MIDAZOLAM HYDROCHLORIDE 1 MG/ML
.5-2 INJECTION INTRAMUSCULAR; INTRAVENOUS PRN
Status: ACTIVE | OUTPATIENT
Start: 2022-09-18 | End: 2022-09-18

## 2022-09-18 RX ORDER — GABAPENTIN 100 MG/1
100 CAPSULE ORAL 3 TIMES DAILY
Status: DISCONTINUED | OUTPATIENT
Start: 2022-09-18 | End: 2022-09-24 | Stop reason: HOSPADM

## 2022-09-18 RX ORDER — ONDANSETRON 2 MG/ML
4 INJECTION INTRAMUSCULAR; INTRAVENOUS EVERY 6 HOURS PRN
Status: ACTIVE | OUTPATIENT
Start: 2022-09-18 | End: 2022-09-18

## 2022-09-18 RX ORDER — MIDAZOLAM HYDROCHLORIDE 1 MG/ML
5 INJECTION INTRAMUSCULAR; INTRAVENOUS ONCE
Status: COMPLETED | OUTPATIENT
Start: 2022-09-18 | End: 2022-09-18

## 2022-09-18 RX ADMIN — MIDAZOLAM HYDROCHLORIDE 1 MG: 1 INJECTION, SOLUTION INTRAMUSCULAR; INTRAVENOUS at 16:14

## 2022-09-18 RX ADMIN — ACETAMINOPHEN 1000 MG: 500 TABLET ORAL at 22:36

## 2022-09-18 RX ADMIN — OMEPRAZOLE 20 MG: 20 CAPSULE, DELAYED RELEASE ORAL at 06:13

## 2022-09-18 RX ADMIN — DEXMEDETOMIDINE 0.6 MCG/KG/HR: 200 INJECTION, SOLUTION INTRAVENOUS at 20:10

## 2022-09-18 RX ADMIN — OXYCODONE HYDROCHLORIDE 10 MG: 10 TABLET ORAL at 17:19

## 2022-09-18 RX ADMIN — GUAIFENESIN 1200 MG: 600 TABLET, EXTENDED RELEASE ORAL at 06:12

## 2022-09-18 RX ADMIN — FENTANYL CITRATE 50 MCG: 50 INJECTION, SOLUTION INTRAMUSCULAR; INTRAVENOUS at 16:05

## 2022-09-18 RX ADMIN — ACETAMINOPHEN 1000 MG: 500 TABLET ORAL at 17:19

## 2022-09-18 RX ADMIN — MUPIROCIN 1 APPLICATION: 20 OINTMENT TOPICAL at 06:14

## 2022-09-18 RX ADMIN — AMPICILLIN SODIUM 2000 MG: 2 INJECTION, POWDER, FOR SOLUTION INTRAVENOUS at 09:45

## 2022-09-18 RX ADMIN — MIDAZOLAM HYDROCHLORIDE 1 MG: 1 INJECTION, SOLUTION INTRAMUSCULAR; INTRAVENOUS at 16:05

## 2022-09-18 RX ADMIN — DEXMEDETOMIDINE 0.6 MCG/KG/HR: 200 INJECTION, SOLUTION INTRAVENOUS at 09:06

## 2022-09-18 RX ADMIN — ACETAMINOPHEN 1000 MG: 500 TABLET ORAL at 06:13

## 2022-09-18 RX ADMIN — AMPICILLIN SODIUM 2000 MG: 2 INJECTION, POWDER, FOR SOLUTION INTRAVENOUS at 06:10

## 2022-09-18 RX ADMIN — SENNOSIDES AND DOCUSATE SODIUM 2 TABLET: 50; 8.6 TABLET ORAL at 06:12

## 2022-09-18 RX ADMIN — AMPICILLIN SODIUM 2000 MG: 2 INJECTION, POWDER, FOR SOLUTION INTRAVENOUS at 22:21

## 2022-09-18 RX ADMIN — POLYETHYLENE GLYCOL 3350 1 PACKET: 17 POWDER, FOR SOLUTION ORAL at 06:13

## 2022-09-18 RX ADMIN — OXYCODONE HYDROCHLORIDE 10 MG: 10 TABLET ORAL at 22:37

## 2022-09-18 RX ADMIN — OXYCODONE HYDROCHLORIDE 10 MG: 10 TABLET ORAL at 09:43

## 2022-09-18 RX ADMIN — ALPRAZOLAM 0.25 MG: 0.25 TABLET ORAL at 22:36

## 2022-09-18 RX ADMIN — OXYCODONE 5 MG: 5 TABLET ORAL at 02:11

## 2022-09-18 RX ADMIN — GABAPENTIN 100 MG: 100 CAPSULE ORAL at 17:19

## 2022-09-18 RX ADMIN — ASPIRIN 81 MG: 81 TABLET, COATED ORAL at 06:13

## 2022-09-18 RX ADMIN — SENNOSIDES AND DOCUSATE SODIUM 2 TABLET: 50; 8.6 TABLET ORAL at 17:19

## 2022-09-18 RX ADMIN — AMPICILLIN SODIUM 2000 MG: 2 INJECTION, POWDER, FOR SOLUTION INTRAVENOUS at 01:27

## 2022-09-18 RX ADMIN — FENTANYL CITRATE 50 MCG: 50 INJECTION, SOLUTION INTRAMUSCULAR; INTRAVENOUS at 16:13

## 2022-09-18 RX ADMIN — ACETAMINOPHEN 1000 MG: 500 TABLET ORAL at 01:28

## 2022-09-18 RX ADMIN — CEFTRIAXONE SODIUM 2 G: 10 INJECTION, POWDER, FOR SOLUTION INTRAVENOUS at 17:53

## 2022-09-18 RX ADMIN — MAGNESIUM SULFATE HEPTAHYDRATE 1 G: 1 INJECTION, SOLUTION INTRAVENOUS at 06:09

## 2022-09-18 RX ADMIN — MIDAZOLAM HYDROCHLORIDE 4 MG: 2 INJECTION, SOLUTION INTRAMUSCULAR; INTRAVENOUS at 10:29

## 2022-09-18 RX ADMIN — OXYCODONE HYDROCHLORIDE 10 MG: 10 TABLET ORAL at 05:23

## 2022-09-18 RX ADMIN — FENTANYL CITRATE 100 MCG: 50 INJECTION, SOLUTION INTRAMUSCULAR; INTRAVENOUS at 10:28

## 2022-09-18 RX ADMIN — CEFTRIAXONE SODIUM 2 G: 10 INJECTION, POWDER, FOR SOLUTION INTRAVENOUS at 06:32

## 2022-09-18 RX ADMIN — AMPICILLIN SODIUM 2000 MG: 2 INJECTION, POWDER, FOR SOLUTION INTRAVENOUS at 17:27

## 2022-09-18 RX ADMIN — GUAIFENESIN 1200 MG: 600 TABLET, EXTENDED RELEASE ORAL at 17:19

## 2022-09-18 RX ADMIN — VASOPRESSIN 0.03 UNITS/MIN: 20 INJECTION, SOLUTION INTRAMUSCULAR; SUBCUTANEOUS at 04:51

## 2022-09-18 RX ADMIN — MAGNESIUM HYDROXIDE 30 ML: 400 SUSPENSION ORAL at 06:14

## 2022-09-18 RX ADMIN — MUPIROCIN 1 APPLICATION: 20 OINTMENT TOPICAL at 17:27

## 2022-09-18 ASSESSMENT — PAIN DESCRIPTION - PAIN TYPE
TYPE: SURGICAL PAIN

## 2022-09-18 ASSESSMENT — FIBROSIS 4 INDEX: FIB4 SCORE: .5952380952380952381

## 2022-09-18 NOTE — CARE PLAN
The patient is Unstable - High likelihood or risk of patient condition declining or worsening      Problem: Post op day 2 CABG/Heart Valve Replacement  Goal: Optimal care of the post op CABG/heart valve replacement post op day 2  Intervention: FSBS: when 2 consecutive BS < 130 after post op day 2, discontinue FSBS unless patient is insulin dependent diabetic  Note: Completed.  Intervention: Daily weights in the morning  Note: Completed.  Intervention: Up in chair for all meals  Note: In chair for breakfast. Eventful day - in bed for most of the day. See previous notes.  Intervention: Ambulate 4 times daily, increasing the distance each time  Note: Eventful day - in bed for most of the day. See previous notes.  Intervention: Stand at sink and wash up with assistance.  Clean incisions twice daily with soap and water.  Note: Prevena in place.  Intervention: Consider pacer wire removal by MD  Note: Pacers capped. Reevaluate for removal tomorrow.  Intervention: Consider removal of damon and chest tube if not already done  Note: Reevaluate tomorrow.

## 2022-09-18 NOTE — PROCEDURES
Chest Tube Insertion    Date/Time: 9/18/2022 3:25 PM  Performed by: Debbie Banda M.D.  Authorized by: Debbie Banda M.D.     Consent:     Consent obtained:  Verbal    Consent given by:  Patient    Risks, benefits, and alternatives were discussed: yes      Risks discussed:  Bleeding, incomplete drainage, nerve damage, pain, infection and damage to surrounding structures    Alternatives discussed:  No treatment, delayed treatment and alternative treatment  Pre-procedure details:     Skin preparation:  Chlorhexidine    Preparation: Patient was prepped and draped in the usual sterile fashion    Sedation:     Sedation type:  Moderate sedation (Total of 75 fentanyl, 4 Versed)  Anesthesia:     Anesthesia method:  Local infiltration    Local anesthetic:  Lidocaine 1% w/o epi  Procedure details:     Placement location:  R lateral    Scalpel size:  10    Tube size (Fr):  28    Dissection instrument:  Zahraa clamp    Ultrasound guidance: yes      Tension pneumothorax: no      Tube connected to:  Suction    Drainage characteristics:  Bloody    Suture material:  2-0 silk    Dressing:  4x4 sterile gauze, petrolatum-impregnated gauze and Xeroform gauze  Post-procedure details:     Post-insertion x-ray findings comment:  Unfortunately hit resistance when placing tube, tube ordered at level of rib and facing superiorly.  IR to place new tube    Procedure completion:  Tolerated with difficulty

## 2022-09-18 NOTE — CARE PLAN
Problem: Hyperinflation  Goal: Prevent or improve atelectasis  Description: Target End Date:  3 to 4 days    1. Instruct incentive spirometry usage  2.  Perform hyperinflation therapy as indicated  Outcome: Progressing   Patient to receive PEP therapy QID.   Achieving 1000 ml on IS. Receiving 2 L via nasal cannula.

## 2022-09-18 NOTE — THERAPY
Missed Therapy     Patient Name: Sarah Buckner  Age:  51 y.o., Sex:  female  Medical Record #: [id number]  Today's Date: 9/18/2022    PT consult for cardiac rehab received. Pt now POD#2 s/p OHS. Discussed with RN, pt not medically appropriate to participate in PT evaluation at this time, is sedated and is pending IR for chest tube placement. PT will hold and eval as able/appropriate.

## 2022-09-18 NOTE — PROGRESS NOTES
H/H this morning was 6.9/21.8 no signs of bleeding, no melena, no tachycardia, minimal chest tube output 20ml. Contacted on call HAILE Goodson. Order placed for X2 PRBCs to be infused.

## 2022-09-18 NOTE — CARE PLAN
Overnight pt rested moderately well. Weaned off precedex to attempt to come off pressors. Remained on vaso .03units/hr and levo 2mcg/min. Urine output was on the soft side 330ml. CVP last night 10-14. Pt with mild forgetfulness overnight. Chest tube out put was minimal 20 ml sanguinous.     Problem: Pain - Standard  Goal: Alleviation of pain or a reduction in pain to the patient’s comfort goal  Outcome: Progressing   The patient is Watcher - Medium risk of patient condition declining or worsening    Shift Goals  Clinical Goals: extubate  Problem: Psychosocial  Goal: Patient's level of anxiety will decrease  Outcome: Progressing  Goal: Patient's ability to verbalize feelings about condition will improve  Outcome: Progressing  Goal: Patient's ability to re-evaluate and adapt role responsibilities will improve  Outcome: Progressing  Goal: Patient and family will demonstrate ability to cope with life altering diagnosis and/or procedure  Outcome: Progressing  Goal: Spiritual and cultural needs incorporated into hospitalization  Outcome: Progressing     Patient Goals: Pain control  Family Goals: CHRIS.  No family present    Progress made toward(s) clinical / shift goals:      Patient is not progressing towards the following goals:

## 2022-09-18 NOTE — PROGRESS NOTES
Monitor summary:    Sinus rylee - sinus rhythm 47-70s, rare PVCs.  V wires in place.  VVI  45 BPM, 23 mA, 1 mV.

## 2022-09-18 NOTE — PROGRESS NOTES
1030: Right pleural chest tube placed by Dr. Banda. Total of 100 mcg Fentanyl and 4mg Versed was given during the procedure with EtCo2 monitoring.    1215: Art line dampened, no blood return after power flushing, not correlating with arterial line. Dr. Banda and Joshua MÉNDEZ notified - remove art line and run cuff pressures q15min while on vasopressors.

## 2022-09-18 NOTE — ASSESSMENT & PLAN NOTE
Bilateral pleural effusions worsening few days prior to CT surgery, during surgery effusions apparently were drained about a liter on each side brown dirty colo

## 2022-09-18 NOTE — ANESTHESIA POSTPROCEDURE EVALUATION
Patient: Sarah Buckner    Procedure Summary     Date: 09/16/22 Room / Location: [facility] 02 / SURGERY [facility]    Anesthesia Start: 0909 Anesthesia Stop: 1454    Procedures:       TRICUSPID VALVE REPAIR, PATENT FORAMEN OVALE CLOSURE, TRANSESOPHAGEAL ESCHOCARDIOGRAM (Chest)      ECHOCARDIOGRAM, TRANSESOPHAGEAL, INTRAOPERATIVE (Esophagus) Diagnosis: (Infective endocarditis of tricuspid valve)    Surgeons: Truong Bradley D.O. Responsible Provider: Brad Ashton M.D.    Anesthesia Type: general ASA Status: 4          Final Anesthesia Type: general  Last vitals  BP   Blood Pressure: 102/55, Arterial BP: 100/48    Temp   36.3 °C (97.4 °F)    Pulse   64   Resp   20    SpO2   94 %      Anesthesia Post Evaluation    Patient location during evaluation: ICU  Level of consciousness: awake and alert    Airway patency: patent  Anesthetic complications: no  Cardiovascular status: hypotensive  Respiratory status: nasal cannula  Hydration status: acceptable    PONV: none          No notable events documented.     Nurse Pain Score: 7 (NPRS)

## 2022-09-18 NOTE — PROGRESS NOTES
Monitor summary:    Sinus rhythm 60s-70s, rare PVCs  V wires in place - insulated per protocol.

## 2022-09-18 NOTE — PROGRESS NOTES
"Infectious Disease Progress Note    Author: Aaron Yeh M.D. Date & Time of service: 2022  9:33 AM    Chief Complaint:  TV endocarditis    Interval History:  51 y.o. female admitted 2022 as transfer from [facility] for KEO. Blood cultures +Efaecalis. TTE with vegetation TV and acute anemia Hgb  down to 6.4 (baseline 13-14)   AF WBC 12.9 Patient alert today. Additional history obtained.  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  Normally active and regularly able to 50+#.  States she felt well until end of May after a 3 hour car ride had the sudden onset of back pain-she saw her PCP, spine and was referred to pain management.  She did not want to take all the narcotics she was given so had been self treating with NSAIDS.  The pain in her back was initially upper back but would change-sometimes low back and varied from side to side.  She was concerned that \"something else\" was going on. MRI done 2022 showed possible myelofibrosis.  On , started feeling feverish, very fatigued, and noted pallor \"I looked sick... like a zombie\"    T-max 100.6, white count 10.4, tolerating antimicrobials.  Cardiology plan as below  9/15 T-max 100.3, tolerating antimicrobials.  KEO findings with change in management plans as below   patient is afebrile now, cardiac surgery yesterday as below, white count bumped up today to 23.8.   patient remains afebrile, white count back down to 16.8 today.  Planning chest tube placement    Labs Reviewed, Medications Reviewed, and Radiology Reviewed.    Review of Systems:  Review of Systems   Unable to perform ROS: Other     Hemodynamics:  Temp (24hrs), Av.7 °C (98 °F), Min:35.8 °C (96.5 °F), Max:37 °C (98.6 °F)  Temperature: 35.8 °C (96.5 °F), Monitored Temp: 36.2 °C (97.2 °F)  Pulse  Av.9  Min: 47  Max: 144   Blood Pressure: 103/49, Arterial BP: 100/48  CVP (mm Hg): 5 MM HG    Physical Exam:  Physical Exam  Vitals and nursing note reviewed. "   Constitutional:       General: She is not in acute distress.     Appearance: She is not ill-appearing, toxic-appearing or diaphoretic.      Comments: Currently resting, post cardiac surgery   HENT:      Nose: No rhinorrhea.      Comments: nasal cannula oxygen  Eyes:      General: No scleral icterus.     Extraocular Movements: Extraocular movements intact.      Pupils: Pupils are equal, round, and reactive to light.   Cardiovascular:      Rate and Rhythm: Tachycardia present.      Heart sounds: No murmur heard.  Abdominal:      General: There is no distension.      Palpations: Abdomen is soft.      Tenderness: There is no abdominal tenderness.   Skin:     Coloration: Skin is pale. Skin is not jaundiced.      Comments: No osler or Janeway lesions   Neurological:      General: No focal deficit present.      Mental Status: She is alert and oriented to person, place, and time.       Meds:    Current Facility-Administered Medications:     ALPRAZolam    fentaNYL    midazolam    gabapentin    Discontinue sliding scale insulin orders and Accuchecks after post-op day #2 if 2 consecutive blood sugars are less than 150 mg/dL **AND** insulin regular **AND** POC blood glucose manual result **AND** NOTIFY MD and PharmD **AND** Administer 20 grams of glucose (approximately 8 ounces of fruit juice) every 15 minutes PRN FSBG less than 70 mg/dL **AND** dextrose bolus    norepinephrine (Levophed) infusion    vasopressin (PITRESSIN) infusion    Respiratory Therapy Consult    NS    electrolyte-A    aspirin EC    Pharmacy Consult Request    acetaminophen **FOLLOWED BY** [START ON 9/21/2022] acetaminophen    traMADol    dexmedetomidine (Precedex) infusion    ondansetron **OR** prochlorperazine **OR** promethazine    acetaminophen **OR** acetaminophen    senna-docusate **AND** polyethylene glycol/lytes **AND** magnesium hydroxide **AND** bisacodyl    omeprazole    mag hydrox-al hydrox-simeth    mupirocin    oxyCODONE immediate-release  **OR** oxyCODONE immediate-release **OR** HYDROmorphone    ampicillin    cefTRIAXone (ROCEPHIN) IV    guaiFENesin ER    Labs:  Recent Labs     09/16/22 0314 09/16/22  1445 09/17/22  0117 09/18/22  0500   WBC 11.9*  --  23.8* 16.8*   RBC 3.34*  --  2.60* 2.59*   HEMOGLOBIN 9.4*  --  7.5* 6.9*   HEMATOCRIT 29.2*  --  23.0* 21.8*   MCV 87.4  --  88.5 84.2   MCH 28.1  --  28.8 26.6*   RDW 50.3*  --  51.5* 68.7*   PLATELETCT 604* 315 345 315   MPV 9.6  --  9.9 10.8   NEUTSPOLYS 80.80*  --   --   --    LYMPHOCYTES 13.90*  --   --   --    MONOCYTES 4.30  --   --   --    EOSINOPHILS 0.00  --   --   --    BASOPHILS 0.30  --   --   --        Recent Labs     09/16/22 0314 09/16/22  1445 09/17/22  0117 09/17/22  1130 09/18/22  0500   SODIUM 136  --  145  --  139   POTASSIUM 3.9   < > 4.8 4.7 4.3   CHLORIDE 101  --  111  --  108   CO2 25  --  23  --  21   GLUCOSE 94  --  136*  --  121*   BUN 3*  --  10  --  19    < > = values in this interval not displayed.       Recent Labs     09/16/22 0314 09/17/22  0117 09/18/22  0500   ALBUMIN 2.7*  --   --    TBILIRUBIN 0.3  --   --    ALKPHOSPHAT 397*  --   --    TOTPROTEIN 7.1  --   --    ALTSGPT 16  --   --    ASTSGOT 15  --   --    CREATININE 0.35* 0.67 0.79         Imaging:  CT-CHEST (THORAX) WITH    Result Date: 9/8/2022   CT of the chest, abdomen and pelvis HISTORY/REASON FOR EXAM:  concern for malignancy TECHNIQUE/EXAM DESCRIPTION: CT scan of the chest, abdomen and pelvis with contrast.  Both automated exposure control and Automated adjustment of tube current based on patient size are utilized.  9/8/2022 8:33 AM. Total DLP: 470.34 mGy*cm COMPARISON: None. FINDINGS: Chest: There are multifocal areas of consolidation involving predominantly the upper lobes and to a lesser degree the lower lobes.  One of them is cavitary in the left lower lobe measuring 1.3 cm.  No pleural effusion or pneumothorax. The heart is normal in size.  There is trace pericardial effusion. The great vessel  origins are unremarkable.  The aorta is normal in caliber. No pathologically enlarged adenopathy. There are no concerning osseous lesions.  No fractures.  Bilateral breast implants. Abdomen pelvis: Solid organs: The liver, pancreas and adrenal glands are unremarkable.  There is a cyst in the anterior spleen. The gallbladder is present. Kidneys and Bladder: There is no nephrolithiasis or hydronephrosis.  Mild focal scarring left upper pole kidney. No enhancing renal masses. The kidneys enhance symmetrically. No ureteral or bladder stones are seen. The bladder is grossly unremarkable. Bowel and Appendix: No dilated bowel. No perienteric inflammatory changes. No extraluminal air or free fluid. The appendix is visualized and appears normal. No pelvic masses. Lymphatics:  No abdominal, pelvic, or retroperitoneal lymphadenopathy. Other: No concerning osseous lesions. No fractures.     1.  Multifocal consolidation concerning for pneumonia, with small cavitary lesion in the left lower lobe. 2.  No acute abnormality or evidence of malignancy to the abdomen or pelvis. [facility] 9/8/2022 9:00 AM DLP Reporting Thresholds for Incorrect/Repeated Exams - DLP in mGy*cm Head/Neck:  0-year-old 3840, 1-year-old 5880, 5-year-old 8770, 10-year-old 68434 and adult 23668 Head:  0-year-old 4540, 1-year-old 7460, 5-year-old 52719, 10-year-old 07846 and adult 53649 Neck:  0-year-old 2940, 1-year-old 4160, 5-year-old 4550, 10-year-old 6320 and adult 8470 Chest:  0-year-old 550, 1-year-old 830, 5-year-old 1200, 10-year-old 3840 and adult 3570 Abd/pelvis:  0-year-old 440, 1-year-old 720, 5-year-old 1080, 10-year-old 3330 and adult 3330 Trunk(C/A/P):  0-year-old 490, 1-year-old 770, 5-year-old 1140, 10-year-old 3570 and adult 3330    CT-HEAD W/O    Result Date: 9/7/2022   CT head without contrast HISTORY/REASON FOR EXAM:  Headache, new or worsening (Age >= 50y). TECHNIQUE/EXAM DESCRIPTION: CT scan of the brain without contrast. Both  automated exposure control and Automated adjustment of tube current based on patient size are utilized. 9/7/2022 4:19 PM. CT scan of the brain was carried out without contrast in the normal manner. Total DLP: 766.10 mGy*cm COMPARISON: None. FINDINGS: The brain parenchyma is unremarkable. The gray-white matter differentiation is well preserved throughout. There is no evidence of mass, mass effect, hemorrhage, or extraaxial fluid collection.  There is no midline shift. The orbits and calvarium are intact. No evidence of fracture. The visualized paranasal sinuses are clear. The mastoid air cells are well pneumatized.     No acute intracranial abnormality. Jaz[facility] 9/7/2022 4:23 PM DLP Reporting Thresholds for Incorrect/Repeated Exams - DLP in mGy*cm Head/Neck:  0-year-old 3840, 1-year-old 5880, 5-year-old 8770, 10-year-old 64848 and adult 53112 Head:  0-year-old 4540, 1-year-old 7460, 5-year-old 35863, 10-year-old 17503 and adult 19090 Neck:  0-year-old 2940, 1-year-old 4160, 5-year-old 4550, 10-year-old 6320 and adult 8470 Chest:  0-year-old 550, 1-year-old 830, 5-year-old 1200, 10-year-old 3840 and adult 3570 Abd/pelvis:  0-year-old 440, 1-year-old 720, 5-year-old 1080, 10-year-old 3330 and adult 3330 Trunk(C/A/P):  0-year-old 490, 1-year-old 770, 5-year-old 1140, 10-year-old 3570 and adult 3330    DX-CHEST-PORTABLE (1 VIEW)    Result Date: 9/7/2022  HISTORY/REASON FOR EXAM:  Cough. TECHNIQUE/EXAM DESCRIPTION AND NUMBER OF VIEWS: Portable chest (one view), 9/7/2022 4:22 PM. COMPARISON: None. FINDINGS: There is mild multifocal groundglass consolidation left upper lobe and right upper lobe, to a lesser degree left lower lobe.  No pleural effusion or pneumothorax. The cardiomediastinal silhouette is normal in size. The bones are intact.     Multifocal bilateral consolidation concerning for pneumonia. Jaz[facility] 9/7/2022 8:06 PM    [id number]    Result Date: 9/10/2022  9/10/2022 1:54 PM  HISTORY/REASON FOR EXAM:  Bacterial endocarditis. TECHNIQUE/EXAM DESCRIPTION AND NUMBER OF VIEWS:  1 view(s) of the mandible. COMPARISON:  None. FINDINGS: No evidence of fracture. No lytic or sclerotic lesion. There are multiple dental caries.     Multiple dental caries.    US-ABDOMEN COMPLETE SURVEY    Result Date: 9/7/2022  HISTORY/REASON FOR EXAM: Abnormal Labs; elevated liver function tests, with elevated alk phos. TECHNIQUE/EXAM DESCRIPTION: Ultrasound abdomen complete.9/7/2022 7:57 PM COMPARISON: None FINDINGS: Pancreas: The tail is suboptimally visualized due to overlying bowel gas. Aorta and IVC: Normal in caliber. Liver: Normal.  The Liver Measures 19.6 cm. The portal vein is patent with flow in the proper direction into the liver.  This is normal. Gallbladder: Normal. Common bile duct: 3 mm, which is normal. Spleen: Normal in size and echogenicity. Kidneys: The kidneys are normal in size and echogenicity. There is No ascites.     Mild hepatomegaly.  Otherwise un[facility]able [facility] 9/7/2022 8:27 PM    CT ABDOMEN-PELVIS WITH IV CONTRAST    Result Date: 9/8/2022   CT of the chest, abdomen and pelvis HISTORY/REASON FOR EXAM:  concern for malignancy TECHNIQUE/EXAM DESCRIPTION: CT scan of the chest, abdomen and pelvis with contrast.  Both automated exposure control and Automated adjustment of tube current based on patient size are utilized.  9/8/2022 8:33 AM. Total DLP: 470.34 mGy*cm COMPARISON: None. FINDINGS: Chest: There are multifocal areas of consolidation involving predominantly the upper lobes and to a lesser degree the lower lobes.  One of them is cavitary in the left lower lobe measuring 1.3 cm.  No pleural effusion or pneumothorax. The heart is normal in size.  There is trace pericardial effusion. The great vessel origins are unremarkable.  The aorta is normal in caliber. No pathologically enlarged adenopathy. There are no concerning osseous lesions.  No fractures.  Bilateral breast  implants. Abdomen pelvis: Solid organs: The liver, pancreas and adrenal glands are unremarkable.  There is a cyst in the anterior spleen. The gallbladder is present. Kidneys and Bladder: There is no nephrolithiasis or hydronephrosis.  Mild focal scarring left upper pole kidney. No enhancing renal masses. The kidneys enhance symmetrically. No ureteral or bladder stones are seen. The bladder is grossly unremarkable. Bowel and Appendix: No dilated bowel. No perienteric inflammatory changes. No extraluminal air or free fluid. The appendix is visualized and appears normal. No pelvic masses. Lymphatics:  No abdominal, pelvic, or retroperitoneal lymphadenopathy. Other: No concerning osseous lesions. No fractures.     1.  Multifocal consolidation concerning for pneumonia, with small cavitary lesion in the left lower lobe. 2.  No acute abnormality or evidence of malignancy to the abdomen or pelvis. Jazzy Bastian 9/8/2022 9:00 AM DLP Reporting Thresholds for Incorrect/Repeated Exams - DLP in mGy*cm Head/Neck:  0-year-old 3840, 1-year-old 5880, 5-year-old 8770, 10-year-old 43766 and adult 27053 Head:  0-year-old 4540, 1-year-old 7460, 5-year-old 74939, 10-year-old 82862 and adult 70545 Neck:  0-year-old 2940, 1-year-old 4160, 5-year-old 4550, 10-year-old 6320 and adult 8470 Chest:  0-year-old 550, 1-year-old 830, 5-year-old 1200, 10-year-old 3840 and adult 3570 Abd/pelvis:  0-year-old 440, 1-year-old 720, 5-year-old 1080, 10-year-old 3330 and adult 3330 Trunk(C/A/P):  0-year-old 490, 1-year-old 770, 5-year-old 1140, 10-year-old 3570 and adult 3330    EC-ECHOCARDIOGRAM COMPLETE W/O CONT    Result Date: 9/8/2022  Transthoracic Echo Report Echocardiography Laboratory CONCLUSIONS No prior study is available for comparison. Technically difficult study - adequate information is obtained. Probable vegetation seen on the tricuspid valve in subcostal view. Normal left ventricular systolic function. The left ventricular ejection  fraction is visually estimated to be 60%. Normal diastolic function. The right ventricle is normal in size and systolic function. Mild aortic insufficiency. These findings were communicated to the medical service. RUDDY REBOLLEDO Exam Date:         2022                    14:12 Exam Location:     Echo Lab Priority:          Routine Ordering Physician:        CATHI CAMARENA Referring Physician: Sonographer:               Sissy Ontiveros Age:    51     Gender:    F MRN:    [id number] :    1970 BSA:    1.65   Ht (in):    64.6   Wt (lb):    132 Exam Type:     Complete Indications:     Bacteremia ICD Codes: CPT Codes:       86041 BP:   113    /   77     HR:   117 Technical Quality:       Technically difficult study -                          adequate information is obtained MEASUREMENTS  (Male / Female) Normal Values 2D ECHO LV Diastolic Diameter PLAX        4.2 cm                4.2 - 5.9 / 3.9 - 5.3 cm LV Systolic Diameter PLAX         3 cm                  2.1 - 4.0 cm IVS Diastolic Thickness           0.67 cm               LVPW Diastolic Thickness          0.68 cm               LVOT Diameter                     2 cm                  Aortic Root Diameter              2.6 cm                Estimated LV Ejection Fraction    60 %                  IVC Diameter                      1.8 cm                DOPPLER AV Peak Velocity                  2.3 m/s               AV Peak Gradient                  21.6 mmHg             AV Mean Gradient                  14.3 mmHg             AI Pressure Half Time             500 ms                LVOT Peak Velocity                1.4 m/s               AV Area Cont Eq vti               1.5 cm2               MV Velocity Time Integral         13.7 cm               Mitral E Point Velocity           0.61 m/s              Mitral E to A Ratio               0.65                  MV Pressure Half Time             45.5 ms               MV Area PHT                        4.8 cm2               MV Deceleration Time              157 ms                TR Peak Velocity                  186 cm/s              Right Atrial Pressure             8 mmHg                Right Ventricular Systolic Press  21.8 mmHg             PV Peak Velocity                  1 m/s                 PV Peak Gradient                  4.3 mmHg              RVOT Peak Velocity                0.65 m/s              * Indicates values subject to auto-interpretation LV EF:  60    % FINDINGS Left Ventricle The left ventricular ejection fraction is visually estimated to be 60%. Normal left ventricular systolic function. Normal left ventricular wall thickness. Normal left ventricular chamber size. Abnormal regional wall motion. Abnormal septal motion. Normal diastolic function. Right Ventricle The right ventricle is normal in size and systolic function. Right Atrium The right atrium is normal in size. Normal inferior vena cava size without inspiratory collapse. Left Atrium The left atrium is normal in size. Left atrial volume index is 10 mL/sq m. Mitral Valve Structurally normal mitral valve without significant stenosis. Trace mitral regurgitation. Aortic Valve Mild aortic insufficiency. Pressure half time is 500 msec. Aortic valve sclerosis without significant stenosis. Tricuspid aortic valve. Tricuspid Valve Structurally normal tricuspid valve without significant stenosis. Mild tricuspid regurgitation. Right atrial pressure is estimated to be 8 mmHg. Estimated right ventricular systolic pressure is 20 mmHg. Probable vegetation seen on the tricuspid valve in subcostal view. Pulmonic Valve Structurally normal pulmonic valve without significant stenosis or regurgitation. Pericardium Plural effusion present. Aorta Normal aortic root for body surface area. The ascending aorta diameter is 2.9 cm. Mark Sykes MD (Electronically Signed) Final Date:     08 September 2022                 17:56    MRI  7/31/2022  FINDINGS:  Spinal column:  There is decreased T1/T2 signal noted within the vertebral marrow homogeneously throughout the spinal column, raising the question of infiltrative disease, such as myelofibrosis.     No destructive osseous process is appreciated.     Vertebral body morphology is normal.     No fracture is identified..     Spinal cord:     The conus medullaris is somewhat low, noted the level of L2-L3.     I see no evidence of syrinx.     Distal spinal cord appears unremarkable.     Cauda equina appear unremarkable.     Intervertebral disks:     At the level of L1-L2, the intervertebral disk, the central spinal canal, and the neural foramina appear normal.     At the level of L2-L3,  there is mild diffuse disc desiccation. There is minimal facet arthrosis.     Central spinal canal and neural foramina appear normal.     At the level of L3-L4, there is mild disc desiccation. There is a minimal circumferential disc bulge as well as minimal facet arthrosis. There is no significant foraminal, or central canal narrowing.     At the level of L4-L5, there is mild disc desiccation.     There is mild facet arthrosis.     Central spinal canal and neural foramina appear normal.     At the level of L5-S1, there is mild disc space narrowing.  There is mild disc desiccation.  Central spinal canal and neural foramina appear normal.     IMPRESSION:     I see no evidence of destructive osseous process and no evidence of significant narrowing of central spinal canal or the neural foramina.       Micro:  Results       Procedure Component Value Units Date/Time    CULTURE TISSUE W/ GRM STAIN [[id number]] Collected: 09/16/22 1048    Order Status: Completed Specimen: Tissue Updated: 09/17/22 1602     Significant Indicator NEG     Source TISS     Site tricuspid vegetation     Culture Result Culture in progress.     Gram Stain Result Few WBCs.  Moderate Gram positive cocci.      Narrative:      Surgery Specimen     "Anaerobic Culture [[id number]] Collected: 09/16/22 1048    Order Status: Completed Specimen: Tissue Updated: 09/17/22 1602     Significant Indicator NEG     Source TISS     Site tricuspid vegetation     Culture Result Culture in progress.    Narrative:      Surgery Specimen    Fungal Culture [[id number]] Collected: 09/16/22 1048    Order Status: Completed Specimen: Tissue Updated: 09/17/22 1602     Significant Indicator NEG     Source TISS     Site tricuspid vegetation     Culture Result Culture in progress.     Fungal Smear Results No fungal elements seen.    Narrative:      Surgery Specimen    AFB Culture [[id number]] Collected: 09/16/22 1048    Order Status: Completed Specimen: Tissue Updated: 09/17/22 1602     Significant Indicator NEG     Source TISS     Site tricuspid vegetation     Culture Result Culture in progress.     AFB Smear Results No acid fast bacilli seen.    Narrative:      Surgery Specimen    BLOOD CULTURE [[id number]]  (Abnormal)  (Susceptibility) Collected: 09/10/22 1651    Order Status: Completed Specimen: Blood from Peripheral Updated: 09/17/22 0841     Significant Indicator POS     Source BLD     Site PERIPHERAL     Culture Result Growth detected by Bactec instrument. 09/15/2022  01:57      Enterococcus faecalis    Narrative:      CALL  [place]  171 tel. [phone],  CALLED  171 tel. [phone] 09/15/2022, 02:02, RB PERF. RESULTS CALLED TO: RN  [phone]  Per Hospital Policy: Only change Specimen Src: to \"Line\" if  specified by physician order.  Left AC    Susceptibility       Enterococcus faecalis (1)       Antibiotic Interpretation Microscan   Method Status    Daptomycin Sensitive 1 mcg/mL JOHN Final    Gent Synergy Sensitive <=500 mcg/mL JOHN Final    Ampicillin Sensitive <=2 mcg/mL JOHN Final    Vancomycin Sensitive 1 mcg/mL JOHN Final    Penicillin Sensitive 1 mcg/mL JOHN Final                       Acid Fast Stain [[id number]] Collected: 09/16/22 1048    Order Status: Completed Specimen: Tissue Updated: " "09/16/22 2022     Significant Indicator NEG     Source TISS     Site tricuspid vegetation     AFB Smear Results No acid fast bacilli seen.    Narrative:      Surgery Specimen    GRAM STAIN [[id number]]  (Abnormal) Collected: 09/16/22 1048    Order Status: Completed Specimen: Tissue Updated: 09/16/22 2022     Significant Indicator .     Source TISS     Site tricuspid vegetation     Gram Stain Result Few WBCs.  Moderate Gram positive cocci.      Narrative:      Surgery Specimen    Fungal Smear [[id number]] Collected: 09/16/22 1048    Order Status: Completed Specimen: Tissue Updated: 09/16/22 2022     Significant Indicator NEG     Source TISS     Site tricuspid vegetation     Fungal Smear Results No fungal elements seen.    Narrative:      Surgery Specimen    BLOOD CULTURE [[id number]] Collected: 09/10/22 1651    Order Status: Completed Specimen: Blood from Peripheral Updated: 09/15/22 1900     Significant Indicator NEG     Source BLD     Site PERIPHERAL     Culture Result No growth after 5 days of incubation.    Narrative:      Per Hospital Policy: Only change Specimen Src: to \"Line\" if  specified by physician order.  Right AC    Urinalysis [[id number]]     Order Status: Canceled Specimen: Urine     BLOOD CULTURE [[id number]] Collected: 09/13/22 0815    Order Status: Completed Specimen: Blood from Peripheral Updated: 09/14/22 0729     Significant Indicator NEG     Source BLD     Site PERIPHERAL     Culture Result No Growth  Note: Blood cultures are incubated for 5 days and  are monitored continuously.Positive blood cultures  are called to the RN and reported as soon as  they are identified.      Narrative:      Per Hospital Policy: Only change Specimen Src: to \"Line\" if  specified by physician order.  Left AC    BLOOD CULTURE [[id number]] Collected: 09/13/22 1446    Order Status: Completed Specimen: Blood from Peripheral Updated: 09/14/22 0729     Significant Indicator NEG     Source BLD     Site PERIPHERAL     Culture " "Result No Growth  Note: Blood cultures are incubated for 5 days and  are monitored continuously.Positive blood cultures  are called to the RN and reported as soon as  they are identified.      Narrative:      Per Hospital Policy: Only change Specimen Src: to \"Line\" if  specified by physician order.  Left AC            Assessment/Hospital course:  This is a 51-year-old female patient who presented to [place] with several weeks of fevers, malaise, cough, TTE noted a vegetation of the tricuspid valve.  She was transferred to [facility] for KEO.    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  Patent foramen ovale  Septic pulmonary emboli  E faecalis bacteremia  Neck and back pain     PLAN:   -Patient was taken to the OR by cardiac surgery on 9/16, underwent, aortic valve repair, tricuspid valve repair, patent foramen ovale closure  -GI was consulted to look for potential source and colonoscopy was performed, sigmoid polypectomy performed  -Given neck, mid and lower back pain, recommend MRI of the whole spine -pending (patient with claustrophobia, if possible would do it before extubation, otherwise may need sedation later)  -Continue dual beta-lactam therapy with IV ampicillin 2 g every 4 hours + IV ceftriaxone 2 g every 12 hours.  Anticipate 6 weeks through 10/27/2022  -At least weekly CBC with differential, CMP while on IV antibiotics.    -Chest x-ray from today with some increasing right-sided pleural effusion.  Plan is for chest tube placement    Disposition: Will need placement for IV antibiotic plan as above  Need for PICC line: Yes, okay to place    Discussed with Dr. Banda.  ID will follow.  Please call with questions.  "

## 2022-09-18 NOTE — PROGRESS NOTES
Cardiovascular Surgery Progress Note    Name: Sarah Buckner  MRN: [id number]  : 1970  Admit Date: 2022  2:08 PM  2 Days Post-Op     Procedure:  Procedure(s) and Anesthesia Type:     * TRICUSPID VALVE REPAIR, PATENT FORAMEN OVALE CLOSURE, TRANSESOPHAGEAL ESCHOCARDIOGRAM - General     * ECHOCARDIOGRAM, TRANSESOPHAGEAL, INTRAOPERATIVE - General    Vitals:  Vitals:    22 0415 22 0600 22 0700 22 0800   BP: (!) 94/51 111/59 118/58 103/49   Pulse: 70 71 64 71   Resp: (!) 34 (!) 40 (!) 24 18   Temp:    35.8 °C (96.5 °F)   TempSrc:       SpO2: 96% 94% 97% 97%   Weight:  71.4 kg (157 lb 6.5 oz)     Height:          Temp (24hrs), Av.7 °C (98 °F), Min:35.8 °C (96.5 °F), Max:37 °C (98.6 °F)      Respiratory:  Vent Mode: Spont, PEEP/CPAP: 8 Respiration: 18, Pulse Oximetry: 97 %       Fluids:    Intake/Output Summary (Last 24 hours) at 2022 0848  Last data filed at 2022 0600  Gross per 24 hour   Intake 1906.14 ml   Output 714 ml   Net 1192.14 ml       Admit weight: Weight: 66.1 kg (145 lb 12.8 oz)  Current weight: Weight: 71.4 kg (157 lb 6.5 oz) (22 0600)    Labs:  Recent Labs     22  0314 22  1445 22  0117 22  0500   WBC 11.9*  --  23.8* 16.8*   RBC 3.34*  --  2.60* 2.59*   HEMOGLOBIN 9.4*  --  7.5* 6.9*   HEMATOCRIT 29.2*  --  23.0* 21.8*   MCV 87.4  --  88.5 84.2   MCH 28.1  --  28.8 26.6*   MCHC 32.2*  --  32.6* 31.7*   RDW 50.3*  --  51.5* 68.7*   PLATELETCT 604* 315 345 315   MPV 9.6  --  9.9 10.8       Recent Labs     22  1445 22  0117 22  1130 22  0500   SODIUM 136  --  145  --  139   POTASSIUM 3.9   < > 4.8 4.7 4.3   CHLORIDE 101  --  111  --  108   CO2 25  --  23  --  21   GLUCOSE 94  --  136*  --  121*   BUN 3*  --  10  --  19   CREATININE 0.35*  --  0.67  --  0.79   CALCIUM 8.7  --  8.2*  --  7.8*    < > = values in this interval not displayed.       Recent Labs     22  144    APTT 30.4 83.9*   INR 1.29* 1.85*           Medications:  Scheduled Medications   Medication Dose Frequency    insulin regular  2-9 Units 4X/DAY ACHS    aspirin EC  81 mg DAILY    Pharmacy Consult Request  1 Each PHARMACY TO DOSE    acetaminophen  1,000 mg Q6HRS    senna-docusate  2 Tablet BID    And    polyethylene glycol/lytes  1 Packet DAILY    And    magnesium hydroxide  30 mL DAILY    omeprazole  20 mg DAILY    mupirocin  1 Application BID    ampicillin  2,000 mg Q4HRS    cefTRIAXone (ROCEPHIN) IV  2 g Q12HRS    guaiFENesin ER  1,200 mg Q12HRS          Exam:   Physical Exam  Vitals and nursing note reviewed.   Constitutional:       General: She is not in acute distress.     Appearance: She is normal weight. She is ill-appearing.      Interventions: She is intubated.   HENT:      Head: Normocephalic and atraumatic.      Right Ear: External ear normal.      Left Ear: External ear normal.      Nose: Nose normal.      Mouth/Throat:      Mouth: Mucous membranes are moist.   Eyes:      Extraocular Movements: Extraocular movements intact.      Conjunctiva/sclera: Conjunctivae normal.      Pupils: Pupils are equal, round, and reactive to light.   Cardiovascular:      Rate and Rhythm: Normal rate and regular rhythm.      Pulses: Normal pulses.      Heart sounds: Normal heart sounds.   Pulmonary:      Effort: Pulmonary effort is normal. She is intubated.   Abdominal:      General: Abdomen is flat. Bowel sounds are normal. There is no distension.      Palpations: Abdomen is soft.   Musculoskeletal:         General: Normal range of motion.      Cervical back: Normal range of motion and neck supple.   Skin:     General: Skin is warm and dry.      Capillary Refill: Capillary refill takes less than 2 seconds.      Comments: Prevena to self suction.   Neurological:      General: No focal deficit present.      Mental Status: She is alert and oriented to person, place, and time. Mental status is at baseline.      Sensory: No  sensory deficit.      Motor: No weakness.   Psychiatric:         Mood and Affect: Mood normal.         Behavior: Behavior normal.         Thought Content: Thought content normal.       Cardiac Medications:    ASA - Yes    Plavix - No; contraindicated because of Other No CAD    Post-operative Beta Blockers - No; contraindicated because of Hypotension    Ace/ARB- No; contraindicated because of Normal EF    Statin - No; contraindicated because of No CAD    Aldactone- No; contraindicated because of Normal EF    Ejection Fraction:  60%    Telemetry:   9/17 SR/ST 60-120s  9/18 SB/SR oPVC    Assessment/Plan:  POD 1 Intubated, on levo 2mcg/vaso gtt.  Neuro intact.  Abdomen soft.  Minimal output from chest tubes.  Cr stable. Hgb 7.5 overnight, given 1 unit PRBC, now 8.5.  UO decreased this AM. Plan: Wean ventilator as tolerated.  Albumin 5% 25g once.  Wean vasopressors as tolerated.  Encourage IS when extubated.    POD 2 Extubated on 2LNC.  On levo/vaso gtt.  Neuro intact.  Abdomen soft nontender.  Cr 0.79 borderline UO overnight.  Hgb down to 6.9.  CXR with increased right pleural effusion this AM.  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  Discussed possible need for right chest tube with Dr Banda.  Plan: Chest tube today.  Wean oxygen as tolerated.  2 units PRBC today.  Wean vasopressors as tolerated.  Xanax prn for anxiety.    Disposition:  TBD

## 2022-09-18 NOTE — PROGRESS NOTES
"Critical Care Progress Note    Date of admission  9/9/2022    Chief Complaint  51 y.o. female admitted 9/9/2022 with     Hospital Course  \"This lady has a history of chronic back pain, primary hypertension and urolithiasis.  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  She has developed septic pulmonary emboli and has been treated with ampicillin and ceftriaxone per infectious diseases.  Today Dr. Bradley took him to the operating theater and performed radical tricuspid valve repair, patent foramen ovale closure and aortic valve repair.  She is now in the cardiac surgery unit.\"  9/17-patient was unable to be extubated overnight due to decreased mental status, still on pressors.   9/18-patient extubated, doing well 4 L oxygen, still requiring pressors.     Interval Problem Update  Reviewed last 24 hour events:  Extubated yesterday afternoon  ABG after extubation within normal limits  Saturating well on 2 L  Overnight respiratory rate in the teens, Precedex was titrated off.   Woke up this morning respiratory rate in the 30s, with some anxiety-consider restarting Precedex versus other antianxiety medication  Still on 2 of nor epi and vaso.  Borderline low urine output overnight 300  CVP between 8-12.   Afebrile, heart rate 40s to 70s  Leukocytosis improving  Continued decrease in hemoglobin-transfusions by CVS 1 unit yesterday hemoglobin 6.9 today  -141 from chest tube overnight  Repeat chest x-ray  Needs MRI 1 pacer wires removed  Creatinine still elevated from baseline(baseline 0.35 about double)    \Worsening pleural effusion on chest x-ray,, right greater than left  Continued decrease in hemoglobin  Discussion with CVT plan for chest tube, request intensivist place chest tube, prior to doing this discussed concern that this might be a hemothorax so needs a surgical chest " tube,  also discussed concerns that patient with very peripheral septic emboli cannonball lesions with air-fluid levels from 10 to 11 days ago, concerned that they may have continued to evolve and possible abscess formation.  Asked if CT prior to evaluate lung parenchyma appropriate, did not want at this time, just at requested we place a surgical chest tube.       Patient extremely anxious unable to sit still hyperventilating tachycardic, even on Precedex almost screaming as soon as I entered the room.  Discussed with her options she asks can she just put me out again.  As patient was continuously moving would be more dangerous procedure discussed option of conscious sedation, patient agreed understanding risks.     Chest tube was placed, with large amount of bloody output, significant respiratory variation, unfortunately when tube placed ended up pointing upward so full effusion was not drained.  IR for drainage.  We will remove chest tube after IR chest tube was placed as chest tube is still draining bloody fluid at this time.       Review of Systems  ROS patient extremely anxious, planes of chest pain not being able to breathe deeply due to it, shortness of breath, and overall just not feeling well    Vital Signs for last 24 hours   Temp:  [35.8 °C (96.5 °F)-37 °C (98.6 °F)] 36.3 °C (97.4 °F)  Pulse:  [56-88] 64  Resp:  [16-55] 20  BP: ()/(47-64) 102/55  SpO2:  [89 %-100 %] 94 %    Hemodynamic parameters for last 24 hours  CVP:  [4 MM HG-22 MM HG] 5 MM HG    Respiratory Information for the last 24 hours       Physical Exam   Physical Exam  Vitals and nursing note reviewed.   Constitutional:       General: She is not in acute distress.     Appearance: She is not ill-appearing, toxic-appearing or diaphoretic.      Comments: Extremely anxious tachycardic tachypneic moving around continuously, almost screaming in fair   HENT:      Nose: No rhinorrhea.      Comments: nasal cannula oxygen  Eyes:      General: No  scleral icterus.     Extraocular Movements: Extraocular movements intact.      Pupils: Pupils are equal, round, and reactive to light.   Cardiovascular:      Rate and Rhythm: Tachycardia present.      Heart sounds: No murmur heard.  Pulmonary:      Effort: No respiratory distress.      Breath sounds: Rhonchi present.      Comments: Tachypnea shallow breathing extremely anxious, improved deep breathing when asleep or with sedation with Precedex  Abdominal:      General: There is no distension.      Palpations: Abdomen is soft.      Tenderness: There is no abdominal tenderness.   Musculoskeletal:         General: No swelling.   Skin:     Coloration: Skin is pale. Skin is not jaundiced.      Comments: No osler or Janeway lesions   Neurological:      General: No focal deficit present.      Mental Status: She is alert and oriented to person, place, and time.   Psychiatric:      Comments: Extreme anxiety       Medications  Current Facility-Administered Medications   Medication Dose Route Frequency Provider Last Rate Last Admin    ALPRAZolam (XANAX) tablet 0.25 mg  0.25 mg Oral TID PRN ZOYA SamsonP.R.N.        gabapentin (NEURONTIN) capsule 100 mg  100 mg Oral TID Debbie Banda M.D.        LIDOCAINE HCL 2 % INJ SOLN             norepinephrine (Levophed) 8 mg in 250 mL NS infusion (premix)  0-30 mcg/min Intravenous Continuous Truong Bradley, D.O. 5.6 mL/hr at 09/18/22 1153 3 mcg/min at 09/18/22 1153    vasopressin (Vasostrict) 20 Units in  mL Infusion  0.03 Units/min Intravenous Continuous Truong Bradley, D.O. 9 mL/hr at 09/18/22 0700 0.03 Units/min at 09/18/22 0700    Respiratory Therapy Consult   Nebulization Continuous RT Joshua Goodson A.P.R.N.        NS infusion   Intravenous Continuous ZOYA SamsonP.R.N. 10 mL/hr at 09/18/22 0700 Rate Verify at 09/18/22 0700    electrolyte-A (PLASMALYTE-A) infusion   Intravenous PRN ZOYA SamsonP.R.N. 999 mL/hr at 09/16/22 1945 New Bag at 09/16/22  1945    aspirin EC (ECOTRIN) tablet 81 mg  81 mg Oral DAILY Joshua Goodson, A.P.R.N.   81 mg at 09/18/22 0613    Pharmacy Consult Request ...Pain Management Review 1 Each  1 Each Other PHARMACY TO DOSE Joshua Goodson, A.P.R.N.        acetaminophen (TYLENOL) tablet 1,000 mg  1,000 mg Oral Q6HRS Joshua Goodson, A.P.R.N.   1,000 mg at 09/18/22 0613    Followed by    [START ON 9/21/2022] acetaminophen (TYLENOL) tablet 1,000 mg  1,000 mg Oral Q6HRS PRN Joshua Goodson, A.P.R.N.        traMADol (Ultram) 50 MG tablet 50 mg  50 mg Oral Q4HRS PRN Joshua Goodson, A.P.R.N.        dexmedetomidine (PRECEDEX) 400 mcg/100mL NS premix infusion  0-1.5 mcg/kg/hr Intravenous Continuous Joshua Goodson A.P.R.N. 9.7 mL/hr at 09/18/22 0906 0.6 mcg/kg/hr at 09/18/22 0906    ondansetron (ZOFRAN) syringe/vial injection 8 mg  8 mg Intravenous Q6HRS PRN Joshua Goodson, A.P.R.N.   8 mg at 09/16/22 1959    Or    prochlorperazine (COMPAZINE) injection 10 mg  10 mg Intravenous Q6HRS PRN Joshua Goodson, A.P.R.N.   10 mg at 09/16/22 2222    Or    promethazine (PHENERGAN) suppository 25 mg  25 mg Rectal Q6HRS PRN Joshua Goodson, A.P.R.N.        acetaminophen (Tylenol) tablet 650 mg  650 mg Oral Q4HRS PRN Joshua Goodson, A.P.R.N.        Or    acetaminophen (TYLENOL) suppository 650 mg  650 mg Rectal Q4HRS PRN Joshua Goodson, A.P.R.N.        senna-docusate (PERICOLACE or SENOKOT S) 8.6-50 MG per tablet 2 Tablet  2 Tablet Oral BID Joshua Goodson, A.P.R.N.   2 Tablet at 09/18/22 0612    And    polyethylene glycol/lytes (MIRALAX) PACKET 1 Packet  1 Packet Oral DAILY Joshua Goodson, A.P.R.N.   1 Packet at 09/18/22 0613    And    magnesium hydroxide (MILK OF MAGNESIA) suspension 30 mL  30 mL Oral DAILY Joshua Goodson, A.P.R.N.   30 mL at 09/18/22 0614    And    bisacodyl (DULCOLAX) suppository 10 mg  10 mg Rectal QDAY PRN Joshua Goodson, A.P.R.N.        omeprazole (PRILOSEC) capsule 20 mg  20 mg Oral DAILY Joshua Goodson, A.P.R.N.   20 mg at 09/18/22 0613    mag hydrox-al  hydrox-simeth (MAALOX PLUS ES or MYLANTA DS) suspension 30 mL  30 mL Oral Q4HRS PRN MARA Samson.P.RConcettaN.        mupirocin (BACTROBAN) 2 % ointment 1 Application  1 Application Topical BID ZOYA SamsonP.RConcettaNConcetta   1 Application at 09/18/22 0614    oxyCODONE immediate-release (ROXICODONE) tablet 5 mg  5 mg Oral Q3HRS PRSHELLI Rosario M.D.   5 mg at 09/18/22 0211    Or    oxyCODONE immediate release (ROXICODONE) tablet 10 mg  10 mg Oral Q3HRS PRN Kassandra Rosario M.D.   10 mg at 09/18/22 0943    Or    HYDROmorphone (Dilaudid) injection 0.5 mg  0.5 mg Intravenous Q3HRS PRN Kassandra Rosario M.D.   0.5 mg at 09/17/22 1153    ampicillin (Omnipen) 2,000 mg in  mL IVPB  2,000 mg Intravenous Q4HRS MARA Samson.P.RConcettaN.   Stopped at 09/18/22 1015    cefTRIAXone (Rocephin) syringe 2 g  2 g Intravenous Q12HRS Maria Luisa Wyatt M.D.   2 g at 09/18/22 0632    guaiFENesin ER (MUCINEX) tablet 1,200 mg  1,200 mg Oral Q12HRS Antonio Goodman M.D.   1,200 mg at 09/18/22 0612       Fluids    Intake/Output Summary (Last 24 hours) at 9/18/2022 1524  Last data filed at 9/18/2022 0930  Gross per 24 hour   Intake 1270.24 ml   Output 462 ml   Net 808.24 ml       Laboratory  Recent Labs     09/16/22  1701 09/16/22  1813 09/17/22  0016 09/17/22  0839 09/17/22  1421   ISTATAPH 7.312*   < > 7.440 7.401 7.371*   ISTATAPCO2 47.2*   < > 36.5 36.4 42.8*   ISTATAPO2 83   < > 95* 66 65   ISTATATCO2 25   < > 26 24 26   [id number] 95   < > 98 93 92*   ISTATARTHCO3 23.9   < > 24.8 22.6 24.8   ISTATARTBE -2   < > 1 -2 -1   ISTATTEMP 35.0 C   < > 35.5 C 36.1 C 36.6 C   ISTATFIO2 30  --  30 30  --    ISTATSPEC Arterial   < > Arterial Arterial Arterial   ISTATAPHTC 7.341*   < > 7.462 7.414 7.377*   [id number] 73   < > 87 62* 63*    < > = values in this interval not displayed.         Recent Labs     09/16/22  0314 09/16/22  1445 09/16/22  1707 09/17/22  0117 09/17/22  1130 09/18/22  0500   SODIUM 136  --   --  145  --   139   POTASSIUM 3.9 4.5   < > 4.8 4.7 4.3   CHLORIDE 101  --   --  111  --  108   CO2 25  --   --  23  --  21   BUN 3*  --   --  10  --  19   CREATININE 0.35*  --   --  0.67  --  0.79   MAGNESIUM  --  3.2*  --  3.0*  --   --    CALCIUM 8.7  --   --  8.2*  --  7.8*    < > = values in this interval not displayed.     Recent Labs     09/16/22 0314 09/17/22 0117 09/18/22  0500   ALTSGPT 16  --   --    ASTSGOT 15  --   --    ALKPHOSPHAT 397*  --   --    TBILIRUBIN 0.3  --   --    GLUCOSE 94 136* 121*     Recent Labs     09/16/22 0314 09/17/22 0117 09/18/22  0500 09/18/22  1200   WBC 11.9* 23.8* 16.8* 16.5*   NEUTSPOLYS 80.80*  --   --   --    LYMPHOCYTES 13.90*  --   --   --    MONOCYTES 4.30  --   --   --    EOSINOPHILS 0.00  --   --   --    BASOPHILS 0.30  --   --   --    ASTSGOT 15  --   --   --    ALTSGPT 16  --   --   --    ALKPHOSPHAT 397*  --   --   --    TBILIRUBIN 0.3  --   --   --      Recent Labs     09/16/22 0314 09/16/22  1445 09/17/22 0117 09/18/22  0500 09/18/22  1200   RBC 3.34*  --  2.60* 2.59* 3.33*   HEMOGLOBIN 9.4*  --  7.5* 6.9* 9.4*   HEMATOCRIT 29.2*  --  23.0* 21.8* 28.6*   PLATELETCT 604* 315 345 315 285   PROTHROMBTM 15.9* 20.9*  --   --  20.7*   APTT 30.4 83.9*  --   --  52.4*   INR 1.29* 1.85*  --   --  1.83*       Imaging  X-Ray:  I have personally reviewed the images and compared with prior images.  EKG:  I have personally reviewed the images and compared with prior images.    Assessment/Plan  * Infective endocarditis of tricuspid valve- (present on admission)  Assessment & Plan  Associated septic pulmonary emboli, status post surgery  Due to Enterococcus faecalis  Continue with ampicillin and ceftriaxone per ID  Patient has been having worsening back pain, ID recommending full spinal MRI when able, currently with pacer wires in place    Pleural effusion  Assessment & Plan  Bilateral pleural effusions worsening few days prior to CT surgery, during surgery effusions apparently were  drained about a liter on each side brown dirty color  Discussed with CV surgery prior to extubation as patient was tachypneic with low volumes, possibility of getting a CT to evaluate complexity of effusion, and then either Thora or chest tube placement, CV surgery was not concerned at that time and did not feel chest tube or Thora was needed.,  Over the last day patient has continued to have decrease in hemoglobin, pleural effusions have worsened especially on right  Request by CV surgery for intensivist to place surgical chest tube on right side  Patient extremely anxious tachypneic tachycardic diaphoretic almost screaming in for every time she sees physician as she is scared to have chest tube placed, asking to be put out because she cannot handle the thought of having another tube placed.  Precedex was started but minimal improvement patient continued to have worsening symptoms and hyperventilation with low lung volumes and worsening oxygen.  Discussed risk and benefits decision to use some small amounts of conscious sedation to help patient tolerate.  However with lung disease from septic emboli this is high risk explained to patient.  She understood but asked to please continue.   Procedure was done, overall patient received 75 of fentanyl, and 4 Versed throughout procedure, still had significant time where she was awake and moving during procedure.  Very difficult procedure very small intercostal space was unable to place a large bore surgical chest tube, placed 28-gauge chest tube.    Around 300 of dark blood came out during placement another 1 tenderness, will do chest tube.   Unfortunately met resistance after placing tube stopped with pressure, as patient with septic emboli and has not had CT in 10 days, did have large septic emboli cannonball lesion around this area, though only blunt instruments used for procedure some concern with underlying lung parenchyma having so much underlying disease, possible  chest tube was able to infiltrate.  Chest x-ray noted port of chest tube  at rib space, chest tube also facing superiorly instead of inferiorly need effusion noted  -Discussed with CV surgery possibility of trying to re target chest tube and effusion, they requested IR placement  Discussed with IR on phone patient will be taken today for IR chest tube as has hemothorax, patient n.p.o. did discuss patient likely will need sedation, and difficulties with chest tube earlier, and concern about other tube discussed not taking this out until after IR chest tube placed.      S/P aortic valve repair  Assessment & Plan  On 9/16 by Dr. Bradlye    S/P patent foramen ovale closure  Assessment & Plan  On 9/16 by Dr. Bradley    S/P tricuspid valve repair  Assessment & Plan  On 9/16 by Dr. Bradley    Encounter for weaning from ventilator (HCC)  Assessment & Plan  Intubated 9/16  ABCDEF bundle  SAT/SBT as appropriate  I am weaning the ventilator based upon hemodynamics and cardiopulmonary status  Not able to be extubated at 6-hour madisyn due to tachypnea, borderline nif and volumes  Patient with significant pulmonary dysfunction secondary to septic emboli, significant inflammation and consolidations throughout lung fields on 9/8 CT chest   And patient has had continued endocarditis with likely continued emboli since then  Patient also with bilateral pleural effusions-discussed with CVT around 1 L was drained yesterday during surgery, murky color fluid was not sent for evaluation, unclear if due to fluid overload, versus parapneumonic effusion due to septic emboli versus empyema(did not sound like it was pus when drained yesterday discussion with CVS APRN)  Very unlikely patient will need perfect parameters as underlying lung parenchyma is extremely inflamed  When sleeping patient's respiratory rate between 25-30, with volumes in the 300s.  As soon as patient wakes up respiratory rate goes up to the high 30s to 40s and  volumes decrease patient appears extremely anxious every time she wakes up despite Precedex, also was treated for pain and continued tachypnea when awake.     Discussion with CVS, could consider CT scanning to evaluate patient's underlying lung parenchyma, and possible chest tube versus Thora to hopefully increase patient's lung capacity, versus trial of extubation as patient's parameters do meet criteria though very borderline when she is sleeping, and could be due to anxiety from intubation especially as worsens when awakes.  Did note patient would be a high risk extubation with a possibility of need for reintubation, would extubate to high flow nasal cannula to help support patient postextubation.     Also discussed ID had recommended MRI of full spine to evaluate for septic metastases to the spine as patient has been having pain however patient had not been able to do this due to claustrophobia.  Discussed option of waiting for another day intubated 1 pacing wire removed having MRI done and ability to sedate appropriately while intubated.     After full discussion with CT surgery, decision for trial of extubation at this time, extubated to high flow  Respiratory rate in the high 20s, no signs of respiratory distress or increased work of breathing, satting 100% will titrate down high flow as tolerated should keep liters per minute elevated for at least a couple hours.      TAZ (acute kidney injury) (HCC)- (present on admission)  Assessment & Plan  Creatinine doubled after his procedure, CVP low, and low urine output  Was given albumin and multiple transfusions    Anemia- (present on admission)  Assessment & Plan  Transfusing for hemoglobins less than 8 per CVS protocol  Continue to decrease down to 6.9 status post 2 units today  With increasing pleural effusion some concern for possible hemothorax  When chest tube placed significant amount of bloody fluid returned  CV surgery aware of this  Repeat CBC and teg  ordered        VTE:  Not Indicated patient not on DVT prophylaxis we will discuss when able to start with CV surgery  Ulcer: PPI   Lines: Central Line  Ongoing indication addressed, Arterial Line  Ongoing indication addressed, and Montague Catheter  Ongoing indication addressed    I have performed a physical exam and reviewed and updated ROS and Plan today (9/18/2022). In review of yesterday's note (9/17/2022), there are no changes except as documented above.     Discussed patient condition and risk of morbidity and/or mortality with RN, RT, Pharmacy, Charge nurse / hot rounds, and Patient CVS  The patient remains critically ill.  Critical care time = 50 minutes in directly providing and coordinating critical care and extensive data review.  No time overlap and excludes procedures.

## 2022-09-18 NOTE — THERAPY
09/18/22 0987   Interdisciplinary Plan of Care Collaboration   Collaboration Comments OT eval held as patient preparing for chest tube.Will continue to follow

## 2022-09-18 NOTE — PROGRESS NOTES
Pt presents to ct 4 . Pt was consented by MD at bedside, confirmed by this RN and consent at bedside. Pt transferred to ct  table in supine position. Patient underwent a right sided chest tube placement by Dr. Conte. Procedure site was marked by MD and verified using imaging guidance. Pt placed on monitor, prepped and draped in a sterile fashion. Vitals were taken every 5 minutes and remained stable during procedure (see doc flow sheet for results). CO2 waveform capnography was monitored and remained WNL throughout procedure. Report called to Zachary THACKER. Pt transported by [facility] with RN to [id number].     Procedure canceled intra procedure by dr Conte due to the safety of the pt     No chest tube placed

## 2022-09-18 NOTE — OR SURGEON
Immediate Post- Operative Note        Findings: aborted RIGHT chest tube placement      Procedure(s): TINY R hemopneumothorax, not safe to access as patient would not remain still and collection is TINY.       Estimated Blood Loss: Less than 5 ml        Complications: None            9/18/2022     4:27 PM     Zay Conte M.D.

## 2022-09-19 ENCOUNTER — APPOINTMENT (OUTPATIENT)
Dept: RADIOLOGY | Facility: MEDICAL CENTER | Age: 52
DRG: 219 | End: 2022-09-19
Attending: NURSE PRACTITIONER
Payer: COMMERCIAL

## 2022-09-19 ENCOUNTER — APPOINTMENT (OUTPATIENT)
Dept: RADIOLOGY | Facility: MEDICAL CENTER | Age: 52
DRG: 219 | End: 2022-09-19
Attending: STUDENT IN AN ORGANIZED HEALTH CARE EDUCATION/TRAINING PROGRAM
Payer: COMMERCIAL

## 2022-09-19 ENCOUNTER — APPOINTMENT (OUTPATIENT)
Dept: RADIOLOGY | Facility: MEDICAL CENTER | Age: 52
DRG: 219 | End: 2022-09-19
Attending: INTERNAL MEDICINE
Payer: COMMERCIAL

## 2022-09-19 PROBLEM — J90 PLEURAL EFFUSION: Status: RESOLVED | Noted: 2022-09-18 | Resolved: 2022-09-19

## 2022-09-19 PROBLEM — R65.21 SEPTIC SHOCK (HCC): Status: ACTIVE | Noted: 2022-09-07

## 2022-09-19 PROBLEM — Z99.11 ENCOUNTER FOR WEANING FROM VENTILATOR (HCC): Status: RESOLVED | Noted: 2022-09-16 | Resolved: 2022-09-19

## 2022-09-19 PROBLEM — F41.9 ANXIETY: Status: ACTIVE | Noted: 2022-09-19

## 2022-09-19 LAB
ANION GAP SERPL CALC-SCNC: 8 MMOL/L (ref 7–16)
BACTERIA SPEC ANAEROBE CULT: NORMAL
BACTERIA TISS AEROBE CULT: ABNORMAL
BACTERIA TISS AEROBE CULT: ABNORMAL
BUN SERPL-MCNC: 20 MG/DL (ref 8–22)
CA-I BLD ISE-SCNC: 1.2 MMOL/L (ref 1.1–1.3)
CALCIUM SERPL-MCNC: 7.7 MG/DL (ref 8.5–10.5)
CHLORIDE SERPL-SCNC: 108 MMOL/L (ref 96–112)
CO2 SERPL-SCNC: 23 MMOL/L (ref 20–33)
CREAT SERPL-MCNC: 0.45 MG/DL (ref 0.5–1.4)
ERYTHROCYTE [DISTWIDTH] IN BLOOD BY AUTOMATED COUNT: 62.7 FL (ref 35.9–50)
GFR SERPLBLD CREATININE-BSD FMLA CKD-EPI: 116 ML/MIN/1.73 M 2
GLUCOSE SERPL-MCNC: 89 MG/DL (ref 65–99)
GRAM STN SPEC: ABNORMAL
HCT VFR BLD AUTO: 29.1 % (ref 37–47)
HCT VFR BLD CALC: 23 % (ref 37–47)
HCT VFR BLD CALC: 23 % (ref 37–47)
HEMOCCULT STL QL: NEGATIVE
HGB BLD-MCNC: 7.8 G/DL (ref 12–16)
HGB BLD-MCNC: 7.8 G/DL (ref 12–16)
HGB BLD-MCNC: 9.4 G/DL (ref 12–16)
MCH RBC QN AUTO: 27.8 PG (ref 27–33)
MCHC RBC AUTO-ENTMCNC: 32.3 G/DL (ref 33.6–35)
MCV RBC AUTO: 86.1 FL (ref 81.4–97.8)
PLATELET # BLD AUTO: 306 K/UL (ref 164–446)
PMV BLD AUTO: 10.5 FL (ref 9–12.9)
POTASSIUM BLD-SCNC: 4.6 MMOL/L (ref 3.6–5.5)
POTASSIUM SERPL-SCNC: 4.3 MMOL/L (ref 3.6–5.5)
RBC # BLD AUTO: 3.38 M/UL (ref 4.2–5.4)
SIGNIFICANT IND 70042: ABNORMAL
SIGNIFICANT IND 70042: NORMAL
SITE SITE: ABNORMAL
SITE SITE: NORMAL
SODIUM SERPL-SCNC: 139 MMOL/L (ref 135–145)
SOURCE SOURCE: ABNORMAL
SOURCE SOURCE: NORMAL
WBC # BLD AUTO: 15.2 K/UL (ref 4.8–10.8)

## 2022-09-19 PROCEDURE — 02HV33Z INSERTION OF INFUSION DEVICE INTO SUPERIOR VENA CAVA, PERCUTANEOUS APPROACH: ICD-10-PCS | Performed by: INTERNAL MEDICINE

## 2022-09-19 PROCEDURE — A9270 NON-COVERED ITEM OR SERVICE: HCPCS | Performed by: STUDENT IN AN ORGANIZED HEALTH CARE EDUCATION/TRAINING PROGRAM

## 2022-09-19 PROCEDURE — A9270 NON-COVERED ITEM OR SERVICE: HCPCS | Performed by: NURSE PRACTITIONER

## 2022-09-19 PROCEDURE — 700102 HCHG RX REV CODE 250 W/ 637 OVERRIDE(OP): Performed by: INTERNAL MEDICINE

## 2022-09-19 PROCEDURE — 700101 HCHG RX REV CODE 250: Performed by: INTERNAL MEDICINE

## 2022-09-19 PROCEDURE — 71045 X-RAY EXAM CHEST 1 VIEW: CPT

## 2022-09-19 PROCEDURE — 700111 HCHG RX REV CODE 636 W/ 250 OVERRIDE (IP): Performed by: NURSE PRACTITIONER

## 2022-09-19 PROCEDURE — 99024 POSTOP FOLLOW-UP VISIT: CPT | Performed by: NURSE PRACTITIONER

## 2022-09-19 PROCEDURE — 82272 OCCULT BLD FECES 1-3 TESTS: CPT

## 2022-09-19 PROCEDURE — 770022 HCHG ROOM/CARE - ICU (200)

## 2022-09-19 PROCEDURE — 99233 SBSQ HOSP IP/OBS HIGH 50: CPT | Performed by: INTERNAL MEDICINE

## 2022-09-19 PROCEDURE — 700102 HCHG RX REV CODE 250 W/ 637 OVERRIDE(OP): Performed by: STUDENT IN AN ORGANIZED HEALTH CARE EDUCATION/TRAINING PROGRAM

## 2022-09-19 PROCEDURE — 99291 CRITICAL CARE FIRST HOUR: CPT | Performed by: INTERNAL MEDICINE

## 2022-09-19 PROCEDURE — 700105 HCHG RX REV CODE 258: Performed by: NURSE PRACTITIONER

## 2022-09-19 PROCEDURE — A9270 NON-COVERED ITEM OR SERVICE: HCPCS | Performed by: INTERNAL MEDICINE

## 2022-09-19 PROCEDURE — 700102 HCHG RX REV CODE 250 W/ 637 OVERRIDE(OP): Performed by: NURSE PRACTITIONER

## 2022-09-19 PROCEDURE — 700111 HCHG RX REV CODE 636 W/ 250 OVERRIDE (IP): Performed by: INTERNAL MEDICINE

## 2022-09-19 PROCEDURE — 700105 HCHG RX REV CODE 258: Performed by: THORACIC SURGERY (CARDIOTHORACIC VASCULAR SURGERY)

## 2022-09-19 PROCEDURE — 700101 HCHG RX REV CODE 250: Performed by: NURSE PRACTITIONER

## 2022-09-19 PROCEDURE — C1751 CATH, INF, PER/CENT/MIDLINE: HCPCS

## 2022-09-19 PROCEDURE — 85027 COMPLETE CBC AUTOMATED: CPT

## 2022-09-19 PROCEDURE — 80048 BASIC METABOLIC PNL TOTAL CA: CPT

## 2022-09-19 PROCEDURE — 700111 HCHG RX REV CODE 636 W/ 250 OVERRIDE (IP): Performed by: THORACIC SURGERY (CARDIOTHORACIC VASCULAR SURGERY)

## 2022-09-19 RX ORDER — FUROSEMIDE 10 MG/ML
20 INJECTION INTRAMUSCULAR; INTRAVENOUS 2 TIMES DAILY
Status: DISCONTINUED | OUTPATIENT
Start: 2022-09-19 | End: 2022-09-20

## 2022-09-19 RX ORDER — BUSPIRONE HYDROCHLORIDE 10 MG/1
5 TABLET ORAL 3 TIMES DAILY
Status: DISCONTINUED | OUTPATIENT
Start: 2022-09-19 | End: 2022-09-24 | Stop reason: HOSPADM

## 2022-09-19 RX ORDER — POTASSIUM CHLORIDE 20 MEQ/1
10 TABLET, EXTENDED RELEASE ORAL 2 TIMES DAILY
Status: DISCONTINUED | OUTPATIENT
Start: 2022-09-19 | End: 2022-09-20

## 2022-09-19 RX ORDER — KETOROLAC TROMETHAMINE 30 MG/ML
30 INJECTION, SOLUTION INTRAMUSCULAR; INTRAVENOUS EVERY 6 HOURS PRN
Status: DISPENSED | OUTPATIENT
Start: 2022-09-19 | End: 2022-09-24

## 2022-09-19 RX ORDER — ESCITALOPRAM OXALATE 10 MG/1
10 TABLET ORAL DAILY
Status: DISCONTINUED | OUTPATIENT
Start: 2022-09-19 | End: 2022-09-19

## 2022-09-19 RX ADMIN — BUSPIRONE HYDROCHLORIDE 5 MG: 10 TABLET ORAL at 17:35

## 2022-09-19 RX ADMIN — AMPICILLIN SODIUM 2000 MG: 2 INJECTION, POWDER, FOR SOLUTION INTRAVENOUS at 14:11

## 2022-09-19 RX ADMIN — GUAIFENESIN 1200 MG: 600 TABLET, EXTENDED RELEASE ORAL at 17:34

## 2022-09-19 RX ADMIN — GUAIFENESIN 1200 MG: 600 TABLET, EXTENDED RELEASE ORAL at 05:20

## 2022-09-19 RX ADMIN — FUROSEMIDE 20 MG: 10 INJECTION, SOLUTION INTRAMUSCULAR; INTRAVENOUS at 10:02

## 2022-09-19 RX ADMIN — POLYETHYLENE GLYCOL 3350 1 PACKET: 17 POWDER, FOR SOLUTION ORAL at 05:20

## 2022-09-19 RX ADMIN — MUPIROCIN 1 APPLICATION: 20 OINTMENT TOPICAL at 17:34

## 2022-09-19 RX ADMIN — AMPICILLIN SODIUM 2000 MG: 2 INJECTION, POWDER, FOR SOLUTION INTRAVENOUS at 11:05

## 2022-09-19 RX ADMIN — AMPICILLIN SODIUM 2000 MG: 2 INJECTION, POWDER, FOR SOLUTION INTRAVENOUS at 22:11

## 2022-09-19 RX ADMIN — ACETAMINOPHEN 1000 MG: 500 TABLET ORAL at 23:15

## 2022-09-19 RX ADMIN — OXYCODONE HYDROCHLORIDE 10 MG: 10 TABLET ORAL at 23:15

## 2022-09-19 RX ADMIN — GABAPENTIN 100 MG: 100 CAPSULE ORAL at 05:21

## 2022-09-19 RX ADMIN — GABAPENTIN 100 MG: 100 CAPSULE ORAL at 17:35

## 2022-09-19 RX ADMIN — GABAPENTIN 100 MG: 100 CAPSULE ORAL at 13:08

## 2022-09-19 RX ADMIN — DEXMEDETOMIDINE 0.6 MCG/KG/HR: 200 INJECTION, SOLUTION INTRAVENOUS at 06:11

## 2022-09-19 RX ADMIN — ASPIRIN 81 MG: 81 TABLET, COATED ORAL at 05:20

## 2022-09-19 RX ADMIN — ACETAMINOPHEN 1000 MG: 500 TABLET ORAL at 13:08

## 2022-09-19 RX ADMIN — OXYCODONE HYDROCHLORIDE 10 MG: 10 TABLET ORAL at 05:20

## 2022-09-19 RX ADMIN — POTASSIUM CHLORIDE 10 MEQ: 1500 TABLET, EXTENDED RELEASE ORAL at 17:35

## 2022-09-19 RX ADMIN — SENNOSIDES AND DOCUSATE SODIUM 2 TABLET: 50; 8.6 TABLET ORAL at 05:20

## 2022-09-19 RX ADMIN — ACETAMINOPHEN 1000 MG: 500 TABLET ORAL at 05:20

## 2022-09-19 RX ADMIN — CEFTRIAXONE SODIUM 2 G: 10 INJECTION, POWDER, FOR SOLUTION INTRAVENOUS at 05:21

## 2022-09-19 RX ADMIN — CEFTRIAXONE SODIUM 2 G: 10 INJECTION, POWDER, FOR SOLUTION INTRAVENOUS at 17:34

## 2022-09-19 RX ADMIN — VASOPRESSIN 0.03 UNITS/MIN: 20 INJECTION, SOLUTION INTRAMUSCULAR; SUBCUTANEOUS at 04:32

## 2022-09-19 RX ADMIN — KETOROLAC TROMETHAMINE 30 MG: 30 INJECTION, SOLUTION INTRAMUSCULAR; INTRAVENOUS at 17:34

## 2022-09-19 RX ADMIN — POTASSIUM CHLORIDE 10 MEQ: 1500 TABLET, EXTENDED RELEASE ORAL at 10:02

## 2022-09-19 RX ADMIN — AMPICILLIN SODIUM 2000 MG: 2 INJECTION, POWDER, FOR SOLUTION INTRAVENOUS at 05:21

## 2022-09-19 RX ADMIN — MUPIROCIN 1 APPLICATION: 20 OINTMENT TOPICAL at 05:21

## 2022-09-19 RX ADMIN — AMPICILLIN SODIUM 2000 MG: 2 INJECTION, POWDER, FOR SOLUTION INTRAVENOUS at 01:56

## 2022-09-19 RX ADMIN — OMEPRAZOLE 20 MG: 20 CAPSULE, DELAYED RELEASE ORAL at 05:21

## 2022-09-19 RX ADMIN — MAGNESIUM HYDROXIDE 30 ML: 400 SUSPENSION ORAL at 05:21

## 2022-09-19 RX ADMIN — OXYCODONE 5 MG: 5 TABLET ORAL at 20:04

## 2022-09-19 RX ADMIN — BUSPIRONE HYDROCHLORIDE 5 MG: 10 TABLET ORAL at 13:08

## 2022-09-19 RX ADMIN — FUROSEMIDE 20 MG: 10 INJECTION, SOLUTION INTRAMUSCULAR; INTRAVENOUS at 17:34

## 2022-09-19 RX ADMIN — ACETAMINOPHEN 1000 MG: 500 TABLET ORAL at 17:35

## 2022-09-19 RX ADMIN — AMPICILLIN SODIUM 2000 MG: 2 INJECTION, POWDER, FOR SOLUTION INTRAVENOUS at 17:35

## 2022-09-19 ASSESSMENT — PAIN DESCRIPTION - PAIN TYPE
TYPE: SURGICAL PAIN
TYPE: SURGICAL PAIN
TYPE: ACUTE PAIN
TYPE: SURGICAL PAIN

## 2022-09-19 ASSESSMENT — ENCOUNTER SYMPTOMS
SHORTNESS OF BREATH: 0
COUGH: 0
NAUSEA: 0
FEVER: 0
CHILLS: 0
BACK PAIN: 1
VOMITING: 0
ABDOMINAL PAIN: 0
DIZZINESS: 0
HEADACHES: 0
DIARRHEA: 0

## 2022-09-19 ASSESSMENT — FIBROSIS 4 INDEX: FIB4 SCORE: 0.61

## 2022-09-19 NOTE — PROGRESS NOTES
1650: Patient arrived back from IR. IR did not operate, right chest tube dressing was changed by IR. Stable vitals.   Authored by Resident/PA/NP

## 2022-09-19 NOTE — CARE PLAN
The patient is Watcher - Medium risk of patient condition declining or worsening    Shift Goals  Clinical Goals: hemodynamic stability  Patient Goals: pain and anxiety control  Family Goals: CHRIS    Progress made toward(s) clinical / shift goals:    Problem: Pain - Standard  Goal: Alleviation of pain or a reduction in pain to the patient’s comfort goal  Outcome: Progressing  Note: Pt is requiring oral analgesics to control pain     Problem: Post op day 2 CABG/Heart Valve Replacement  Goal: Optimal care of the post op CABG/heart valve replacement post op day 2  Outcome: Progressing  Intervention: FSBS: when 2 consecutive BS < 130 after post op day 2, discontinue FSBS unless patient is insulin dependent diabetic  Note: Pt does not require insulin  Intervention: Daily weights in the morning  Note: Completed on day shift 9/18  Intervention: Up in chair for all meals  Note: Pt was up for breakfast, back to bed for procedure, and went to IR.  Intervention: Ambulate 4 times daily, increasing the distance each time  Note: Pt unable to ambulate, CT placement and went to IR  Intervention: Stand at sink and wash up with assistance.  Clean incisions twice daily with soap and water.  Note: Incision has wound vac (Prevena)  Intervention: IS q 1 hour while awake and record best IS volume  Note: Pt is sleeping  Intervention: Consider pacer wire removal by MD  Note: Wires still in place  Intervention: Consider removal of damon and chest tube if not already done  Note: Damon and chest tube still in place       Patient is not progressing towards the following goals:

## 2022-09-19 NOTE — PROGRESS NOTES
"Infectious Disease Progress Note    Author: Adwoa Parker M.D. Date & Time of service: 9/19/2022  8:32 AM    Chief Complaint:  Follow-up for TV endocarditis    Interval History:  51 y.o. female admitted 9/9/2022 as transfer from [place] for KEO. Blood cultures +Efaecalis. TTE with vegetation TV and acute anemia Hgb  down to 6.4 (baseline 13-14)  9/11 AF WBC 12.9 Patient alert today. Additional history obtained.  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  Normally active and regularly able to 50+#.  States she felt well until end of May after a 3 hour car ride had the sudden onset of back pain-she saw her PCP, spine and was referred to pain management.  She did not want to take all the narcotics she was given so had been self treating with NSAIDS.  The pain in her back was initially upper back but would change-sometimes low back and varied from side to side.  She was concerned that \"something else\" was going on. MRI done 7/31/2022 showed possible myelofibrosis.  On August 4, started feeling feverish, very fatigued, and noted pallor \"I looked sick... like a zombie\"   9/13 T-max 100.6, white count 10.4, tolerating antimicrobials.  Cardiology plan as below  9/15 T-max 100.3, tolerating antimicrobials.  KEO findings with change in management plans as below  9/17 patient is afebrile now, cardiac surgery yesterday as below, white count bumped up today to 23.8.  9/18 patient remains afebrile, white count back down to 16.8 today.  Planning chest tube placement  9/19 T-max 100.2, WBC 15.2.  Right chest tube placement was aborted given size and inability to access site.  Patient sitting up in chair states she has been able to mobilize a little bit more.  Ongoing back pain but not worse.  Reiterates that she is claustrophobic in an MRI machine    Labs Reviewed, Medications Reviewed, and Radiology Reviewed.    Review of Systems:  Review of Systems   Constitutional:  Positive for malaise/fatigue. Negative for " chills and fever.   Respiratory:  Negative for cough and shortness of breath.    Gastrointestinal:  Negative for abdominal pain, diarrhea, nausea and vomiting.   Musculoskeletal:  Positive for back pain.   Neurological:  Negative for dizziness and headaches.     Hemodynamics:  Temp (24hrs), Av.2 °C (98.9 °F), Min:36.3 °C (97.4 °F), Max:37.9 °C (100.2 °F)  Temperature: 37.1 °C (98.8 °F), Monitored Temp: 36.8 °C (98.2 °F)  Pulse  Av.3  Min: 47  Max: 144   Blood Pressure: (!) 85/52, Arterial BP: (!) 84/72  CVP (mm Hg): (!) 14 MM HG    Physical Exam:  Physical Exam  Vitals and nursing note reviewed.   Constitutional:       General: She is not in acute distress.     Appearance: Normal appearance. She is not ill-appearing, toxic-appearing or diaphoretic.      Comments: Currently resting, post cardiac surgery   HENT:      Nose: No rhinorrhea.      Comments: nasal cannula oxygen     Mouth/Throat:      Mouth: Mucous membranes are moist.   Eyes:      General: No scleral icterus.     Extraocular Movements: Extraocular movements intact.      Pupils: Pupils are equal, round, and reactive to light.   Neck:      Comments: Right IJ catheter  Cardiovascular:      Rate and Rhythm: Normal rate.      Heart sounds: No murmur heard.  Pulmonary:      Effort: Pulmonary effort is normal. No respiratory distress.      Comments: Sternotomy incision with sutures in place, well approximated.  No surrounding erythema    Prior chest tube site dressed, dressing saturated with serosanguineous fluid  Abdominal:      General: There is no distension.      Palpations: Abdomen is soft.      Tenderness: There is no abdominal tenderness.   Skin:     Coloration: Skin is pale. Skin is not jaundiced.      Comments: No osler or Janeway lesions   Neurological:      General: No focal deficit present.      Mental Status: She is alert and oriented to person, place, and time.   Psychiatric:         Mood and Affect: Mood normal.         Behavior: Behavior  normal.       Meds:    Current Facility-Administered Medications:     escitalopram    ketorolac    furosemide    potassium chloride SA    ALPRAZolam    gabapentin    norepinephrine (Levophed) infusion    vasopressin (PITRESSIN) infusion    Respiratory Therapy Consult    NS    electrolyte-A    aspirin EC    Pharmacy Consult Request    acetaminophen **FOLLOWED BY** [START ON 9/21/2022] acetaminophen    traMADol    dexmedetomidine (Precedex) infusion    ondansetron **OR** prochlorperazine **OR** promethazine    acetaminophen **OR** acetaminophen    senna-docusate **AND** polyethylene glycol/lytes **AND** magnesium hydroxide **AND** bisacodyl    omeprazole    mag hydrox-al hydrox-simeth    mupirocin    oxyCODONE immediate-release **OR** oxyCODONE immediate-release **OR** HYDROmorphone    ampicillin    cefTRIAXone (ROCEPHIN) IV    guaiFENesin ER    Labs:  Recent Labs     09/18/22  0500 09/18/22  1200 09/19/22  0507   WBC 16.8* 16.5* 15.2*   RBC 2.59* 3.33* 3.38*   HEMOGLOBIN 6.9* 9.4* 9.4*   HEMATOCRIT 21.8* 28.6* 29.1*   MCV 84.2 85.9 86.1   MCH 26.6* 28.2 27.8   RDW 68.7* 63.2* 62.7*   PLATELETCT 315 285 306   MPV 10.8 10.8 10.5       Recent Labs     09/17/22  0117 09/17/22  1130 09/18/22  0500 09/19/22  0507   SODIUM 145  --  139 139   POTASSIUM 4.8 4.7 4.3 4.3   CHLORIDE 111  --  108 108   CO2 23  --  21 23   GLUCOSE 136*  --  121* 89   BUN 10  --  19 20       Recent Labs     09/17/22  0117 09/18/22  0500 09/19/22  0507   CREATININE 0.67 0.79 0.45*         Imaging:  CT-CHEST (THORAX) WITH    Result Date: 9/8/2022   CT of the chest, abdomen and pelvis HISTORY/REASON FOR EXAM:  concern for malignancy TECHNIQUE/EXAM DESCRIPTION: CT scan of the chest, abdomen and pelvis with contrast.  Both automated exposure control and Automated adjustment of tube current based on patient size are utilized.  9/8/2022 8:33 AM. Total DLP: 470.34 mGy*cm COMPARISON: None. FINDINGS: Chest: There are multifocal areas of consolidation  involving predominantly the upper lobes and to a lesser degree the lower lobes.  One of them is cavitary in the left lower lobe measuring 1.3 cm.  No pleural effusion or pneumothorax. The heart is normal in size.  There is trace pericardial effusion. The great vessel origins are unremarkable.  The aorta is normal in caliber. No pathologically enlarged adenopathy. There are no concerning osseous lesions.  No fractures.  Bilateral breast implants. Abdomen pelvis: Solid organs: The liver, pancreas and adrenal glands are unremarkable.  There is a cyst in the anterior spleen. The gallbladder is present. Kidneys and Bladder: There is no nephrolithiasis or hydronephrosis.  Mild focal scarring left upper pole kidney. No enhancing renal masses. The kidneys enhance symmetrically. No ureteral or bladder stones are seen. The bladder is grossly unremarkable. Bowel and Appendix: No dilated bowel. No perienteric inflammatory changes. No extraluminal air or free fluid. The appendix is visualized and appears normal. No pelvic masses. Lymphatics:  No abdominal, pelvic, or retroperitoneal lymphadenopathy. Other: No concerning osseous lesions. No fractures.     1.  Multifocal consolidation concerning for pneumonia, with small cavitary lesion in the left lower lobe. 2.  No acute abnormality or evidence of malignancy to the abdomen or pelvis. [facility] 9/8/2022 9:00 AM DLP Reporting Thresholds for Incorrect/Repeated Exams - DLP in mGy*cm Head/Neck:  0-year-old 3840, 1-year-old 5880, 5-year-old 8770, 10-year-old 38628 and adult 39769 Head:  0-year-old 4540, 1-year-old 7460, 5-year-old 14928, 10-year-old 34367 and adult 02472 Neck:  0-year-old 2940, 1-year-old 4160, 5-year-old 4550, 10-year-old 6320 and adult 8470 Chest:  0-year-old 550, 1-year-old 830, 5-year-old 1200, 10-year-old 3840 and adult 3570 Abd/pelvis:  0-year-old 440, 1-year-old 720, 5-year-old 1080, 10-year-old 3330 and adult 3330 Trunk(C/A/P):  0-year-old 490,  1-year-old 770, 5-year-old 1140, 10-year-old 3570 and adult 3330    CT-HEAD W/O    Result Date: 9/7/2022   CT head without contrast HISTORY/REASON FOR EXAM:  Headache, new or worsening (Age >= 50y). TECHNIQUE/EXAM DESCRIPTION: CT scan of the brain without contrast. Both automated exposure control and Automated adjustment of tube current based on patient size are utilized. 9/7/2022 4:19 PM. CT scan of the brain was carried out without contrast in the normal manner. Total DLP: 766.10 mGy*cm COMPARISON: None. FINDINGS: The brain parenchyma is unremarkable. The gray-white matter differentiation is well preserved throughout. There is no evidence of mass, mass effect, hemorrhage, or extraaxial fluid collection.  There is no midline shift. The orbits and calvarium are intact. No evidence of fracture. The visualized paranasal sinuses are clear. The mastoid air cells are well pneumatized.     No acute intracranial abnormality. [facility] 9/7/2022 4:23 PM DLP Reporting Thresholds for Incorrect/Repeated Exams - DLP in mGy*cm Head/Neck:  0-year-old 3840, 1-year-old 5880, 5-year-old 8770, 10-year-old 21999 and adult 15259 Head:  0-year-old 4540, 1-year-old 7460, 5-year-old 51816, 10-year-old 73944 and adult 40051 Neck:  0-year-old 2940, 1-year-old 4160, 5-year-old 4550, 10-year-old 6320 and adult 8470 Chest:  0-year-old 550, 1-year-old 830, 5-year-old 1200, 10-year-old 3840 and adult 3570 Abd/pelvis:  0-year-old 440, 1-year-old 720, 5-year-old 1080, 10-year-old 3330 and adult 3330 Trunk(C/A/P):  0-year-old 490, 1-year-old 770, 5-year-old 1140, 10-year-old 3570 and adult 3330    DX-CHEST-PORTABLE (1 VIEW)    Result Date: 9/7/2022  HISTORY/REASON FOR EXAM:  Cough. TECHNIQUE/EXAM DESCRIPTION AND NUMBER OF VIEWS: Portable chest (one view), 9/7/2022 4:22 PM. COMPARISON: None. FINDINGS: There is mild multifocal groundglass consolidation left upper lobe and right upper lobe, to a lesser degree left lower lobe.  No pleural  effusion or pneumothorax. The cardiomediastinal silhouette is normal in size. The bones are intact.     Multifocal bilateral consolidation concerning for pneumonia. Jazzy Bellamy 9/7/2022 8:06 PM    [id number]    Result Date: 9/10/2022  9/10/2022 1:54 PM HISTORY/REASON FOR EXAM:  Bacterial endocarditis. TECHNIQUE/EXAM DESCRIPTION AND NUMBER OF VIEWS:  1 view(s) of the mandible. COMPARISON:  None. FINDINGS: No evidence of fracture. No lytic or sclerotic lesion. There are multiple dental caries.     Multiple dental caries.    US-ABDOMEN COMPLETE SURVEY    Result Date: 9/7/2022  HISTORY/REASON FOR EXAM: Abnormal Labs; elevated liver function tests, with elevated alk phos. TECHNIQUE/EXAM DESCRIPTION: Ultrasound abdomen complete.9/7/2022 7:57 PM COMPARISON: None FINDINGS: Pancreas: The tail is suboptimally visualized due to overlying bowel gas. Aorta and IVC: Normal in caliber. Liver: Normal.  The Liver Measures 19.6 cm. The portal vein is patent with flow in the proper direction into the liver.  This is normal. Gallbladder: Normal. Common bile duct: 3 mm, which is normal. Spleen: Normal in size and echogenicity. Kidneys: The kidneys are normal in size and echogenicity. There is No ascites.     Mild hepatomegaly.  Otherwise unremarkable exam. Jazzy Huberbury 9/7/2022 8:27 PM    CT ABDOMEN-PELVIS WITH IV CONTRAST    Result Date: 9/8/2022   CT of the chest, abdomen and pelvis HISTORY/REASON FOR EXAM:  concern for malignancy TECHNIQUE/EXAM DESCRIPTION: CT scan of the chest, abdomen and pelvis with contrast.  Both automated exposure control and Automated adjustment of tube current based on patient size are utilized.  9/8/2022 8:33 AM. Total DLP: 470.34 mGy*cm COMPARISON: None. FINDINGS: Chest: There are multifocal areas of consolidation involving predominantly the upper lobes and to a lesser degree the lower lobes.  One of them is cavitary in the left lower lobe measuring 1.3 cm.  No pleural effusion or  pneumothorax. The heart is normal in size.  There is trace pericardial effusion. The great vessel origins are unremarkable.  The aorta is normal in caliber. No pathologically enlarged adenopathy. There are no concerning osseous lesions.  No fractures.  Bilateral breast implants. Abdomen pelvis: Solid organs: The liver, pancreas and adrenal glands are unremarkable.  There is a cyst in the anterior spleen. The gallbladder is present. Kidneys and Bladder: There is no nephrolithiasis or hydronephrosis.  Mild focal scarring left upper pole kidney. No enhancing renal masses. The kidneys enhance symmetrically. No ureteral or bladder stones are seen. The bladder is grossly unremarkable. Bowel and Appendix: No dilated bowel. No perienteric inflammatory changes. No extraluminal air or free fluid. The appendix is visualized and appears normal. No pelvic masses. Lymphatics:  No abdominal, pelvic, or retroperitoneal lymphadenopathy. Other: No concerning osseous lesions. No fractures.     1.  Multifocal consolidation concerning for pneumonia, with small cavitary lesion in the left lower lobe. 2.  No acute abnormality or evidence of malignancy to the abdomen or pelvis. [facility] 9/8/2022 9:00 AM DLP Reporting Thresholds for Incorrect/Repeated Exams - DLP in mGy*cm Head/Neck:  0-year-old 3840, 1-year-old 5880, 5-year-old 8770, 10-year-old 76414 and adult 40465 Head:  0-year-old 4540, 1-year-old 7460, 5-year-old 73771, 10-year-old 45433 and adult 47898 Neck:  0-year-old 2940, 1-year-old 4160, 5-year-old 4550, 10-year-old 6320 and adult 8470 Chest:  0-year-old 550, 1-year-old 830, 5-year-old 1200, 10-year-old 3840 and adult 3570 Abd/pelvis:  0-year-old 440, 1-year-old 720, 5-year-old 1080, 10-year-old 3330 and adult 3330 Trunk(C/A/P):  0-year-old 490, 1-year-old 770, 5-year-old 1140, 10-year-old 3570 and adult 3330    EC-ECHOCARDIOGRAM COMPLETE W/O CONT    Result Date: 9/8/2022  Transthoracic Echo Report Echocardiography  Laboratory CONCLUSIONS No prior study is available for comparison. Technically difficult study - adequate information is obtained. Probable vegetation seen on the tricuspid valve in subcostal view. Normal left ventricular systolic function. The left ventricular ejection fraction is visually estimated to be 60%. Normal diastolic function. The right ventricle is normal in size and systolic function. Mild aortic insufficiency. These findings were communicated to the medical service. RUDDY REBOLLEDO Exam Date:         2022                    14:12 Exam Location:     Echo Lab Priority:          Routine Ordering Physician:        CATHI CAMARENA Referring Physician: Sonographer:               Sissy Ontiveros Age:    51     Gender:    F MRN:    [id number] :    1970 BSA:    1.65   Ht (in):    64.6   Wt (lb):    132 Exam Type:     Complete Indications:     Bacteremia ICD Codes: CPT Codes:       99077 BP:   113    /   77     HR:   117 Technical Quality:       Technically difficult study -                          adequate information is obtained MEASUREMENTS  (Male / Female) Normal Values 2D ECHO LV Diastolic Diameter PLAX        4.2 cm                4.2 - 5.9 / 3.9 - 5.3 cm LV Systolic Diameter PLAX         3 cm                  2.1 - 4.0 cm IVS Diastolic Thickness           0.67 cm               LVPW Diastolic Thickness          0.68 cm               LVOT Diameter                     2 cm                  Aortic Root Diameter              2.6 cm                Estimated LV Ejection Fraction    60 %                  IVC Diameter                      1.8 cm                DOPPLER AV Peak Velocity                  2.3 m/s               AV Peak Gradient                  21.6 mmHg             AV Mean Gradient                  14.3 mmHg             AI Pressure Half Time             500 ms                LVOT Peak Velocity                1.4 m/s               AV Area Cont Eq vti                1.5 cm2               MV Velocity Time Integral         13.7 cm               Mitral E Point Velocity           0.61 m/s              Mitral E to A Ratio               0.65                  MV Pressure Half Time             45.5 ms               MV Area PHT                       4.8 cm2               MV Deceleration Time              157 ms                TR Peak Velocity                  186 cm/s              Right Atrial Pressure             8 mmHg                Right Ventricular Systolic Press  21.8 mmHg             PV Peak Velocity                  1 m/s                 PV Peak Gradient                  4.3 mmHg              RVOT Peak Velocity                0.65 m/s              * Indicates values subject to auto-interpretation LV EF:  60    % FINDINGS Left Ventricle The left ventricular ejection fraction is visually estimated to be 60%. Normal left ventricular systolic function. Normal left ventricular wall thickness. Normal left ventricular chamber size. Abnormal regional wall motion. Abnormal septal motion. Normal diastolic function. Right Ventricle The right ventricle is normal in size and systolic function. Right Atrium The right atrium is normal in size. Normal inferior vena cava size without inspiratory collapse. Left Atrium The left atrium is normal in size. Left atrial volume index is 10 mL/sq m. Mitral Valve Structurally normal mitral valve without significant stenosis. Trace mitral regurgitation. Aortic Valve Mild aortic insufficiency. Pressure half time is 500 msec. Aortic valve sclerosis without significant stenosis. Tricuspid aortic valve. Tricuspid Valve Structurally normal tricuspid valve without significant stenosis. Mild tricuspid regurgitation. Right atrial pressure is estimated to be 8 mmHg. Estimated right ventricular systolic pressure is 20 mmHg. Probable vegetation seen on the tricuspid valve in subcostal view. Pulmonic Valve Structurally normal pulmonic valve without  significant stenosis or regurgitation. Pericardium Plural effusion present. Aorta Normal aortic root for body surface area. The ascending aorta diameter is 2.9 cm. Mark Sykes MD (Electronically Signed) Final Date:     08 September 2022                 17:56    MRI 7/31/2022  FINDINGS:  Spinal column:  There is decreased T1/T2 signal noted within the vertebral marrow homogeneously throughout the spinal column, raising the question of infiltrative disease, such as myelofibrosis.     No destructive osseous process is appreciated.     Vertebral body morphology is normal.     No fracture is identified..     Spinal cord:     The conus medullaris is somewhat low, noted the level of L2-L3.     I see no evidence of syrinx.     Distal spinal cord appears unremarkable.     Cauda equina appear unremarkable.     Intervertebral disks:     At the level of L1-L2, the intervertebral disk, the central spinal canal, and the neural foramina appear normal.     At the level of L2-L3,  there is mild diffuse disc desiccation. There is minimal facet arthrosis.     Central spinal canal and neural foramina appear normal.     At the level of L3-L4, there is mild disc desiccation. There is a minimal circumferential disc bulge as well as minimal facet arthrosis. There is no significant foraminal, or central canal narrowing.     At the level of L4-L5, there is mild disc desiccation.     There is mild facet arthrosis.     Central spinal canal and neural foramina appear normal.     At the level of L5-S1, there is mild disc space narrowing.  There is mild disc desiccation.  Central spinal canal and neural foramina appear normal.     IMPRESSION:     I see no evidence of destructive osseous process and no evidence of significant narrowing of central spinal canal or the neural foramina.       Micro:  Results       Procedure Component Value Units Date/Time    BLOOD CULTURE [[id number]] Collected: 09/13/22 1446    Order Status: Completed  "Specimen: Blood from Peripheral Updated: 09/18/22 1700     Significant Indicator NEG     Source BLD     Site PERIPHERAL     Culture Result No growth after 5 days of incubation.    Narrative:      Per Hospital Policy: Only change Specimen Src: to \"Line\" if  specified by physician order.  Left AC    Fungal Culture [[id number]] Collected: 09/16/22 1048    Order Status: Completed Specimen: Tissue Updated: 09/18/22 1607     Significant Indicator NEG     Source TISS     Site tricuspid vegetation     Culture Result Culture in progress.     Fungal Smear Results No fungal elements seen.    Narrative:      Surgery Specimen    AFB Culture [[id number]] Collected: 09/16/22 1048    Order Status: Completed Specimen: Tissue Updated: 09/18/22 1607     Significant Indicator NEG     Source TISS     Site tricuspid vegetation     Culture Result Culture in progress.     AFB Smear Results No acid fast bacilli seen.    Narrative:      Surgery Specimen    CULTURE TISSUE W/ GRM STAIN [[id number]]  (Abnormal) Collected: 09/16/22 1048    Order Status: Completed Specimen: Tissue Updated: 09/18/22 1607     Significant Indicator POS     Source TISS     Site tricuspid vegetation     Culture Result -     Gram Stain Result Few WBCs.  Moderate Gram positive cocci.       Culture Result Enterococcus faecalis  Light growth  Susceptibilities in progress      Narrative:      Surgery Specimen    Anaerobic Culture [[id number]] Collected: 09/16/22 1048    Order Status: Completed Specimen: Tissue Updated: 09/18/22 1607     Significant Indicator NEG     Source TISS     Site tricuspid vegetation     Culture Result Culture in progress.    Narrative:      Surgery Specimen    BLOOD CULTURE [[id number]] Collected: 09/13/22 0815    Order Status: Completed Specimen: Blood from Peripheral Updated: 09/18/22 1100     Significant Indicator NEG     Source BLD     Site PERIPHERAL     Culture Result No growth after 5 days of incubation.    Narrative:      Per Hospital Policy: " "Only change Specimen Src: to \"Line\" if  specified by physician order.  Left AC    BLOOD CULTURE [[id number]]  (Abnormal)  (Susceptibility) Collected: 09/10/22 1651    Order Status: Completed Specimen: Blood from Peripheral Updated: 09/17/22 0841     Significant Indicator POS     Source BLD     Site PERIPHERAL     Culture Result Growth detected by Bactec instrument. 09/15/2022  01:57      Enterococcus faecalis    Narrative:      CALL  [place]  171 tel. [phone],  CALLED  171 tel. [phone] 09/15/2022, 02:02, RB PERF. RESULTS CALLED TO: RN  [phone]  Per Hospital Policy: Only change Specimen Src: to \"Line\" if  specified by physician order.  Left AC    Susceptibility       Enterococcus faecalis (1)       Antibiotic Interpretation Microscan   Method Status    Daptomycin Sensitive 1 mcg/mL JOHN Final    Gent Synergy Sensitive <=500 mcg/mL JOHN Final    Ampicillin Sensitive <=2 mcg/mL JOHN Final    Vancomycin Sensitive 1 mcg/mL JOHN Final    Penicillin Sensitive 1 mcg/mL JOHN Final                       Acid Fast Stain [[id number]] Collected: 09/16/22 1048    Order Status: Completed Specimen: Tissue Updated: 09/16/22 2022     Significant Indicator NEG     Source TISS     Site tricuspid vegetation     AFB Smear Results No acid fast bacilli seen.    Narrative:      Surgery Specimen    GRAM STAIN [[id number]]  (Abnormal) Collected: 09/16/22 1048    Order Status: Completed Specimen: Tissue Updated: 09/16/22 2022     Significant Indicator .     Source TISS     Site tricuspid vegetation     Gram Stain Result Few WBCs.  Moderate Gram positive cocci.      Narrative:      Surgery Specimen    Fungal Smear [[id number]] Collected: 09/16/22 1048    Order Status: Completed Specimen: Tissue Updated: 09/16/22 2022     Significant Indicator NEG     Source TISS     Site tricuspid vegetation     Fungal Smear Results No fungal elements seen.    Narrative:      Surgery Specimen    BLOOD CULTURE [[id number]] Collected: 09/10/22 1651    Order Status: " "Completed Specimen: Blood from Peripheral Updated: 09/15/22 1900     Significant Indicator NEG     Source BLD     Site PERIPHERAL     Culture Result No growth after 5 days of incubation.    Narrative:      Per Hospital Policy: Only change Specimen Src: to \"Line\" if  specified by physician order.  Right AC    Urinalysis [[id number]]     Order Status: Canceled Specimen: Urine             Assessment/Hospital course:  This is a 51-year-old female patient who presented to [place] with several weeks of fevers, malaise, cough, TTE noted a vegetation of the tricuspid valve.  She was transferred to [facility] for KEO.    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  Patent foramen ovale status post closure on 9/16  Right hemopneumothorax, not amenable to chest tube placement  Septic pulmonary emboli  E faecalis bacteremia  Leukocytosis, overall improving  Neck and back pain     PLAN:   -Patient was taken to the OR by cardiac surgery on 9/16, underwent, aortic valve repair, tricuspid valve repair, patent foramen ovale closure  -GI was consulted to look for potential source and colonoscopy was performed, sigmoid polypectomy performed  -Given neck, mid and lower back pain, recommend MRI of the whole spine to rule out any epidural abscess in the setting of bacteremia-pending.  Patient will need MRI with general anesthesia   -Continue dual beta-lactam therapy with IV ampicillin 2 g every 4 hours + IV ceftriaxone 2 g every 12 hours.  Anticipate 6 weeks through 10/27/2022  -At least weekly CBC with differential, CMP while on IV antibiotics.    -Chest x-ray with some increasing right-sided pleural effusion.  Chest tube not placed given size   -PICC line ordered today    Disposition: Will need placement for IV antibiotic plan as above    Need for PICC line: Yes, PICC ordered    Plan of care discussed with intensivist, Dr. Lincoln  "

## 2022-09-19 NOTE — PROGRESS NOTES
Cardiovascular Surgery Progress Note    Name: Sarah Buckner  MRN: [id number]  : 1970  Admit Date: 2022  2:08 PM  3 Days Post-Op     Procedure:  Procedure(s) and Anesthesia Type:     * TRICUSPID VALVE REPAIR, PATENT FORAMEN OVALE CLOSURE, TRANSESOPHAGEAL ESCHOCARDIOGRAM - General     * ECHOCARDIOGRAM, TRANSESOPHAGEAL, INTRAOPERATIVE - General    Vitals:  Vitals:    22 0630 22 0645 22 0700 22 0715   BP: (!) 88/51 (!) 85/50 (!) 76/48 (!) 85/52   Pulse: 61 60 (!) 59 62   Resp: (!) 21 (!) 23 (!) 22 (!) 28   Temp:       TempSrc:       SpO2: 94% 95% 94% 93%   Weight:       Height:          Temp (24hrs), Av.2 °C (98.9 °F), Min:36.3 °C (97.4 °F), Max:37.9 °C (100.2 °F)      Respiratory:    Respiration: (!) 28, Pulse Oximetry: 93 %       Fluids:    Intake/Output Summary (Last 24 hours) at 2022 0803  Last data filed at 2022 0600  Gross per 24 hour   Intake 2377.08 ml   Output 725 ml   Net 1652.08 ml       Admit weight: Weight: 66.1 kg (145 lb 12.8 oz)  Current weight: Weight: 74.8 kg (164 lb 14.5 oz) (22 0600)    Labs:  Recent Labs     22  0500 22  1200 22  0507   WBC 16.8* 16.5* 15.2*   RBC 2.59* 3.33* 3.38*   HEMOGLOBIN 6.9* 9.4* 9.4*   HEMATOCRIT 21.8* 28.6* 29.1*   MCV 84.2 85.9 86.1   MCH 26.6* 28.2 27.8   MCHC 31.7* 32.9* 32.3*   RDW 68.7* 63.2* 62.7*   PLATELETCT 315 285 306   MPV 10.8 10.8 10.5       Recent Labs     22  0117 22  1130 22  0500 22  0507   SODIUM 145  --  139 139   POTASSIUM 4.8 4.7 4.3 4.3   CHLORIDE 111  --  108 108   CO2 23  --  21 23   GLUCOSE 136*  --  121* 89   BUN 10  --  19 20   CREATININE 0.67  --  0.79 0.45*   CALCIUM 8.2*  --  7.8* 7.7*       Recent Labs     22  1445 22  1200   APTT 83.9* 52.4*   INR 1.85* 1.83*           Medications:  Scheduled Medications   Medication Dose Frequency    escitalopram  10 mg DAILY    furosemide  20 mg BID    potassium chloride SA  10 mEq BID     gabapentin  100 mg TID    aspirin EC  81 mg DAILY    Pharmacy Consult Request  1 Each PHARMACY TO DOSE    acetaminophen  1,000 mg Q6HRS    senna-docusate  2 Tablet BID    And    polyethylene glycol/lytes  1 Packet DAILY    And    magnesium hydroxide  30 mL DAILY    omeprazole  20 mg DAILY    mupirocin  1 Application BID    ampicillin  2,000 mg Q4HRS    cefTRIAXone (ROCEPHIN) IV  2 g Q12HRS    guaiFENesin ER  1,200 mg Q12HRS          Exam:   Physical Exam  Vitals and nursing note reviewed.   Constitutional:       General: She is not in acute distress.     Appearance: She is normal weight.   HENT:      Head: Normocephalic and atraumatic.      Right Ear: External ear normal.      Left Ear: External ear normal.      Nose: Nose normal.      Mouth/Throat:      Mouth: Mucous membranes are moist.   Eyes:      Extraocular Movements: Extraocular movements intact.      Conjunctiva/sclera: Conjunctivae normal.      Pupils: Pupils are equal, round, and reactive to light.   Cardiovascular:      Rate and Rhythm: Normal rate and regular rhythm.      Pulses: Normal pulses.      Heart sounds: Normal heart sounds.   Pulmonary:      Effort: Pulmonary effort is normal.      Breath sounds: Examination of the right-lower field reveals decreased breath sounds. Examination of the left-lower field reveals decreased breath sounds. Decreased breath sounds present.   Abdominal:      General: Abdomen is flat. Bowel sounds are normal. There is no distension.      Palpations: Abdomen is soft.   Musculoskeletal:         General: Normal range of motion.      Cervical back: Normal range of motion and neck supple.      Right lower leg: Edema present.      Left lower leg: Edema present.   Skin:     General: Skin is warm and dry.      Capillary Refill: Capillary refill takes less than 2 seconds.      Comments: Sternum- c/d/i   Neurological:      General: No focal deficit present.      Mental Status: She is alert and oriented to person, place, and time.  Mental status is at baseline.      Sensory: No sensory deficit.      Motor: No weakness.   Psychiatric:         Mood and Affect: Mood is anxious. Affect is inappropriate.       Cardiac Medications:    ASA - Yes    Plavix - No; contraindicated because of Other No CAD    Post-operative Beta Blockers - No; contraindicated because of Hypotension    Ace/ARB- No; contraindicated because of Normal EF    Statin - No; contraindicated because of No CAD    Aldactone- No; contraindicated because of Normal EF    Ejection Fraction:  60%    Telemetry:   9/17 SR/ST 60-120s  9/18 SB/SR oPVC  9/19 SR    Assessment/Plan:  POD 1 Intubated, on levo 2mcg/vaso gtt.  Neuro intact.  Abdomen soft.  Minimal output from chest tubes.  Cr stable. Hgb 7.5 overnight, given 1 unit PRBC, now 8.5.  UO decreased this AM. Plan: Wean ventilator as tolerated.  Albumin 5% 25g once.  Wean vasopressors as tolerated.  Encourage IS when extubated.    POD 2 Extubated on 2LNC.  On levo/vaso gtt.  Neuro intact.  Abdomen soft nontender.  Cr 0.79 borderline UO overnight.  Hgb down to 6.9.  CXR with increased right pleural effusion this AM.  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  Discussed possible need for right chest tube with Dr Banda.  Plan: Chest tube today.  Wean oxygen as tolerated.  2 units PRBC today.  Wean vasopressors as tolerated.  Xanax prn for anxiety.    POD 3 HOTN- on vaso/levo- wean, SR- d/c pacer wires, 2lnc, CXR- no penumo or effusions- d/c all CT's- ck CXR in 4 hours, gently diurese, Anxiety- start long acting, pain- add toradol    Disposition:  TBD

## 2022-09-19 NOTE — CARE PLAN
Problem: Hyperinflation  Goal: Prevent or improve atelectasis  Description: Target End Date:  3 to 4 days    1. Instruct incentive spirometry usage  2.  Perform hyperinflation therapy as indicated  Outcome: Not Progressing   Patient is achieving 500 ml on IS. PEP QID. Receiving 2-4 L via nasal cannula.

## 2022-09-19 NOTE — THERAPY
Physical Therapy Contact Note    Patient Name: Sarah Buckner  Age:  51 y.o., Sex:  female  Medical Record #: [id number]  Today's Date: 9/19/2022    Pt now pending epidural abscess r/o - will hold and re-attempt PT eval as appropriate.    Pj Bautista PT, DPT

## 2022-09-19 NOTE — ASSESSMENT & PLAN NOTE
This is Septic shock Present on admission  SIRS criteria identified on my evaluation include: Fever, with temperature greater than 101 deg F and Tachycardia, with heart rate greater than 90 BPM  Indicators of septic shock include: Severe sepsis present and persistent hypotension after 30 ml/kg completed.   Sources is: IE  Sepsis protocol initiated  Crystalloid Fluid Administration: Fluid resuscitation ordered per standard protocol - 30 mL/kg per current or ideal body weight  IV antibiotics as appropriate for source of sepsis  Reassessment: I have reassessed the patient's hemodynamic status    Ampicillin plus ceftriaxone s/p source control  Vasopressors titrated to MAP > 65

## 2022-09-19 NOTE — DISCHARGE PLANNING
Case Management Discharge Planning    Admission Date: 9/9/2022  GMLOS: 7.1  ALOS: 10    6-Clicks ADL Score: 24  6-Clicks Mobility Score: 24      Anticipated Discharge Dispo: Discharge Disposition: Discharged to home/self care (01)  Discharge Address: [address] [place] [address] [address] [place] [address]  Discharge Contact Phone Number: [phone]    DME Needed: Unable to determine at this time.    Action(s) Taken: Updated Provider/Nurse on Discharge Plan and OTHER: Reviewed chart and updated discharge plan. Reviewed treatment and discharge plans, needs, and barriers with medical and nursing teams.    Escalations Completed: None    Medically Clear: No    Next Steps: f/u with medical and nursing teams regarding discharge planning needs/barriers and assist as indicated. f/u with patient and family regarding discharge planning needs/barriers and update discharge plan as indicated.    Barriers to Discharge: Medical clearance    Is the patient up for discharge tomorrow: No

## 2022-09-19 NOTE — PROCEDURES
Vascular Access Team     Date of Insertion: 9/19/22  Arm Circumference: 30  Internal length: 39  External Length: 0  Vein Occupancy %: 30   Reason for PICC: Antibiotic Therapy  Labs: WBC 15.2, , BUN 30, Cr 0.45, , INR 1.83 on 9/18/22     Consents confirmed, vessel patency confirmed with ultrasound. Risks and benefits of procedure explained to patient and education regarding central line associated bloodstream infections provided. Questions answered.      PICC placed in LUE per licensed provider order with ultrasound guidance.  4 Fr, single lumen PICC placed in brachial vein after 1 attempt(s). 2 mL of 1% lidocaine injected intradermally at the insertion site. A 21 gauge microintroducer needle was visualized entering the vein and modified Seldinger technique was used to obtain access to the vein. 39 cm catheter inserted and brisk blood return was observed from each lumen upon aspiration. Line secured at the 0 cm marker. TCS stylet removed and observed to be fully intact. Each lumen flushed using pulsatile method without resistance with 10 mL 0.9% normal saline. PICC line secured with Biopatch and Tegaderm.     PICC tip placement location is confirmed by nurse to be in the Superior Vena Cava (SVC) utilizing 3CG technology. PICC line is appropriate for use at this time. Patient tolerated procedure well, without complications.  Patient condition relayed to primary RN or ordering physician via this post procedure note in the EMR.      Ultrasound images uploaded to PACS and viewable in the EMR - yes  Ultrasound imaged printed and placed in paper chart - no     Glance App Power PICC ref # [id number], Lot # [id number], Expiration Date 9/30/23

## 2022-09-19 NOTE — PROGRESS NOTES
"Critical Care Progress Note    Date of admission  9/9/2022    Chief Complaint  51 y.o. female admitted 9/9/2022 with     Hospital Course  \"This lady has a history of chronic back pain, primary hypertension and urolithiasis.  She was admitted to 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  She has developed septic pulmonary emboli and has been treated with ampicillin and ceftriaxone per infectious diseases.  Today Dr. Bradley took him to the operating theater and performed radical tricuspid valve repair, patent foramen ovale closure and aortic valve repair.  She is now in the cardiac surgery unit.\"  9/17-patient was unable to be extubated overnight due to decreased mental status, still on pressors.   9/18-patient extubated, doing well 4 L oxygen, still requiring pressors.     Interval Problem Update  Reviewed last 24 hour events:  Awake and alert  Chest tube out  Tmax 37.9  VTE ppx still on hold  Precedex   Vaso for MAP > 65  Reports resolution of back pain today      Review of Systems  Review of Systems   Unable to perform ROS: Critical illness      Vital Signs for last 24 hours   Temp:  [36.6 °C (97.9 °F)-37.9 °C (100.2 °F)] 37.1 °C (98.8 °F)  Pulse:  [58-78] 62  Resp:  [16-50] 50  BP: ()/(45-78) 135/70  SpO2:  [88 %-99 %] 97 %    Hemodynamic parameters for last 24 hours  CVP:  [11 MM HG-17 MM HG] 14 MM HG    Respiratory Information for the last 24 hours       Physical Exam   Physical Exam  Vitals and nursing note reviewed.   Constitutional:       Appearance: She is not ill-appearing.   HENT:      Head: Normocephalic and atraumatic.      Right Ear: External ear normal.      Left Ear: External ear normal.      Nose: Nose normal.      Mouth/Throat:      Mouth: Mucous membranes are moist.   Eyes:      Pupils: Pupils are equal, round, and reactive to light.   Cardiovascular:      Rate and " Rhythm: Normal rate and regular rhythm.      Pulses: Normal pulses.      Comments: Midline incision c/d/i  Pulmonary:      Effort: Pulmonary effort is normal. No respiratory distress.   Abdominal:      General: Abdomen is flat. There is no distension.      Palpations: Abdomen is soft.      Tenderness: There is no abdominal tenderness. There is no guarding or rebound.   Musculoskeletal:      Right lower leg: No edema.      Left lower leg: No edema.   Skin:     Capillary Refill: Capillary refill takes less than 2 seconds.      Findings: No lesion or rash.   Neurological:      General: No focal deficit present.      Mental Status: She is alert and oriented to person, place, and time.      Motor: No weakness.   Psychiatric:         Mood and Affect: Mood normal.       Medications  Current Facility-Administered Medications   Medication Dose Route Frequency Provider Last Rate Last Admin    ketorolac (TORADOL) injection 30 mg  30 mg Intravenous Q6HRS PRN Peggy Kate A.P.N.        furosemide (LASIX) injection 20 mg  20 mg Intravenous BID Peggy Kate A.P.N.   20 mg at 09/19/22 1002    potassium chloride SA (Kdur) tablet 10 mEq  10 mEq Oral BID Peggy Kate A.P.N.   10 mEq at 09/19/22 1002    busPIRone (BUSPAR) tablet 5 mg  5 mg Oral TID Peggy Kate A.P.N.   5 mg at 09/19/22 1308    ALPRAZolam (XANAX) tablet 0.25 mg  0.25 mg Oral TID PRN MARA Samson.P.R.N.   0.25 mg at 09/18/22 2236    gabapentin (NEURONTIN) capsule 100 mg  100 mg Oral TID Debbie Banda M.D.   100 mg at 09/19/22 1308    norepinephrine (Levophed) 8 mg in 250 mL NS infusion (premix)  0-30 mcg/min Intravenous Continuous Truong Bradley D.O.   Stopped at 09/19/22 0815    vasopressin (Vasostrict) 20 Units in  mL Infusion  0.03 Units/min Intravenous Continuous Truong Bradley, D.O.   Stopped at 09/19/22 1330    Respiratory Therapy Consult   Nebulization Continuous RT MARA Samson.P.R.N.        NS infusion    Intravenous Continuous Joshua Goodson, A.P.R.N. 10 mL/hr at 09/18/22 0700 Rate Verify at 09/18/22 0700    electrolyte-A (PLASMALYTE-A) infusion   Intravenous PRN Joshua Goodson, A.P.R.N. 999 mL/hr at 09/16/22 1945 New Bag at 09/16/22 1945    aspirin EC (ECOTRIN) tablet 81 mg  81 mg Oral DAILY Joshua Goodson, A.P.R.N.   81 mg at 09/19/22 0520    Pharmacy Consult Request ...Pain Management Review 1 Each  1 Each Other PHARMACY TO DOSE Joshua Goodson, A.P.R.N.        acetaminophen (TYLENOL) tablet 1,000 mg  1,000 mg Oral Q6HRS Joshua Goodson, A.P.R.N.   1,000 mg at 09/19/22 1308    Followed by    [START ON 9/21/2022] acetaminophen (TYLENOL) tablet 1,000 mg  1,000 mg Oral Q6HRS PRN Joshua Goodson, A.P.R.N.        traMADol (Ultram) 50 MG tablet 50 mg  50 mg Oral Q4HRS PRN Joshua Goodson, A.P.R.N.        dexmedetomidine (PRECEDEX) 400 mcg/100mL NS premix infusion  0-1.5 mcg/kg/hr Intravenous Continuous Joshua Goodson, A.P.R.N. 3.2 mL/hr at 09/19/22 0901 0.2 mcg/kg/hr at 09/19/22 0901    ondansetron (ZOFRAN) syringe/vial injection 8 mg  8 mg Intravenous Q6HRS PRN Joshua Goodson, A.P.R.N.   8 mg at 09/16/22 1959    Or    prochlorperazine (COMPAZINE) injection 10 mg  10 mg Intravenous Q6HRS PRN Joshua Goodson, A.P.R.N.   10 mg at 09/16/22 2222    Or    promethazine (PHENERGAN) suppository 25 mg  25 mg Rectal Q6HRS PRN Joshua Goodson, A.P.R.N.        acetaminophen (Tylenol) tablet 650 mg  650 mg Oral Q4HRS PRN Joshua Goodson, A.P.R.N.        Or    acetaminophen (TYLENOL) suppository 650 mg  650 mg Rectal Q4HRS PRN Joshua Goodson, A.P.R.N.        senna-docusate (PERICOLACE or SENOKOT S) 8.6-50 MG per tablet 2 Tablet  2 Tablet Oral BID Joshua Goodson A.P.R.N.   2 Tablet at 09/19/22 0520    And    polyethylene glycol/lytes (MIRALAX) PACKET 1 Packet  1 Packet Oral DAILY Joshua Goodson A.P.R.N.   1 Packet at 09/19/22 0520    And    magnesium hydroxide (MILK OF MAGNESIA) suspension 30 mL  30 mL Oral DAILY Joshua Goodson A.P.R.N.   30 mL at  09/19/22 0521    And    bisacodyl (DULCOLAX) suppository 10 mg  10 mg Rectal QDAY PRN Joshua Goodson A.P.R.N.        omeprazole (PRILOSEC) capsule 20 mg  20 mg Oral DAILY MARA Samson.P.R.N.   20 mg at 09/19/22 0521    mag hydrox-al hydrox-simeth (MAALOX PLUS ES or MYLANTA DS) suspension 30 mL  30 mL Oral Q4HRS PRN Joshua Goodson A.P.R.N.        mupirocin (BACTROBAN) 2 % ointment 1 Application  1 Application Topical BID MARA Samson.P.R.N.   1 Application at 09/19/22 0521    oxyCODONE immediate-release (ROXICODONE) tablet 5 mg  5 mg Oral Q3HRS PRN Kassandra Rosario M.D.   5 mg at 09/18/22 0211    Or    oxyCODONE immediate release (ROXICODONE) tablet 10 mg  10 mg Oral Q3HRS PRN Kassandra Rosario M.D.   10 mg at 09/19/22 0520    Or    HYDROmorphone (Dilaudid) injection 0.5 mg  0.5 mg Intravenous Q3HRS PRN Kassandra Rosario M.D.   0.5 mg at 09/17/22 1153    ampicillin (Omnipen) 2,000 mg in  mL IVPB  2,000 mg Intravenous Q4HRS Joshua Goodson A.P.R.N.   Stopped at 09/19/22 1135    cefTRIAXone (Rocephin) syringe 2 g  2 g Intravenous Q12HRS Maria Luisa Wyatt M.D.   2 g at 09/19/22 0521    guaiFENesin ER (MUCINEX) tablet 1,200 mg  1,200 mg Oral Q12HRS Antonio Goodman M.D.   1,200 mg at 09/19/22 0520       Fluids    Intake/Output Summary (Last 24 hours) at 9/19/2022 1357  Last data filed at 9/19/2022 1200  Gross per 24 hour   Intake 2652.15 ml   Output 760 ml   Net 1892.15 ml       Laboratory  Recent Labs     09/16/22  1701 09/16/22  1813 09/17/22  0016 09/17/22  0839 09/17/22  1421   ISTATAPH 7.312*   < > 7.440 7.401 7.371*   ISTATAPCO2 47.2*   < > 36.5 36.4 42.8*   ISTATAPO2 83   < > 95* 66 65   ISTATATCO2 25   < > 26 24 26   TXITLLC3CRS 95   < > 98 93 92*   ISTATARTHCO3 23.9   < > 24.8 22.6 24.8   ISTATARTBE -2   < > 1 -2 -1   ISTATTEMP 35.0 C   < > 35.5 C 36.1 C 36.6 C   ISTATFIO2 30  --  30 30  --    ISTATSPEC Arterial   < > Arterial Arterial Arterial   ISTATAPHTC 7.341*   < > 7.462  7.414 7.377*   RJISVPYD2NA 73   < > 87 62* 63*    < > = values in this interval not displayed.         Recent Labs     09/16/22  1445 09/16/22  1707 09/17/22  0117 09/17/22  1130 09/18/22  0500 09/19/22  0507   SODIUM  --   --  145  --  139 139   POTASSIUM 4.5   < > 4.8 4.7 4.3 4.3   CHLORIDE  --   --  111  --  108 108   CO2  --   --  23  --  21 23   BUN  --   --  10  --  19 20   CREATININE  --   --  0.67  --  0.79 0.45*   MAGNESIUM 3.2*  --  3.0*  --   --   --    CALCIUM  --   --  8.2*  --  7.8* 7.7*    < > = values in this interval not displayed.     Recent Labs     09/17/22  0117 09/18/22  0500 09/19/22  0507   GLUCOSE 136* 121* 89     Recent Labs     09/18/22  0500 09/18/22  1200 09/19/22  0507   WBC 16.8* 16.5* 15.2*     Recent Labs     09/16/22  1445 09/17/22  0117 09/18/22  0500 09/18/22  1200 09/19/22  0507   RBC  --    < > 2.59* 3.33* 3.38*   HEMOGLOBIN  --    < > 6.9* 9.4* 9.4*   HEMATOCRIT  --    < > 21.8* 28.6* 29.1*   PLATELETCT 315   < > 315 285 306   PROTHROMBTM 20.9*  --   --  20.7*  --    APTT 83.9*  --   --  52.4*  --    INR 1.85*  --   --  1.83*  --     < > = values in this interval not displayed.       Imaging  X-Ray:  I have personally reviewed the images and compared with prior images.  EKG:  I have personally reviewed the images and compared with prior images.    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 Bradley    S/P patent foramen ovale closure  Assessment & Plan  On 9/16 by Dr. Bradley    S/P tricuspid valve repair  Assessment & Plan  On 9/16 by Dr. Bradley    TAZ (acute kidney injury) (HCC)- (present on  admission)  Assessment & Plan  Strict I/Os  Renally dosed medications  Avoid nephrotoxic agents as able  Trend     Anemia- (present on admission)  Assessment & Plan  Acute on chronic following cardiothoracic surgery  Transfuse hgb < 8 CTS  Chest tube with hemothorax 9/18; removed 09/19/22     Septic shock (HCC)  Assessment & Plan  This is Septic shock Present on admission  SIRS criteria identified on my evaluation include: Fever, with temperature greater than 101 deg F and Tachycardia, with heart rate greater than 90 BPM  Indicators of septic shock include: Severe sepsis present and persistent hypotension after 30 ml/kg completed.   Sources is: IE  Sepsis protocol initiated  Crystalloid Fluid Administration: Fluid resuscitation ordered per standard protocol - 30 mL/kg per current or ideal body weight  IV antibiotics as appropriate for source of sepsis  Reassessment: I have reassessed the patient's hemodynamic status    Ampicillin plus ceftriaxone s/p source control  Vasopressors titrated to MAP > 65         VTE:  Contraindicated  Ulcer: PPI   Lines: Central Line  Ongoing indication addressed, Arterial Line  Ongoing indication addressed, and [place]ague Catheter  Ongoing indication addressed    I have performed a physical exam and reviewed and updated ROS and Plan today (9/19/2022). In review of yesterday's note (9/18/2022), there are no changes except as documented above.     Discussed patient condition and risk of morbidity and/or mortality with RN, RT, Pharmacy, Charge nurse / hot rounds, and Patient CV[facility]  The patient remains critically ill.  Critical care time = 67 minutes in directly providing and coordinating critical care and extensive data review.  No time overlap and excludes procedures.

## 2022-09-20 ENCOUNTER — APPOINTMENT (OUTPATIENT)
Dept: RADIOLOGY | Facility: MEDICAL CENTER | Age: 52
DRG: 219 | End: 2022-09-20
Attending: STUDENT IN AN ORGANIZED HEALTH CARE EDUCATION/TRAINING PROGRAM
Payer: COMMERCIAL

## 2022-09-20 LAB
ANION GAP SERPL CALC-SCNC: 11 MMOL/L (ref 7–16)
BUN SERPL-MCNC: 19 MG/DL (ref 8–22)
CALCIUM SERPL-MCNC: 7.8 MG/DL (ref 8.5–10.5)
CHLORIDE SERPL-SCNC: 106 MMOL/L (ref 96–112)
CO2 SERPL-SCNC: 23 MMOL/L (ref 20–33)
CREAT SERPL-MCNC: 0.45 MG/DL (ref 0.5–1.4)
ERYTHROCYTE [DISTWIDTH] IN BLOOD BY AUTOMATED COUNT: 61.9 FL (ref 35.9–50)
GFR SERPLBLD CREATININE-BSD FMLA CKD-EPI: 116 ML/MIN/1.73 M 2
GLUCOSE SERPL-MCNC: 86 MG/DL (ref 65–99)
HCT VFR BLD AUTO: 29.5 % (ref 37–47)
HGB BLD-MCNC: 9.5 G/DL (ref 12–16)
MCH RBC QN AUTO: 27.9 PG (ref 27–33)
MCHC RBC AUTO-ENTMCNC: 32.2 G/DL (ref 33.6–35)
MCV RBC AUTO: 86.5 FL (ref 81.4–97.8)
PLATELET # BLD AUTO: 355 K/UL (ref 164–446)
PMV BLD AUTO: 10.4 FL (ref 9–12.9)
POTASSIUM SERPL-SCNC: 3.7 MMOL/L (ref 3.6–5.5)
RBC # BLD AUTO: 3.41 M/UL (ref 4.2–5.4)
SODIUM SERPL-SCNC: 140 MMOL/L (ref 135–145)
WBC # BLD AUTO: 16 K/UL (ref 4.8–10.8)

## 2022-09-20 PROCEDURE — 700111 HCHG RX REV CODE 636 W/ 250 OVERRIDE (IP): Performed by: NURSE PRACTITIONER

## 2022-09-20 PROCEDURE — 97535 SELF CARE MNGMENT TRAINING: CPT

## 2022-09-20 PROCEDURE — 700111 HCHG RX REV CODE 636 W/ 250 OVERRIDE (IP): Performed by: INTERNAL MEDICINE

## 2022-09-20 PROCEDURE — 700102 HCHG RX REV CODE 250 W/ 637 OVERRIDE(OP): Performed by: STUDENT IN AN ORGANIZED HEALTH CARE EDUCATION/TRAINING PROGRAM

## 2022-09-20 PROCEDURE — 80048 BASIC METABOLIC PNL TOTAL CA: CPT

## 2022-09-20 PROCEDURE — 99024 POSTOP FOLLOW-UP VISIT: CPT | Performed by: NURSE PRACTITIONER

## 2022-09-20 PROCEDURE — 85027 COMPLETE CBC AUTOMATED: CPT

## 2022-09-20 PROCEDURE — 99232 SBSQ HOSP IP/OBS MODERATE 35: CPT | Performed by: HOSPITALIST

## 2022-09-20 PROCEDURE — A9270 NON-COVERED ITEM OR SERVICE: HCPCS | Performed by: HOSPITALIST

## 2022-09-20 PROCEDURE — A9270 NON-COVERED ITEM OR SERVICE: HCPCS | Performed by: NURSE PRACTITIONER

## 2022-09-20 PROCEDURE — 700102 HCHG RX REV CODE 250 W/ 637 OVERRIDE(OP): Performed by: HOSPITALIST

## 2022-09-20 PROCEDURE — 700102 HCHG RX REV CODE 250 W/ 637 OVERRIDE(OP): Performed by: NURSE PRACTITIONER

## 2022-09-20 PROCEDURE — 99233 SBSQ HOSP IP/OBS HIGH 50: CPT | Performed by: INTERNAL MEDICINE

## 2022-09-20 PROCEDURE — 700105 HCHG RX REV CODE 258: Performed by: NURSE PRACTITIONER

## 2022-09-20 PROCEDURE — 71045 X-RAY EXAM CHEST 1 VIEW: CPT

## 2022-09-20 PROCEDURE — 700102 HCHG RX REV CODE 250 W/ 637 OVERRIDE(OP): Performed by: INTERNAL MEDICINE

## 2022-09-20 PROCEDURE — A9270 NON-COVERED ITEM OR SERVICE: HCPCS | Performed by: STUDENT IN AN ORGANIZED HEALTH CARE EDUCATION/TRAINING PROGRAM

## 2022-09-20 PROCEDURE — A9270 NON-COVERED ITEM OR SERVICE: HCPCS | Performed by: INTERNAL MEDICINE

## 2022-09-20 PROCEDURE — 770020 HCHG ROOM/CARE - TELE (206)

## 2022-09-20 RX ORDER — FUROSEMIDE 10 MG/ML
20 INJECTION INTRAMUSCULAR; INTRAVENOUS EVERY 6 HOURS
Status: DISCONTINUED | OUTPATIENT
Start: 2022-09-20 | End: 2022-09-21

## 2022-09-20 RX ORDER — METOLAZONE 2.5 MG/1
2.5 TABLET ORAL
Status: DISCONTINUED | OUTPATIENT
Start: 2022-09-20 | End: 2022-09-22

## 2022-09-20 RX ORDER — POTASSIUM CHLORIDE 20 MEQ/1
20 TABLET, EXTENDED RELEASE ORAL 3 TIMES DAILY
Status: DISCONTINUED | OUTPATIENT
Start: 2022-09-20 | End: 2022-09-22

## 2022-09-20 RX ORDER — SUMATRIPTAN 50 MG/1
100 TABLET, FILM COATED ORAL
Status: DISCONTINUED | OUTPATIENT
Start: 2022-09-20 | End: 2022-09-24 | Stop reason: HOSPADM

## 2022-09-20 RX ADMIN — KETOROLAC TROMETHAMINE 30 MG: 30 INJECTION, SOLUTION INTRAMUSCULAR; INTRAVENOUS at 21:18

## 2022-09-20 RX ADMIN — MUPIROCIN 1 APPLICATION: 20 OINTMENT TOPICAL at 17:16

## 2022-09-20 RX ADMIN — POTASSIUM CHLORIDE 20 MEQ: 20 TABLET, EXTENDED RELEASE ORAL at 11:54

## 2022-09-20 RX ADMIN — GABAPENTIN 100 MG: 100 CAPSULE ORAL at 17:15

## 2022-09-20 RX ADMIN — BUSPIRONE HYDROCHLORIDE 5 MG: 10 TABLET ORAL at 17:15

## 2022-09-20 RX ADMIN — POTASSIUM CHLORIDE 10 MEQ: 1500 TABLET, EXTENDED RELEASE ORAL at 05:51

## 2022-09-20 RX ADMIN — OXYCODONE 5 MG: 5 TABLET ORAL at 10:27

## 2022-09-20 RX ADMIN — GUAIFENESIN 1200 MG: 600 TABLET, EXTENDED RELEASE ORAL at 05:51

## 2022-09-20 RX ADMIN — GABAPENTIN 100 MG: 100 CAPSULE ORAL at 05:52

## 2022-09-20 RX ADMIN — AMPICILLIN SODIUM 2000 MG: 2 INJECTION, POWDER, FOR SOLUTION INTRAVENOUS at 13:40

## 2022-09-20 RX ADMIN — OMEPRAZOLE 20 MG: 20 CAPSULE, DELAYED RELEASE ORAL at 05:52

## 2022-09-20 RX ADMIN — FUROSEMIDE 20 MG: 10 INJECTION, SOLUTION INTRAMUSCULAR; INTRAVENOUS at 17:15

## 2022-09-20 RX ADMIN — ACETAMINOPHEN 1000 MG: 500 TABLET ORAL at 12:00

## 2022-09-20 RX ADMIN — CEFTRIAXONE SODIUM 2 G: 10 INJECTION, POWDER, FOR SOLUTION INTRAVENOUS at 18:08

## 2022-09-20 RX ADMIN — FUROSEMIDE 20 MG: 10 INJECTION, SOLUTION INTRAMUSCULAR; INTRAVENOUS at 05:51

## 2022-09-20 RX ADMIN — AMPICILLIN SODIUM 2000 MG: 2 INJECTION, POWDER, FOR SOLUTION INTRAVENOUS at 17:15

## 2022-09-20 RX ADMIN — BUSPIRONE HYDROCHLORIDE 5 MG: 10 TABLET ORAL at 05:52

## 2022-09-20 RX ADMIN — OXYCODONE 5 MG: 5 TABLET ORAL at 14:54

## 2022-09-20 RX ADMIN — GABAPENTIN 100 MG: 100 CAPSULE ORAL at 11:54

## 2022-09-20 RX ADMIN — MUPIROCIN 1 APPLICATION: 20 OINTMENT TOPICAL at 05:53

## 2022-09-20 RX ADMIN — METOLAZONE 2.5 MG: 2.5 TABLET ORAL at 17:15

## 2022-09-20 RX ADMIN — FUROSEMIDE 20 MG: 10 INJECTION, SOLUTION INTRAMUSCULAR; INTRAVENOUS at 11:54

## 2022-09-20 RX ADMIN — METOLAZONE 2.5 MG: 2.5 TABLET ORAL at 08:57

## 2022-09-20 RX ADMIN — AMPICILLIN SODIUM 2000 MG: 2 INJECTION, POWDER, FOR SOLUTION INTRAVENOUS at 21:20

## 2022-09-20 RX ADMIN — BUSPIRONE HYDROCHLORIDE 5 MG: 10 TABLET ORAL at 11:54

## 2022-09-20 RX ADMIN — GUAIFENESIN 1200 MG: 600 TABLET, EXTENDED RELEASE ORAL at 17:20

## 2022-09-20 RX ADMIN — PROCHLORPERAZINE EDISYLATE 10 MG: 5 INJECTION INTRAMUSCULAR; INTRAVENOUS at 04:21

## 2022-09-20 RX ADMIN — AMPICILLIN SODIUM 2000 MG: 2 INJECTION, POWDER, FOR SOLUTION INTRAVENOUS at 01:03

## 2022-09-20 RX ADMIN — AMPICILLIN SODIUM 2000 MG: 2 INJECTION, POWDER, FOR SOLUTION INTRAVENOUS at 10:21

## 2022-09-20 RX ADMIN — ACETAMINOPHEN 1000 MG: 500 TABLET ORAL at 17:15

## 2022-09-20 RX ADMIN — ASPIRIN 81 MG: 81 TABLET, COATED ORAL at 05:52

## 2022-09-20 RX ADMIN — ALPRAZOLAM 0.25 MG: 0.25 TABLET ORAL at 01:03

## 2022-09-20 RX ADMIN — CEFTRIAXONE SODIUM 2 G: 10 INJECTION, POWDER, FOR SOLUTION INTRAVENOUS at 05:51

## 2022-09-20 RX ADMIN — POTASSIUM CHLORIDE 20 MEQ: 20 TABLET, EXTENDED RELEASE ORAL at 17:15

## 2022-09-20 RX ADMIN — AMPICILLIN SODIUM 2000 MG: 2 INJECTION, POWDER, FOR SOLUTION INTRAVENOUS at 05:53

## 2022-09-20 RX ADMIN — ACETAMINOPHEN 1000 MG: 500 TABLET ORAL at 05:52

## 2022-09-20 RX ADMIN — SUMATRIPTAN SUCCINATE 100 MG: 50 TABLET ORAL at 17:15

## 2022-09-20 ASSESSMENT — ENCOUNTER SYMPTOMS
ROS GI COMMENTS: TOLERATING A DIET
NAUSEA: 0
CHILLS: 0
ABDOMINAL PAIN: 0
SHORTNESS OF BREATH: 0
VOMITING: 0
DIZZINESS: 0
BACK PAIN: 0
COUGH: 0
HEADACHES: 1
HEADACHES: 0
DIARRHEA: 0
FEVER: 0

## 2022-09-20 ASSESSMENT — PATIENT HEALTH QUESTIONNAIRE - PHQ9
SUM OF ALL RESPONSES TO PHQ9 QUESTIONS 1 AND 2: 0
2. FEELING DOWN, DEPRESSED, IRRITABLE, OR HOPELESS: NOT AT ALL
1. LITTLE INTEREST OR PLEASURE IN DOING THINGS: NOT AT ALL

## 2022-09-20 ASSESSMENT — FIBROSIS 4 INDEX
FIB4 SCORE: 0.53
FIB4 SCORE: 0.53

## 2022-09-20 NOTE — PROGRESS NOTES
Received report from day KEDAR Vargas, POC discussed. Call light in place, bed lowered and locked, bed alarm on.  Per KEDAR Vargas, CTS prefers Vasopressin to be turned on first to maintain SBP>90.

## 2022-09-20 NOTE — CARE PLAN
Problem: Post Op Day 4 CABG/Heart Valve Replacement  Goal: Optimal care of the Post Op CABG/Heart Valve replacement Post Op Day 4  Outcome: Progressing  Intervention: Daily weights in the morning  Note: Completed  Intervention: Shower daily and clean incisions twice daily with soap and water  Note: Completed  Intervention: Up in chair for all meals  Note: Pt refuses this morning because she is tired  Intervention: Ambulate 4 times daily, increasing the distance each time  Note: Ambulated x1  Intervention: IS q 1 hour while awake and record best IS volume  Note: 750   Intervention: Consider removal of damon, chest tube and pacer wires if not already done  Note: TBD  Intervention: Discharge Education  Note: Educated about sepsis  Intervention: Add special instructions for cardiac surgery discharge instructions to after visit summary and review with patient  Note: TBD   The patient is Stable - Low risk of patient condition declining or worsening    Shift Goals  Clinical Goals: mantain hemodynamic stability  Patient Goals: sleep  Family Goals: CHRIS    Progress made toward(s) clinical / shift goals:  pt is off of vasopressors           Normal vision: sees adequately in most situations; can see medication labels, newsprint

## 2022-09-20 NOTE — PROGRESS NOTES
Hospital Medicine Daily Progress Note    Date of Service  9/20/2022    Chief Complaint  Sarah Buckner is a 51 y.o. female admitted 9/9/2022 with bacteremia.    Hospital Course  Sarah Buckner is a 51 y.o. female admitted 9/9/2022 with bacterial endocarditis growing enterococcus faecalis on blood cultures prior to 09/08/22, with possible streptococcus on 09/10/22 blood cultures.  Also found to have significant anemia, reportedly had melena outpatient but none inpatient. Patient was placed on ampicillin and ceftriaxone IV. Work-up of Enterococcus bacteremia including EGD, Colonoscopy revealed mild diverticulosis, antral gastritis and sigmoid polypectomy of 7mm pedunculated polyp. Cardiology was consulted and ordered a KEO found a perferation in the tricuspid valve, with open PFO and aortic valve involvement as well.  At that point, cardiothoracic surgery was consulted for assessment and possible valve replacement/repair.     Patient was placed on 6 weeks of antibiotic therapy starting from last negative blood cultures taken 09/13/2022.  On 9/16/2022 she underwent tricuspid valve repair, patent foramen ovale closure, aortic valve repair by Dr. Bradley.  Postoperatively she remained intubated and required infusion of red blood cells as well as IV pressors.  A chest tube was attempted on 9/18/2022 but was not successful.        Interval Problem Update  9/20/2022: Patient seen and evaluated in the ICU.  She now has transfer orders to telemetry.  Remains on IV Rocephin and ampicillin.  She is on IV Lasix 1 mg every 6 hours as well as metolazone. Her pain is controlled. She is on 0.5 liters of oxygen.    I have discussed this patient's plan of care and discharge plan at IDT rounds today with Case Management, Nursing, Nursing leadership, and other members of the IDT team. SNF referral placed for long term IV antibiotics.     Consultants/Specialty  critical care  Cardiothoracic surgery  Infectious  disease  GI  Cardiology  General surgery  Code Status  Full Code    Disposition  Patient is not medically cleared for discharge.   Anticipate discharge to be determined.  I have placed the appropriate orders for post-discharge needs.    Review of Systems  Review of Systems   Constitutional:  Negative for chills and fever.   Respiratory:  Negative for shortness of breath.    Cardiovascular:  Positive for leg swelling.        Sternal pain   Gastrointestinal:         Tolerating a diet   Neurological:  Positive for headaches.   All other systems reviewed and are negative.     Physical Exam  Temp:  [36.7 °C (98 °F)-37 °C (98.6 °F)] 36.7 °C (98 °F)  Pulse:  [48-95] 95  Resp:  [20] 20  BP: ()/(50-83) 125/82  SpO2:  [87 %-99 %] 90 %    Physical Exam  Vitals and nursing note reviewed.   Constitutional:       Appearance: She is ill-appearing. She is not toxic-appearing.   HENT:      Head: Normocephalic and atraumatic.      Mouth/Throat:      Mouth: Mucous membranes are dry.      Pharynx: Oropharynx is clear.   Eyes:      General: No scleral icterus.     Conjunctiva/sclera: Conjunctivae normal.   Cardiovascular:      Rate and Rhythm: Normal rate and regular rhythm.      Comments: Slight murmur  Sternal scar without infection.   Pulmonary:      Effort: Pulmonary effort is normal.      Breath sounds: Normal breath sounds.      Comments: 0.5 liters of nasal cannula oxygen  Abdominal:      General: There is no distension.      Tenderness: There is no abdominal tenderness.   Musculoskeletal:      Cervical back: Normal range of motion and neck supple.      Right lower leg: Edema present.      Left lower leg: Edema present.      Comments: Left arm PICC   Skin:     General: Skin is warm and dry.   Neurological:      General: No focal deficit present.      Mental Status: She is alert and oriented to person, place, and time.   Psychiatric:         Mood and Affect: Mood normal.         Behavior: Behavior normal.         Thought  Content: Thought content normal.         Judgment: Judgment normal.       Fluids    Intake/Output Summary (Last 24 hours) at 9/20/2022 1235  Last data filed at 9/20/2022 1200  Gross per 24 hour   Intake 2770.48 ml   Output 2030 ml   Net 740.48 ml       Laboratory  Recent Labs     09/18/22  1200 09/19/22  0507 09/20/22  0107   WBC 16.5* 15.2* 16.0*   RBC 3.33* 3.38* 3.41*   HEMOGLOBIN 9.4* 9.4* 9.5*   HEMATOCRIT 28.6* 29.1* 29.5*   MCV 85.9 86.1 86.5   MCH 28.2 27.8 27.9   MCHC 32.9* 32.3* 32.2*   RDW 63.2* 62.7* 61.9*   PLATELETCT 285 306 355   MPV 10.8 10.5 10.4     Recent Labs     09/18/22  0500 09/19/22  0507 09/20/22  0107   SODIUM 139 139 140   POTASSIUM 4.3 4.3 3.7   CHLORIDE 108 108 106   CO2 21 23 23   GLUCOSE 121* 89 86   BUN 19 20 19   CREATININE 0.79 0.45* 0.45*   CALCIUM 7.8* 7.7* 7.8*     Recent Labs     09/18/22  1200   APTT 52.4*   INR 1.83*               Imaging  DX-CHEST-PORTABLE (1 VIEW)   Final Result      1.  Unchanged BILATERAL pneumonia   2.  Small BILATERAL pleural effusions, LEFT larger than RIGHT, unchanged      IR-PICC LINE PLACEMENT W/ GUIDANCE > AGE 5   Final Result                  Ultrasound-guided PICC placement performed by qualified nursing staff as    above.          DX-CHEST-PORTABLE (1 VIEW)   Final Result      1.  Right IJ catheter and left PICC line in satisfactory position.      2.  Improving bilateral multifocal opacities consistent with resolving pneumonitis and/or pulmonary edema.      3.   Small left pleural effusion.      DX-CHEST-PORTABLE (1 VIEW)   Final Result      Stable bilateral pulmonary infiltrates and support devices.      CT-ABORTED CT PROCEDURE   Final Result      Aborted RIGHT chest tube placement.      DX-CHEST-PORTABLE (1 VIEW)   Final Result         1.  Decrease in right pleural effusion after right chest tube placement.      2.  Side port of right-sided chest tube is noted between the ribs.      3.  No new infiltrates or consolidations are identified.       DX-CHEST-PORTABLE (1 VIEW)   Final Result         1.  There is some increase in small right pleural effusion since the prior radiograph.      2.  No new infiltrates or consolidations.      DX-CHEST-PORTABLE (1 VIEW)   Final Result         1. Stable lines and tubes.   2. Stable bilateral pulmonary opacities and small to moderate bilateral pleural effusions.         DX-CHEST-PORTABLE (1 VIEW)   Final Result      1.  Bilateral nodular opacities are consistent with septic emboli seen on prior exams. Probable small bilateral pleural effusions.      2.  Endotracheal tube tip projects approximately 2.2 cm above the parag.      3.  Right internal jugular catheter tip projects over the cavoatrial junction. No pneumothorax is identified.      EC-KEO W/O CONT         DX-CHEST-2 VIEWS   Final Result      1.  Multifocal ill-defined bilateral airspace opacities suggesting infection.   2.  Small right pleural effusion.      US-CAROTID DOPPLER BILAT   Final Result      EC-KEO W/O CONT   Final Result      [id number]   Final Result      Multiple dental caries.      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  Last positive blood culture was 9/8.    Denies IV drug use, possible dental carries (chronic), melena reported on scale of weeks. GI consulted to work-up for possible GI source of Enterococcus bacteremia, including possible malignancy.   Colonoscopy and EGD 9/12 showing healed erosive gastritis, few sigmoid diverticula, 7mm pendunculated sigmoid polyp  Status post aortic valve and tricuspid valve repairs by Dr. Bradley on 9/16  Infectious disease has consulted  IV ampicillin and rocephin through 10/27/22. Weekly CBC and CMP while on IV antibiotics.  Likely post-acute placement for IV antibiotics and further rehab purposes.   IV lasix  20 mg q6 hours with potassium. Likely change to oral on 9/21.     S/P aortic valve repair- (present on admission)  Assessment & Plan  By Dr. Bradley 9/16    S/P patent foramen ovale closure- (present on admission)  Assessment & Plan  By Dr. Bradley 9/16.     S/P tricuspid valve repair- (present on admission)  Assessment & Plan  By Dr. Bradley 9/16    Elevated alkaline phosphatase level  Assessment & Plan  Alk phos has been significantly elevated throughout admission.  Isozymes confirm liver source.  Alk phos went as high as 1,433 and has trended down  Follow weekly and this is appropriate for outpatient follow up.    Endocarditis  Assessment & Plan  TTE with concern for tricuspid vegetation  See above problem    Anemia- (present on admission)  Assessment & Plan  Reports history of several weeks of melena at home, in the context of max dosages of NSAIDs for ongoing back pain. Melena has not recurred during admission.  Initial fecal occult blood is negative.  She required 2 units of RBCs at outside hospital prior to transfer.  Anemia may be multifactorial with contributions from hemolysis due to endocarditis, iron deficiency anemia due to GI losses.     Pneumonia- (present on admission)  Assessment & Plan  Cavitary lesion noted in left lower lobe demonstrated on CT chest. Possible etiology of infectious emboli from tricuspid vegetations.   IR consulted and not amenable to drainage.        Migraine with aura- (present on admission)  Assessment & Plan  Resume home imitrex 100 mg which she uses up to 9 tabs every 2 weeks    TAZ (acute kidney injury) (HCC)- (present on admission)  Assessment & Plan  resolved       VTE prophylaxis: SCDs/TEDs    I have performed a physical exam and reviewed and updated ROS and Plan today (9/20/2022). In review of yesterday's note (9/19/2022), there are no changes except as documented above.

## 2022-09-20 NOTE — PROGRESS NOTES
Future Appointments   Date Time Provider Mj Bettencourt   12/23/2020  6:30 PM Candy Vieyra MD [facility] [address] [facility]   12/30/2020 10:20 AM Rober Montalvo MD [facility] [place]     Patient verbally consents to a virtual/telephone check-in service for Report received from CIC RN. Pt transferred on zoll and hooked up to tele box once on unit. Pt oriented to room, bed is locked in lowest position. Belongings and call light within reach. Updated on plan of care. No questions at this time.

## 2022-09-20 NOTE — PROGRESS NOTES
Cardiovascular Surgery Progress Note    Name: Sarah Buckner  MRN: [id number]  : 1970  Admit Date: 2022  2:08 PM  4 Days Post-Op     Procedure:  Procedure(s) and Anesthesia Type:     * TRICUSPID VALVE REPAIR, PATENT FORAMEN OVALE CLOSURE, TRANSESOPHAGEAL ESCHOCARDIOGRAM - General     * ECHOCARDIOGRAM, TRANSESOPHAGEAL, INTRAOPERATIVE - General    Vitals:  Vitals:    22 0600 22 0641 22 0700 22 0748   BP:  133/71 110/62    Pulse:  74 71 72   Resp:    20   Temp:       TempSrc:       SpO2: 94%  92% 95%   Weight:  73.9 kg (162 lb 14.7 oz)     Height:          Temp (24hrs), Av.9 °C (98.5 °F), Min:36.7 °C (98.1 °F), Max:37.1 °C (98.8 °F)      Respiratory:    Respiration: 20, Pulse Oximetry: 95 %       Fluids:    Intake/Output Summary (Last 24 hours) at 2022 0810  Last data filed at 2022 0600  Gross per 24 hour   Intake 2973.48 ml   Output 1655 ml   Net 1318.48 ml       Admit weight: Weight: 66.1 kg (145 lb 12.8 oz)  Current weight: Weight: 73.9 kg (162 lb 14.7 oz) (22 0641)    Labs:  Recent Labs     22  1200 22  0507 22  0107   WBC 16.5* 15.2* 16.0*   RBC 3.33* 3.38* 3.41*   HEMOGLOBIN 9.4* 9.4* 9.5*   HEMATOCRIT 28.6* 29.1* 29.5*   MCV 85.9 86.1 86.5   MCH 28.2 27.8 27.9   MCHC 32.9* 32.3* 32.2*   RDW 63.2* 62.7* 61.9*   PLATELETCT 285 306 355   MPV 10.8 10.5 10.4       Recent Labs     22  0500 22  0507 22  0107   SODIUM 139 139 140   POTASSIUM 4.3 4.3 3.7   CHLORIDE 108 108 106   CO2 21 23 23   GLUCOSE 121* 89 86   BUN 19 20 19   CREATININE 0.79 0.45* 0.45*   CALCIUM 7.8* 7.7* 7.8*       Recent Labs     22  1200   APTT 52.4*   INR 1.83*           Medications:  Scheduled Medications   Medication Dose Frequency    furosemide  20 mg Q6HRS    metOLazone  2.5 mg BID DIURETIC    potassium chloride SA  20 mEq TID    busPIRone  5 mg TID    gabapentin  100 mg TID    aspirin EC  81 mg DAILY    Pharmacy Consult Request  1 Each  PHARMACY TO DOSE    acetaminophen  1,000 mg Q6HRS    senna-docusate  2 Tablet BID    And    polyethylene glycol/lytes  1 Packet DAILY    And    magnesium hydroxide  30 mL DAILY    omeprazole  20 mg DAILY    mupirocin  1 Application BID    ampicillin  2,000 mg Q4HRS    cefTRIAXone (ROCEPHIN) IV  2 g Q12HRS    guaiFENesin ER  1,200 mg Q12HRS          Exam:   Physical Exam  Vitals and nursing note reviewed.   Constitutional:       General: She is not in acute distress.     Appearance: She is normal weight.   HENT:      Head: Normocephalic and atraumatic.      Right Ear: External ear normal.      Left Ear: External ear normal.      Nose: Nose normal.      Mouth/Throat:      Mouth: Mucous membranes are moist.   Eyes:      Extraocular Movements: Extraocular movements intact.      Conjunctiva/sclera: Conjunctivae normal.      Pupils: Pupils are equal, round, and reactive to light.   Cardiovascular:      Rate and Rhythm: Normal rate and regular rhythm.      Pulses: Normal pulses.      Heart sounds: Normal heart sounds.   Pulmonary:      Effort: Pulmonary effort is normal.      Breath sounds: Examination of the right-lower field reveals decreased breath sounds. Examination of the left-lower field reveals decreased breath sounds. Decreased breath sounds present.   Abdominal:      General: Abdomen is flat. Bowel sounds are normal. There is no distension.      Palpations: Abdomen is soft.   Musculoskeletal:         General: Normal range of motion.      Cervical back: Normal range of motion and neck supple.      Right lower leg: Edema present.      Left lower leg: Edema present.   Skin:     General: Skin is warm and dry.      Capillary Refill: Capillary refill takes less than 2 seconds.      Comments: Sternum- c/d/i   Neurological:      General: No focal deficit present.      Mental Status: She is alert and oriented to person, place, and time. Mental status is at baseline.      Sensory: No sensory deficit.      Motor: No  weakness.   Psychiatric:         Mood and Affect: Mood is anxious. Affect is inappropriate.       Cardiac Medications:    ASA - Yes    Plavix - No; contraindicated because of Other No CAD    Post-operative Beta Blockers - No; contraindicated because of Hypotension    Ace/ARB- No; contraindicated because of Normal EF    Statin - No; contraindicated because of No CAD    Aldactone- No; contraindicated because of Normal EF    Ejection Fraction:  60%    Telemetry:   9/17 SR/ST 60-120s  9/18 SB/SR oPVC  9/19 SR  9/20 SR    Assessment/Plan:  POD 1 Intubated, on levo 2mcg/vaso gtt.  Neuro intact.  Abdomen soft.  Minimal output from chest tubes.  Cr stable. Hgb 7.5 overnight, given 1 unit PRBC, now 8.5.  UO decreased this AM. Plan: Wean ventilator as tolerated.  Albumin 5% 25g once.  Wean vasopressors as tolerated.  Encourage IS when extubated.    POD 2 Extubated on 2LNC.  On levo/vaso gtt.  Neuro intact.  Abdomen soft nontender.  Cr 0.79 borderline UO overnight.  Hgb down to 6.9.  CXR with increased right pleural effusion this AM.  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  Discussed possible need for right chest tube with Dr Banda.  Plan: Chest tube today.  Wean oxygen as tolerated.  2 units PRBC today.  Wean vasopressors as tolerated.  Xanax prn for anxiety.    POD 3 HOTN- on vaso/levo- wean, SR- d/c pacer wires, 2lnc, CXR- no penumo or effusions- d/c all CT's- ck CXR in 4 hours, gently diurese, Anxiety- start long acting, pain- add toradol    POD 4 HDS- off all gtts, SR, CXR- no pneumo, Begin aggressive diuresis, anxiety-improved, plan transfer to tele    Disposition:  TBD

## 2022-09-20 NOTE — CARE PLAN
Problem: Pain - Standard  Goal: Alleviation of pain or a reduction in pain to the patient’s comfort goal  Outcome: Progressing     Problem: Post Op Day 3 CABG/Heart Valve replacement  Goal: Optimal care of the post op CABG/Heart Valve replacement post op day 3  Outcome: Progressing  Intervention: Daily weights in the morning  Note: completed  Intervention: Shower daily and clean incisions twice daily with soap and water  Note: Completed x1 on day shift  Intervention: Up in chair for all meals  Note: completed  Intervention: Ambulate 4 times daily, increasing the distance each time  Note: Pt ambulated x3 on day shift  Intervention: IS q 1 hour while awake and record best IS volume  Note: 750  Intervention: Consider removal of damon, chest tube and pacer wires if not already done  Note: Chest tubes and pacer wires removed  Intervention: Assess need for case management and  for discharge planning  Note: NA  Intervention: Discharge planning (See discharge barriers/planning problem on care plan)  Note: NA     The patient is Watcher - Medium risk of patient condition declining or worsening    Shift Goals  Clinical Goals: hemodynamic stability  Patient Goals: pain and anxiety control  Family Goals: CHRIS    Progress made toward(s) clinical / shift goals:  pt off levo and intermittently off vaso    Patient is not progressing towards the following goals:

## 2022-09-20 NOTE — CARE PLAN
Problem: Hyperinflation  Goal: Prevent or improve atelectasis  Description: Target End Date:  3 to 4 days    1. Instruct incentive spirometry usage  2.  Perform hyperinflation therapy as indicated  Outcome: Progressing       Respiratory Update    Treatment modality: PEP QID    Pt tolerating current treatments well with no adverse reactions.

## 2022-09-20 NOTE — PROGRESS NOTES
"Infectious Disease Progress Note    Author: Adwoa Parker M.D. Date & Time of service: 9/20/2022  9:19 AM    Chief Complaint:  Follow-up for TV endocarditis    Interval History:  51 y.o. female admitted 9/9/2022 as transfer from [place] for [facility]O. Blood cultures +Efaecalis. TTE with vegetation TV and acute anemia Hgb  down to 6.4 (baseline 13-14)  9/11 AF WBC 12.9 Patient alert today. Additional history obtained.  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  Normally active and regularly able to 50+#.  States she felt well until end of May after a 3 hour car ride had the sudden onset of back pain-she saw her PCP, spine and was referred to pain management.  She did not want to take all the narcotics she was given so had been self treating with NSAIDS.  The pain in her back was initially upper back but would change-sometimes low back and varied from side to side.  She was concerned that \"something else\" was going on. MRI done 7/31/2022 showed possible myelofibrosis.  On August 4, started feeling feverish, very fatigued, and noted pallor \"I looked sick... like a zombie\"   9/13 T-max 100.6, white count 10.4, tolerating antimicrobials.  Cardiology plan as below  9/15 T-max 100.3, tolerating antimicrobials.  KEO findings with change in management plans as below  9/17 patient is afebrile now, cardiac surgery yesterday as below, white count bumped up today to 23.8.  9/18 patient remains afebrile, white count back down to 16.8 today.  Planning chest tube placement  9/19 T-max 100.2, WBC 15.2.  Right chest tube placement was aborted given size and inability to access site.  Patient sitting up in chair states she has been able to mobilize a little bit more.  Ongoing back pain but not worse.  Reiterates that she is claustrophobic in an MRI machine  9/20 afebrile, WBC 16 sitting up in chair and feeling well.  Right IJ removed.  Patient states that back pain has nearly resolved.  Patient agreeable to skilled facility " at time of discharge      Labs Reviewed, Medications Reviewed, and Radiology Reviewed.    Review of Systems:  Review of Systems   Constitutional:  Negative for chills, fever and malaise/fatigue.   Respiratory:  Negative for cough and shortness of breath.    Gastrointestinal:  Negative for abdominal pain, diarrhea, nausea and vomiting.   Musculoskeletal:  Negative for back pain.   Neurological:  Negative for dizziness and headaches.     Hemodynamics:  Temp (24hrs), Av.9 °C (98.4 °F), Min:36.7 °C (98 °F), Max:37.1 °C (98.8 °F)  Temperature: 36.7 °C (98 °F), Monitored Temp: 36.5 °C (97.7 °F)  Pulse  Av.1  Min: 47  Max: 144   Blood Pressure: 126/73      Physical Exam:  Physical Exam  Vitals and nursing note reviewed.   Constitutional:       General: She is not in acute distress.     Appearance: Normal appearance. She is not ill-appearing, toxic-appearing or diaphoretic.      Comments:      HENT:      Nose: No rhinorrhea.      Comments: nasal cannula oxygen     Mouth/Throat:      Mouth: Mucous membranes are moist.   Eyes:      General: No scleral icterus.     Extraocular Movements: Extraocular movements intact.      Pupils: Pupils are equal, round, and reactive to light.   Cardiovascular:      Rate and Rhythm: Normal rate.      Heart sounds: No murmur heard.  Pulmonary:      Effort: Pulmonary effort is normal. No respiratory distress.      Comments: Sternotomy incision with sutures in place, well approximated.  No surrounding erythema    Prior chest tube site dressed  Abdominal:      General: There is no distension.      Palpations: Abdomen is soft.      Tenderness: There is no abdominal tenderness.   Musculoskeletal:      Comments: Left upper extremity PICC line in place   Skin:     Coloration: Skin is pale. Skin is not jaundiced.      Comments: No osler or Janeway lesions   Neurological:      General: No focal deficit present.      Mental Status: She is alert and oriented to person, place, and time.    Psychiatric:         Mood and Affect: Mood normal.         Behavior: Behavior normal.       Meds:    Current Facility-Administered Medications:     furosemide    metOLazone    potassium chloride SA    ketorolac    busPIRone    ALPRAZolam    gabapentin    Respiratory Therapy Consult    aspirin EC    Pharmacy Consult Request    acetaminophen **FOLLOWED BY** [START ON 9/21/2022] acetaminophen    traMADol    ondansetron **OR** prochlorperazine **OR** promethazine    acetaminophen **OR** acetaminophen    senna-docusate **AND** polyethylene glycol/lytes **AND** magnesium hydroxide **AND** bisacodyl    omeprazole    mag hydrox-al hydrox-simeth    mupirocin    oxyCODONE immediate-release **OR** oxyCODONE immediate-release **OR** HYDROmorphone    ampicillin    cefTRIAXone (ROCEPHIN) IV    guaiFENesin ER    Labs:  Recent Labs     09/18/22  1200 09/19/22  0507 09/20/22  0107   WBC 16.5* 15.2* 16.0*   RBC 3.33* 3.38* 3.41*   HEMOGLOBIN 9.4* 9.4* 9.5*   HEMATOCRIT 28.6* 29.1* 29.5*   MCV 85.9 86.1 86.5   MCH 28.2 27.8 27.9   RDW 63.2* 62.7* 61.9*   PLATELETCT 285 306 355   MPV 10.8 10.5 10.4       Recent Labs     09/18/22  0500 09/19/22  0507 09/20/22  0107   SODIUM 139 139 140   POTASSIUM 4.3 4.3 3.7   CHLORIDE 108 108 106   CO2 21 23 23   GLUCOSE 121* 89 86   BUN 19 20 19       Recent Labs     09/18/22  0500 09/19/22  0507 09/20/22  0107   CREATININE 0.79 0.45* 0.45*         Imaging:  CT-CHEST (THORAX) WITH    Result Date: 9/8/2022   CT of the chest, abdomen and pelvis HISTORY/REASON FOR EXAM:  concern for malignancy TECHNIQUE/EXAM DESCRIPTION: CT scan of the chest, abdomen and pelvis with contrast.  Both automated exposure control and Automated adjustment of tube current based on patient size are utilized.  9/8/2022 8:33 AM. Total DLP: 470.34 mGy*cm COMPARISON: None. FINDINGS: Chest: There are multifocal areas of consolidation involving predominantly the upper lobes and to a lesser degree the lower lobes.  One of them is  cavitary in the left lower lobe measuring 1.3 cm.  No pleural effusion or pneumothorax. The heart is normal in size.  There is trace pericardial effusion. The great vessel origins are unremarkable.  The aorta is normal in caliber. No pathologically enlarged adenopathy. There are no concerning osseous lesions.  No fractures.  Bilateral breast implants. Abdomen pelvis: Solid organs: The liver, pancreas and adrenal glands are unremarkable.  There is a cyst in the anterior spleen. The gallbladder is present. Kidneys and Bladder: There is no nephrolithiasis or hydronephrosis.  Mild focal scarring left upper pole kidney. No enhancing renal masses. The kidneys enhance symmetrically. No ureteral or bladder stones are seen. The bladder is grossly unremarkable. Bowel and Appendix: No dilated bowel. No perienteric inflammatory changes. No extraluminal air or free fluid. The appendix is visualized and appears normal. No pelvic masses. Lymphatics:  No abdominal, pelvic, or retroperitoneal lymphadenopathy. Other: No concerning osseous lesions. No fractures.     1.  Multifocal consolidation concerning for pneumonia, with small cavitary lesion in the left lower lobe. 2.  No acute abnormality or evidence of malignancy to the abdomen or pelvis. [facility] 9/8/2022 9:00 AM DLP Reporting Thresholds for Incorrect/Repeated Exams - DLP in mGy*cm Head/Neck:  0-year-old 3840, 1-year-old 5880, 5-year-old 8770, 10-year-old 30514 and adult 26717 Head:  0-year-old 4540, 1-year-old 7460, 5-year-old 95749, 10-year-old 00920 and adult 87260 Neck:  0-year-old 2940, 1-year-old 4160, 5-year-old 4550, 10-year-old 6320 and adult 8470 Chest:  0-year-old 550, 1-year-old 830, 5-year-old 1200, 10-year-old 3840 and adult 3570 Abd/pelvis:  0-year-old 440, 1-year-old 720, 5-year-old 1080, 10-year-old 3330 and adult 3330 Trunk(C/A/P):  0-year-old 490, 1-year-old 770, 5-year-old 1140, 10-year-old 3570 and adult 3330    CT-HEAD W/O    Result Date:  9/7/2022   CT head without contrast HISTORY/REASON FOR EXAM:  Headache, new or worsening (Age >= 50y). TECHNIQUE/EXAM DESCRIPTION: CT scan of the brain without contrast. Both automated exposure control and Automated adjustment of tube current based on patient size are utilized. 9/7/2022 4:19 PM. CT scan of the brain was carried out without contrast in the normal manner. Total DLP: 766.10 mGy*cm COMPARISON: None. FINDINGS: The brain parenchyma is unremarkable. The gray-white matter differentiation is well preserved throughout. There is no evidence of mass, mass effect, hemorrhage, or extraaxial fluid collection.  There is no midline shift. The orbits and calvarium are intact. No evidence of fracture. The visualized paranasal sinuses are clear. The mastoid air cells are well pneumatized.     No acute intracranial abnormality. Jazzy Huberbury 9/7/2022 4:23 PM DLP Reporting Thresholds for Incorrect/Repeated Exams - DLP in mGy*cm Head/Neck:  0-year-old 3840, 1-year-old 5880, 5-year-old 8770, 10-year-old 59823 and adult 94745 Head:  0-year-old 4540, 1-year-old 7460, 5-year-old 72282, 10-year-old 19326 and adult 62447 Neck:  0-year-old 2940, 1-year-old 4160, 5-year-old 4550, 10-year-old 6320 and adult 8470 Chest:  0-year-old 550, 1-year-old 830, 5-year-old 1200, 10-year-old 3840 and adult 3570 Abd/pelvis:  0-year-old 440, 1-year-old 720, 5-year-old 1080, 10-year-old 3330 and adult 3330 Trunk(C/A/P):  0-year-old 490, 1-year-old 770, 5-year-old 1140, 10-year-old 3570 and adult 3330    DX-CHEST-PORTABLE (1 VIEW)    Result Date: 9/7/2022  HISTORY/REASON FOR EXAM:  Cough. TECHNIQUE/EXAM DESCRIPTION AND NUMBER OF VIEWS: Portable chest (one view), 9/7/2022 4:22 PM. COMPARISON: None. FINDINGS: There is mild multifocal groundglass consolidation left upper lobe and right upper lobe, to a lesser degree left lower lobe.  No pleural effusion or pneumothorax. The cardiomediastinal silhouette is normal in size. The bones are intact.      Multifocal bilateral consolidation concerning for pneumonia. Jazzy Bellamy 9/7/2022 8:06 PM    [id number]    Result Date: 9/10/2022  9/10/2022 1:54 PM HISTORY/REASON FOR EXAM:  Bacterial endocarditis. TECHNIQUE/EXAM DESCRIPTION AND NUMBER OF VIEWS:  1 view(s) of the mandible. COMPARISON:  None. FINDINGS: No evidence of fracture. No lytic or sclerotic lesion. There are multiple dental caries.     Multiple dental caries.    US-ABDOMEN COMPLETE SURVEY    Result Date: 9/7/2022  HISTORY/REASON FOR EXAM: Abnormal Labs; elevated liver function tests, with elevated alk phos. TECHNIQUE/EXAM DESCRIPTION: Ultrasound abdomen complete.9/7/2022 7:57 PM COMPARISON: None FINDINGS: Pancreas: The tail is suboptimally visualized due to overlying bowel gas. Aorta and IVC: Normal in caliber. Liver: Normal.  The Liver Measures 19.6 cm. The portal vein is patent with flow in the proper direction into the liver.  This is normal. Gallbladder: Normal. Common bile duct: 3 mm, which is normal. Spleen: Normal in size and echogenicity. Kidneys: The kidneys are normal in size and echogenicity. There is No ascites.     Mild hepatomegaly.  Otherwise unremarkable exam. Jazzy Bellamy 9/7/2022 8:27 PM    CT ABDOMEN-PELVIS WITH IV CONTRAST    Result Date: 9/8/2022   CT of the chest, abdomen and pelvis HISTORY/REASON FOR EXAM:  concern for malignancy TECHNIQUE/EXAM DESCRIPTION: CT scan of the chest, abdomen and pelvis with contrast.  Both automated exposure control and Automated adjustment of tube current based on patient size are utilized.  9/8/2022 8:33 AM. Total DLP: 470.34 mGy*cm COMPARISON: None. FINDINGS: Chest: There are multifocal areas of consolidation involving predominantly the upper lobes and to a lesser degree the lower lobes.  One of them is cavitary in the left lower lobe measuring 1.3 cm.  No pleural effusion or pneumothorax. The heart is normal in size.  There is trace pericardial effusion. The great vessel origins  are unremarkable.  The aorta is normal in caliber. No pathologically enlarged adenopathy. There are no concerning osseous lesions.  No fractures.  Bilateral breast implants. Abdomen pelvis: Solid organs: The liver, pancreas and adrenal glands are unremarkable.  There is a cyst in the anterior spleen. The gallbladder is present. Kidneys and Bladder: There is no nephrolithiasis or hydronephrosis.  Mild focal scarring left upper pole kidney. No enhancing renal masses. The kidneys enhance symmetrically. No ureteral or bladder stones are seen. The bladder is grossly unremarkable. Bowel and Appendix: No dilated bowel. No perienteric inflammatory changes. No extraluminal air or free fluid. The appendix is visualized and appears normal. No pelvic masses. Lymphatics:  No abdominal, pelvic, or retroperitoneal lymphadenopathy. Other: No concerning osseous lesions. No fractures.     1.  Multifocal consolidation concerning for pneumonia, with small cavitary lesion in the left lower lobe. 2.  No acute abnormality or evidence of malignancy to the abdomen or pelvis. Derby Logsden 9/8/2022 9:00 AM DLP Reporting Thresholds for Incorrect/Repeated Exams - DLP in mGy*cm Head/Neck:  0-year-old 3840, 1-year-old 5880, 5-year-old 8770, 10-year-old 61138 and adult 98748 Head:  0-year-old 4540, 1-year-old 7460, 5-year-old 51164, 10-year-old 14026 and adult 97406 Neck:  0-year-old 2940, 1-year-old 4160, 5-year-old 4550, 10-year-old 6320 and adult 8470 Chest:  0-year-old 550, 1-year-old 830, 5-year-old 1200, 10-year-old 3840 and adult 3570 Abd/pelvis:  0-year-old 440, 1-year-old 720, 5-year-old 1080, 10-year-old 3330 and adult 3330 Trunk(C/A/P):  0-year-old 490, 1-year-old 770, 5-year-old 1140, 10-year-old 3570 and adult 3330    EC-ECHOCARDIOGRAM COMPLETE W/O CONT    Result Date: 9/8/2022  Transthoracic Echo Report Echocardiography Laboratory CONCLUSIONS No prior study is available for comparison. Technically difficult study - adequate  information is obtained. Probable vegetation seen on the tricuspid valve in subcostal view. Normal left ventricular systolic function. The left ventricular ejection fraction is visually estimated to be 60%. Normal diastolic function. The right ventricle is normal in size and systolic function. Mild aortic insufficiency. These findings were communicated to the medical service. RUDDY REBOLLEDO Exam Date:         2022                    14:12 Exam Location:     Echo Lab Priority:          Routine Ordering Physician:        CATHI CAMARENA Referring Physician: Sonographer:               Sissy Ontiveros Age:    51     Gender:    F MRN:    [id number] :    1970 BSA:    1.65   Ht (in):    64.6   Wt (lb):    132 Exam Type:     Complete Indications:     Bacteremia ICD Codes: CPT Codes:       49515 BP:   113    /   77     HR:   117 Technical Quality:       Technically difficult study -                          adequate information is obtained MEASUREMENTS  (Male / Female) Normal Values 2D ECHO LV Diastolic Diameter PLAX        4.2 cm                4.2 - 5.9 / 3.9 - 5.3 cm LV Systolic Diameter PLAX         3 cm                  2.1 - 4.0 cm IVS Diastolic Thickness           0.67 cm               LVPW Diastolic Thickness          0.68 cm               LVOT Diameter                     2 cm                  Aortic Root Diameter              2.6 cm                Estimated LV Ejection Fraction    60 %                  IVC Diameter                      1.8 cm                DOPPLER AV Peak Velocity                  2.3 m/s               AV Peak Gradient                  21.6 mmHg             AV Mean Gradient                  14.3 mmHg             AI Pressure Half Time             500 ms                LVOT Peak Velocity                1.4 m/s               AV Area Cont Eq vti               1.5 cm2               MV Velocity Time Integral         13.7 cm               Mitral E Point  Velocity           0.61 m/s              Mitral E to A Ratio               0.65                  MV Pressure Half Time             45.5 ms               MV Area PHT                       4.8 cm2               MV Deceleration Time              157 ms                TR Peak Velocity                  186 cm/s              Right Atrial Pressure             8 mmHg                Right Ventricular Systolic Press  21.8 mmHg             PV Peak Velocity                  1 m/s                 PV Peak Gradient                  4.3 mmHg              RVOT Peak Velocity                0.65 m/s              * Indicates values subject to auto-interpretation LV EF:  60    % FINDINGS Left Ventricle The left ventricular ejection fraction is visually estimated to be 60%. Normal left ventricular systolic function. Normal left ventricular wall thickness. Normal left ventricular chamber size. Abnormal regional wall motion. Abnormal septal motion. Normal diastolic function. Right Ventricle The right ventricle is normal in size and systolic function. Right Atrium The right atrium is normal in size. Normal inferior vena cava size without inspiratory collapse. Left Atrium The left atrium is normal in size. Left atrial volume index is 10 mL/sq m. Mitral Valve Structurally normal mitral valve without significant stenosis. Trace mitral regurgitation. Aortic Valve Mild aortic insufficiency. Pressure half time is 500 msec. Aortic valve sclerosis without significant stenosis. Tricuspid aortic valve. Tricuspid Valve Structurally normal tricuspid valve without significant stenosis. Mild tricuspid regurgitation. Right atrial pressure is estimated to be 8 mmHg. Estimated right ventricular systolic pressure is 20 mmHg. Probable vegetation seen on the tricuspid valve in subcostal view. Pulmonic Valve Structurally normal pulmonic valve without significant stenosis or regurgitation. Pericardium Plural effusion present. Aorta Normal aortic root for body  surface area. The ascending aorta diameter is 2.9 cm. Mark Sykes MD (Electronically Signed) Final Date:     08 September 2022                 17:56    MRI 7/31/2022  FINDINGS:  Spinal column:  There is decreased T1/T2 signal noted within the vertebral marrow homogeneously throughout the spinal column, raising the question of infiltrative disease, such as myelofibrosis.     No destructive osseous process is appreciated.     Vertebral body morphology is normal.     No fracture is identified..     Spinal cord:     The conus medullaris is somewhat low, noted the level of L2-L3.     I see no evidence of syrinx.     Distal spinal cord appears unremarkable.     Cauda equina appear unremarkable.     Intervertebral disks:     At the level of L1-L2, the intervertebral disk, the central spinal canal, and the neural foramina appear normal.     At the level of L2-L3,  there is mild diffuse disc desiccation. There is minimal facet arthrosis.     Central spinal canal and neural foramina appear normal.     At the level of L3-L4, there is mild disc desiccation. There is a minimal circumferential disc bulge as well as minimal facet arthrosis. There is no significant foraminal, or central canal narrowing.     At the level of L4-L5, there is mild disc desiccation.     There is mild facet arthrosis.     Central spinal canal and neural foramina appear normal.     At the level of L5-S1, there is mild disc space narrowing.  There is mild disc desiccation.  Central spinal canal and neural foramina appear normal.     IMPRESSION:     I see no evidence of destructive osseous process and no evidence of significant narrowing of central spinal canal or the neural foramina.       Micro:  Results       Procedure Component Value Units Date/Time    CULTURE TISSUE W/ GRM STAIN [[id number]]  (Abnormal)  (Susceptibility) Collected: 09/16/22 1048    Order Status: Completed Specimen: Tissue Updated: 09/20/22 5910     Significant Indicator POS      "Source TISS     Site tricuspid vegetation     Culture Result -     Gram Stain Result Few WBCs.  Moderate Gram positive cocci.       Culture Result Enterococcus faecalis  Moderate growth      Narrative:      Surgery Specimen    Susceptibility       Enterococcus faecalis (1)       Antibiotic Interpretation Microscan   Method Status    Daptomycin Sensitive <=0.5 mcg/mL JOHN Final    Gent Synergy Sensitive <=500 mcg/mL JOHN Final    Ampicillin Sensitive <=2 mcg/mL JOHN Final    Vancomycin Sensitive 1 mcg/mL JOHN Final    Penicillin Sensitive 2 mcg/mL JOHN Final                       Anaerobic Culture [[id number]] Collected: 09/16/22 1048    Order Status: Completed Specimen: Tissue Updated: 09/20/22 0826     Significant Indicator NEG     Source TISS     Site tricuspid vegetation     Culture Result No Anaerobes isolated.    Narrative:      Surgery Specimen    Fungal Culture [[id number]] Collected: 09/16/22 1048    Order Status: Completed Specimen: Tissue Updated: 09/20/22 0826     Significant Indicator NEG     Source TISS     Site tricuspid vegetation     Culture Result No fungal growth.     Fungal Smear Results No fungal elements seen.    Narrative:      Surgery Specimen    AFB Culture [[id number]] Collected: 09/16/22 1048    Order Status: Completed Specimen: Tissue Updated: 09/20/22 0826     Significant Indicator NEG     Source TISS     Site tricuspid vegetation     Culture Result Culture in progress.     AFB Smear Results No acid fast bacilli seen.    Narrative:      Surgery Specimen    BLOOD CULTURE [[id number]] Collected: 09/13/22 1446    Order Status: Completed Specimen: Blood from Peripheral Updated: 09/18/22 1700     Significant Indicator NEG     Source BLD     Site PERIPHERAL     Culture Result No growth after 5 days of incubation.    Narrative:      Per Hospital Policy: Only change Specimen Src: to \"Line\" if  specified by physician order.  Left AC    BLOOD CULTURE [[id number]] Collected: 09/13/22 0815    Order Status: " "Completed Specimen: Blood from Peripheral Updated: 09/18/22 1100     Significant Indicator NEG     Source BLD     Site PERIPHERAL     Culture Result No growth after 5 days of incubation.    Narrative:      Per Hospital Policy: Only change Specimen Src: to \"Line\" if  specified by physician order.  Left AC    BLOOD CULTURE [[id number]]  (Abnormal)  (Susceptibility) Collected: 09/10/22 1651    Order Status: Completed Specimen: Blood from Peripheral Updated: 09/17/22 0841     Significant Indicator POS     Source BLD     Site PERIPHERAL     Culture Result Growth detected by Bactec instrument. 09/15/2022  01:57      Enterococcus faecalis    Narrative:      CALL  [place]  171 tel. [phone],  CALLED  171 tel. [phone] 09/15/2022, 02:02, RB PERF. RESULTS CALLED TO: RN  [phone]  Per Hospital Policy: Only change Specimen Src: to \"Line\" if  specified by physician order.  Left AC    Susceptibility       Enterococcus faecalis (1)       Antibiotic Interpretation Microscan   Method Status    Daptomycin Sensitive 1 mcg/mL JOHN Final    Gent Synergy Sensitive <=500 mcg/mL JOHN Final    Ampicillin Sensitive <=2 mcg/mL JOHN Final    Vancomycin Sensitive 1 mcg/mL JOHN Final    Penicillin Sensitive 1 mcg/mL JOHN Final                       Acid Fast Stain [[id number]] Collected: 09/16/22 1048    Order Status: Completed Specimen: Tissue Updated: 09/16/22 2022     Significant Indicator NEG     Source TISS     Site tricuspid vegetation     AFB Smear Results No acid fast bacilli seen.    Narrative:      Surgery Specimen    GRAM STAIN [[id number]]  (Abnormal) Collected: 09/16/22 1048    Order Status: Completed Specimen: Tissue Updated: 09/16/22 2022     Significant Indicator .     Source TISS     Site tricuspid vegetation     Gram Stain Result Few WBCs.  Moderate Gram positive cocci.      Narrative:      Surgery Specimen    Fungal Smear [[id number]] Collected: 09/16/22 1048    Order Status: Completed Specimen: Tissue Updated: 09/16/22 2022     " "Significant Indicator NEG     Source TISS     Site tricuspid vegetation     Fungal Smear Results No fungal elements seen.    Narrative:      Surgery Specimen    BLOOD CULTURE [[id number]] Collected: 09/10/22 1651    Order Status: Completed Specimen: Blood from Peripheral Updated: 09/15/22 1900     Significant Indicator NEG     Source BLD     Site PERIPHERAL     Culture Result No growth after 5 days of incubation.    Narrative:      Per Hospital Policy: Only change Specimen Src: to \"Line\" if  specified by physician order.  Right AC    Urinalysis [[id number]]     Order Status: Canceled Specimen: Urine             Assessment/Hospital course:  This is a 51-year-old female patient who presented to [facility] with several weeks of fevers, malaise, cough, TTE noted a vegetation of the tricuspid valve.  She was transferred to [facility] for KEO.    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  Patent foramen ovale status post closure on 9/16  Right hemopneumothorax, not amenable to chest tube placement  Septic pulmonary emboli  E faecalis bacteremia  Leukocytosis, overall improving  Neck and back pain     PLAN:   -Patient was taken to the OR by cardiac surgery on 9/16, underwent, aortic valve repair, tricuspid valve repair, patent foramen ovale closure  -GI was consulted to look for potential source and colonoscopy was performed, sigmoid polypectomy performed  -Okay to defer MRI of the entire spine since back pain resolved   -Continue dual beta-lactam therapy with IV ampicillin 2 g every 4 hours + IV ceftriaxone 2 g every 12 hours.  Anticipate 6 weeks through 10/27/2022  -At least weekly CBC with differential, CMP while on IV antibiotics.    -Chest x-ray with some increasing right-sided pleural effusion.  Chest tube not placed given size       Disposition: SNF placement for long-term course of IV antibiotics.  Patient agreeable    Need for PICC line: Yes, PICC placed    I have performed " a physical exam and reviewed and updated ROS and plan today 9/20/2022.  In review of yesterday's note 9/19/2022, there are no changes except as documented above.    Follow-up in [facility]    Plan of care discussed with intensivist, Dr. Lincoln.  ID signing off.  Please reconsult if needed

## 2022-09-21 LAB — EKG IMPRESSION: NORMAL

## 2022-09-21 PROCEDURE — 770020 HCHG ROOM/CARE - TELE (206)

## 2022-09-21 PROCEDURE — 700111 HCHG RX REV CODE 636 W/ 250 OVERRIDE (IP): Performed by: INTERNAL MEDICINE

## 2022-09-21 PROCEDURE — 700102 HCHG RX REV CODE 250 W/ 637 OVERRIDE(OP): Performed by: NURSE PRACTITIONER

## 2022-09-21 PROCEDURE — 700102 HCHG RX REV CODE 250 W/ 637 OVERRIDE(OP): Performed by: INTERNAL MEDICINE

## 2022-09-21 PROCEDURE — 700111 HCHG RX REV CODE 636 W/ 250 OVERRIDE (IP): Performed by: NURSE PRACTITIONER

## 2022-09-21 PROCEDURE — A9270 NON-COVERED ITEM OR SERVICE: HCPCS | Performed by: STUDENT IN AN ORGANIZED HEALTH CARE EDUCATION/TRAINING PROGRAM

## 2022-09-21 PROCEDURE — 93010 ELECTROCARDIOGRAM REPORT: CPT | Performed by: INTERNAL MEDICINE

## 2022-09-21 PROCEDURE — 99233 SBSQ HOSP IP/OBS HIGH 50: CPT | Performed by: INTERNAL MEDICINE

## 2022-09-21 PROCEDURE — A9270 NON-COVERED ITEM OR SERVICE: HCPCS | Performed by: INTERNAL MEDICINE

## 2022-09-21 PROCEDURE — 700101 HCHG RX REV CODE 250: Performed by: INTERNAL MEDICINE

## 2022-09-21 PROCEDURE — 700105 HCHG RX REV CODE 258: Performed by: INTERNAL MEDICINE

## 2022-09-21 PROCEDURE — 700102 HCHG RX REV CODE 250 W/ 637 OVERRIDE(OP): Performed by: HOSPITALIST

## 2022-09-21 PROCEDURE — 700105 HCHG RX REV CODE 258: Performed by: NURSE PRACTITIONER

## 2022-09-21 PROCEDURE — 97166 OT EVAL MOD COMPLEX 45 MIN: CPT

## 2022-09-21 PROCEDURE — 700102 HCHG RX REV CODE 250 W/ 637 OVERRIDE(OP): Performed by: STUDENT IN AN ORGANIZED HEALTH CARE EDUCATION/TRAINING PROGRAM

## 2022-09-21 PROCEDURE — A9270 NON-COVERED ITEM OR SERVICE: HCPCS | Performed by: NURSE PRACTITIONER

## 2022-09-21 PROCEDURE — 99024 POSTOP FOLLOW-UP VISIT: CPT | Performed by: NURSE PRACTITIONER

## 2022-09-21 PROCEDURE — 93005 ELECTROCARDIOGRAM TRACING: CPT | Performed by: INTERNAL MEDICINE

## 2022-09-21 PROCEDURE — A9270 NON-COVERED ITEM OR SERVICE: HCPCS | Performed by: HOSPITALIST

## 2022-09-21 RX ORDER — SCOLOPAMINE TRANSDERMAL SYSTEM 1 MG/1
1 PATCH, EXTENDED RELEASE TRANSDERMAL
Status: DISCONTINUED | OUTPATIENT
Start: 2022-09-21 | End: 2022-09-24 | Stop reason: HOSPADM

## 2022-09-21 RX ORDER — FUROSEMIDE 40 MG/1
40 TABLET ORAL
Status: DISCONTINUED | OUTPATIENT
Start: 2022-09-21 | End: 2022-09-22

## 2022-09-21 RX ORDER — SUCRALFATE ORAL 1 G/10ML
1 SUSPENSION ORAL EVERY 6 HOURS
Status: DISCONTINUED | OUTPATIENT
Start: 2022-09-21 | End: 2022-09-24 | Stop reason: HOSPADM

## 2022-09-21 RX ADMIN — ACETAMINOPHEN 1000 MG: 500 TABLET ORAL at 06:20

## 2022-09-21 RX ADMIN — FUROSEMIDE 20 MG: 10 INJECTION, SOLUTION INTRAMUSCULAR; INTRAVENOUS at 06:21

## 2022-09-21 RX ADMIN — AMPICILLIN SODIUM 2000 MG: 2 INJECTION, POWDER, FOR SOLUTION INTRAVENOUS at 13:38

## 2022-09-21 RX ADMIN — OXYCODONE 5 MG: 5 TABLET ORAL at 13:37

## 2022-09-21 RX ADMIN — ALUMINUM HYDROXIDE, MAGNESIUM HYDROXIDE, AND DIMETHICONE 30 ML: 400; 400; 40 SUSPENSION ORAL at 21:06

## 2022-09-21 RX ADMIN — OXYCODONE 5 MG: 5 TABLET ORAL at 18:21

## 2022-09-21 RX ADMIN — OXYCODONE 5 MG: 5 TABLET ORAL at 22:50

## 2022-09-21 RX ADMIN — ALPRAZOLAM 0.25 MG: 0.25 TABLET ORAL at 09:21

## 2022-09-21 RX ADMIN — POTASSIUM CHLORIDE 20 MEQ: 20 TABLET, EXTENDED RELEASE ORAL at 17:42

## 2022-09-21 RX ADMIN — GABAPENTIN 100 MG: 100 CAPSULE ORAL at 06:21

## 2022-09-21 RX ADMIN — AMPICILLIN SODIUM 2000 MG: 2 INJECTION, POWDER, FOR SOLUTION INTRAVENOUS at 17:45

## 2022-09-21 RX ADMIN — SUCRALFATE 1 G: 1 SUSPENSION ORAL at 17:42

## 2022-09-21 RX ADMIN — BUSPIRONE HYDROCHLORIDE 5 MG: 10 TABLET ORAL at 06:21

## 2022-09-21 RX ADMIN — SUCRALFATE 1 G: 1 SUSPENSION ORAL at 23:50

## 2022-09-21 RX ADMIN — ACETAMINOPHEN 1000 MG: 500 TABLET ORAL at 00:04

## 2022-09-21 RX ADMIN — SENNOSIDES AND DOCUSATE SODIUM 2 TABLET: 50; 8.6 TABLET ORAL at 17:42

## 2022-09-21 RX ADMIN — OXYCODONE 5 MG: 5 TABLET ORAL at 00:16

## 2022-09-21 RX ADMIN — CEFTRIAXONE SODIUM 2 G: 10 INJECTION, POWDER, FOR SOLUTION INTRAVENOUS at 17:44

## 2022-09-21 RX ADMIN — AMPICILLIN SODIUM 2000 MG: 2 INJECTION, POWDER, FOR SOLUTION INTRAVENOUS at 06:21

## 2022-09-21 RX ADMIN — BUSPIRONE HYDROCHLORIDE 5 MG: 10 TABLET ORAL at 17:42

## 2022-09-21 RX ADMIN — ALUMINUM HYDROXIDE, MAGNESIUM HYDROXIDE, AND DIMETHICONE 30 ML: 400; 400; 40 SUSPENSION ORAL at 13:37

## 2022-09-21 RX ADMIN — AMPICILLIN SODIUM 2000 MG: 2 INJECTION, POWDER, FOR SOLUTION INTRAVENOUS at 22:53

## 2022-09-21 RX ADMIN — AMPICILLIN SODIUM 2000 MG: 2 INJECTION, POWDER, FOR SOLUTION INTRAVENOUS at 02:35

## 2022-09-21 RX ADMIN — SUMATRIPTAN SUCCINATE 100 MG: 50 TABLET ORAL at 17:01

## 2022-09-21 RX ADMIN — AMPICILLIN SODIUM 2000 MG: 2 INJECTION, POWDER, FOR SOLUTION INTRAVENOUS at 09:22

## 2022-09-21 RX ADMIN — GABAPENTIN 100 MG: 100 CAPSULE ORAL at 17:42

## 2022-09-21 RX ADMIN — POTASSIUM CHLORIDE 20 MEQ: 20 TABLET, EXTENDED RELEASE ORAL at 12:32

## 2022-09-21 RX ADMIN — ALPRAZOLAM 0.25 MG: 0.25 TABLET ORAL at 21:06

## 2022-09-21 RX ADMIN — FUROSEMIDE 20 MG: 10 INJECTION, SOLUTION INTRAMUSCULAR; INTRAVENOUS at 12:33

## 2022-09-21 RX ADMIN — FUROSEMIDE 20 MG: 10 INJECTION, SOLUTION INTRAMUSCULAR; INTRAVENOUS at 00:04

## 2022-09-21 RX ADMIN — GUAIFENESIN 1200 MG: 600 TABLET, EXTENDED RELEASE ORAL at 06:20

## 2022-09-21 RX ADMIN — METOLAZONE 2.5 MG: 2.5 TABLET ORAL at 16:17

## 2022-09-21 RX ADMIN — SUCRALFATE 1 G: 1 SUSPENSION ORAL at 09:18

## 2022-09-21 RX ADMIN — ASPIRIN 81 MG: 81 TABLET, COATED ORAL at 06:21

## 2022-09-21 RX ADMIN — MUPIROCIN 1 APPLICATION: 20 OINTMENT TOPICAL at 06:00

## 2022-09-21 RX ADMIN — GUAIFENESIN 1200 MG: 600 TABLET, EXTENDED RELEASE ORAL at 17:42

## 2022-09-21 RX ADMIN — BUSPIRONE HYDROCHLORIDE 5 MG: 10 TABLET ORAL at 12:33

## 2022-09-21 RX ADMIN — SCOPOLAMINE 1 PATCH: 1.5 PATCH, EXTENDED RELEASE TRANSDERMAL at 09:18

## 2022-09-21 RX ADMIN — OMEPRAZOLE 20 MG: 20 CAPSULE, DELAYED RELEASE ORAL at 06:21

## 2022-09-21 RX ADMIN — ALPRAZOLAM 0.25 MG: 0.25 TABLET ORAL at 02:40

## 2022-09-21 RX ADMIN — CEFTRIAXONE SODIUM 2 G: 10 INJECTION, POWDER, FOR SOLUTION INTRAVENOUS at 06:21

## 2022-09-21 RX ADMIN — METOLAZONE 2.5 MG: 2.5 TABLET ORAL at 06:20

## 2022-09-21 RX ADMIN — POTASSIUM CHLORIDE 20 MEQ: 20 TABLET, EXTENDED RELEASE ORAL at 06:21

## 2022-09-21 RX ADMIN — GABAPENTIN 100 MG: 100 CAPSULE ORAL at 12:32

## 2022-09-21 RX ADMIN — SENNOSIDES AND DOCUSATE SODIUM 2 TABLET: 50; 8.6 TABLET ORAL at 06:22

## 2022-09-21 ASSESSMENT — ENCOUNTER SYMPTOMS
NAUSEA: 1
FALLS: 0
VOMITING: 0
ROS GI COMMENTS: TOLERATING A DIET
FEVER: 0
SEIZURES: 0
SHORTNESS OF BREATH: 0
BLURRED VISION: 0
SORE THROAT: 0
HALLUCINATIONS: 0
LOSS OF CONSCIOUSNESS: 0
CHILLS: 0
DOUBLE VISION: 0
ABDOMINAL PAIN: 0

## 2022-09-21 ASSESSMENT — COGNITIVE AND FUNCTIONAL STATUS - GENERAL
SUGGESTED CMS G CODE MODIFIER DAILY ACTIVITY: CK
TOILETING: A LOT
DAILY ACTIVITIY SCORE: 19
DRESSING REGULAR LOWER BODY CLOTHING: A LITTLE
HELP NEEDED FOR BATHING: A LITTLE
PERSONAL GROOMING: A LITTLE

## 2022-09-21 ASSESSMENT — PAIN DESCRIPTION - PAIN TYPE: TYPE: ACUTE PAIN

## 2022-09-21 ASSESSMENT — LIFESTYLE VARIABLES: SUBSTANCE_ABUSE: 0

## 2022-09-21 ASSESSMENT — ACTIVITIES OF DAILY LIVING (ADL): TOILETING: INDEPENDENT

## 2022-09-21 ASSESSMENT — FIBROSIS 4 INDEX: FIB4 SCORE: 0.53

## 2022-09-21 NOTE — CARE PLAN
Problem: Hyperinflation  Goal: Prevent or improve atelectasis  Description: Target End Date:  3 to 4 days    1. Instruct incentive spirometry usage  2.  Perform hyperinflation therapy as indicated  Outcome: Not Met    PT is refusing to participate in care  PEP QID  IS

## 2022-09-21 NOTE — PROGRESS NOTES
Hospital Medicine Daily Progress Note    Date of Service  9/21/2022    Chief Complaint  Sarah Buckner is a 51 y.o. female admitted 9/9/2022 with bacteremia.    Hospital Course  Sarah Buckner is a 51 y.o. female admitted 9/9/2022 with bacterial endocarditis growing enterococcus faecalis on blood cultures prior to 09/08/22, with possible streptococcus on 09/10/22 blood cultures.  Also found to have significant anemia, reportedly had melena outpatient but none inpatient. Patient was placed on ampicillin and ceftriaxone IV. Work-up of Enterococcus bacteremia including EGD, Colonoscopy revealed mild diverticulosis, antral gastritis and sigmoid polypectomy of 7mm pedunculated polyp. Cardiology was consulted and ordered a KEO found a perferation in the tricuspid valve, with open PFO and aortic valve involvement as well.  At that point, cardiothoracic surgery was consulted for assessment and possible valve replacement/repair.     Patient was placed on 6 weeks of antibiotic therapy starting from last negative blood cultures taken 09/13/2022.  On 9/16/2022 she underwent tricuspid valve repair, patent foramen ovale closure, aortic valve repair by Dr. Bradley.  Postoperatively she remained intubated and required infusion of red blood cells as well as IV pressors.  A chest tube was attempted on 9/18/2022 but was not successful.      Interval Problem Update  Patient was seen and examined at bedside.  No acute events overnight. Patient is resting comfortably in bed and in no acute distress.     IV antibiotics thru 10/27  Looking into SNF vs LTAC  Transition to PO lasix, metolazone       I have discussed this patient's plan of care and discharge plan at IDT rounds today with Case Management, Nursing, Nursing leadership, and other members of the IDT team. SNF referral placed for long term IV antibiotics.     Consultants/Specialty  critical care  Cardiothoracic surgery  Infectious disease  GI  Cardiology  General  surgery  Code Status  Full Code    Disposition  Patient is not medically cleared for discharge.   Anticipate discharge to be determined.  I have placed the appropriate orders for post-discharge needs.    Review of Systems  Review of Systems   Constitutional:  Negative for chills and fever.   HENT:  Negative for congestion and sore throat.    Eyes:  Negative for blurred vision and double vision.   Respiratory:  Negative for shortness of breath.    Cardiovascular:  Positive for leg swelling.        Sternal pain   Gastrointestinal:  Positive for nausea. Negative for abdominal pain and vomiting.        Tolerating a diet   Genitourinary:  Negative for dysuria and frequency.   Musculoskeletal:  Negative for falls.   Skin:  Negative for rash.   Neurological:  Negative for seizures and loss of consciousness.   Psychiatric/Behavioral:  Negative for hallucinations and substance abuse.    All other systems reviewed and are negative.     Physical Exam  Temp:  [36.8 °C (98.2 °F)-37.4 °C (99.3 °F)] 37.1 °C (98.8 °F)  Pulse:  [79-98] 98  Resp:  [18-20] 18  BP: (123-141)/() 130/84  SpO2:  [92 %-96 %] 95 %    Physical Exam  Vitals and nursing note reviewed.   Constitutional:       Appearance: She is ill-appearing. She is not toxic-appearing.   HENT:      Head: Normocephalic and atraumatic.      Right Ear: External ear normal.      Left Ear: External ear normal.      Nose: No congestion.      Mouth/Throat:      Mouth: Mucous membranes are dry.      Pharynx: Oropharynx is clear.   Eyes:      General: No scleral icterus.     Conjunctiva/sclera: Conjunctivae normal.   Cardiovascular:      Rate and Rhythm: Normal rate and regular rhythm.      Comments: Slight murmur  Sternal scar without infection.   Pulmonary:      Effort: Pulmonary effort is normal.      Breath sounds: Normal breath sounds. No wheezing.      Comments: 0.5 liters of nasal cannula oxygen  Abdominal:      Tenderness: There is no abdominal tenderness. There is no  guarding or rebound.   Musculoskeletal:      Cervical back: Normal range of motion and neck supple.      Right lower leg: Edema present.      Left lower leg: Edema present.      Comments: Left arm PICC   Skin:     General: Skin is warm and dry.      Coloration: Skin is not jaundiced.      Findings: No bruising.   Neurological:      General: No focal deficit present.      Mental Status: She is alert and oriented to person, place, and time.      Cranial Nerves: No cranial nerve deficit.   Psychiatric:         Mood and Affect: Mood normal.         Behavior: Behavior normal.         Thought Content: Thought content normal.         Judgment: Judgment normal.       Fluids    Intake/Output Summary (Last 24 hours) at 9/21/2022 1313  Last data filed at 9/21/2022 1059  Gross per 24 hour   Intake 220 ml   Output 9500 ml   Net -9280 ml       Laboratory  Recent Labs     09/19/22  0507 09/20/22  0107   WBC 15.2* 16.0*   RBC 3.38* 3.41*   HEMOGLOBIN 9.4* 9.5*   HEMATOCRIT 29.1* 29.5*   MCV 86.1 86.5   MCH 27.8 27.9   MCHC 32.3* 32.2*   RDW 62.7* 61.9*   PLATELETCT 306 355   MPV 10.5 10.4     Recent Labs     09/19/22  0507 09/20/22  0107   SODIUM 139 140   POTASSIUM 4.3 3.7   CHLORIDE 108 106   CO2 23 23   GLUCOSE 89 86   BUN 20 19   CREATININE 0.45* 0.45*   CALCIUM 7.7* 7.8*                     Imaging  DX-CHEST-PORTABLE (1 VIEW)   Final Result      1.  Unchanged BILATERAL pneumonia   2.  Small BILATERAL pleural effusions, LEFT larger than RIGHT, unchanged      IR-PICC LINE PLACEMENT W/ GUIDANCE > AGE 5   Final Result                  Ultrasound-guided PICC placement performed by qualified nursing staff as    above.          DX-CHEST-PORTABLE (1 VIEW)   Final Result      1.  Right IJ catheter and left PICC line in satisfactory position.      2.  Improving bilateral multifocal opacities consistent with resolving pneumonitis and/or pulmonary edema.      3.   Small left pleural effusion.      DX-CHEST-PORTABLE (1 VIEW)   Final Result       Stable bilateral pulmonary infiltrates and support devices.      CT-ABORTED CT PROCEDURE   Final Result      Aborted RIGHT chest tube placement.      DX-CHEST-PORTABLE (1 VIEW)   Final Result         1.  Decrease in right pleural effusion after right chest tube placement.      2.  Side port of right-sided chest tube is noted between the ribs.      3.  No new infiltrates or consolidations are identified.      DX-CHEST-PORTABLE (1 VIEW)   Final Result         1.  There is some increase in small right pleural effusion since the prior radiograph.      2.  No new infiltrates or consolidations.      DX-CHEST-PORTABLE (1 VIEW)   Final Result         1. Stable lines and tubes.   2. Stable bilateral pulmonary opacities and small to moderate bilateral pleural effusions.         DX-CHEST-PORTABLE (1 VIEW)   Final Result      1.  Bilateral nodular opacities are consistent with septic emboli seen on prior exams. Probable small bilateral pleural effusions.      2.  Endotracheal tube tip projects approximately 2.2 cm above the parag.      3.  Right internal jugular catheter tip projects over the cavoatrial junction. No pneumothorax is identified.      EC-KEO W/O CONT         DX-CHEST-2 VIEWS   Final Result      1.  Multifocal ill-defined bilateral airspace opacities suggesting infection.   2.  Small right pleural effusion.      US-CAROTID DOPPLER BILAT   Final Result      EC-KEO W/O CONT   Final Result      [id number]   Final Result      Multiple dental caries.      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  Last positive blood culture was 9/8.    Denies IV drug use, possible dental carries (chronic), melena reported on scale of weeks. GI consulted to work-up for possible GI source of Enterococcus bacteremia, including possible  malignancy.   Colonoscopy and EGD 9/12 showing healed erosive gastritis, few sigmoid diverticula, 7mm pendunculated sigmoid polyp  Status post aortic valve and tricuspid valve repairs by Dr. Bradley on 9/16  Infectious disease has consulted  IV ampicillin and rocephin through 10/27/22. Weekly CBC and CMP while on IV antibiotics.  Likely post-acute placement for IV antibiotics and further rehab purposes.   IWill reach out to ID regarding other options for q4hrs ampicillin    Endocarditis  Assessment & Plan  TTE with concern for tricuspid vegetation  See above problem    S/P aortic valve repair- (present on admission)  Assessment & Plan  By Dr. Bradley 9/16    S/P patent foramen ovale closure- (present on admission)  Assessment & Plan  By Dr. Bradley 9/16.     S/P tricuspid valve repair- (present on admission)  Assessment & Plan  By Dr. Bradley 9/16    Elevated alkaline phosphatase level  Assessment & Plan  Alk phos has been significantly elevated throughout admission.  Isozymes confirm liver source.  Alk phos went as high as 1,433 and has trended down  Follow weekly and this is appropriate for outpatient follow up.    Anemia- (present on admission)  Assessment & Plan  Reports history of several weeks of melena at home, in the context of max dosages of NSAIDs for ongoing back pain. Melena has not recurred during admission.  Initial fecal occult blood is negative.  She required 2 units of RBCs at outside hospital prior to transfer.  Anemia may be multifactorial with contributions from hemolysis due to endocarditis, iron deficiency anemia due to GI losses.     Pneumonia- (present on admission)  Assessment & Plan  Cavitary lesion noted in left lower lobe demonstrated on CT chest. Possible etiology of infectious emboli from tricuspid vegetations.   IR consulted and not amenable to drainage.        Septic shock (HCC)  Assessment & Plan  resolved    Migraine with aura- (present on admission)  Assessment &  Plan  Resume home imitrex 100 mg which she uses up to 9 tabs every 2 weeks    TAZ (acute kidney injury) (HCC)- (present on admission)  Assessment & Plan  resolved       VTE prophylaxis: SCDs/TEDs    I have performed a physical exam and reviewed and updated ROS and Plan today (9/21/2022). In review of yesterday's note (9/20/2022), there are no changes except as documented above.

## 2022-09-21 NOTE — THERAPY
Physical Therapy Education Note    Patient Name: Sarah Buckner  Age:  51 y.o., Sex:  female  Medical Record #: [id number]  Today's Date: 9/20/2022    PT cardiac rehab eval attempted. Pt just transferred out of ICU and fatigued, politely declining mobility, however, agreeable to subjective history (see details below) and cardiac rehab education. Pt receptive to edu with handouts provided regarding walking program, talk test, RPE scale, BP/HR monitoring, s/s heart failure response, Cardiac rehab phase I and II, activity pacing and progression. Pt reports ambulating halls multiple times in ICU and maintaining sternal precautions with mobility. Will re-attempt mobility eval as appropriate.        09/20/22 1704   Prior Living Situation   Housing / Facility 1 Story House   Steps Into Home 3   Steps In Home 0   Equipment Owned Front-Wheel Walker;Single Point Cane   Lives with - Patient's Self Care Capacity Parents   Comments Mother able to assist, drive as needed   Prior Level of Functional Mobility   Bed Mobility Independent   Transfer Status Independent   Ambulation Independent   Distance Ambulation (Feet)   (community)   Assistive Devices Used None   Stairs Independent   Cognition    Cognition / Consciousness WDL   Level of Consciousness Alert   Comments Pleasant, receptive to edu   Education Group   Education Provided Sternal Precautions;Cardiac Precautions;Role of Physical Therapist   Cardiac Precautions Patient Response Patient;Acceptance;Explanation;Handout;Verbal Demonstration   Sternal Precautions Patient Response Patient;Acceptance;Explanation;Handout;Verbal Demonstration   Role of Physical Therapist Patient Response Patient;Acceptance;Explanation;Handout;Verbal Demonstration   Additional Comments Pt educated on self management and compensatory strategies s/p OHS.   Anticipated Discharge Equipment and Recommendations   Discharge Recommendations   (formal recs to follow)   Interdisciplinary Plan of Care  Collaboration   IDT Collaboration with  Nursing   Patient Position at End of Therapy Seated;Call Light within Reach;Tray Table within Reach;Phone within Reach   Session Information   Date / Session Number  9/20: CR edu only, Mobility EVAL

## 2022-09-21 NOTE — PROGRESS NOTES
Cardiovascular Surgery Progress Note    Name: Sarah Buckner  MRN: [id number]  : 1970  Admit Date: 2022  2:08 PM  5 Days Post-Op     Procedure:  Procedure(s) and Anesthesia Type:     * TRICUSPID VALVE REPAIR, PATENT FORAMEN OVALE CLOSURE, TRANSESOPHAGEAL ESCHOCARDIOGRAM - General     * ECHOCARDIOGRAM, TRANSESOPHAGEAL, INTRAOPERATIVE - General    Vitals:  Vitals:    22 0307 22 0634 22 0715 22 0724   BP: (!) 140/101   133/82   Pulse: 80  82 83   Resp: 18  20 18   Temp: 36.9 °C (98.4 °F)   37.2 °C (99 °F)   TempSrc: Temporal   Temporal   SpO2: 94%  96% 94%   Weight:  69.3 kg (152 lb 12.5 oz)     Height:          Temp (24hrs), Av °C (98.6 °F), Min:36.4 °C (97.6 °F), Max:37.4 °C (99.3 °F)      Respiratory:    Respiration: 18, Pulse Oximetry: 94 %       Fluids:    Intake/Output Summary (Last 24 hours) at 2022 0815  Last data filed at 2022 0727  Gross per 24 hour   Intake 820 ml   Output 9300 ml   Net -8480 ml       Admit weight: Weight: 66.1 kg (145 lb 12.8 oz)  Current weight: Weight: 69.3 kg (152 lb 12.5 oz) (22 0634)    Labs:  Recent Labs     22  1200 22  0507 22  0107   WBC 16.5* 15.2* 16.0*   RBC 3.33* 3.38* 3.41*   HEMOGLOBIN 9.4* 9.4* 9.5*   HEMATOCRIT 28.6* 29.1* 29.5*   MCV 85.9 86.1 86.5   MCH 28.2 27.8 27.9   MCHC 32.9* 32.3* 32.2*   RDW 63.2* 62.7* 61.9*   PLATELETCT 285 306 355   MPV 10.8 10.5 10.4       Recent Labs     22  0507 22  0107   SODIUM 139 140   POTASSIUM 4.3 3.7   CHLORIDE 108 106   CO2 23 23   GLUCOSE 89 86   BUN 20 19   CREATININE 0.45* 0.45*   CALCIUM 7.7* 7.8*       Recent Labs     22  1200   APTT 52.4*   INR 1.83*           Medications:  Scheduled Medications   Medication Dose Frequency    furosemide  20 mg Q6HRS    metOLazone  2.5 mg BID DIURETIC    potassium chloride SA  20 mEq TID    busPIRone  5 mg TID    gabapentin  100 mg TID    aspirin EC  81 mg DAILY    Pharmacy Consult Request  1 Each  PHARMACY TO DOSE    acetaminophen  1,000 mg Q6HRS    senna-docusate  2 Tablet BID    And    polyethylene glycol/lytes  1 Packet DAILY    And    magnesium hydroxide  30 mL DAILY    omeprazole  20 mg DAILY    mupirocin  1 Application BID    ampicillin  2,000 mg Q4HRS    cefTRIAXone (ROCEPHIN) IV  2 g Q12HRS    guaiFENesin ER  1,200 mg Q12HRS          Exam:   Physical Exam  Vitals and nursing note reviewed.   Constitutional:       General: She is not in acute distress.     Appearance: She is normal weight.   HENT:      Head: Normocephalic and atraumatic.      Right Ear: External ear normal.      Left Ear: External ear normal.      Nose: Nose normal.      Mouth/Throat:      Mouth: Mucous membranes are moist.   Eyes:      Extraocular Movements: Extraocular movements intact.      Conjunctiva/sclera: Conjunctivae normal.      Pupils: Pupils are equal, round, and reactive to light.   Cardiovascular:      Rate and Rhythm: Normal rate and regular rhythm.      Pulses: Normal pulses.      Heart sounds: Normal heart sounds.   Pulmonary:      Effort: Pulmonary effort is normal.      Breath sounds: Examination of the right-lower field reveals decreased breath sounds. Examination of the left-lower field reveals decreased breath sounds. Decreased breath sounds present.   Abdominal:      General: Abdomen is flat. Bowel sounds are normal. There is no distension.      Palpations: Abdomen is soft.   Musculoskeletal:         General: Normal range of motion.      Cervical back: Normal range of motion and neck supple.      Right lower leg: Edema present.      Left lower leg: Edema present.   Skin:     General: Skin is warm and dry.      Capillary Refill: Capillary refill takes less than 2 seconds.      Comments: Sternum- c/d/i   Neurological:      General: No focal deficit present.      Mental Status: She is alert and oriented to person, place, and time. Mental status is at baseline.      Sensory: No sensory deficit.      Motor: No  weakness.   Psychiatric:         Mood and Affect: Mood is anxious. Affect is inappropriate.       Cardiac Medications:    ASA - Yes    Plavix - No; contraindicated because of Other No CAD    Post-operative Beta Blockers - No; contraindicated because of Hypotension    Ace/ARB- No; contraindicated because of Normal EF    Statin - No; contraindicated because of No CAD    Aldactone- No; contraindicated because of Normal EF    Ejection Fraction:  60%    Telemetry:   9/17 SR/ST 60-120s  9/18 SB/SR oPVC  9/19 SR  9/20 SR  9/21 SR  rPVC    Assessment/Plan:  POD 1 Intubated, on levo 2mcg/vaso gtt.  Neuro intact.  Abdomen soft.  Minimal output from chest tubes.  Cr stable. Hgb 7.5 overnight, given 1 unit PRBC, now 8.5.  UO decreased this AM. Plan: Wean ventilator as tolerated.  Albumin 5% 25g once.  Wean vasopressors as tolerated.  Encourage IS when extubated.    POD 2 Extubated on 2LNC.  On levo/vaso gtt.  Neuro intact.  Abdomen soft nontender.  Cr 0.79 borderline UO overnight.  Hgb down to 6.9.  CXR with increased right pleural effusion this AM.  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  Discussed possible need for right chest tube with Dr Banda.  Plan: Chest tube today.  Wean oxygen as tolerated.  2 units PRBC today.  Wean vasopressors as tolerated.  Xanax prn for anxiety.    POD 3 HOTN- on vaso/levo- wean, SR- d/c pacer wires, 2lnc, CXR- no penumo or effusions- d/c all CT's- ck CXR in 4 hours, gently diurese, Anxiety- start long acting, pain- add toradol    POD 4 HDS- off all gtts, SR, CXR- no pneumo, Begin aggressive diuresis, anxiety-improved, plan transfer to tele    POD 5 HDS.  SR.  Anxious. Nauseated.  Small seroma vs hematoma at superior edge of incision.  Serous drainage from inferior aspect of the wound.  Plan: Scopolamine patch for nausea due to elevated qtc.  Xanax prn for anxiety.  Boost shakes for protein requirements.  Monitor seroma.      Disposition:  [facility]

## 2022-09-21 NOTE — CARE PLAN
The patient is Stable - Low risk of patient condition declining or worsening    Shift Goals  Clinical Goals: Use IS, mobilize, Abx therapy  Patient Goals: Pain control  Family Goals: CHRIS      Problem: Pain - Standard  Goal: Alleviation of pain or a reduction in pain to the patient’s comfort goal  Outcome: Progressing  Note: Pharm and non-pharm methods such as distraction, rest, and repositioning in use for pain relief.     Problem: Psychosocial  Goal: Patient's level of anxiety will decrease  Outcome: Progressing  Note: Meds and therapeutic talk in use for anxiety relief. Patient encouraged to speak about feeling when needed.      Problem: Post Op Day 5 CABG/Heart Valve Replacement  Goal: Optimal care of the Post Op CABG/Heart Valve replacement Post Op Day 5  Outcome: Progressing  Intervention: Daily weights in the morning  Note: Patient weighed every morning daily.  Intervention: Shower daily and clean incisions twice daily with soap and water  Note: Patient educated to shower daily, and CHG baths in use for infection prevention.  Intervention: Up in chair for all meals  Note: Patient up to chair for all meals.  Intervention: Ambulate 4 times daily, increasing the distance each time  Note: Patient ambulated this evening, and will ambulate in morning.  Intervention: IS q 1 hour while awake and record best IS volume  Note: Patient encouraged to use IS, volume at 750 mL  Intervention: Consider removal of damon, chest tube and pacer wires if not already done  Note: Chest tube wires out.   Intervention: Discharge Education  Note: Assessment for discharge daily.       Progress made toward(s) clinical / shift goals:  Progressing

## 2022-09-21 NOTE — DISCHARGE PLANNING
Case Management Discharge Planning    Admission Date: 9/9/2022  GMLOS: 7  ALOS: 12    6-Clicks ADL Score: 24  6-Clicks Mobility Score: 24      Anticipated Discharge Dispo: Discharge Disposition: LTAC vs SNF    Discharge Contact Phone Number: [phone]    DME Needed: No    Action(s) Taken: Spoke to pt at bedside, pt gave verbal choice for SNF, choice form faxed to DPA.     Escalations Completed: None    Medically Clear: No    Next Steps: f/u with medical team for clearance, DPA for SNF acceptance, complete PASRR.    Barriers to Discharge: Medical clearance and Pending Placement

## 2022-09-21 NOTE — DIETARY
Nutrition Services Brief Update:    Upon a dietary rescreen, Pt found to have low PO intake with avg of 25-50%. Per surgery note on 9/21, Pt is nauseated and anxious with plan of Scopolamin patch for nausea and Xanax for anxiety; Boost protein shakes for protein. Per EMR, Pt -5.5 kg via stand up scale. Boost Plus supplement added.    Recommendations/Plan:  Boost Plus TID for added nutritional content.  Monitor weight    RD following.

## 2022-09-21 NOTE — CARE PLAN
Problem: Knowledge Deficit - Standard  Goal: Patient and family/care givers will demonstrate understanding of plan of care, disease process/condition, diagnostic tests and medications  Outcome: Progressing  Note: Pt's whiteboard is updated, pt has been updated on POC, all questions have been answered at this time       Problem: Pain - Standard  Goal: Alleviation of pain or a reduction in pain to the patient’s comfort goal  Outcome: Progressing  Note: Pain medications will allow pt to perform activity      Problem: Communication  Goal: The ability to communicate needs accurately and effectively will improve  Outcome: Progressing  Note: Pt's whiteboard is updated, pt has been updated on POC, all questions have been answered at this time       Problem: Post Op Day 4 CABG/Heart Valve Replacement  Goal: Optimal care of the Post Op CABG/Heart Valve replacement Post Op Day 4  Outcome: Progressing  Intervention: Daily weights in the morning  Note: Weight complete   Intervention: Shower daily and clean incisions twice daily with soap and water  Note: Pt has cleaned incision once and will clean again with night shift. Pt states that she is too tired to shower at this time   Intervention: Ambulate 4 times daily, increasing the distance each time  Note: Pt has ambulated twice with day shift and will complete the other two with night shift    The patient is Watcher - Medium risk of patient condition declining or worsening    Shift Goals  Clinical Goals: mobility, transfer out of ICU  Patient Goals: sleep  Family Goals: CHRIS    Progress made toward(s) clinical / shift goals:  pain control, safety, communication, incision care     Patient is not progressing towards the following goals:

## 2022-09-21 NOTE — DISCHARGE PLANNING
Received Choice form at 0918  Agency/Facility Name: [facility]   Referral sent per Choice form @ 0980    Received Choice form at 0992  Agency/Facility Name: [facility], and [facility]   Referral sent per Choice form @ 0917

## 2022-09-21 NOTE — PROGRESS NOTES
Assumed care of patient. Bedside report received from Fanny THACKER. Updated POC, call light within reach, fall precautions in place. Bed locked, and in lowest position. Assessment completed, patient is A&Ox4. Patient instructed to call for assistance. All questions answered, no further needs at this time.     Patient educated on purpose of bed alarm and risk of falls, patient verbalizes understanding, refusing bed alarm at this time. Charge RN notified.

## 2022-09-21 NOTE — PROGRESS NOTES
Report received, patient care assumed. Pt up in chair watching TV at this time. On 1L nasal cannula. Pt able to pull 1000 on IS. Updated on POC, questions answered. Call light and belongings within reach.

## 2022-09-21 NOTE — THERAPY
Occupational Therapy   Initial Evaluation     Patient Name: Sarah Buckner  Age:  51 y.o., Sex:  female  Medical Record #: [id number]  Today's Date: 9/21/2022     Precautions  Precautions: Fall Risk, Sternal Precautions (See Comments)    Assessment  Patient is a 51 y.o. female who was transferred to [facility] with enterococcus bacteremia, TAZ, and cavitary pneumonia. Pt s/p KEO which was suspicious for tricuspid valve mass. 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 Pt s/p radical tricuspid valve repair, patent foramen ovale closure and aortic valve repair 9/16. At baseline, pt is fully independent and works full time. During OT eval, pt limited by decreased activity tolerance, impaired balance, and limited knowledge of post-op sternal precautions impacting her independence with ADLs.          Plan    Recommend Occupational Therapy 3 times per week until therapy goals are met for the following treatments:  Adaptive Equipment, Self Care/Activities of Daily Living, Therapeutic Activities, and Therapeutic Exercises.    DC Equipment Recommendations: None  Discharge Recommendations:  (Current plan is to discharge to a facility where pt can receive IV antibiotics, recommend pt continue OT services at next level of care)     Subjective    Agreeable to therapy session      Objective       09/21/22 1210   Prior Living Situation   Prior Services Home-Independent   Housing / Facility 1 Story House   Steps Into Home 3   Steps In Home 0   Bathroom Set up Bathtub / Shower Combination   Equipment Owned Front-Wheel Walker;Single Point Cane;Tub / Shower Seat   Lives with - Patient's Self Care Capacity Parents   Comments Pt lives with her parents in order to help them. Her father has dementia. Her mother is in good health and able to drive.   Prior Level of ADL Function   Self Feeding  Independent   Grooming / Hygiene Independent   Bathing Independent   Dressing Independent   Toileting Independent   Prior Level of IADL Function   Medication Management Independent   Laundry Independent   Kitchen Mobility Independent   Home Management Independent   Shopping Independent   Prior Level Of Mobility Independent Without Device in Community   Driving / Transportation Driving Independent   Occupation (Pre-Hospital Vocational) Employed Full Time  (works as surgical tech at [employer], mostly ortho surgeries)   Precautions   Precautions Fall Risk;Sternal Precautions (See Comments)   Vitals   O2 Delivery Device Silicone Nasal Cannula   Pain 0 - 10 Group   Therapist Pain Assessment During Activity;Nurse Notified  (reports sternal pain with coughing)   Cognition    Cognition / Consciousness WDL   Level of Consciousness Alert   Comments pleasant and cooperative   Active ROM Upper Body   Dominant Hand Right   Comments pain free AROM is functional for basic ADLs   Sensation Upper Body   Upper Extremity Sensation  WDL   Upper Body Muscle Tone   Upper Body Muscle Tone  WDL   Coordination Upper Body   Coordination WDL   Balance Assessment   Sitting Balance (Static) Good   Sitting Balance (Dynamic) Good   Standing Balance (Static) Fair   Standing Balance (Dynamic) Fair   Weight Shift Sitting Fair   Weight Shift Standing Fair   Comments standing and walking without AD   Bed Mobility    Comments up in chair before and after session   ADL Assessment   Grooming Minimal Assist  (assist to brush hair after shower)   Bathing Minimal Assist  (seated shower)   Upper Body Dressing Supervision  (gown)   Lower Body Dressing Supervision  (socks, verbal cues for appropriate technique)   Toileting   (denied need, has damon in place)   6 Clicks Daily Activity Score 19   Functional Mobility   Sit to Stand Contact Guard Assist   Bed, Chair, Wheelchair Transfer Contact Guard Assist   Tub / Shower Transfers Contact Guard Assist   Mobility  walked to/from bathroom without AD   Patient / Family Goals   Patient / Family Goal #1 to get better   Short Term Goals   Short Term Goal # 1 Pt will complete toilet transfer with supervision   Short Term Goal # 2 Pt will complete simulated tub transfer with SBA   Short Term Goal # 3 Pt will brush hair in seated or standing with supervision   Education Group   Education Provided Role of Occupational Therapist;Sternal Precautions;Activities of Daily Living   Role of Occupational Therapist Patient Response Patient;Acceptance;Explanation;Verbal Demonstration   Sternal Precautions Patient Response Patient;Acceptance;Explanation;Demonstration;Verbal Demonstration   ADL Patient Response Patient;Acceptance;Explanation;Demonstration;Verbal Demonstration;Action Demonstration   Problem List   Problem List Decreased Active Daily Living Skills;Decreased Homemaking Skills;Decreased Functional Mobility;Decreased Activity Tolerance;Impaired Postural Control / Balance;Limited Knowledge of Post Op Precautions

## 2022-09-22 ENCOUNTER — APPOINTMENT (OUTPATIENT)
Dept: RADIOLOGY | Facility: MEDICAL CENTER | Age: 52
DRG: 219 | End: 2022-09-22
Attending: NURSE PRACTITIONER
Payer: COMMERCIAL

## 2022-09-22 LAB
ANION GAP SERPL CALC-SCNC: 11 MMOL/L (ref 7–16)
BUN SERPL-MCNC: 7 MG/DL (ref 8–22)
CALCIUM SERPL-MCNC: 9.3 MG/DL (ref 8.5–10.5)
CHLORIDE SERPL-SCNC: 92 MMOL/L (ref 96–112)
CO2 SERPL-SCNC: 31 MMOL/L (ref 20–33)
CREAT SERPL-MCNC: 0.51 MG/DL (ref 0.5–1.4)
GFR SERPLBLD CREATININE-BSD FMLA CKD-EPI: 113 ML/MIN/1.73 M 2
GLUCOSE SERPL-MCNC: 104 MG/DL (ref 65–99)
POTASSIUM SERPL-SCNC: 4.4 MMOL/L (ref 3.6–5.5)
SODIUM SERPL-SCNC: 134 MMOL/L (ref 135–145)

## 2022-09-22 PROCEDURE — A9270 NON-COVERED ITEM OR SERVICE: HCPCS | Performed by: INTERNAL MEDICINE

## 2022-09-22 PROCEDURE — 700102 HCHG RX REV CODE 250 W/ 637 OVERRIDE(OP): Performed by: INTERNAL MEDICINE

## 2022-09-22 PROCEDURE — 700111 HCHG RX REV CODE 636 W/ 250 OVERRIDE (IP): Performed by: INTERNAL MEDICINE

## 2022-09-22 PROCEDURE — 700105 HCHG RX REV CODE 258: Performed by: INTERNAL MEDICINE

## 2022-09-22 PROCEDURE — 94760 N-INVAS EAR/PLS OXIMETRY 1: CPT

## 2022-09-22 PROCEDURE — 700102 HCHG RX REV CODE 250 W/ 637 OVERRIDE(OP): Performed by: HOSPITALIST

## 2022-09-22 PROCEDURE — 770020 HCHG ROOM/CARE - TELE (206)

## 2022-09-22 PROCEDURE — 700102 HCHG RX REV CODE 250 W/ 637 OVERRIDE(OP): Performed by: STUDENT IN AN ORGANIZED HEALTH CARE EDUCATION/TRAINING PROGRAM

## 2022-09-22 PROCEDURE — A9270 NON-COVERED ITEM OR SERVICE: HCPCS | Performed by: NURSE PRACTITIONER

## 2022-09-22 PROCEDURE — 700102 HCHG RX REV CODE 250 W/ 637 OVERRIDE(OP): Performed by: NURSE PRACTITIONER

## 2022-09-22 PROCEDURE — A9270 NON-COVERED ITEM OR SERVICE: HCPCS | Performed by: STUDENT IN AN ORGANIZED HEALTH CARE EDUCATION/TRAINING PROGRAM

## 2022-09-22 PROCEDURE — 71046 X-RAY EXAM CHEST 2 VIEWS: CPT

## 2022-09-22 PROCEDURE — 99024 POSTOP FOLLOW-UP VISIT: CPT | Performed by: NURSE PRACTITIONER

## 2022-09-22 PROCEDURE — 80048 BASIC METABOLIC PNL TOTAL CA: CPT

## 2022-09-22 PROCEDURE — 99232 SBSQ HOSP IP/OBS MODERATE 35: CPT | Performed by: INTERNAL MEDICINE

## 2022-09-22 PROCEDURE — 700101 HCHG RX REV CODE 250: Performed by: INTERNAL MEDICINE

## 2022-09-22 PROCEDURE — A9270 NON-COVERED ITEM OR SERVICE: HCPCS | Performed by: HOSPITALIST

## 2022-09-22 PROCEDURE — 700111 HCHG RX REV CODE 636 W/ 250 OVERRIDE (IP): Performed by: NURSE PRACTITIONER

## 2022-09-22 PROCEDURE — 93005 ELECTROCARDIOGRAM TRACING: CPT | Performed by: INTERNAL MEDICINE

## 2022-09-22 RX ORDER — FUROSEMIDE 10 MG/ML
40 INJECTION INTRAMUSCULAR; INTRAVENOUS DAILY
Status: DISCONTINUED | OUTPATIENT
Start: 2022-09-23 | End: 2022-09-22

## 2022-09-22 RX ORDER — METOLAZONE 2.5 MG/1
2.5 TABLET ORAL
Status: DISCONTINUED | OUTPATIENT
Start: 2022-09-23 | End: 2022-09-23

## 2022-09-22 RX ORDER — POTASSIUM CHLORIDE 20 MEQ/1
20 TABLET, EXTENDED RELEASE ORAL 2 TIMES DAILY
Status: DISCONTINUED | OUTPATIENT
Start: 2022-09-22 | End: 2022-09-23

## 2022-09-22 RX ORDER — FUROSEMIDE 10 MG/ML
40 INJECTION INTRAMUSCULAR; INTRAVENOUS DAILY
Status: DISCONTINUED | OUTPATIENT
Start: 2022-09-22 | End: 2022-09-22

## 2022-09-22 RX ORDER — FUROSEMIDE 40 MG/1
40 TABLET ORAL
Status: DISCONTINUED | OUTPATIENT
Start: 2022-09-23 | End: 2022-09-23

## 2022-09-22 RX ADMIN — OMEPRAZOLE 20 MG: 20 CAPSULE, DELAYED RELEASE ORAL at 04:43

## 2022-09-22 RX ADMIN — GABAPENTIN 100 MG: 100 CAPSULE ORAL at 04:43

## 2022-09-22 RX ADMIN — FUROSEMIDE 40 MG: 40 TABLET ORAL at 04:43

## 2022-09-22 RX ADMIN — BUSPIRONE HYDROCHLORIDE 5 MG: 10 TABLET ORAL at 17:05

## 2022-09-22 RX ADMIN — GUAIFENESIN 1200 MG: 600 TABLET, EXTENDED RELEASE ORAL at 04:42

## 2022-09-22 RX ADMIN — PROCHLORPERAZINE EDISYLATE 10 MG: 5 INJECTION INTRAMUSCULAR; INTRAVENOUS at 12:37

## 2022-09-22 RX ADMIN — SUMATRIPTAN SUCCINATE 100 MG: 50 TABLET ORAL at 17:05

## 2022-09-22 RX ADMIN — GABAPENTIN 100 MG: 100 CAPSULE ORAL at 17:06

## 2022-09-22 RX ADMIN — AMPICILLIN SODIUM 2000 MG: 2 INJECTION, POWDER, FOR SOLUTION INTRAVENOUS at 06:21

## 2022-09-22 RX ADMIN — ALPRAZOLAM 0.25 MG: 0.25 TABLET ORAL at 22:26

## 2022-09-22 RX ADMIN — GABAPENTIN 100 MG: 100 CAPSULE ORAL at 12:38

## 2022-09-22 RX ADMIN — POTASSIUM CHLORIDE 20 MEQ: 1500 TABLET, EXTENDED RELEASE ORAL at 17:06

## 2022-09-22 RX ADMIN — ASPIRIN 81 MG: 81 TABLET, COATED ORAL at 04:43

## 2022-09-22 RX ADMIN — BUSPIRONE HYDROCHLORIDE 5 MG: 10 TABLET ORAL at 04:43

## 2022-09-22 RX ADMIN — POTASSIUM CHLORIDE 20 MEQ: 20 TABLET, EXTENDED RELEASE ORAL at 06:00

## 2022-09-22 RX ADMIN — OXYCODONE 5 MG: 5 TABLET ORAL at 22:26

## 2022-09-22 RX ADMIN — AMPICILLIN SODIUM 2000 MG: 2 INJECTION, POWDER, FOR SOLUTION INTRAVENOUS at 22:26

## 2022-09-22 RX ADMIN — GUAIFENESIN 1200 MG: 600 TABLET, EXTENDED RELEASE ORAL at 17:05

## 2022-09-22 RX ADMIN — CEFTRIAXONE SODIUM 2 G: 10 INJECTION, POWDER, FOR SOLUTION INTRAVENOUS at 17:06

## 2022-09-22 RX ADMIN — OXYCODONE 5 MG: 5 TABLET ORAL at 08:21

## 2022-09-22 RX ADMIN — AMPICILLIN SODIUM 2000 MG: 2 INJECTION, POWDER, FOR SOLUTION INTRAVENOUS at 14:05

## 2022-09-22 RX ADMIN — OXYCODONE 5 MG: 5 TABLET ORAL at 17:59

## 2022-09-22 RX ADMIN — CEFTRIAXONE SODIUM 2 G: 10 INJECTION, POWDER, FOR SOLUTION INTRAVENOUS at 04:43

## 2022-09-22 RX ADMIN — AMPICILLIN SODIUM 2000 MG: 2 INJECTION, POWDER, FOR SOLUTION INTRAVENOUS at 10:26

## 2022-09-22 RX ADMIN — OXYCODONE 5 MG: 5 TABLET ORAL at 01:55

## 2022-09-22 RX ADMIN — METOLAZONE 2.5 MG: 2.5 TABLET ORAL at 04:42

## 2022-09-22 RX ADMIN — SUCRALFATE 1 G: 1 SUSPENSION ORAL at 06:21

## 2022-09-22 RX ADMIN — AMPICILLIN SODIUM 2000 MG: 2 INJECTION, POWDER, FOR SOLUTION INTRAVENOUS at 01:45

## 2022-09-22 RX ADMIN — SUCRALFATE 1 G: 1 SUSPENSION ORAL at 17:11

## 2022-09-22 RX ADMIN — AMPICILLIN SODIUM 2000 MG: 2 INJECTION, POWDER, FOR SOLUTION INTRAVENOUS at 17:59

## 2022-09-22 RX ADMIN — SUCRALFATE 1 G: 1 SUSPENSION ORAL at 12:38

## 2022-09-22 RX ADMIN — BUSPIRONE HYDROCHLORIDE 5 MG: 10 TABLET ORAL at 12:37

## 2022-09-22 ASSESSMENT — ENCOUNTER SYMPTOMS
HALLUCINATIONS: 0
ABDOMINAL PAIN: 0
FEVER: 0
ROS GI COMMENTS: TOLERATING A DIET
SHORTNESS OF BREATH: 0
LOSS OF CONSCIOUSNESS: 0
VOMITING: 0
NAUSEA: 1
BLURRED VISION: 0
DOUBLE VISION: 0
SEIZURES: 0
FALLS: 0
CHILLS: 0
SORE THROAT: 0

## 2022-09-22 ASSESSMENT — PAIN DESCRIPTION - PAIN TYPE
TYPE: SURGICAL PAIN
TYPE: ACUTE PAIN

## 2022-09-22 ASSESSMENT — LIFESTYLE VARIABLES: SUBSTANCE_ABUSE: 0

## 2022-09-22 NOTE — DISCHARGE PLANNING
[facility]P TCN chart review completed. Collaborated with FREDERICK Willis.  Per chart review, patient is not medically cleared for discharge.  Patient will require IV ABX until 10/27/22.  Plan for SNF in [place] versus LTAC .  [place] choice facilities were obtained by FREDERICK.  Patient seen at bedside and was emotionally labile today.  Patient is hopefull to be able to be placed in a facility in [place] so her mother can come to see her.  Patients mother cares for her disabled father in [place].  Patient states she is maintaining her sternal precautions.     9/21/22 OT recommendations-  plan is to discharge to a facility where pt can receive IV antibiotics, recommend pt continue OT services at next level of care.       TCN will continue to follow and collaborate with discharge planning team as additional post acute needs arise. Thank you.    Previously completed:  Choice obtained: HH, LTAC and infusion choice (IV ABX).  [facility] referral (N), patient has f/u appointments and is out of [facility] area.

## 2022-09-22 NOTE — PROGRESS NOTES
Hospital Medicine Daily Progress Note    Date of Service  9/22/2022    Chief Complaint  Sarah Buckner is a 51 y.o. female admitted 9/9/2022 with bacteremia.    Hospital Course  Sarah Buckner is a 51 y.o. female admitted 9/9/2022 with bacterial endocarditis growing enterococcus faecalis on blood cultures prior to 09/08/22, with possible streptococcus on 09/10/22 blood cultures.  Also found to have significant anemia, reportedly had melena outpatient but none inpatient. Patient was placed on ampicillin and ceftriaxone IV. Work-up of Enterococcus bacteremia including EGD, Colonoscopy revealed mild diverticulosis, antral gastritis and sigmoid polypectomy of 7mm pedunculated polyp. Cardiology was consulted and ordered a KEO found a perferation in the tricuspid valve, with open PFO and aortic valve involvement as well.  At that point, cardiothoracic surgery was consulted for assessment and possible valve replacement/repair.     Patient was placed on 6 weeks of antibiotic therapy starting from last negative blood cultures taken 09/13/2022.  On 9/16/2022 she underwent tricuspid valve repair, patent foramen ovale closure, aortic valve repair by Dr. Bradley.  Postoperatively she remained intubated and required infusion of red blood cells as well as IV pressors.  A chest tube was attempted on 9/18/2022 but was not successful.      Interval Problem Update  Patient was seen and examined at bedside.  No acute events overnight. Patient is resting comfortably in bed and in no acute distress.     IV antibiotics thru 10/27  Looking into SNF vs LTAC  Transition to PO lasix, metolazone       I have discussed this patient's plan of care and discharge plan at IDT rounds today with Case Management, Nursing, Nursing leadership, and other members of the IDT team. SNF referral placed for long term IV antibiotics.     Consultants/Specialty  critical care  Cardiothoracic surgery  Infectious disease  GI  Cardiology  General  surgery  Code Status  Full Code    Disposition  Patient is not medically cleared for discharge.   Anticipate discharge to be determined.  I have placed the appropriate orders for post-discharge needs.    Review of Systems  Review of Systems   Constitutional:  Negative for chills and fever.   HENT:  Negative for congestion and sore throat.    Eyes:  Negative for blurred vision and double vision.   Respiratory:  Negative for shortness of breath.    Cardiovascular:  Positive for leg swelling.        Sternal pain   Gastrointestinal:  Positive for nausea. Negative for abdominal pain and vomiting.        Tolerating a diet   Genitourinary:  Negative for dysuria and frequency.   Musculoskeletal:  Negative for falls.   Skin:  Negative for rash.   Neurological:  Negative for seizures and loss of consciousness.   Psychiatric/Behavioral:  Negative for hallucinations and substance abuse.    All other systems reviewed and are negative.     Physical Exam  Temp:  [36.1 °C (97 °F)-38.1 °C (100.6 °F)] 36.7 °C (98 °F)  Pulse:  [] 101  Resp:  [18-20] 18  BP: (109-140)/(76-95) 127/79  SpO2:  [91 %-98 %] 98 %    Physical Exam  Vitals and nursing note reviewed.   Constitutional:       Appearance: She is ill-appearing. She is not toxic-appearing.   HENT:      Head: Normocephalic and atraumatic.      Right Ear: External ear normal.      Left Ear: External ear normal.      Nose: No congestion.      Mouth/Throat:      Mouth: Mucous membranes are dry.      Pharynx: Oropharynx is clear.   Eyes:      General: No scleral icterus.     Conjunctiva/sclera: Conjunctivae normal.   Cardiovascular:      Rate and Rhythm: Normal rate and regular rhythm.   Pulmonary:      Effort: Pulmonary effort is normal.      Breath sounds: Normal breath sounds. No wheezing.   Abdominal:      Tenderness: There is no abdominal tenderness. There is no guarding or rebound.   Musculoskeletal:      Cervical back: Normal range of motion and neck supple.      Right lower  leg: Edema present.      Left lower leg: Edema present.      Comments: Left arm PICC   Skin:     General: Skin is warm and dry.      Coloration: Skin is not jaundiced.      Findings: No bruising.   Neurological:      General: No focal deficit present.      Mental Status: She is alert and oriented to person, place, and time.      Cranial Nerves: No cranial nerve deficit.   Psychiatric:         Mood and Affect: Mood normal.         Behavior: Behavior normal.         Thought Content: Thought content normal.         Judgment: Judgment normal.       Fluids    Intake/Output Summary (Last 24 hours) at 9/22/2022 1555  Last data filed at 9/22/2022 1452  Gross per 24 hour   Intake 300 ml   Output 5450 ml   Net -5150 ml       Laboratory  Recent Labs     09/20/22  0107   WBC 16.0*   RBC 3.41*   HEMOGLOBIN 9.5*   HEMATOCRIT 29.5*   MCV 86.5   MCH 27.9   MCHC 32.2*   RDW 61.9*   PLATELETCT 355   MPV 10.4     Recent Labs     09/20/22  0107 09/22/22  1022   SODIUM 140 134*   POTASSIUM 3.7 4.4   CHLORIDE 106 92*   CO2 23 31   GLUCOSE 86 104*   BUN 19 7*   CREATININE 0.45* 0.51   CALCIUM 7.8* 9.3                     Imaging  DX-CHEST-2 VIEWS   Final Result      1.  Small BILATERAL pleural effusions   2.  Unchanged BILATERAL multifocal airspace disease      DX-CHEST-PORTABLE (1 VIEW)   Final Result      1.  Unchanged BILATERAL pneumonia   2.  Small BILATERAL pleural effusions, LEFT larger than RIGHT, unchanged      IR-PICC LINE PLACEMENT W/ GUIDANCE > AGE 5   Final Result                  Ultrasound-guided PICC placement performed by qualified nursing staff as    above.          DX-CHEST-PORTABLE (1 VIEW)   Final Result      1.  Right IJ catheter and left PICC line in satisfactory position.      2.  Improving bilateral multifocal opacities consistent with resolving pneumonitis and/or pulmonary edema.      3.   Small left pleural effusion.      DX-CHEST-PORTABLE (1 VIEW)   Final Result      Stable bilateral pulmonary infiltrates and  support devices.      CT-ABORTED CT PROCEDURE   Final Result      Aborted RIGHT chest tube placement.      DX-CHEST-PORTABLE (1 VIEW)   Final Result         1.  Decrease in right pleural effusion after right chest tube placement.      2.  Side port of right-sided chest tube is noted between the ribs.      3.  No new infiltrates or consolidations are identified.      DX-CHEST-PORTABLE (1 VIEW)   Final Result         1.  There is some increase in small right pleural effusion since the prior radiograph.      2.  No new infiltrates or consolidations.      DX-CHEST-PORTABLE (1 VIEW)   Final Result         1. Stable lines and tubes.   2. Stable bilateral pulmonary opacities and small to moderate bilateral pleural effusions.         DX-CHEST-PORTABLE (1 VIEW)   Final Result      1.  Bilateral nodular opacities are consistent with septic emboli seen on prior exams. Probable small bilateral pleural effusions.      2.  Endotracheal tube tip projects approximately 2.2 cm above the parag.      3.  Right internal jugular catheter tip projects over the cavoatrial junction. No pneumothorax is identified.      EC-KEO W/O CONT         DX-CHEST-2 VIEWS   Final Result      1.  Multifocal ill-defined bilateral airspace opacities suggesting infection.   2.  Small right pleural effusion.      US-CAROTID DOPPLER BILAT   Final Result      EC-KEO W/O CONT   Final Result      [id number]   Final Result      Multiple dental caries.      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  Last positive blood culture was 9/8.    Denies IV drug use, possible dental carries (chronic), melena reported on scale of weeks. GI consulted to work-up for possible GI source of Enterococcus bacteremia, including possible malignancy.   Colonoscopy and EGD 9/12 showing healed  erosive gastritis, few sigmoid diverticula, 7mm pendunculated sigmoid polyp  Status post aortic valve and tricuspid valve repairs by Dr. Bradley on 9/16  Infectious disease has consulted  IV ampicillin and rocephin through 10/27/22. Weekly CBC and CMP while on IV antibiotics.  Likely post-acute placement for IV antibiotics and further rehab purposes.   IWill reach out to ID regarding other options for q4hrs ampicillin    Endocarditis  Assessment & Plan  TTE with concern for tricuspid vegetation  See above problem    S/P aortic valve repair- (present on admission)  Assessment & Plan  By Dr. Bradley 9/16    S/P patent foramen ovale closure- (present on admission)  Assessment & Plan  By Dr. Bradley 9/16.     S/P tricuspid valve repair- (present on admission)  Assessment & Plan  By Dr. Bradley 9/16    Elevated alkaline phosphatase level  Assessment & Plan  Alk phos has been significantly elevated throughout admission.  Isozymes confirm liver source.  Alk phos went as high as 1,433 and has trended down  Follow weekly and this is appropriate for outpatient follow up.    Anemia- (present on admission)  Assessment & Plan  Reports history of several weeks of melena at home, in the context of max dosages of NSAIDs for ongoing back pain. Melena has not recurred during admission.  Initial fecal occult blood is negative.  She required 2 units of RBCs at outside hospital prior to transfer.  Anemia may be multifactorial with contributions from hemolysis due to endocarditis, iron deficiency anemia due to GI losses.     Pneumonia- (present on admission)  Assessment & Plan  Cavitary lesion noted in left lower lobe demonstrated on CT chest. Possible etiology of infectious emboli from tricuspid vegetations.   IR consulted and not amenable to drainage.        Septic shock (HCC)  Assessment & Plan  resolved    Migraine with aura- (present on admission)  Assessment & Plan  Resume home imitrex 100 mg which she uses up to 9 tabs  every 2 weeks    TAZ (acute kidney injury) (HCC)- (present on admission)  Assessment & Plan  resolved       VTE prophylaxis: SCDs/TEDs    I have performed a physical exam and reviewed and updated ROS and Plan today (9/22/2022). In review of yesterday's note (9/21/2022), there are no changes except as documented above.

## 2022-09-22 NOTE — DISCHARGE PLANNING
"HT/SCP TCN chart review completed. Collaborated with Lazaro MACK.  No new TCN needs identified at this time. Noted current plan to dc either to LTAC or SNF dependent on IV ABX/medical needs at time of medical clearance (noted has been accepted to LTAC and SNF choices pending as well). TCN will continue to follow and collaborate with discharge planning team as additional post acute needs arise. Thank you.    Previously completed:    - Orders received for: PT and OT consults -> most recent OT note stating \"Current plan is to discharge to a facility where pt can receive IV antibiotics, recommend pt continue OT services at next level of care\"; PT to \"re-attempt mobility eval as appropriate\"    Choice obtained: HH, LTAC, SNF and infusion choice (IV ABX).    [facility] referral (N), patient has f/u appointments and is out of [facility] area.  "

## 2022-09-22 NOTE — PROGRESS NOTES
Monitor Summary    SR-ST:   Ectopy: (F) PVC, (R) coup, (R) bigem, (R) trigem  (O) PVC  .11/.10/.48

## 2022-09-22 NOTE — PROGRESS NOTES
Cardiovascular Surgery Progress Note    Name: aSrah Buckner  MRN: [id number]  : 1970  Admit Date: 2022  2:08 PM  6 Days Post-Op     Procedure:  Procedure(s) and Anesthesia Type:     * TRICUSPID VALVE REPAIR, PATENT FORAMEN OVALE CLOSURE, TRANSESOPHAGEAL ESCHOCARDIOGRAM - General     * ECHOCARDIOGRAM, TRANSESOPHAGEAL, INTRAOPERATIVE - General    Vitals:  Vitals:    22 0000 22 0400 22 0708 22 0720   BP: 109/76 113/80  115/88   Pulse: 100 92 90 95   Resp: 18 18 20 18   Temp: 37.1 °C (98.8 °F) 36.9 °C (98.4 °F)  37.5 °C (99.5 °F)   TempSrc: Temporal Temporal  Temporal   SpO2: 93% 97% 93% 91%   Weight:       Height:          Temp (24hrs), Av.3 °C (99.2 °F), Min:36.1 °C (97 °F), Max:38.1 °C (100.6 °F)      Respiratory:    Respiration: 18, Pulse Oximetry: 91 %       Fluids:    Intake/Output Summary (Last 24 hours) at 2022 1027  Last data filed at 2022 0956  Gross per 24 hour   Intake 540 ml   Output 6250 ml   Net -5710 ml       Admit weight: Weight: 66.1 kg (145 lb 12.8 oz)  Current weight: Weight: 69.3 kg (152 lb 12.5 oz) (22 0634)    Labs:  Recent Labs     22  0107   WBC 16.0*   RBC 3.41*   HEMOGLOBIN 9.5*   HEMATOCRIT 29.5*   MCV 86.5   MCH 27.9   MCHC 32.2*   RDW 61.9*   PLATELETCT 355   MPV 10.4       Recent Labs     22  010   SODIUM 140   POTASSIUM 3.7   CHLORIDE 106   CO2 23   GLUCOSE 86   BUN 19   CREATININE 0.45*   CALCIUM 7.8*             Medications:  Scheduled Medications   Medication Dose Frequency    potassium chloride SA  20 mEq BID    [START ON 2022] metOLazone  2.5 mg Q DAY    [START ON 2022] furosemide  40 mg DAILY    scopolamine  1 Patch Q72HRS    sucralfate  1 g Q6HRS    busPIRone  5 mg TID    gabapentin  100 mg TID    aspirin EC  81 mg DAILY    Pharmacy Consult Request  1 Each PHARMACY TO DOSE    senna-docusate  2 Tablet BID    And    polyethylene glycol/lytes  1 Packet DAILY    And    magnesium hydroxide  30 mL DAILY     omeprazole  20 mg DAILY    ampicillin  2,000 mg Q4HRS    cefTRIAXone (ROCEPHIN) IV  2 g Q12HRS    guaiFENesin ER  1,200 mg Q12HRS          Exam:   Physical Exam  Vitals and nursing note reviewed.   Constitutional:       General: She is not in acute distress.     Appearance: She is normal weight.   HENT:      Head: Normocephalic and atraumatic.      Right Ear: External ear normal.      Left Ear: External ear normal.      Nose: Nose normal.      Mouth/Throat:      Mouth: Mucous membranes are moist.   Eyes:      Extraocular Movements: Extraocular movements intact.      Conjunctiva/sclera: Conjunctivae normal.      Pupils: Pupils are equal, round, and reactive to light.   Cardiovascular:      Rate and Rhythm: Normal rate and regular rhythm.      Pulses: Normal pulses.      Heart sounds: Normal heart sounds.   Pulmonary:      Effort: Pulmonary effort is normal.      Breath sounds: Examination of the right-lower field reveals decreased breath sounds. Examination of the left-lower field reveals decreased breath sounds. Decreased breath sounds present.   Abdominal:      General: Abdomen is flat. Bowel sounds are normal. There is no distension.      Palpations: Abdomen is soft.   Musculoskeletal:         General: Normal range of motion.      Cervical back: Normal range of motion and neck supple.      Right lower leg: Edema present.      Left lower leg: Edema present.   Skin:     General: Skin is warm and dry.      Capillary Refill: Capillary refill takes less than 2 seconds.      Comments: Sternum- c/d/i   Neurological:      General: No focal deficit present.      Mental Status: She is alert and oriented to person, place, and time. Mental status is at baseline.      Sensory: No sensory deficit.      Motor: No weakness.   Psychiatric:         Mood and Affect: Mood is anxious.       Cardiac Medications:    ASA - Yes    Plavix - No; contraindicated because of Other No CAD    Post-operative Beta Blockers - No; contraindicated  because of Hypotension    Ace/ARB- No; contraindicated because of Normal EF    Statin - No; contraindicated because of No CAD    Aldactone- No; contraindicated because of Normal EF    Ejection Fraction:  60%    Telemetry:   9/17 SR/ST 60-120s  9/18 SB/SR oPVC  9/19 SR  9/20 SR  9/21 SR  rPVC  9/22 SR    Assessment/Plan:  POD 1 Intubated, on levo 2mcg/vaso gtt.  Neuro intact.  Abdomen soft.  Minimal output from chest tubes.  Cr stable. Hgb 7.5 overnight, given 1 unit PRBC, now 8.5.  UO decreased this AM. Plan: Wean ventilator as tolerated.  Albumin 5% 25g once.  Wean vasopressors as tolerated.  Encourage IS when extubated.    POD 2 Extubated on 2LNC.  On levo/vaso gtt.  Neuro intact.  Abdomen soft nontender.  Cr 0.79 borderline UO overnight.  Hgb down to 6.9.  CXR with increased right pleural effusion this AM.  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  Discussed possible need for right chest tube with Dr Banda.  Plan: Chest tube today.  Wean oxygen as tolerated.  2 units PRBC today.  Wean vasopressors as tolerated.  Xanax prn for anxiety.    POD 3 HOTN- on vaso/levo- wean, SR- d/c pacer wires, 2lnc, CXR- no penumo or effusions- d/c all CT's- ck CXR in 4 hours, gently diurese, Anxiety- start long acting, pain- add toradol    POD 4 HDS- off all gtts, SR, CXR- no pneumo, Begin aggressive diuresis, anxiety-improved, plan transfer to tele    POD 5 HDS.  SR.  Anxious. Nauseated.  Small seroma vs hematoma at superior edge of incision.  Serous drainage from inferior aspect of the wound.  Plan: Scopolamine patch for nausea due to elevated qtc.  Xanax prn for anxiety.  Boost shakes for protein requirements.  Monitor seroma.      POD 6 HDS, SR, Incision c/d/I- swelling at top of incision is normal- d/w patient, Diuresed well- will dec to daily, ck CXR, d/c DIV[facility] damon referral    Disposition:  9/22 [facility] referral

## 2022-09-22 NOTE — PROGRESS NOTES
Bedside report received. No overnight events per night RN. Patient A&O x 4. VS'S. Requiring 1 L via NC, attempted to wean off O2 during morning assessment, however pt desatted to mid 80's while RA. Crackles in LLL. Pt instructed to continue to use IS. Currently pulling 750. Complains of pain at surgical site, medicated per MAR. [place] in place for retention. POC discussed with patient. Pt verbalizes understanding. Call light and belongings with in reach. Bed locked and in lowest position, alarm and fall precautions in place.

## 2022-09-23 LAB
EKG IMPRESSION: NORMAL
EKG IMPRESSION: NORMAL

## 2022-09-23 PROCEDURE — A9270 NON-COVERED ITEM OR SERVICE: HCPCS | Performed by: NURSE PRACTITIONER

## 2022-09-23 PROCEDURE — 99024 POSTOP FOLLOW-UP VISIT: CPT | Performed by: NURSE PRACTITIONER

## 2022-09-23 PROCEDURE — 93005 ELECTROCARDIOGRAM TRACING: CPT

## 2022-09-23 PROCEDURE — 700102 HCHG RX REV CODE 250 W/ 637 OVERRIDE(OP): Performed by: NURSE PRACTITIONER

## 2022-09-23 PROCEDURE — 700102 HCHG RX REV CODE 250 W/ 637 OVERRIDE(OP): Performed by: STUDENT IN AN ORGANIZED HEALTH CARE EDUCATION/TRAINING PROGRAM

## 2022-09-23 PROCEDURE — 700102 HCHG RX REV CODE 250 W/ 637 OVERRIDE(OP): Performed by: INTERNAL MEDICINE

## 2022-09-23 PROCEDURE — 700111 HCHG RX REV CODE 636 W/ 250 OVERRIDE (IP): Performed by: INTERNAL MEDICINE

## 2022-09-23 PROCEDURE — A9270 NON-COVERED ITEM OR SERVICE: HCPCS | Performed by: STUDENT IN AN ORGANIZED HEALTH CARE EDUCATION/TRAINING PROGRAM

## 2022-09-23 PROCEDURE — 700101 HCHG RX REV CODE 250: Performed by: INTERNAL MEDICINE

## 2022-09-23 PROCEDURE — 700111 HCHG RX REV CODE 636 W/ 250 OVERRIDE (IP): Performed by: NURSE PRACTITIONER

## 2022-09-23 PROCEDURE — A9270 NON-COVERED ITEM OR SERVICE: HCPCS | Performed by: INTERNAL MEDICINE

## 2022-09-23 PROCEDURE — 99233 SBSQ HOSP IP/OBS HIGH 50: CPT | Performed by: STUDENT IN AN ORGANIZED HEALTH CARE EDUCATION/TRAINING PROGRAM

## 2022-09-23 PROCEDURE — 93010 ELECTROCARDIOGRAM REPORT: CPT | Performed by: INTERNAL MEDICINE

## 2022-09-23 PROCEDURE — 770001 HCHG ROOM/CARE - MED/SURG/GYN PRIV*

## 2022-09-23 PROCEDURE — 700105 HCHG RX REV CODE 258: Performed by: INTERNAL MEDICINE

## 2022-09-23 RX ORDER — POTASSIUM CHLORIDE 750 MG/1
10 TABLET, EXTENDED RELEASE ORAL DAILY
Qty: 60 TABLET | Refills: 11
Start: 2022-09-24 | End: 2022-11-01

## 2022-09-23 RX ORDER — SUCRALFATE ORAL 1 G/10ML
1 SUSPENSION ORAL EVERY 6 HOURS
Qty: 1 ML | Refills: 0
Start: 2022-09-23 | End: 2022-11-01

## 2022-09-23 RX ORDER — POTASSIUM CHLORIDE 20 MEQ/1
10 TABLET, EXTENDED RELEASE ORAL DAILY
Status: DISCONTINUED | OUTPATIENT
Start: 2022-09-24 | End: 2022-09-24 | Stop reason: HOSPADM

## 2022-09-23 RX ORDER — CLOPIDOGREL BISULFATE 75 MG/1
75 TABLET ORAL DAILY
Status: DISCONTINUED | OUTPATIENT
Start: 2022-09-23 | End: 2022-09-24 | Stop reason: HOSPADM

## 2022-09-23 RX ORDER — OMEPRAZOLE 20 MG/1
20 CAPSULE, DELAYED RELEASE ORAL DAILY
Qty: 30 CAPSULE | Refills: 0
Start: 2022-09-24 | End: 2022-11-01

## 2022-09-23 RX ORDER — GABAPENTIN 100 MG/1
100 CAPSULE ORAL 3 TIMES DAILY
Qty: 90 CAPSULE | Refills: 0
Start: 2022-09-23 | End: 2023-01-30

## 2022-09-23 RX ORDER — BUSPIRONE HYDROCHLORIDE 5 MG/1
5 TABLET ORAL 3 TIMES DAILY
Qty: 90 TABLET | Refills: 0
Start: 2022-09-23 | End: 2022-11-14 | Stop reason: SDUPTHER

## 2022-09-23 RX ORDER — CLOPIDOGREL BISULFATE 75 MG/1
75 TABLET ORAL DAILY
Qty: 30 TABLET | Refills: 0
Start: 2022-09-23 | End: 2023-01-10

## 2022-09-23 RX ORDER — FUROSEMIDE 20 MG/1
20 TABLET ORAL DAILY
Qty: 60 TABLET | Refills: 0
Start: 2022-09-23 | End: 2022-11-01

## 2022-09-23 RX ORDER — FUROSEMIDE 20 MG/1
20 TABLET ORAL
Status: DISCONTINUED | OUTPATIENT
Start: 2022-09-23 | End: 2022-09-24 | Stop reason: HOSPADM

## 2022-09-23 RX ORDER — ASPIRIN 81 MG/1
81 TABLET ORAL DAILY
Qty: 30 TABLET | Refills: 0
Start: 2022-09-24

## 2022-09-23 RX ADMIN — CEFTRIAXONE SODIUM 2 G: 10 INJECTION, POWDER, FOR SOLUTION INTRAVENOUS at 06:07

## 2022-09-23 RX ADMIN — AMPICILLIN SODIUM 2000 MG: 2 INJECTION, POWDER, FOR SOLUTION INTRAVENOUS at 10:06

## 2022-09-23 RX ADMIN — SUCRALFATE 1 G: 1 SUSPENSION ORAL at 00:00

## 2022-09-23 RX ADMIN — METOLAZONE 2.5 MG: 2.5 TABLET ORAL at 06:07

## 2022-09-23 RX ADMIN — CEFTRIAXONE SODIUM 2 G: 10 INJECTION, POWDER, FOR SOLUTION INTRAVENOUS at 17:34

## 2022-09-23 RX ADMIN — AMPICILLIN SODIUM 2000 MG: 2 INJECTION, POWDER, FOR SOLUTION INTRAVENOUS at 06:06

## 2022-09-23 RX ADMIN — SUCRALFATE 1 G: 1 SUSPENSION ORAL at 17:38

## 2022-09-23 RX ADMIN — AMPICILLIN SODIUM 2000 MG: 2 INJECTION, POWDER, FOR SOLUTION INTRAVENOUS at 02:20

## 2022-09-23 RX ADMIN — BUSPIRONE HYDROCHLORIDE 5 MG: 10 TABLET ORAL at 06:07

## 2022-09-23 RX ADMIN — OXYCODONE 5 MG: 5 TABLET ORAL at 12:17

## 2022-09-23 RX ADMIN — BUSPIRONE HYDROCHLORIDE 5 MG: 10 TABLET ORAL at 11:38

## 2022-09-23 RX ADMIN — ASPIRIN 81 MG: 81 TABLET, COATED ORAL at 06:07

## 2022-09-23 RX ADMIN — ALPRAZOLAM 0.25 MG: 0.25 TABLET ORAL at 22:24

## 2022-09-23 RX ADMIN — SUCRALFATE 1 G: 1 SUSPENSION ORAL at 22:25

## 2022-09-23 RX ADMIN — AMPICILLIN SODIUM 2000 MG: 2 INJECTION, POWDER, FOR SOLUTION INTRAVENOUS at 14:07

## 2022-09-23 RX ADMIN — GABAPENTIN 100 MG: 100 CAPSULE ORAL at 06:06

## 2022-09-23 RX ADMIN — FUROSEMIDE 40 MG: 40 TABLET ORAL at 06:06

## 2022-09-23 RX ADMIN — GABAPENTIN 100 MG: 100 CAPSULE ORAL at 11:38

## 2022-09-23 RX ADMIN — OMEPRAZOLE 20 MG: 20 CAPSULE, DELAYED RELEASE ORAL at 06:06

## 2022-09-23 RX ADMIN — SUCRALFATE 1 G: 1 SUSPENSION ORAL at 11:38

## 2022-09-23 RX ADMIN — GABAPENTIN 100 MG: 100 CAPSULE ORAL at 17:39

## 2022-09-23 RX ADMIN — AMPICILLIN SODIUM 2000 MG: 2 INJECTION, POWDER, FOR SOLUTION INTRAVENOUS at 17:36

## 2022-09-23 RX ADMIN — OXYCODONE 5 MG: 5 TABLET ORAL at 04:03

## 2022-09-23 RX ADMIN — AMPICILLIN SODIUM 2000 MG: 2 INJECTION, POWDER, FOR SOLUTION INTRAVENOUS at 22:25

## 2022-09-23 RX ADMIN — BUSPIRONE HYDROCHLORIDE 5 MG: 10 TABLET ORAL at 17:38

## 2022-09-23 RX ADMIN — GUAIFENESIN 1200 MG: 600 TABLET, EXTENDED RELEASE ORAL at 17:39

## 2022-09-23 RX ADMIN — CLOPIDOGREL BISULFATE 75 MG: 75 TABLET ORAL at 14:06

## 2022-09-23 RX ADMIN — OXYCODONE HYDROCHLORIDE 10 MG: 10 TABLET ORAL at 21:14

## 2022-09-23 RX ADMIN — OXYCODONE 5 MG: 5 TABLET ORAL at 15:39

## 2022-09-23 RX ADMIN — PROCHLORPERAZINE EDISYLATE 10 MG: 5 INJECTION INTRAMUSCULAR; INTRAVENOUS at 14:07

## 2022-09-23 RX ADMIN — POTASSIUM CHLORIDE 20 MEQ: 1500 TABLET, EXTENDED RELEASE ORAL at 06:06

## 2022-09-23 RX ADMIN — SUCRALFATE 1 G: 1 SUSPENSION ORAL at 06:06

## 2022-09-23 ASSESSMENT — PAIN DESCRIPTION - PAIN TYPE
TYPE: SURGICAL PAIN
TYPE: SURGICAL PAIN

## 2022-09-23 ASSESSMENT — FIBROSIS 4 INDEX: FIB4 SCORE: 0.53

## 2022-09-23 NOTE — PROGRESS NOTES
Assumed care of patient at bedside report from day shift RN. Updated on POC. Patient currently A & O x 4; on 1 L O2 NC; up standby; Call light within reach. Whiteboard updated. Fall precautions in place. Bed locked and in lowest position. All questions answered. No other needs indicated at this time.

## 2022-09-23 NOTE — DISCHARGE INSTRUCTIONS
Discharge Instructions    Discharged to other by ambulance with escort. Discharged via wheelchair, hospital escort: Yes.  Special equipment needed: Oxygen    Be sure to schedule a follow-up appointment with your primary care doctor or any specialists as instructed.     Discharge Plan:        I understand that a diet low in cholesterol, fat, and sodium is recommended for good health. Unless I have been given specific instructions below for another diet, I accept this instruction as my diet prescription.   Other diet: cardiac     Special Instructions: DIVISION OF CARDIAC SURGERY DISCHARGE INSTRUCTIONS    Activity:    NO driving for 4 weeks after surgery. You may ride as a passenger.  NO lifting more than 10 pounds for 6 weeks.  For the next 6 weeks, keep your elbows close to your body and move within a pain-free motion when lifting, pushing or pulling.  Do not stretch both arms backwards at the same time.    Walk at least 4 times per day, there is no maximum.  Other physical activities (sex, housework, gardening, etc.) are okay after 4 weeks.  Continue using incentive spirometer for 2 weeks.  If you are going home on oxygen and you were not on oxygen prior to surgery, keep using until you are oxygen free.  Weigh yourself daily.  Call your Cardiologist for a weight gain of 2 or more pounds within 48 hours.  Take all of your medications as prescribed    Incision Care:    SHOWER EVERYDAY-no baths. Make sure to clean your incision(s) twice daily with plain, perfume and dye-free soap.  Then pat incision(s) dry with clean towel. No creams or lotions on your incision(s).    If you are discharging with a Prevena bandage on your chest, you or your home health nurse may remove the bandage 7 days after surgery, or sooner if the battery runs out or the device alarms. When the battery runs out, the bandage will lose suction and it will puff up.  To remove, slowly roll down the edges of the bandage. Throw away the entire bandage  and the battery pack.  Keep the incision open to air and clean twice daily with soap and water.  If there is any increased redness or swelling, separation of the incision line, or thick drainage from any of your incisions, call the Cardiac Surgeons ([phone]).    Continue to wear your MARK Stockings for 4 weeks. You may take them off when you are in bed or when your legs are elevated.    General Instructions:    You have been referred to Cardiac Rehab.  You can start Cardiac Rehab 30 days after surgery.  If you do not have an appointment at the time of discharge call [phone] to schedule an appointment.  Your Primary Care Doctor typically handles home oxygen. Oxygen may be stopped when your oxygen level is consistently greater than 90.  Check with your Primary Care Doctor if you are unsure.  Take all of your medications (including pain medications) as prescribed.  Taking medications other than prescribed can result in serious injury.    For Patients Discharged with Narcotic Pain Medication:     If a refill is needed, understand that only 1 refill will be provided and you must come to the Cardiac Surgeons’ office for an appointment (72 hours’ notice is required to schedule and there are no weekend appointments).  If the pain medications you are discharged on are not working, you will need to bring your remaining prescription into the office in order to receive a new prescription.  If you were taking narcotics prior to your heart surgery, the Cardiac Surgeons will provide you with one prescription and additional medications will need to be provided by your pain management doctor.  Do not drink alcohol while taking narcotics.  Lost or stolen medications will not be refilled.  If medications are stolen, report to law enforcement.    Contact Cardiac Surgery at [phone] if you have any questions.    -Is this patient being discharged with medication to prevent blood clots?  Yes, Aspirin   Aspirin, ASA oral  tablets  What is this medicine?  ASPIRIN (AS pir in) is a pain reliever. It is used to treat mild pain and fever. This medicine is also used as directed by a doctor to prevent and to treat heart attacks, to prevent strokes and blood clots, and to treat arthritis or inflammation.  This medicine may be used for other purposes; ask your health care provider or pharmacist if you have questions.  COMMON BRAND NAME(S): Aspir-Low, Aspir-Pam, Aspirtab, Agustin Advanced Aspirin, Agustin Aspirin, Agustin Aspirin Extra Strength, Agustin Aspirin Plus, Agustin Extra Strength, Agustin Extra Strength Plus, Agustin Genuine Aspirin, Agustin Womens Aspirin, Bufferin, Bufferin Extra Strength, Bufferin Low Dose  What should I tell my health care provider before I take this medicine?  They need to know if you have any of these conditions:  anemia  asthma  bleeding problems  child with chickenpox, the flu, or other viral infection  diabetes  gout  if you frequently drink alcohol containing drinks  kidney disease  liver disease  low level of vitamin K  lupus  smoke tobacco  stomach ulcers or other problems  an unusual or allergic reaction to aspirin, tartrazine dye, other medicines, dyes, or preservatives  pregnant or trying to get pregnant  breast-feeding  How should I use this medicine?  Take this medicine by mouth with a glass of water. Follow the directions on the package or prescription label. You can take this medicine with or without food. If it upsets your stomach, take it with food. Do not take your medicine more often than directed.  Talk to your pediatrician regarding the use of this medicine in children. While this drug may be prescribed for children as young as 12 years of age for selected conditions, precautions do apply. Children and teenagers should not use this medicine to treat chicken pox or flu symptoms unless directed by a doctor.  Patients over 65 years old may have a stronger reaction and need a smaller dose.  Overdosage: If you  think you have taken too much of this medicine contact a poison control center or emergency room at once.  NOTE: This medicine is only for you. Do not share this medicine with others.  What if I miss a dose?  If you are taking this medicine on a regular schedule and miss a dose, take it as soon as you can. If it is almost time for your next dose, take only that dose. Do not take double or extra doses.  What may interact with this medicine?  Do not take this medicine with any of the following medications:  cidofovir  ketorolac  probenecid  This medicine may also interact with the following medications:  alcohol  alendronate  bismuth subsalicylate  flavocoxid  herbal supplements like feverfew, garlic, joaquín, ginkgo biloba, horse chestnut  medicines for diabetes or glaucoma like acetazolamide, methazolamide  medicines for gout  medicines that treat or prevent blood clots like enoxaparin, heparin, ticlopidine, warfarin  other aspirin and aspirin-like medicines  NSAIDs, medicines for pain and inflammation, like ibuprofen or naproxen  pemetrexed  sulfinpyrazone  varicella live vaccine  This list may not describe all possible interactions. Give your health care provider a list of all the medicines, herbs, non-prescription drugs, or dietary supplements you use. Also tell them if you smoke, drink alcohol, or use illegal drugs. Some items may interact with your medicine.  What should I watch for while using this medicine?  If you are treating yourself for pain, tell your doctor or health care professional if the pain lasts more than 10 days, if it gets worse, or if there is a new or different kind of pain. Tell your doctor if you see redness or swelling. Also, check with your doctor if you have a fever that lasts for more than 3 days. Only take this medicine to prevent heart attacks or blood clotting if prescribed by your doctor or health care professional.  Do not take aspirin or aspirin-like medicines with this medicine.  Too much aspirin can be dangerous. Always read the labels carefully.  This medicine can irritate your stomach or cause bleeding problems. Do not smoke cigarettes or drink alcohol while taking this medicine. Do not lie down for 30 minutes after taking this medicine to prevent irritation to your throat.  If you are scheduled for any medical or dental procedure, tell your healthcare provider that you are taking this medicine. You may need to stop taking this medicine before the procedure.  This medicine may be used to treat migraines. If you take migraine medicines for 10 or more days a month, your migraines may get worse. Keep a diary of headache days and medicine use. Contact your healthcare professional if your migraine attacks occur more frequently.  What side effects may I notice from receiving this medicine?  Side effects that you should report to your doctor or health care professional as soon as possible:  allergic reactions like skin rash, itching or hives, swelling of the face, lips, or tongue  breathing problems  changes in hearing, ringing in the ears  confusion  general ill feeling or flu-like symptoms  pain on swallowing  redness, blistering, peeling or loosening of the skin, including inside the mouth or nose  signs and symptoms of bleeding such as bloody or black, tarry stools; red or dark-brown urine; spitting up blood or brown material that looks like coffee grounds; red spots on the skin; unusual bruising or bleeding from the eye, gums, or nose  trouble passing urine or change in the amount of urine  unusually weak or tired  yellowing of the eyes or skin  Side effects that usually do not require medical attention (report to your doctor or health care professional if they continue or are bothersome):  diarrhea or constipation  headache  nausea, vomiting  stomach gas, heartburn  This list may not describe all possible side effects. Call your doctor for medical advice about side effects. You may report  side effects to FDA at 8-834-FDA-5311.  Where should I keep my medicine?  Keep out of the reach of children.  Store at room temperature between 15 and 30 degrees C (59 and 86 degrees F). Protect from heat and moisture. Do not use this medicine if it has a strong vinegar smell. Throw away any unused medicine after the expiration date.  NOTE: This sheet is a summary. It may not cover all possible information. If you have questions about this medicine, talk to your doctor, pharmacist, or health care provider.  © 2020 [employer] (2018-01-30 10:42:13)    Is patient discharged on Warfarin / Coumadin?   No     Bacteremia, Adult  Bacteremia is the presence of bacteria in the blood. When bacteria enter the bloodstream, they can cause a life-threatening reaction called sepsis, which is a medical emergency. Bacteremia can spread to other parts of the body, including the heart, joints, and brain.  What are the causes?  This condition is caused by bacteria that get into the blood.  Bacteria can enter the blood:  From a skin infection or injury, such as a burn or a cut.  From a lung infection (pneumonia).  From an infection in your stomach or intestines (gastrointestinal infection).  From an infection in your bladder or urinary system (urinary tract infection).  During a dental or medical procedure.  From bleeding gums.  When a bacterial infection in another part of your body spreads to your blood.  Through an unclean (contaminated) needle.  What increases the risk?  This condition is more likely to develop in children, the elderly, and people who:  Have a long-term (chronic) disease or condition like diabetes or chronic kidney failure.  Have an artificial joint or heart valve.  Have heart valve disease.  Have a tube inserted to treat a medical condition, such as a urinary catheter or IV.  Have a weak disease-fighting system (immune system).  Inject illegal drugs.  Have been hospitalized for more than 10 days in a  row.  What are the signs or symptoms?  Symptoms of this condition include:  Fever.  Chills.  Fast heartbeat.  Shortness of breath.  Dizziness.  Weakness.  Confusion.  Nausea or vomiting.  Diarrhea.  Low blood pressure.  Decreased urine output.  Bacteremia that has spread to other parts of the body may cause symptoms in those areas. In some cases, there are no symptoms.  How is this diagnosed?  This condition may be diagnosed with a physical exam and tests, such as:  A complete blood count (CBC). This test checks for signs of infection.  Blood cultures. These check for bacteria in your blood.  Tests of any tubes that you have had inserted. These tests check for a source of infection.  Urine tests, including urine cultures. These check for bacteria in the urine that could be a source of infection.  Imaging tests, such as an X-ray, CT scan, MRI, or heart ultrasound. These check for a source of infection in other parts of your body, such as your lungs, heart valves, or joints.  How is this treated?  This condition is usually treated in the hospital. Treatment may involve:  Antibiotic medicines. These may be given by mouth (orally) or directly into your blood through an IV (infusion through your vein).  Depending on the source of infection, you may need antibiotics for several weeks.  At first, you may be given an antibiotic to kill most types of blood bacteria (broad-spectrum antibiotic). If your test results show that a certain kind of bacteria is causing the problem, you may be given a different antibiotic to kill that specific bacteria.  IV fluids.  Removing any catheter or device that could be a source of infection.  Blood pressure and breathing support, if needed.  Surgery to control the source or the spread of infection, such as surgery to remove an infected device, abscess, or tissue.  Having follow-up visits for medicines, blood tests, and further evaluation.  Follow these instructions at home:  Medicines  Take  over-the-counter and prescription medicines only as told by your health care provider.  If you were prescribed an antibiotic medicine, take it as told by your health care provider. Do not stop taking the antibiotic even if you start to feel better.  General instructions    Rest as needed. Ask your health care provider when you may return to normal activities.  Drink enough fluid to keep your urine pale yellow.  Do not use any products that contain nicotine or tobacco, such as cigarettes and e-cigarettes. If you need help quitting, ask your health care provider.  Keep all follow-up visits as told by your health care provider. This is important.  How is this prevented?    Wash your hands regularly with soap and water. If soap and water are not available, use hand .  You should wash your hands:  After using the toilet or changing a diaper.  Before preparing, cooking, or serving food.  While caring for a sick person or while visiting someone in a hospital.  Before and after changing bandages (dressings) over wounds.  Clean any scrapes or cuts with soap and water and cover them with clean dressings.  Get vaccinations as recommended by your health care provider.  Practice good oral hygiene. Brush your teeth two times a day, and floss regularly.  Take good care of your skin. This includes bathing and moisturizing on a regular basis.  Get help right away if you have:  Pain.  A fever or chills.  Trouble breathing.  A fast heart rate.  Skin that is blotchy, pale, or clammy.  Confusion.  Weakness.  Lack of energy (lethargy) or unusual sleepiness.  Diarrhea.  New symptoms that develop after treatment has started.  These symptoms may represent a serious problem that is an emergency. Do not wait to see if the symptoms will go away. Get medical help right away. Call your local emergency services (911 in the [place] Do not drive yourself to the hospital.  Summary  Bacteremia is the presence of bacteria in the blood. When  bacteria enter the bloodstream, they can cause a life-threatening reaction called sepsis.  Some symptoms of bacteremia include fever, chills, shortness of breath, confusion, nausea or vomiting, and diarrhea.  Tests may be done to find the source of infection that led to bacteremia. These tests may include blood tests, urine tests, and imaging tests.  Bacteremia is usually treated with antibiotic medicines in the hospital.  Get help right away if you have any new symptoms that develop after treatment has started.  This information is not intended to replace advice given to you by your health care provider. Make sure you discuss any questions you have with your health care provider.  Document Released: 10/01/2007 Document Revised: 04/29/2019 Document Reviewed: 04/29/2019  [employer] Patient Education © 2020 [employer]    Endocarditis    Endocarditis is an infection of the heart valves or an infection of the inner layer of the heart (endocardium). Endocarditis can cause growths on the heart valves or inside the heart. Over time, these growths can destroy heart tissue and cause heart failure or problems with the heart rhythm. They can also cause a stroke if they break away and block an artery in the brain. Early treatment offers the best chance for curing endocarditis and preventing complications.  What are the causes?  This condition may be caused by:  Germs that normally live in or on your body. The germs that most commonly cause endocarditis are bacteria.  Fungus.  Cancer.  Connective tissue disease.  What increases the risk?  This condition is more likely to develop in people who have:  A heart defect.  Artificial (prosthetic) heart valves.  An abnormal or damaged heart valve.  A history of endocarditis.  IV drug abuse.  Having certain procedures may also increase the risk of germs getting into the heart or bloodstream.  What are the signs or symptoms?  Symptoms of this condition may start suddenly, or they may  start slowly and gradually get worse. Symptoms include:  Fever.  Chills.  Night sweats.  Muscle aches.  Fatigue.  Weakness.  Shortness of breath.  Chest pain.  Blood spots in the eyes.  Bleeding under the fingernails or toenails.  Painless red spots on the palms.  Painful lumps in the fingertips or toes.  Swelling in the feet or ankles.  How is this diagnosed?  This condition may be diagnosed based on:  A physical exam. Your health care provider will listen to your heart to check for abnormal heart sounds (murmur). He or she may also use a scope to check for bleeding at the back of your eyes (retinas).  Tests. They may include:  Blood tests to look for the germs that cause endocarditis.  Imaging tests. These include a chest X-ray, CT scan, or echocardiogram. A type of echocardiogram called a transesophageal echocardiogram may be done to look at heart valves more closely.  How is this treated?  Treatment for this condition depends on the cause of the endocarditis. Treatment may include:  Antibiotic medicines. These may be given through an IV line or taken by mouth. You may need to be on more than one antibiotic medicine.  Surgery to replace your heart valve. You may need surgery if:  The endocarditis does not respond to treatment.  You develop complications.  Your heart valve is severely damaged.  Follow these instructions at home:  Medicines  Take over-the-counter and prescription medicines only as told by your health care provider.  If you were prescribed an antibiotic medicine, take it as told by your health care provider. Do not stop taking the antibiotic even if you start to feel better. You may need to be on intravenous or oral antibiotics for several weeks.  Do not use IV drugs unless it is part of your medical treatment.  Lifestyle  Do not get tattoos or body piercings.  Practice good oral hygiene. This includes:  Brushing and flossing regularly.  Scheduling routine dental appointments.  Do not use any  "products that contain nicotine or tobacco, such as cigarettes, e-cigarettes, and chewing tobacco. If you need help quitting, ask your health care provider.  If you drink alcohol:  Limit how much you use to:  0-1 drink a day for women.  0-2 drinks a day for men.  Be aware of how much alcohol is in your drink. In the [place] one drink equals one typical bottle of beer (12 oz), one-half glass of wine (5 oz), or one shot of hard liquor (1½ oz).  General instructions  Let your health care provider know before you have any dental or surgical procedures. You may need to take antibiotics before the procedure.  Tell all of your health care providers, including your dentist, that you have had endocarditis.  Gradually resume your usual activities.  Keep all follow-up visits as told by your health care provider. This is important.  Contact a health care provider if:  You have a fever.  Your symptoms do not improve.  Your symptoms get worse.  Your symptoms come back.  Get help right away if:  You have trouble breathing.  You have chest pain.  You have any symptoms of a stroke. \"BE FAST\" is an easy way to remember the main warning signs of a stroke:  B - Balance. Signs are dizziness, sudden trouble walking, or loss of balance.  E - Eyes. Signs are trouble seeing or a sudden change in vision.  F - Face. Signs are sudden weakness or numbness of the face, or the face or eyelid drooping on one side.  A - Arms. Signs are weakness or numbness in an arm. This happens suddenly and usually on one side of the body.  S - Speech. Signs are sudden trouble speaking, slurred speech, or trouble understanding what people say.  T - Time. Time to call emergency services. Write down what time symptoms started.  You have other signs of a stroke, such as:  A sudden, severe headache with no known cause.  Nausea or vomiting.  Seizure.  These symptoms may represent a serious problem that is an emergency. Do not wait to see if the symptoms will go away. Get " medical help right away. Call your local emergency services (911 in the [place] Do not drive yourself to the hospital.  Summary  Endocarditis is an infection of the heart valves or inner layer of the heart (endocardium). It is caused by bacteria or a fungus.  Having certain heart conditions or procedures may increase the risk of endocarditis.  Antibiotics are an important treatment for endocarditis. Take these medicines as told by your health care provider. Do not stop taking them even if you start to feel better.  Tell all of your health care providers, including your dentist, that you have had endocarditis.  This information is not intended to replace advice given to you by your health care provider. Make sure you discuss any questions you have with your health care provider.  Document Released: 12/18/2006 Document Revised: 08/05/2019 Document Reviewed: 08/05/2019  [employer] ??? Patient Education © 2020 [employer]  Patent Foramen Ovale Closure, Adult, Care After  This sheet gives you information about how to care for yourself after your procedure. Your health care provider may also give you more specific instructions. If you have problems or questions, contact your health care provider.  What can I expect after the procedure?  After the procedure, it is common to have:  Bruising.  Tenderness in the area where the long, thin tube was inserted into your inner thigh or groin area (catheter insertion site).  Follow these instructions at home:  Catheter insertion site care    Follow instructions from your health care provider about how to take care of your incision or puncture. Make sure you:  Wash your hands with soap and water before you change your bandage (dressing). If soap and water are not available, use hand .  Change your dressing as told by your health care provider.  Leave stitches (sutures), skin glue, or adhesive strips in place. These skin closures may need to stay in place for 2 weeks or longer. If  adhesive strip edges start to loosen and curl up, you may trim the loose edges. Do not remove adhesive strips completely unless your health care provider tells you to do that.  Check your catheter insertion site every day for signs of infection. Check for:  Redness, swelling, or pain.  Fluid or blood.  Warmth.  Pus or a bad smell.  A lump or bump.  Activity  Do not lift anything that is heavier than 10 lb (4.5 kg), or the limit that your health care provider tells you, until he or she says that it is safe.  Return to your normal activities as told by your health care provider. Ask your health care provider what activities are safe for you.  Do not drive until your health care provider approves.  Medicines  Take over-the-counter and prescription medicines only as told by your health care provider.  You may need to take medicines to prevent blood clots for 6 months or longer. You may have to take aspirin and clopidogrel. Clopidogrel is a blood thinner (anticoagulant) that helps to prevent heart attacks and strokes.  Lifestyle  Avoid drinking alcohol.  Do not use any products that contain nicotine or tobacco, such as cigarettes and e-cigarettes. If you need help quitting, ask your health care provider.  General instructions  Drink enough fluid to keep your urine clear or pale yellow. This helps to get rid of the dye that was used during the procedure.  Tell all health care providers and dental care providers who care for you that you had a patent foramen ovale closure. Do this before having any type of test or surgery.  Ask your health care provider if you need to take antibiotic medicine before dental procedures and surgeries. This may be necessary to prevent infection.  While taking anticoagulants:  Prevent falls by removing loose rugs and extension cords from areas where you walk.  Be very careful when using knives, scissors, or other sharp objects.  Do not play contact sports or participate in other activities that  have a high risk of injury.  Do not take baths, swim, or use a hot tub until your health care provider approves.  Keep all follow-up visits as told by your health care provider. This is important.  Contact a health care provider if:  You have a fever.  You have pain that does not get better with medicine.  You have fluid or blood coming from your catheter insertion site.  You have a hard lump or bump at your catheter insertion site.  Get help right away if:  You have trouble breathing.  You have chest pain.  You have redness, swelling, or pain around your catheter insertion site.  Your catheter insertion site feels warm to the touch.  You have pus or a bad smell coming from your catheter insertion site.  You have severe pain in your arm or jaw.  Summary  After the procedure, it is common for you to have some bruising and tenderness where a tube was inserted into your inner thigh or groin area (catheter insertion site).  Check your catheter insertion site every day for signs of infection, such as redness, swelling, or pain.  Before any procedure or test, tell all health care providers and dental providers who care for you that you had a patent foramen ovale closure.  This information is not intended to replace advice given to you by your health care provider. Make sure you discuss any questions you have with your health care provider.  Document Released: 01/18/2018 Document Revised: 04/14/2020 Document Reviewed: 01/18/2018  [employer] Patient Education © 2020 [employer]

## 2022-09-23 NOTE — PROGRESS NOTES
Cardiovascular Surgery Progress Note    Name: Sarah Buckner  MRN: [id number]  : 1970  Admit Date: 2022  2:08 PM  7 Days Post-Op     Procedure:  Procedure(s) and Anesthesia Type:     * TRICUSPID VALVE REPAIR, PATENT FORAMEN OVALE CLOSURE, TRANSESOPHAGEAL ESCHOCARDIOGRAM - General     * ECHOCARDIOGRAM, TRANSESOPHAGEAL, INTRAOPERATIVE - General    Vitals:  Vitals:    22 2343 22 0350 22 0607 22 0708   BP: (!) 99/66 116/71 135/87 111/76   Pulse: (!) 102 (!) 105 99 (!) 114   Resp: 18 18  18   Temp: 36.7 °C (98 °F) 37.2 °C (99 °F)  37.1 °C (98.8 °F)   TempSrc: Temporal Temporal  Temporal   SpO2: 90% 95%  94%   Weight:   61.6 kg (135 lb 12.9 oz)    Height:          Temp (24hrs), Av.2 °C (98.9 °F), Min:36.7 °C (98 °F), Max:37.4 °C (99.3 °F)      Respiratory:    Respiration: 18, Pulse Oximetry: 94 %       Fluids:    Intake/Output Summary (Last 24 hours) at 2022 1216  Last data filed at 2022 0851  Gross per 24 hour   Intake 840 ml   Output 5600 ml   Net -4760 ml       Admit weight: Weight: 66.1 kg (145 lb 12.8 oz)  Current weight: Weight: 61.6 kg (135 lb 12.9 oz) (22 0607)    Labs:        Recent Labs     22  1022   SODIUM 134*   POTASSIUM 4.4   CHLORIDE 92*   CO2 31   GLUCOSE 104*   BUN 7*   CREATININE 0.51   CALCIUM 9.3             Medications:  Scheduled Medications   Medication Dose Frequency    [START ON 2022] potassium chloride SA  10 mEq DAILY    furosemide  20 mg Q DAY    clopidogrel  75 mg DAILY    scopolamine  1 Patch Q72HRS    sucralfate  1 g Q6HRS    busPIRone  5 mg TID    gabapentin  100 mg TID    aspirin EC  81 mg DAILY    Pharmacy Consult Request  1 Each PHARMACY TO DOSE    senna-docusate  2 Tablet BID    And    polyethylene glycol/lytes  1 Packet DAILY    And    magnesium hydroxide  30 mL DAILY    omeprazole  20 mg DAILY    ampicillin  2,000 mg Q4HRS    cefTRIAXone (ROCEPHIN) IV  2 g Q12HRS    guaiFENesin ER  1,200 mg Q12HRS          Exam:    Physical Exam  Vitals and nursing note reviewed.   Constitutional:       General: She is not in acute distress.     Appearance: She is normal weight.   HENT:      Head: Normocephalic and atraumatic.      Right Ear: External ear normal.      Left Ear: External ear normal.      Nose: Nose normal.      Mouth/Throat:      Mouth: Mucous membranes are moist.   Eyes:      Extraocular Movements: Extraocular movements intact.      Conjunctiva/sclera: Conjunctivae normal.      Pupils: Pupils are equal, round, and reactive to light.   Cardiovascular:      Rate and Rhythm: Normal rate and regular rhythm.      Pulses: Normal pulses.      Heart sounds: Normal heart sounds.   Pulmonary:      Effort: Pulmonary effort is normal.      Breath sounds: Examination of the right-lower field reveals decreased breath sounds. Examination of the left-lower field reveals decreased breath sounds. Decreased breath sounds present.   Abdominal:      General: Abdomen is flat. Bowel sounds are normal. There is no distension.      Palpations: Abdomen is soft.   Musculoskeletal:         General: Normal range of motion.      Cervical back: Normal range of motion and neck supple.   Skin:     General: Skin is warm and dry.      Capillary Refill: Capillary refill takes less than 2 seconds.      Comments: Sternum- c/d/i   Neurological:      General: No focal deficit present.      Mental Status: She is alert and oriented to person, place, and time. Mental status is at baseline.      Sensory: No sensory deficit.      Motor: No weakness.       Cardiac Medications:    ASA - Yes    Plavix - No; contraindicated because of Other No CAD    Post-operative Beta Blockers - No; contraindicated because of Hypotension    Ace/ARB- No; contraindicated because of Normal EF    Statin - No; contraindicated because of No CAD    Aldactone- No; contraindicated because of Normal EF    Ejection Fraction:  60%    Telemetry:   9/17 SR/ST 60-120s  9/18 SB/SR oPVC  9/19 SR  9/20  SR  9/21 SR  rPVC  9/22 SR  9/23 SR    Assessment/Plan:  POD 1 Intubated, on levo 2mcg/vaso gtt.  Neuro intact.  Abdomen soft.  Minimal output from chest tubes.  Cr stable. Hgb 7.5 overnight, given 1 unit PRBC, now 8.5.  UO decreased this AM. Plan: Wean ventilator as tolerated.  Albumin 5% 25g once.  Wean vasopressors as tolerated.  Encourage IS when extubated.    POD 2 Extubated on 2LNC.  On levo/vaso gtt.  Neuro intact.  Abdomen soft nontender.  Cr 0.79 borderline UO overnight.  Hgb down to 6.9.  CXR with increased right pleural effusion this AM.  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  Discussed possible need for right chest tube with Dr Banda.  Plan: Chest tube today.  Wean oxygen as tolerated.  2 units PRBC today.  Wean vasopressors as tolerated.  Xanax prn for anxiety.    POD 3 HOTN- on vaso/levo- wean, SR- d/c pacer wires, 2lnc, CXR- no penumo or effusions- d/c all CT's- ck CXR in 4 hours, gently diurese, Anxiety- start long acting, pain- add toradol    POD 4 HDS- off all gtts, SR, CXR- no pneumo, Begin aggressive diuresis, anxiety-improved, plan transfer to tele    POD 5 HDS.  SR.  Anxious. Nauseated.  Small seroma vs hematoma at superior edge of incision.  Serous drainage from inferior aspect of the wound.  Plan: Scopolamine patch for nausea due to elevated qtc.  Xanax prn for anxiety.  Boost shakes for protein requirements.  Monitor seroma.      POD 6 HDS, SR, Incision c/d/I- swelling at top of incision is normal- d/w patient, Diuresed well- will dec to daily, ck CXR, d/c DIVYA damon referral    POD 7 HDS, SR, diuresed well- change to PO, incision c/d/I,   Will sign off- follow-up arranged, please call if questions.     Disposition:  9/22 LTAC referral

## 2022-09-23 NOTE — PROGRESS NOTES
Bedside report received. No overnight events per night RN. Patient A&O x 4. Currently requiring 1-2 L via NC will attempt to titrate off as tolerated. Sinus tach on telemetry monitor 100-120's. Complains of mild surgical site pain will medicate per MAR. POC discussed with patient. Pt verbalizes understanding. Call light and belongings with in reach. Bed locked and in lowest position, alarm and fall precautions in place.

## 2022-09-23 NOTE — DISCHARGE PLANNING
DC Transport Scheduled    Received request at: 1153    Transport Company Scheduled:  9/23/2022  Spoke with Anahi rosario [facility] to schedule transport.      Scheduled Date: 9/23/2022  Scheduled Time: 1600    Destination: [facility]     Notified care team of scheduled transport via Vo[employer].     If there are any changes needed to the DC transportation scheduled, please contact [facility] at ext. [phone] between the hours of [phone] Mon-Fri. If outside those hours, contact the ED Case Manager at ext. [phone].

## 2022-09-23 NOTE — PROGRESS NOTES
"PT stating she has 6/10 chest pain. PT stating it feels like \"someone kicked her in the chest\". STAT EKG ordered. VSS. HAILE Gutierrez notified and to bed side.  "

## 2022-09-23 NOTE — DISCHARGE SUMMARY
Discharge Summary    CHIEF COMPLAINT ON ADMISSION  No chief complaint on file.      Reason for Admission  Endocarditis    Admission Date  9/9/2022     CODE STATUS  Full Code    HPI & HOSPITAL COURSE  Sarah Buckner is a 51 y.o. female admitted 9/9/2022 with bacterial endocarditis growing enterococcus faecalis on blood cultures prior to 09/08/22, with possible streptococcus on 09/10/22 blood cultures.  Also found to have significant anemia, reportedly had melena outpatient but none inpatient. Work-up of Enterococcus bacteremia including EGD, Colonoscopy revealed mild diverticulosis, antral gastritis and sigmoid polypectomy of 7mm pedunculated polyp. Cardiology was consulted and ordered a KEO found a perferation in the tricuspid valve, with open PFO and aortic valve involvement as well.  At that point, cardiothoracic surgery was consulted for assessment and possible valve replacement/repair. She underwent tricuspid valve repair, patent foramen ovale closure, and aortic valve repair by cardiothoracic surgery. Post operatively she required ICU for ventilation and IV vasopressor support. She was ultimately weaned from ventilator and vasopressors. Blood cultures remain negative, she is to continue 6 week antibiotic course, end date 10/27/2022. She is on ampicillin and ceftriaxone per infectious disease recommendations. She is accepted to LTAC.    Therefore, she is discharged in good and stable condition to a long-term acute care hospital.    The patient met 2-midnight criteria for an inpatient stay at the time of discharge.      FOLLOW UP ITEMS POST DISCHARGE  Follow up with cardiothoracic surgery.  Follow up with PCP post LTAC stay.    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  Active Problems:    Migraine with aura POA: Yes    Septic shock (HCC) POA: Unknown    Pneumonia POA: Yes    Anemia POA: Yes    TAZ (acute kidney injury) (HCC) POA: Yes    Elevated alkaline  phosphatase level POA: Unknown    S/P tricuspid valve repair POA: Yes    S/P patent foramen ovale closure POA: Yes    S/P aortic valve repair POA: Yes    Anxiety POA: Unknown  Resolved Problems:    Encounter for weaning from ventilator (HCC) POA: Unknown    Pleural effusion POA: Unknown      FOLLOW UP  Future Appointments   Date Time Provider Department Center   10/24/2022  1:00 PM CT RESOURCE PROVIDER [facility] None   11/1/2022  2:15 PM Carol Tate P.A.-C. [facility] None     No follow-up provider specified.    MEDICATIONS ON DISCHARGE     Medication List        START taking these medications        Instructions   aspirin 81 MG EC tablet  Start taking on: September 24, 2022   Take 1 Tablet by mouth every day.  Dose: 81 mg     busPIRone 5 MG tablet  Commonly known as: BUSPAR   Take 1 Tablet by mouth 3 times a day.  Dose: 5 mg     cefTRIAXone 2 g/20 mL   Commonly known as: Rocephin   Infuse 20 mL into a venous catheter every 12 hours for 34 days.  Dose: 2 g     clopidogrel 75 MG Tabs  Commonly known as: PLAVIX   Take 1 Tablet by mouth every day.  Dose: 75 mg     furosemide 20 MG Tabs  Commonly known as: LASIX   Take 1 Tablet by mouth every day.  Dose: 20 mg     gabapentin 100 MG Caps  Commonly known as: NEURONTIN   Take 1 Capsule by mouth 3 times a day.  Dose: 100 mg     NS SOLN 100 mL with ampicillin 2 GM SOLR 2,000 mg  Replaces: NS 0.9% SOLN 100 mL with ampicillin 2 GM SOLR 2 g   Infuse 2,000 mg into a venous catheter every 4 hours for 34 days.  Dose: 2,000 mg     omeprazole 20 MG delayed-release capsule  Start taking on: September 24, 2022  Commonly known as: PRILOSEC   Take 1 Capsule by mouth every day.  Dose: 20 mg     potassium chloride SA 10 MEQ Tbcr  Start taking on: September 24, 2022  Commonly known as: K-DUR   Take 1 Tablet by mouth every day.  Dose: 10 mEq     sucralfate 1 GM/10ML Susp  Commonly known as: CARAFATE   Take 10 mL by mouth every 6 hours.  Dose: 1 g            CONTINUE taking these medications   "      Instructions   sumatriptan 100 MG tablet  Commonly known as: IMITREX   TAKE 1 TABLET BY MOUTH AS NEEDED FOR MIGRAINE, REPEAT IN 3 HOURS AS NEEDED . DO NOT EXCEED 3 TABS PER WEEK            STOP taking these medications      asa/apap/caffeine 250-250-65 MG Tabs  Commonly known as: EXCEDRIN     benzonatate 100 MG Caps  Commonly known as: TESSALON     cyclobenzaprine 10 mg Tabs  Commonly known as: Flexeril     Guaifenesin 1200 MG Tb12     Melatonin Gummies 2.5 MG Chew     meloxicam 7.5 MG Tabs  Commonly known as: MOBIC     NS 0.9% SOLN 100 mL with ampicillin 2 GM SOLR 2 g  Replaced by: NS SOLN 100 mL with ampicillin 2 GM SOLR 2,000 mg     NS SOLN 100 mL with gentamicin 40 MG/ML SOLN 60 mg     oxyCODONE immediate-release 5 MG Tabs  Commonly known as: ROXICODONE     vancomycin 750 mg/250mL 750 mg               Allergies  Allergies   Allergen Reactions    Morphine Itching     Severe itchiness  \"It makes me itch\"    Hydrocodone-Acetaminophen Unspecified     hallucinations         DIET  Orders Placed This Encounter   Procedures    Diet Order Diet: Cardiac     Standing Status:   Standing     Number of Occurrences:   1     Order Specific Question:   Diet:     Answer:   Cardiac [6]       ACTIVITY  As tolerated.  Weight bearing as tolerated    LINES, DRAINS, AND WOUNDS  This is an automated list. Peripheral IVs will be removed prior to discharge.  PICC Single Lumen 09/19/22 Left Brachial (Active)   Site Assessment Clean;Dry;Intact 09/23/22 0800   Line Status Blood return noted;Saline locked;Flushed;Scrubbed the hub prior to access;Infusing 09/23/22 0800   Line Secured at (cm) 0 cm 09/19/22 1240   Extremity Circumference (cm) 30 cm 09/19/22 1240   Dressing Type Biopatch;Securing device;Skin barrier;Transparent 09/23/22 0800   Dressing Status Intact;Dry;Clean 09/23/22 0800   Dressing Intervention N/A 09/23/22 0800   Dressing Change Due 09/24/22 09/20/22 2048   Date Primary Tubing Changed 09/20/22 09/20/22 0800   Date " Secondary Tubing Changed 09/20/22 09/20/22 0800   Date IV Connector(s) Changed 09/19/22 09/19/22 1240   NEXT Primary Tubing Change  09/24/22 09/20/22 0800   NEXT Secondary Tubing Change  09/21/22 09/20/22 0800   NEXT IV Connector(s) Change 09/24/22 09/20/22 0800   Line Necessity Assessed Antibiotic Therapy Greater than 7 Days 09/23/22 0800   $ Single Lumen PICC Charge Single kit used 09/19/22 1240      ETT (Active)     Wound 12/08/20 Incision Umbilicus (Active)       Wound 12/08/20 Abdomen Lower Right (Active)       Wound 12/08/20 Abdomen Lower Left (Active)       Wound 09/16/22 Incision Chest PREVENA  (Active)   Site Assessment Intact;Dry 09/23/22 0800   Periwound Assessment [place] 09/23/22 0800   Margins Attached edges 09/23/22 0800   Closure Open to air 09/23/22 0800   Drainage Amount None 09/23/22 0800   Drainage Description Brown 09/21/22 2100   Treatments Cleansed 09/22/22 2000   Wound Cleansing Soap and Water 09/22/22 2000   Dressing Options Open to Air 09/23/22 0800   Dressing Status Open to Air 09/22/22 2000       Wound 09/19/22 Abdomen Lower;Midline Chest Tube Sites (Active)   Site Assessment CHRIS 09/23/22 0800   Periwound Assessment CHRIS 09/22/22 2000   Margins CHRIS 09/22/22 2000   Closure None 09/23/22 0800   Drainage Amount None 09/23/22 0800   Drainage Description Sanguineous 09/19/22 2000   Treatments Cleansed 09/22/22 1000   Wound Cleansing Soap and Water 09/22/22 1000   Dressing Options Tape;Dry Gauze 09/22/22 2000   Dressing Changed Observed 09/23/22 0800   Dressing Status Clean;Dry;Intact 09/23/22 0800       Wound 09/19/22 Flank Pleural chest tube (Active)   Site Assessment CHRIS 09/21/22 2100   Periwound Assessment CHRIS 09/21/22 2100   Margins CHRIS 09/21/22 2100   Closure None 09/22/22 1000   Drainage Amount Moderate 09/19/22 2000   Drainage Description Serosanguineous 09/19/22 2000   Treatments Cleansed 09/19/22 1000   Wound Cleansing Soap and Water 09/19/22 1000   Dressing Options Tape 09/22/22 1000    Dressing Changed Observed 09/23/22 0800   Dressing Status Clean;Dry;Intact 09/23/22 0800   Dressing Change/Treatment Frequency As Needed 09/23/22 0800      PICC Single Lumen 09/19/22 Left Brachial (Active)   Site Assessment Clean;Dry;Intact 09/23/22 0800   Line Status Blood return noted;Saline locked;Flushed;Scrubbed the hub prior to access;Infusing 09/23/22 0800   Line Secured at (cm) 0 cm 09/19/22 1240   Extremity Circumference (cm) 30 cm 09/19/22 1240   Dressing Type Biopatch;Securing device;Skin barrier;Transparent 09/23/22 0800   Dressing Status Intact;Dry;Clean 09/23/22 0800   Dressing Intervention N/A 09/23/22 0800   Dressing Change Due 09/24/22 09/20/22 2048   Date Primary Tubing Changed 09/20/22 09/20/22 0800   Date Secondary Tubing Changed 09/20/22 09/20/22 0800   Date IV Connector(s) Changed 09/19/22 09/19/22 1240   NEXT Primary Tubing Change  09/24/22 09/20/22 0800   NEXT Secondary Tubing Change  09/21/22 09/20/22 0800   NEXT IV Connector(s) Change 09/24/22 09/20/22 0800   Line Necessity Assessed Antibiotic Therapy Greater than 7 Days 09/23/22 0800   $ Single Lumen PICC Charge Single kit used 09/19/22 1240                MENTAL STATUS ON TRANSFER      Alert and oriented x4       CONSULTATIONS  General surgery  Infectious disease  Gastroenterology  Cardiology  Cardiothoracic surgery  Critical care    PROCEDURES  9/12: EGD, colonoscopy, polypectomy  9/16: tricuspid valve repair, patent formaen ovale closure, aortic valve repair, KEO  9/19: PICC line placement    LABORATORY  Lab Results   Component Value Date    SODIUM 134 (L) 09/22/2022    POTASSIUM 4.4 09/22/2022    CHLORIDE 92 (L) 09/22/2022    CO2 31 09/22/2022    GLUCOSE 104 (H) 09/22/2022    BUN 7 (L) 09/22/2022    CREATININE 0.51 09/22/2022        Lab Results   Component Value Date    WBC 16.0 (H) 09/20/2022    HEMOGLOBIN 9.5 (L) 09/20/2022    HEMATOCRIT 29.5 (L) 09/20/2022    PLATELETCT 355 09/20/2022        Total time of the discharge process  exceeds 42 minutes.

## 2022-09-23 NOTE — PROGRESS NOTES
"0530 addendum: The patient denies chest pain at this time. She is resting comfortably. VSS.   Repeat EKG completed shows sinus tachycardia with PVCs, inferior q waves no longer present. Discussed EKG findings with cardiologist, Dr. Ruddy Bowman, who states that previous q wave finding may be related to lead placement.     [employer] Coverage Progress Note:     Briefly, Chani Buckner is a 51-year-old female who was admitted on 9/9/2022 with bacterial endocarditis and found to have tricuspid valve perforation with open PFO and aortic valve involvement.  CT surgery was consulted. and patient underwent repair on 9/16/2022.     I was contacted tonight regarding complaints of ongoing chest pain.  An EKG was performed per protocol prior to my notification regarding the patient's symptoms. EKG obtained at 2255 shows sinus tachycardia, rate 99 BPM, with incomplete RBBB and possible inferior q waves.    The patient was seen at bedside.  She endorses sternal chest pain, unchanged from previous.  She describes the pain as sore, worse with movement, and reproducible with palpation.  She denies associated dizziness, palpitations, nausea/vomiting, or shortness of breath.     /87   Pulse 99   Temp 37.2 °C (99 °F) (Temporal)   Resp 18   Ht 1.646 m (5' 4.8\")   Wt 69.3 kg (152 lb 12.5 oz)   SpO2 95%     Physical Exam  Vitals reviewed.   Constitutional:       General: She is not in acute distress.     Appearance: She is not ill-appearing or toxic-appearing.   HENT:      Mouth/Throat:      Mouth: Mucous membranes are moist.   Cardiovascular:      Rate and Rhythm: Regular rhythm. Tachycardia present.      Pulses: Normal pulses.      Heart sounds: Normal heart sounds. No murmur heard.  Pulmonary:      Effort: Pulmonary effort is normal. No respiratory distress.      Breath sounds: Normal breath sounds. No wheezing, rhonchi or rales.   Chest:      Chest wall: Tenderness present.   Abdominal:      General: Abdomen is flat.      " "Palpations: Abdomen is soft.   Musculoskeletal:      Right lower leg: No edema.      Left lower leg: No edema.   Skin:     General: Skin is warm and dry.   Neurological:      General: No focal deficit present.      Mental Status: She is alert and oriented to person, place, and time.   Psychiatric:      Comments: Anxious mood     Plan:   Chest pain   Patient describes as \"sore,\" worse with movement, reproducible to palpation, and unchanged since open heart procedure  Suspect post-operative pain, however EKG shows new q waves concerning for possible ischemia    Will repeat EKG in 5 hours   Consider cardiac enzymes however will likely be elevated post-open heart operation    HAILE Vincent   [facility] Hospitalist         "

## 2022-09-23 NOTE — DISCHARGE PLANNING
"Agency/Facility Name: [facility]   Spoke To: Leslye  Outcome: DPA informed that [facility] has accepted Pt and asked DPA if auth is currently being processed at another [facility]. DPA to update RN CM and follow up with SNF.     RN CM notified.     1039-  Agency/Facility Name: [facility]   Spoke To: Leslye  Outcome: DPA asked if [facility] would be able to take the Pt today, they can as long as the Pt can be transferred by 16:00, so we \"would have to get the ball rolling on that\".     RN CM notified.     1134-  Agency/Facility Name: [facility]   Spoke To: Leslye  Outcome: DPA provided [facility] with [employer] auth #[id number] (via RN FREDERICK). [facility]. Leslye provided Dr. Richard's number ([phone]) to give report, an the nurse report number ([phone]). DPA to follow up as needed.     RN CM notified.     "

## 2022-09-23 NOTE — DISCHARGE PLANNING
Case Management Discharge Planning    Admission Date: 9/9/2022  GMLOS: 7  ALOS: 14    6-Clicks ADL Score: 19  6-Clicks Mobility Score: 24      Anticipated Discharge Dispo: Discharge Disposition: Disch to a long term care facility (63)  Discharge Contact Phone Number: [phone]    DME Needed: No    Action(s) Taken: Pt accepted to [facility], who has a bed today. [facility] [place] notified so auth can be changed from [facility] to [facility]. Obtained [facility] auth. Spoke to pt at bedside who gave verbal auth for transfer to [facility] today at 1500. [facility] Dr. Mcdonnell and KEDAR Bales notified. Dr. Mcdonnell given # for Dr report to Dr. Richard [phone].     Escalations Completed: None    Medically Clear: Yes    Next Steps: Transfer to [facility] at 1500 via [facility] once Ride Line sets up transport.    Barriers to Discharge: Transportation    1335 - Transfer packet completed and given to KEDAR Bales.

## 2022-09-24 VITALS
BODY MASS INDEX: 22.63 KG/M2 | TEMPERATURE: 97.7 F | SYSTOLIC BLOOD PRESSURE: 108 MMHG | HEART RATE: 52 BPM | OXYGEN SATURATION: 94 % | DIASTOLIC BLOOD PRESSURE: 74 MMHG | WEIGHT: 135.8 LBS | RESPIRATION RATE: 16 BRPM | HEIGHT: 65 IN

## 2022-09-24 PROCEDURE — 700105 HCHG RX REV CODE 258: Performed by: INTERNAL MEDICINE

## 2022-09-24 PROCEDURE — A9270 NON-COVERED ITEM OR SERVICE: HCPCS | Performed by: STUDENT IN AN ORGANIZED HEALTH CARE EDUCATION/TRAINING PROGRAM

## 2022-09-24 PROCEDURE — 700102 HCHG RX REV CODE 250 W/ 637 OVERRIDE(OP): Performed by: NURSE PRACTITIONER

## 2022-09-24 PROCEDURE — 700102 HCHG RX REV CODE 250 W/ 637 OVERRIDE(OP)

## 2022-09-24 PROCEDURE — A9270 NON-COVERED ITEM OR SERVICE: HCPCS | Performed by: NURSE PRACTITIONER

## 2022-09-24 PROCEDURE — 700102 HCHG RX REV CODE 250 W/ 637 OVERRIDE(OP): Performed by: STUDENT IN AN ORGANIZED HEALTH CARE EDUCATION/TRAINING PROGRAM

## 2022-09-24 PROCEDURE — A9270 NON-COVERED ITEM OR SERVICE: HCPCS | Performed by: INTERNAL MEDICINE

## 2022-09-24 PROCEDURE — 700101 HCHG RX REV CODE 250: Performed by: INTERNAL MEDICINE

## 2022-09-24 PROCEDURE — A9270 NON-COVERED ITEM OR SERVICE: HCPCS

## 2022-09-24 PROCEDURE — A9270 NON-COVERED ITEM OR SERVICE: HCPCS | Performed by: HOSPITALIST

## 2022-09-24 PROCEDURE — 700111 HCHG RX REV CODE 636 W/ 250 OVERRIDE (IP): Performed by: NURSE PRACTITIONER

## 2022-09-24 PROCEDURE — 99239 HOSP IP/OBS DSCHRG MGMT >30: CPT | Performed by: STUDENT IN AN ORGANIZED HEALTH CARE EDUCATION/TRAINING PROGRAM

## 2022-09-24 PROCEDURE — 700102 HCHG RX REV CODE 250 W/ 637 OVERRIDE(OP): Performed by: HOSPITALIST

## 2022-09-24 PROCEDURE — 700111 HCHG RX REV CODE 636 W/ 250 OVERRIDE (IP): Performed by: INTERNAL MEDICINE

## 2022-09-24 PROCEDURE — 700102 HCHG RX REV CODE 250 W/ 637 OVERRIDE(OP): Performed by: INTERNAL MEDICINE

## 2022-09-24 RX ORDER — POTASSIUM CHLORIDE 20 MEQ/1
TABLET, EXTENDED RELEASE ORAL
Status: COMPLETED
Start: 2022-09-24 | End: 2022-09-24

## 2022-09-24 RX ADMIN — POTASSIUM CHLORIDE 10 MEQ: 20 TABLET, EXTENDED RELEASE ORAL at 04:13

## 2022-09-24 RX ADMIN — SUCRALFATE 1 G: 1 SUSPENSION ORAL at 04:15

## 2022-09-24 RX ADMIN — CEFTRIAXONE SODIUM 2 G: 10 INJECTION, POWDER, FOR SOLUTION INTRAVENOUS at 05:57

## 2022-09-24 RX ADMIN — CLOPIDOGREL BISULFATE 75 MG: 75 TABLET ORAL at 04:14

## 2022-09-24 RX ADMIN — ASPIRIN 81 MG: 81 TABLET, COATED ORAL at 04:14

## 2022-09-24 RX ADMIN — SUMATRIPTAN SUCCINATE 100 MG: 50 TABLET ORAL at 04:15

## 2022-09-24 RX ADMIN — GABAPENTIN 100 MG: 100 CAPSULE ORAL at 04:14

## 2022-09-24 RX ADMIN — OXYCODONE 5 MG: 5 TABLET ORAL at 02:13

## 2022-09-24 RX ADMIN — GUAIFENESIN 1200 MG: 600 TABLET, EXTENDED RELEASE ORAL at 04:14

## 2022-09-24 RX ADMIN — AMPICILLIN SODIUM 2000 MG: 2 INJECTION, POWDER, FOR SOLUTION INTRAVENOUS at 08:08

## 2022-09-24 RX ADMIN — PROCHLORPERAZINE EDISYLATE 10 MG: 5 INJECTION INTRAMUSCULAR; INTRAVENOUS at 08:08

## 2022-09-24 RX ADMIN — POTASSIUM CHLORIDE 10 MEQ: 1500 TABLET, EXTENDED RELEASE ORAL at 04:13

## 2022-09-24 RX ADMIN — BUSPIRONE HYDROCHLORIDE 5 MG: 10 TABLET ORAL at 04:15

## 2022-09-24 RX ADMIN — AMPICILLIN SODIUM 2000 MG: 2 INJECTION, POWDER, FOR SOLUTION INTRAVENOUS at 02:13

## 2022-09-24 RX ADMIN — OMEPRAZOLE 20 MG: 20 CAPSULE, DELAYED RELEASE ORAL at 04:13

## 2022-09-24 RX ADMIN — FUROSEMIDE 20 MG: 20 TABLET ORAL at 04:14

## 2022-09-24 RX ADMIN — SCOPOLAMINE 1 PATCH: 1.5 PATCH, EXTENDED RELEASE TRANSDERMAL at 09:29

## 2022-09-24 RX ADMIN — OXYCODONE HYDROCHLORIDE 10 MG: 10 TABLET ORAL at 05:56

## 2022-09-24 ASSESSMENT — PAIN DESCRIPTION - PAIN TYPE
TYPE: ACUTE PAIN
TYPE: ACUTE PAIN;SURGICAL PAIN
TYPE: SURGICAL PAIN

## 2022-09-24 NOTE — PROGRESS NOTES
Bedside report received from KEDAR Bales. Patient was A&Ox4 and complained of surgical site pain. Plan of care discussed with patient. Personal belonging and call light were placed within reach. Patient was instructed to use call light for assistance to bathroom.     KEDAR Bales prepared the discharge papers and placed them in patient's drawer. This RN will pass it on to day shift RN. Patient is expected to be discharge to LTAC around 0900 on 9/24/2022.

## 2022-09-24 NOTE — PROGRESS NOTES
Patient was scheduled to discharge this afternoon, however transportation has been rescheduled for tomorrow morning. MD notified, per MD no need to resume Tele.

## 2022-09-24 NOTE — DISCHARGE PLANNING
Note:  Received call from KEDAR James asking for update on pt's transportation. Per chart review, transport was arranged via [facility]. RN CM called [facility] and was informed that transportation is delayed due to [facility] being busy with calls but pt is next on the list, no ETA provided. KEDAR James updated. Per KEDAR James, she will update pt and LTAC.

## 2022-09-24 NOTE — DISCHARGE SUMMARY
Discharge Summary    CHIEF COMPLAINT ON ADMISSION  No chief complaint on file.      Reason for Admission  Endocarditis    Admission Date  9/9/2022     CODE STATUS  Full Code    HPI & HOSPITAL COURSE  Sarah Buckner is a 51 y.o. female admitted 9/9/2022 with bacterial endocarditis growing enterococcus faecalis on blood cultures prior to 09/08/22, with possible streptococcus on 09/10/22 blood cultures.  Also found to have significant anemia, reportedly had melena outpatient but none inpatient. Work-up of Enterococcus bacteremia including EGD, Colonoscopy revealed mild diverticulosis, antral gastritis and sigmoid polypectomy of 7mm pedunculated polyp. Cardiology was consulted and ordered a KEO found a perferation in the tricuspid valve, with open PFO and aortic valve involvement as well.  At that point, cardiothoracic surgery was consulted for assessment and possible valve replacement/repair. She underwent tricuspid valve repair, patent foramen ovale closure, and aortic valve repair by cardiothoracic surgery. Post operatively she required ICU for ventilation and IV vasopressor support. She was ultimately weaned from ventilator and vasopressors. Blood cultures remain negative, she is to continue 6 week antibiotic course, end date 10/27/2022. She is on ampicillin and ceftriaxone per infectious disease recommendations. She is accepted to LTAC. Discharge delayed one day due to LTAC availability.    Therefore, she is discharged in good and stable condition to a long-term acute care hospital.    The patient met 2-midnight criteria for an inpatient stay at the time of discharge.      FOLLOW UP ITEMS POST DISCHARGE  Follow up with cardiothoracic surgery.  Follow up with PCP post LTAC stay.    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  Active Problems:    Migraine with aura POA: Yes    Septic shock (HCC) POA: Unknown    Pneumonia POA: Yes    Anemia POA: Yes    TAZ (acute  kidney injury) ([facility]) POA: Yes    Elevated alkaline phosphatase level POA: Unknown    S/P tricuspid valve repair POA: Yes    S/P patent foramen ovale closure POA: Yes    S/P aortic valve repair POA: Yes    Anxiety POA: Unknown  Resolved Problems:    Encounter for weaning from ventilator (HCC) POA: Unknown    Pleural effusion POA: Unknown      FOLLOW UP  Future Appointments   Date Time Provider Department Center   10/24/2022  1:00 PM CT RESOURCE PROVIDER [facility] None   11/1/2022  2:15 PM Carol Tate P.A.-[facility] None     [facility]  [address] [place] [address]  [phone]    Schedule an appointment as soon as possible for a visit  As needed    MEDICATIONS ON DISCHARGE     Medication List        START taking these medications        Instructions   aspirin 81 MG EC tablet   Take 1 Tablet by mouth every day.  Dose: 81 mg     busPIRone 5 MG tablet  Commonly known as: BUSPAR   Take 1 Tablet by mouth 3 times a day.  Dose: 5 mg     cefTRIAXone 2 g/20 mL   Commonly known as: Rocephin   Infuse 20 mL into a venous catheter every 12 hours for 34 days.  Dose: 2 g     clopidogrel 75 MG Tabs  Commonly known as: PLAVIX   Take 1 Tablet by mouth every day.  Dose: 75 mg     furosemide 20 MG Tabs  Commonly known as: LASIX   Take 1 Tablet by mouth every day.  Dose: 20 mg     gabapentin 100 MG Caps  Commonly known as: NEURONTIN   Take 1 Capsule by mouth 3 times a day.  Dose: 100 mg     NS SOLN 100 mL with ampicillin 2 GM SOLR 2,000 mg  Replaces: NS 0.9% SOLN 100 mL with ampicillin 2 GM SOLR 2 g   Infuse 2,000 mg into a venous catheter every 4 hours for 34 days.  Dose: 2,000 mg     omeprazole 20 MG delayed-release capsule  Commonly known as: PRILOSEC   Take 1 Capsule by mouth every day.  Dose: 20 mg     potassium chloride SA 10 MEQ Tbcr  Commonly known as: K-DUR   Take 1 Tablet by mouth every day.  Dose: 10 mEq     sucralfate 1 GM/10ML Susp  Commonly known as: CARAFATE   Take 10 mL by mouth every 6 hours.  Dose:  "1 g            CONTINUE taking these medications        Instructions   sumatriptan 100 MG tablet  Commonly known as: IMITREX   TAKE 1 TABLET BY MOUTH AS NEEDED FOR MIGRAINE, REPEAT IN 3 HOURS AS NEEDED . DO NOT EXCEED 3 TABS PER WEEK            STOP taking these medications      asa/apap/caffeine 250-250-65 MG Tabs  Commonly known as: EXCEDRIN     benzonatate 100 MG Caps  Commonly known as: TESSALON     cyclobenzaprine 10 mg Tabs  Commonly known as: Flexeril     Guaifenesin 1200 MG Tb12     Melatonin Gummies 2.5 MG Chew     meloxicam 7.5 MG Tabs  Commonly known as: MOBIC     NS 0.9% SOLN 100 mL with ampicillin 2 GM SOLR 2 g  Replaced by: NS SOLN 100 mL with ampicillin 2 GM SOLR 2,000 mg     NS SOLN 100 mL with gentamicin 40 MG/ML SOLN 60 mg     oxyCODONE immediate-release 5 MG Tabs  Commonly known as: ROXICODONE     vancomycin 750 mg/250mL 750 mg               Allergies  Allergies   Allergen Reactions    Morphine Itching     Severe itchiness  \"It makes me itch\"    Hydrocodone-Acetaminophen Unspecified     hallucinations         DIET  Orders Placed This Encounter   Procedures    Diet Order Diet: Cardiac     Standing Status:   Standing     Number of Occurrences:   1     Order Specific Question:   Diet:     Answer:   Cardiac [6]       ACTIVITY  As tolerated.  Weight bearing as tolerated    LINES, DRAINS, AND WOUNDS  This is an automated list. Peripheral IVs will be removed prior to discharge.  PICC Single Lumen 09/19/22 Left Brachial (Active)   Site Assessment Clean;Dry;Intact 09/23/22 0800   Line Status Blood return noted;Saline locked;Flushed;Scrubbed the hub prior to access;Infusing 09/23/22 0800   Line Secured at (cm) 0 cm 09/19/22 1240   Extremity Circumference (cm) 30 cm 09/19/22 1240   Dressing Type Biopatch;Securing device;Skin barrier;Transparent 09/23/22 0800   Dressing Status Intact;Dry;Clean 09/23/22 0800   Dressing Intervention N/A 09/23/22 0800   Dressing Change Due 09/24/22 09/20/22 2048   Date Primary " Tubing Changed 09/20/22 09/20/22 0800   Date Secondary Tubing Changed 09/20/22 09/20/22 0800   Date IV Connector(s) Changed 09/19/22 09/19/22 1240   NEXT Primary Tubing Change  09/24/22 09/20/22 0800   NEXT Secondary Tubing Change  09/21/22 09/20/22 0800   NEXT IV Connector(s) Change 09/24/22 09/20/22 0800   Line Necessity Assessed Antibiotic Therapy Greater than 7 Days 09/23/22 0800   $ Single Lumen PICC Charge Single kit used 09/19/22 1240      ETT (Active)     Wound 12/08/20 Incision Umbilicus (Active)       Wound 12/08/20 Abdomen Lower Right (Active)       Wound 12/08/20 Abdomen Lower Left (Active)       Wound 09/16/22 Incision Chest PREVENA  (Active)   Site Assessment Intact;Dry 09/23/22 0800   Periwound Assessment [place] 09/23/22 0800   Margins Attached edges 09/23/22 0800   Closure Open to air 09/23/22 0800   Drainage Amount None 09/23/22 0800   Drainage Description Brown 09/21/22 2100   Treatments Cleansed 09/22/22 2000   Wound Cleansing Soap and Water 09/22/22 2000   Dressing Options Open to Air 09/23/22 0800   Dressing Status Open to Air 09/22/22 2000       Wound 09/19/22 Abdomen Lower;Midline Chest Tube Sites (Active)   Site Assessment CHRIS 09/23/22 0800   Periwound Assessment CHRIS 09/22/22 2000   Margins CHRIS 09/22/22 2000   Closure None 09/23/22 0800   Drainage Amount None 09/23/22 0800   Drainage Description Sanguineous 09/19/22 2000   Treatments Cleansed 09/22/22 1000   Wound Cleansing Soap and Water 09/22/22 1000   Dressing Options Tape;Dry Gauze 09/22/22 2000   Dressing Changed Observed 09/23/22 0800   Dressing Status Clean;Dry;Intact 09/23/22 0800       Wound 09/19/22 Flank Pleural chest tube (Active)   Site Assessment CHRIS 09/21/22 2100   Periwound Assessment CHRIS 09/21/22 2100   Margins CHRIS 09/21/22 2100   Closure None 09/22/22 1000   Drainage Amount Moderate 09/19/22 2000   Drainage Description Serosanguineous 09/19/22 2000   Treatments Cleansed 09/19/22 1000   Wound Cleansing Soap and Water 09/19/22  1000   Dressing Options Tape 09/22/22 1000   Dressing Changed Observed 09/23/22 0800   Dressing Status Clean;Dry;Intact 09/23/22 0800   Dressing Change/Treatment Frequency As Needed 09/23/22 0800      PICC Single Lumen 09/19/22 Left Brachial (Active)   Site Assessment Clean;Dry;Intact 09/23/22 0800   Line Status Blood return noted;Saline locked;Flushed;Scrubbed the hub prior to access;Infusing 09/23/22 0800   Line Secured at (cm) 0 cm 09/19/22 1240   Extremity Circumference (cm) 30 cm 09/19/22 1240   Dressing Type Biopatch;Securing device;Skin barrier;Transparent 09/23/22 0800   Dressing Status Intact;Dry;Clean 09/23/22 0800   Dressing Intervention N/A 09/23/22 0800   Dressing Change Due 09/24/22 09/20/22 2048   Date Primary Tubing Changed 09/20/22 09/20/22 0800   Date Secondary Tubing Changed 09/20/22 09/20/22 0800   Date IV Connector(s) Changed 09/19/22 09/19/22 1240   NEXT Primary Tubing Change  09/24/22 09/20/22 0800   NEXT Secondary Tubing Change  09/21/22 09/20/22 0800   NEXT IV Connector(s) Change 09/24/22 09/20/22 0800   Line Necessity Assessed Antibiotic Therapy Greater than 7 Days 09/23/22 0800   $ Single Lumen PICC Charge Single kit used 09/19/22 1240                MENTAL STATUS ON TRANSFER      Alert and oriented x4       CONSULTATIONS  General surgery  Infectious disease  Gastroenterology  Cardiology  Cardiothoracic surgery  Critical care    PROCEDURES  9/12: EGD, colonoscopy, polypectomy  9/16: tricuspid valve repair, patent formaen ovale closure, aortic valve repair, KEO  9/19: PICC line placement    LABORATORY  Lab Results   Component Value Date    SODIUM 134 (L) 09/22/2022    POTASSIUM 4.4 09/22/2022    CHLORIDE 92 (L) 09/22/2022    CO2 31 09/22/2022    GLUCOSE 104 (H) 09/22/2022    BUN 7 (L) 09/22/2022    CREATININE 0.51 09/22/2022        Lab Results   Component Value Date    WBC 16.0 (H) 09/20/2022    HEMOGLOBIN 9.5 (L) 09/20/2022    HEMATOCRIT 29.5 (L) 09/20/2022    PLATELETCT 355 09/20/2022         Total time of the discharge process exceeds 34 minutes.

## 2022-09-24 NOTE — PROGRESS NOTES
Patient transferred to [facility] via [facility]. Report called to KEDAR Faria.     Discharge paperwork reviewed with patient yesterday, patient states she has no further questions.

## 2022-09-24 NOTE — DISCHARGE PLANNING
Note:  RN CM received call from Edmnod Mejia RN at [facility]. Per Jackie, they cannot accept pt tonight because their doctor will not be present on site to admit pt. They will be able to accept pt tomorrow morning. KEDAR James from Tele 8 aware. RN CM called [facility] to reschedule transportation. Per Fay, transportation rescheduled for tomorrow, 9/24/2022 at 0900. KEDAR MACK informed KEDAR Mejia at [facility] and KEDAR James on Tele 8. Per KEDAR James, she will inform pt and MD.

## 2022-09-24 NOTE — DISCHARGE PLANNING
"[facility] chart review completed. Collaborated with FREDERICK Willis. Note that [facility] had [facility] accepted [facility] though they will not have bed available until Monday. Received update from FREDERICK that [facility] has bed available today if auth can be obtained. TCN reached out to [facility] HUM team to obtain auth for [facility]. Provided CM with auth # and [facility] auth # as well. Anticipating dc to [facility] later today at this time. TCN will continue to follow and collaborate with discharge planning team as additional post acute needs arise. Thank you.    Previously completed:     - Orders received for: PT and OT consults -> most recent OT note stating \"Current plan is to discharge to a facility where pt can receive IV antibiotics, recommend pt continue OT services at next level of care\"; PT to \"re-attempt mobility eval as appropriate\"     Choice obtained: HH, LTAC, SNF and infusion choice (IV ABX).     [facility] referral (N), patient has f/u appointments and is out of [place] area.  "

## 2022-09-25 LAB — LV EJECT FRACT  99904: 40

## 2022-10-11 LAB
FUNGUS SPEC CULT: NORMAL
FUNGUS SPEC FUNGUS STN: NORMAL
SIGNIFICANT IND 70042: NORMAL
SITE SITE: NORMAL
SOURCE SOURCE: NORMAL

## 2022-10-19 NOTE — H&P
"CHIEF COMPLAINT: Post-op visit     PROCEDURE:   Dr. Bradley 9/16/2022  RADICAL TRICUSPID VALVE REPAIR, PATENT FORAMEN OVALE CLOSURE, AORTIC VALVE REPAIR,   TRANSESOPHAGEAL ESCHOCARDIOGRAM - Wound Class: Clean with Drain  ECHOCARDIOGRAM, TRANSESOPHAGEAL, INTRAOPERATIVE - Wound Class: None    HPI: Patient presents to clinic for her one month follow up. She was discharged from LTAC on 10/11/22. Patient reports intermittent pain that keeps her up at night. She describes it as a dull ache, 10/10 in severity. Patient is walking frequently around the house. Patient is getting IV antibiotics at the infusion center.    /76 (BP Location: Left arm, Patient Position: Sitting, BP Cuff Size: Adult)   Pulse (!) 103   Temp 36.2 °C (97.2 °F) (Temporal)   Ht 1.6 m (5' 3\")   Wt 60 kg (132 lb 3.2 oz)   SpO2 98%     PHYSICAL EXAM:  Cardiac: S1S2  Neuro:  AAO x 3  Resp:  CTA  Wounds:  CDI  Sternum:  Stable    PLAN:   Continue plavix for 3 months or per your Cardiologist's recommendations.  We reviewed post operative sternal precautions, weight limits and driving precautions moving forward.  We reviewed SBE prophylaxis. 7 days of oxycodone prescribed. Discussed risks and benefits of pain medications with patient and reviewed clinic policy of 7 day course. Discharge from clinic.     Overall, we are very pleased with the patient’s progress and we have instructed the patient to follow-up with us in the future should they have any concerns related to their surgery. Otherwise, we will see the patient on a PRN basis. The patient will continue to follow-up with their Cardiologist and PCP.  The patient has been informed that any further prescription refills should be done through their primary care physician and/or cardiologist.  They acknowledged understanding.  Thank you for allowing us to participate in the care of this very pleasant patient and please let us know if there is any way we may be of further assistance.    "

## 2022-10-24 ENCOUNTER — OFFICE VISIT (OUTPATIENT)
Dept: CARDIOTHORACIC SURGERY | Facility: MEDICAL CENTER | Age: 52
End: 2022-10-24
Payer: COMMERCIAL

## 2022-10-24 VITALS
HEART RATE: 103 BPM | HEIGHT: 63 IN | WEIGHT: 132.2 LBS | DIASTOLIC BLOOD PRESSURE: 76 MMHG | SYSTOLIC BLOOD PRESSURE: 130 MMHG | OXYGEN SATURATION: 98 % | BODY MASS INDEX: 23.42 KG/M2 | TEMPERATURE: 97.2 F

## 2022-10-24 DIAGNOSIS — Z95.2 S/P AVR: ICD-10-CM

## 2022-10-24 PROCEDURE — 99024 POSTOP FOLLOW-UP VISIT: CPT | Performed by: NURSE PRACTITIONER

## 2022-10-24 RX ORDER — TRAMADOL HYDROCHLORIDE 50 MG/1
50 TABLET ORAL EVERY 4 HOURS PRN
COMMUNITY
End: 2022-12-07 | Stop reason: SDUPTHER

## 2022-10-24 RX ORDER — BENZONATATE 200 MG/1
200 CAPSULE ORAL
COMMUNITY
Start: 2022-10-10 | End: 2022-10-24

## 2022-10-24 RX ORDER — CYCLOBENZAPRINE HCL 5 MG
5 TABLET ORAL
COMMUNITY
Start: 2022-10-10 | End: 2022-10-24

## 2022-10-24 ASSESSMENT — FIBROSIS 4 INDEX: FIB4 SCORE: 0.54

## 2022-10-27 ENCOUNTER — OFFICE VISIT (OUTPATIENT)
Dept: INFECTIOUS DISEASES | Facility: MEDICAL CENTER | Age: 52
End: 2022-10-27
Attending: INTERNAL MEDICINE
Payer: COMMERCIAL

## 2022-10-27 VITALS
BODY MASS INDEX: 24.22 KG/M2 | DIASTOLIC BLOOD PRESSURE: 80 MMHG | SYSTOLIC BLOOD PRESSURE: 124 MMHG | HEIGHT: 63 IN | WEIGHT: 136.69 LBS | TEMPERATURE: 97.7 F | RESPIRATION RATE: 16 BRPM | HEART RATE: 112 BPM | OXYGEN SATURATION: 97 %

## 2022-10-27 DIAGNOSIS — R78.81 BACTEREMIA DUE TO ENTEROCOCCUS: ICD-10-CM

## 2022-10-27 DIAGNOSIS — B95.2 BACTEREMIA DUE TO ENTEROCOCCUS: ICD-10-CM

## 2022-10-27 DIAGNOSIS — I33.0 INFECTIVE ENDOCARDITIS OF TRICUSPID VALVE: ICD-10-CM

## 2022-10-27 PROBLEM — R65.21 SEPTIC SHOCK (HCC): Status: RESOLVED | Noted: 2022-09-07 | Resolved: 2022-10-27

## 2022-10-27 PROBLEM — A41.9 SEPTIC SHOCK (HCC): Status: RESOLVED | Noted: 2022-09-07 | Resolved: 2022-10-27

## 2022-10-27 PROCEDURE — 99214 OFFICE O/P EST MOD 30 MIN: CPT | Performed by: INTERNAL MEDICINE

## 2022-10-27 PROCEDURE — 99212 OFFICE O/P EST SF 10 MIN: CPT | Performed by: INTERNAL MEDICINE

## 2022-10-27 ASSESSMENT — FIBROSIS 4 INDEX: FIB4 SCORE: 0.62

## 2022-10-27 NOTE — PROGRESS NOTES
[facility] INFECTIOUS DISEASES CLINIC FOLLOW-UP NOTE     Date of Service: 10/27/2022    Chief Complaint: Follow-up for bacteremia    History of Present Illness:     Sarah Buckner is a pleasant 51 y.o. female patient who presented to [place] in early September with complaints of several weeks of fevers, malaise, cough, TTE was concerning for a tricuspid valve vegetation.  She was transferred to [facility] for KEO.  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  Patient was also noted to have septic pulmonary emboli, E faecalis bacteremia. Patient was taken to the OR by cardiac surgery on 9/16, underwent, aortic valve repair, tricuspid valve repair, patent foramen ovale closure.  Valve culture also grew E faecalis. GI was consulted to look for potential source and colonoscopy was performed, sigmoid polypectomy performed.  Patient received dual beta-lactam therapy with IV ampicillin and IV ceftriaxone with plan to complete 6 weeks from day of surgery through 10/27/2022.      Patient was at an LTAC but did not need to stay there after 4 weeks.  She was seen there by Dr. Waggoner who is an infectious disease doctor at [facility].  Antibiotics were switched to IV daptomycin 8 mg/KG every 24 hours and patient received this at the [place] infusion center starting 10/12/2022 and plans to complete over 2 additional weeks through 10/31/2022.    Latest labs from 10/25 with no acute significant concerns. Mild elevated alkaline phosphatase trending down, likely following switch from dual beta-lactam therapy to daptomycin.  Patient feeling significantly better, no fevers or chills, no back pain, no restriction of mobility.    Review of Systems:  All other systems reviewed and are negative expect as noted in HPI    Past Medical History:   Diagnosis Date    Encounter for weaning from ventilator (HCC) 9/16/2022    Hypertension     Migraine      Pleural effusion 9/18/2022    PONV (postoperative nausea and vomiting)        Past Surgical History:   Procedure Laterality Date    TRICUSPID VALVE REPAIR  9/16/2022    Procedure: TRICUSPID VALVE REPAIR, PATENT FORAMEN OVALE CLOSURE, TRANSESOPHAGEAL ESCHOCARDIOGRAM;  Surgeon: Truong Bradley D.O.;  Location: [facility];  Service: Cardiothoracic    ECHOCARDIOGRAM, TRANSESOPHAGEAL, INTRAOPERATIVE  9/16/2022    Procedure: ECHOCARDIOGRAM, TRANSESOPHAGEAL, INTRAOPERATIVE;  Surgeon: Truong Bradley D.O.;  Location: [facility];  Service: Cardiothoracic    ID UPPER GI ENDOSCOPY,DIAGNOSIS N/A 9/12/2022    Procedure: GASTROSCOPY;  Surgeon: Danyelle Curiel M.D.;  Location: SURGERY SAME DAY [facility];  Service: Gastroenterology    ID COLONOSCOPY,DIAGNOSTIC N/A 9/12/2022    Procedure: COLONOSCOPY;  Surgeon: Danyelle Curiel M.D.;  Location: SURGERY SAME DAY [facility];  Service: Gastroenterology    ID UPPER GI ENDOSCOPY,BIOPSY N/A 9/12/2022    Procedure: GASTROSCOPY, WITH BIOPSY;  Surgeon: Danyelle Curiel M.D.;  Location: SURGERY SAME DAY [facility];  Service: Gastroenterology    COLONOSCOPY WITH POLYP N/A 9/12/2022    Procedure: COLONOSCOPY, WITH POLYPECTOMY;  Surgeon: Danyelle Curiel M.D.;  Location: SURGERY SAME DAY [facility];  Service: Gastroenterology    ID LAP,RMV  ADNEXAL STRUCTURE Bilateral 12/8/2020    Procedure: SALPINGO-OOPHORECTOMY, BILATERAL, LAPAROSCOPIC;  Surgeon: Brayan Hunt M.D.;  Location: [facility];  Service: Gynecology    VAGINAL HYSTERECTOMY SCOPE TOTAL N/A 12/8/2020    Procedure: LAPAROSCOPIC  SUBTOTAL HYSTERECTOMY;  Surgeon: Brayan Hunt M.D.;  Location: [facility];  Service: Gynecology    CYSTOSCOPY N/A 12/8/2020    Procedure: CYSTOSCOPY;  Surgeon: Brayan Hunt M.D.;  Location: [facility];  Service: Gynecology    LUMPECTOMY      breast reduction, implants     OTHER ORTHOPEDIC SURGERY      orif right wrist hardware    PRIMARY C  "SECTION      3       Family History   Problem Relation Age of Onset    Clotting Disorder Mother        Social History     Socioeconomic History    Marital status:      Spouse name: Not on file    Number of children: Not on file    Years of education: Not on file    Highest education level: Not on file   Occupational History    Not on file   Tobacco Use    Smoking status: Never    Smokeless tobacco: Never   Vaping Use    Vaping Use: Never used   Substance and Sexual Activity    Alcohol use: Yes     Alcohol/week: 3.6 oz     Types: 6 Glasses of wine per week    Drug use: Never    Sexual activity: Not on file   Other Topics Concern    Not on file   Social History Narrative    Not on file     Social Determinants of Health     Financial Resource Strain: Not on file   Food Insecurity: Not on file   Transportation Needs: Not on file   Physical Activity: Not on file   Stress: Not on file   Social Connections: Not on file   Intimate Partner Violence: Not on file   Housing Stability: Not on file       Allergies   Allergen Reactions    Morphine Itching     Severe itchiness  \"It makes me itch\"    Hydrocodone-Acetaminophen Unspecified     hallucinations         Medications:  Current Outpatient Medications on File Prior to Visit   Medication Sig Dispense Refill    traMADol (ULTRAM) 50 MG Tab Take 50 mg by mouth every four hours as needed.      oxyCODONE HCl 5 MG Tablet Abuse-Deterrent Take 5 mg by mouth every 6 hours as needed (severe pain) for up to 7 days. 28 Tablet 0    NS SOLN 100 mL with ampicillin 2 GM SOLR 2,000 mg Infuse 2,000 mg into a venous catheter every 4 hours for 34 days. 2 mg 0    aspirin EC 81 MG EC tablet Take 1 Tablet by mouth every day. 30 Tablet 0    busPIRone (BUSPAR) 5 MG tablet Take 1 Tablet by mouth 3 times a day. 90 Tablet 0    clopidogrel (PLAVIX) 75 MG Tab Take 1 Tablet by mouth every day. 30 Tablet 0    gabapentin (NEURONTIN) 100 MG Cap Take 1 Capsule by mouth 3 times a day. 90 Capsule 0    " "omeprazole (PRILOSEC) 20 MG delayed-release capsule Take 1 Capsule by mouth every day. 30 Capsule 0    potassium chloride SA (K-DUR) 10 MEQ Tab CR Take 1 Tablet by mouth every day. 60 Tablet 11    sucralfate (CARAFATE) 1 GM/10ML Suspension Take 10 mL by mouth every 6 hours. 1 mL 0    sumatriptan (IMITREX) 100 MG tablet TAKE 1 TABLET BY MOUTH AS NEEDED FOR MIGRAINE, REPEAT IN 3 HOURS AS NEEDED . DO NOT EXCEED 3 TABS PER WEEK      furosemide (LASIX) 20 MG Tab Take 1 Tablet by mouth every day. (Patient not taking: No sig reported) 60 Tablet 0     No current facility-administered medications on file prior to visit.       Physical Exam:   Vital Signs: /80 (BP Location: Right arm, Patient Position: Sitting, BP Cuff Size: Adult)   Pulse (!) 112   Temp 36.5 °C (97.7 °F)   Resp 16   Ht 1.6 m (5' 3\")   Wt 62 kg (136 lb 11 oz)   LMP 11/23/2020   SpO2 97%   BMI 24.21 kg/m²   Vital signs reviewed  Constitutional: Patient is oriented to person, place, and time. Appears well-developed and well-nourished. No distress  Head: Atraumatic, normocephalic  Eyes: Conjunctivae normal, EOM intact. Pupils are equal, round, and reactive to light.   Mouth/Throat: Wearing a mask  Neck: Neck supple. No masses/lymphadenopathy  Cardiovascular: Normal rate, regular rhythm, normal S1S2 and intact distal pulses. No murmur, gallop, or friction rub. No pedal edema.  Pulmonary/Chest: No respiratory distress. Unlabored respiratory effort, lungs clear to auscultation. No wheezes or rales.  Midline well-healed sternal incision with no gross external evidence of infection  Abdominal: Soft, non tender. BS + x 4. No masses or hepatosplenomegaly.   Musculoskeletal: No joint tenderness, swelling, erythema, or restriction of motion noted.  Neurological: Alert and oriented to person, place, and time. No gross cranial nerve deficit. No focal neural deficit noted  Skin: Skin is warm and dry. No rashes or embolic phenomena noted on exposed " skin  Psychiatric: Normal mood and affect. Behavior is normal.     LABS:  WBC   Date/Time Value Ref Range Status   10/25/2022 03:15 PM 6.5 4.8 - 10.8 K/uL Final     RBC   Date/Time Value Ref Range Status   10/25/2022 03:15 PM 4.40 4.20 - 5.40 M/uL Final     Hemoglobin   Date/Time Value Ref Range Status   10/25/2022 03:15 PM 12.6 (L) 13.0 - 17.0 g/dL Final     Hematocrit   Date/Time Value Ref Range Status   10/25/2022 03:15 PM 38.8 (L) 39.0 - 50.0 % Final     MCV   Date/Time Value Ref Range Status   10/25/2022 03:15 PM 88.2 81.0 - 99.0 fL Final     MCH   Date/Time Value Ref Range Status   10/25/2022 03:15 PM 28.6 27.0 - 31.0 pg Final     MCHC   Date/Time Value Ref Range Status   10/25/2022 03:15 PM 32.5 (L) 33.0 - 37.0 g/dL Final     MPV   Date/Time Value Ref Range Status   10/25/2022 03:15 PM 9.0 7.4 - 10.4 fL Final     Sodium   Date/Time Value Ref Range Status   10/25/2022 03:15  136 - 145 mmol/L Final     Potassium   Date/Time Value Ref Range Status   10/25/2022 03:15 PM 4.2 3.5 - 5.1 mmol/L Final     Chloride   Date/Time Value Ref Range Status   10/25/2022 03:15  98 - 107 mmol/L Final     Co2   Date/Time Value Ref Range Status   10/25/2022 03:15 PM 26 21 - 32 mmol/L Final     Glucose   Date/Time Value Ref Range Status   10/25/2022 03:15 PM 86 74 - 99 mg/dL Final     Bun   Date/Time Value Ref Range Status   10/25/2022 03:15 PM 10 7 - 18 mg/dL Final     Creatinine   Date/Time Value Ref Range Status   10/25/2022 03:15 PM 0.6 0.6 - 1.0 mg/dL Final     Glom Filt Rate, Est   Date/Time Value Ref Range Status   10/11/2022 04:35  >60 mL/min/1.7 Final     Comment:     Estimated GFR derived from the MDRD Study equation can be used in patients who are in the hospital.  However, it is important to pay attention to potential inaccuracies due to the non-steady state of serum creatinine, co-morbidities that cause malnutrition, and the use of medications that interfere with the measurement of serum  creatinine.    The estimated GFR is only accurate for patients greater than 18 years of age.     Alkaline Phosphatase   Date/Time Value Ref Range Status   10/25/2022 03:15  (H) 46 - 116 U/L Final     AST(SGOT)   Date/Time Value Ref Range Status   10/25/2022 03:15 PM 22 15 - 37 U/L Final     ALT(SGPT)   Date/Time Value Ref Range Status   10/25/2022 03:15 PM 19 12 - 78 U/L Final     Total Bilirubin   Date/Time Value Ref Range Status   10/25/2022 03:15 PM 0.3 0.2 - 1.0 mg/dL Final      CPK Total   Date/Time Value Ref Range Status   10/25/2022 03:15 PM 63 26 - 192 U/L Final        MICRO:  Blood Culture   Date Value Ref Range Status   09/08/2022 (A)  Final    Growth detected by Bactec instrument.  Enterococcus isolated: See blood culture result from specimen  drawn on 09/07/22 at 15:56 for identification and  sensitivities.           Latest pertinent labs were reviewed    IMAGING STUDIES:  Chest x-ray 9/22 with multifocal bilateral opacities, costophrenic angle blunting    Assessment:   Sarah Buckner is a 51 y.o. 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 Waggoner after patient left LTAC to IV daptomycin 8 mg/KG every 24 hours with plan to complete on 10/31/2022.       Plan:   -Patient will complete appropriate antibiotic therapy on 10/31.  Okay to discontinue PICC line at that time  -Monitor closely for any recurrence of infection off of antibiotics.  Ordered surveillance blood cultures approximately 4 weeks off of antibiotics to  a recurrent Enterococcus bacteremia sooner if present    ID clinic follow-up as needed    Aaron Yeh M.D.    Please note that this dictation was created using voice recognition software. I have worked with technical experts  from [facility] to optimize the interface.  I have made every reasonable attempt to correct obvious errors, but there may be errors of grammar and possibly content that I did not discover before finalizing the note.

## 2022-10-31 LAB
MYCOBACTERIUM SPEC CULT: NORMAL
RHODAMINE-AURAMINE STN SPEC: NORMAL
SIGNIFICANT IND 70042: NORMAL
SITE SITE: NORMAL
SOURCE SOURCE: NORMAL

## 2022-10-31 NOTE — PROGRESS NOTES
Chief Complaint   Patient presents with    Other     S/P tricuspid valve repair & S/P aortic valve repair    Endocarditis     Infective endocarditis of tricuspid valve                  Subjective:   Sarah Buckner is a 51 y.o. female who presents today for follow-up.    Initially seen by Dr. Mcwilliams in the hospital 9/12/2022 where she presented with E. Faecalis bacteremia with found tricuspid and aortic valve endocarditis complicated by severe tricuspid regurgitation and mild aortic valve regurgitation.  She also had a patent foramen ovale.  On 9/16 she underwent radical tricuspid valve repair, patent foramen ovale closure, aortic valve repair by Dr. Bradley.  Postoperative echo showed successful repair of tricuspid valve with mild TR centrally, and a mean gradient of 1 mmHg across the valve.  No change in mild central AI.  Improvement of left ventricular ejection fraction with no inotropic support.     She had a largely uncomplicated recovery postsurgery requiring diuresis, anxiolytics.  She has a prolonged QT.    She had recent follow-up with Dr. Yeh, infectious Disease, she completed her antibiotic therapy 10/31.  Repeat blood cultures due in 4 weeks.  She did have a colonoscopy prior to her surgery due to the finding of E facaecalis bacteremia and had a polyp removed at that time. Pathology showed tubular adenoma.    Chani is slowly recovering after her surgery.  She is now at home, discharge from LTAC.  She denies orthopnea, lower extremity edema, abdominal swelling.  Her furosemide was discontinued after 30 days postop. Weight at home 136lbs which has been stable since her discharge.    She has occasional sharp nerve pain radiating across her chest and pleuritic pain although this is improving.    She is been checking her blood pressures daily at the infusion center, these have been 100-120 systolic.  She has noticed her heart rates been fast since her surgery, and now down to about 100  susy Wilde asks about returning to work.  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  She is currently on short-term disability.    She is interested in participating in cardiac rehab.  Past Medical History:   Diagnosis Date    Encounter for weaning from ventilator (HCC) 9/16/2022    Hypertension     Migraine     Pleural effusion 9/18/2022    PONV (postoperative nausea and vomiting)          Past Surgical History:   Procedure Laterality Date    TRICUSPID VALVE REPAIR  9/16/2022    Procedure: TRICUSPID VALVE REPAIR, PATENT FORAMEN OVALE CLOSURE, TRANSESOPHAGEAL ESCHOCARDIOGRAM;  Surgeon: Truong Bradley D.O.;  Location: SURGERY [facility];  Service: Cardiothoracic    ECHOCARDIOGRAM, TRANSESOPHAGEAL, INTRAOPERATIVE  9/16/2022    Procedure: ECHOCARDIOGRAM, TRANSESOPHAGEAL, INTRAOPERATIVE;  Surgeon: Truong Bradley D.O.;  Location: [facility];  Service: Cardiothoracic    NV UPPER GI ENDOSCOPY,DIAGNOSIS N/A 9/12/2022    Procedure: GASTROSCOPY;  Surgeon: Danyelle Curiel M.D.;  Location: SURGERY SAME DAY [facility];  Service: Gastroenterology    NV COLONOSCOPY,DIAGNOSTIC N/A 9/12/2022    Procedure: COLONOSCOPY;  Surgeon: Danyelle Curiel M.D.;  Location: SURGERY SAME DAY [facility];  Service: Gastroenterology    NV UPPER GI ENDOSCOPY,BIOPSY N/A 9/12/2022    Procedure: GASTROSCOPY, WITH BIOPSY;  Surgeon: Danyelle Curiel M.D.;  Location: SURGERY SAME DAY [facility];  Service: Gastroenterology    COLONOSCOPY WITH POLYP N/A 9/12/2022    Procedure: COLONOSCOPY, WITH POLYPECTOMY;  Surgeon: Danyelle Curiel M.D.;  Location: SURGERY SAME DAY [facility];  Service: Gastroenterology    NV LAP,RMV  ADNEXAL STRUCTURE Bilateral 12/8/2020    Procedure: SALPINGO-OOPHORECTOMY, BILATERAL, LAPAROSCOPIC;  Surgeon: Brayan Hunt M.D.;  Location: SURGERY [facility];  Service: Gynecology    VAGINAL HYSTERECTOMY SCOPE TOTAL N/A 12/8/2020    Procedure:  "LAPAROSCOPIC  SUBTOTAL HYSTERECTOMY;  Surgeon: Brayan Hunt M.D.;  Location: SURGERY [facility];  Service: Gynecology    CYSTOSCOPY N/A 12/8/2020    Procedure: CYSTOSCOPY;  Surgeon: Brayan Hunt M.D.;  Location: SURGERY [facility];  Service: Gynecology    LUMPECTOMY      breast reduction, implants     OTHER ORTHOPEDIC SURGERY      orif right wrist hardware    PRIMARY C SECTION      3         Family History   Problem Relation Age of Onset    Clotting Disorder Mother          Social History     Tobacco Use   Smoking Status Never   Smokeless Tobacco Never          Social History     Substance and Sexual Activity   Alcohol Use Yes    Alcohol/week: 3.6 oz    Types: 6 Glasses of wine per week         Allergies   Allergen Reactions    Morphine Itching     Severe itchiness  \"It makes me itch\"    Hydrocodone-Acetaminophen Unspecified     hallucinations           Outpatient Encounter Medications as of 11/1/2022   Medication Sig Dispense Refill    ferrous gluconate (FERGON) 324 (38 Fe) MG Tab Take 324 mg by mouth every day. for 30 days      lidocaine (LIDODERM) 5 % Patch APPLY ONE PATCH TOPICALLY TO CLEAN, DRY SKIN. LEAVE ON FOR 12 HOURS THEN REMOVE. MUST WAIT AT LEAST 12 HOURS BEFORE APPLYING PATCH(ES) AGAIN.      Melatonin 10 MG Tab Take 10 mg by mouth.      oxyCODONE immediate-release (ROXICODONE) 5 MG Tab TAKE 1 TABLET BY MOUTH EVERY 6 HOURS AS NEEDED FOR SEVERE PAIN FOR UP TO 7 DAYS      temazepam (RESTORIL) 15 MG Cap TAKE 1 TO 2 CAPSULES BY MOUTH AT BEDTIME AS NEEDED FOR SLEEP FOR UP TO 14 DAYS      pantoprazole (PROTONIX) 40 MG Tablet Delayed Response Take 1 Tablet by mouth 1/2 hour before dinner. 30 Tablet 2    traMADol (ULTRAM) 50 MG Tab Take 50 mg by mouth every four hours as needed.      aspirin EC 81 MG EC tablet Take 1 Tablet by mouth every day. 30 Tablet 0    busPIRone (BUSPAR) 5 MG tablet Take 1 Tablet by mouth 3 times a day. 90 Tablet 0    clopidogrel (PLAVIX) 75 MG Tab Take 1 Tablet by mouth every " "day. 30 Tablet 0    gabapentin (NEURONTIN) 100 MG Cap Take 1 Capsule by mouth 3 times a day. 90 Capsule 0    sumatriptan (IMITREX) 100 MG tablet TAKE 1 TABLET BY MOUTH AS NEEDED FOR MIGRAINE, REPEAT IN 3 HOURS AS NEEDED . DO NOT EXCEED 3 TABS PER WEEK      [DISCONTINUED] pantoprazole (PROTONIX) 40 MG Tablet Delayed Response TAKE 1 TABLET BY MOUTH AT BEDTIME 30 MINUTES BEFORE MEAL(S)      [DISCONTINUED] sumatriptan (IMITREX) 100 MG tablet Take 100 mg by mouth. (Patient not taking: Reported on 2022)      [] oxyCODONE HCl 5 MG Tablet Abuse-Deterrent Take 5 mg by mouth every 6 hours as needed (severe pain) for up to 7 days. 28 Tablet 0    [DISCONTINUED] furosemide (LASIX) 20 MG Tab Take 1 Tablet by mouth every day. (Patient not taking: No sig reported) 60 Tablet 0    [DISCONTINUED] omeprazole (PRILOSEC) 20 MG delayed-release capsule Take 1 Capsule by mouth every day. (Patient not taking: Reported on 2022) 30 Capsule 0    [DISCONTINUED] potassium chloride SA (K-DUR) 10 MEQ Tab CR Take 1 Tablet by mouth every day. (Patient not taking: Reported on 2022) 60 Tablet 11    [DISCONTINUED] sucralfate (CARAFATE) 1 GM/10ML Suspension Take 10 mL by mouth every 6 hours. 1 mL 0     No facility-administered encounter medications on file as of 2022.         Review of Systems   Constitutional:  Positive for malaise/fatigue. Negative for chills and fever.        No weight gain   Respiratory:  Positive for shortness of breath. Negative for cough.    Cardiovascular:  Positive for chest pain (chest wall pain, pleuritic pain). Negative for palpitations, orthopnea, leg swelling and PND.   Gastrointestinal:  Negative for blood in stool, heartburn and melena.   Neurological:  Negative for dizziness.   Psychiatric/Behavioral:  The patient is nervous/anxious.         Objective:   BP (!) 98/64 (BP Location: Left arm, Patient Position: Sitting, BP Cuff Size: Adult)   Pulse (!) 102   Resp 16   Ht 1.6 m (5' 2.99\")   Wt " 61.7 kg (136 lb)   [facility] 11/23/2020   SpO2 98%   BMI 24.10 kg/m²        Physical Exam  Vitals and nursing note reviewed.   Constitutional:       General: She is not in acute distress.     Appearance: Normal appearance.   HENT:      Head: Normocephalic and atraumatic.   Eyes:      General: No scleral icterus.  Cardiovascular:      Rate and Rhythm: Regular rhythm. Tachycardia present.      Pulses: Normal pulses.      Heart sounds: No murmur heard.  Pulmonary:      Effort: Pulmonary effort is normal. No respiratory distress.      Breath sounds: Normal breath sounds. No rales.   Abdominal:      General: There is no distension.      Palpations: Abdomen is soft.   Musculoskeletal:      Right lower leg: No edema.      Left lower leg: No edema.   Skin:     General: Skin is warm and dry.      Capillary Refill: Capillary refill takes less than 2 seconds.      Comments: Sternotomy scar is well approximated and healed without any purulence or surrounding erythema.  3 Chest tube scars are well-healed.   Neurological:      General: No focal deficit present.      Mental Status: She is alert and oriented to person, place, and time.   Psychiatric:         Judgment: Judgment normal.          Assessment:     1. S/P tricuspid valve repair  EC-ECHOCARDIOGRAM COMPLETE W/O CONT      2. Infective endocarditis of tricuspid valve  EC-ECHOCARDIOGRAM COMPLETE W/O CONT      3. S/P aortic valve repair  EC-ECHOCARDIOGRAM COMPLETE W/O CONT      4. S/P patent foramen ovale closure        5. Iron deficiency  IRON/TOTAL IRON BIND (FEIBC)    IRON    FERRITIN    CBC WITHOUT DIFFERENTIAL           Medical Decision Making:  Today's Assessment / Plan:   Tricuspid valve and aortic valve endocarditis secondary to E. Faecalis bacteremia  S/P Triscupid repair and debridement of aortic valve with residual mild TR and AI.   Valvular cardiomyopathy  - cont aspirin 81mg. Cont clopidogrel 75mg daily for a total of 3mo post op (stop around Dec 16)  - no coronary  angiogram before surgery?   - was not discharged on beta blockade therapy due to hypotension in hospital   - repeat KEO immediately post op interpreted by Dr. Bradley shows resolution of LV dysfunction  Repeat TTE, will discuss-need for repeat KEO with Dr. Mcwilliams.  - SBE prophylaxis for any dental procedure or airway procedure.  Her ID MD had also recommended prophylaxis prior to colonoscopies.    Iron deficiency anemia  - check CBC and iron studies    Gastritis secondary to NSAID use  -Complete 30-day course of Carafate then can stop.  Continue pantoprazole and follow-up with her primary for further recommendations    Prolonged QT  - repeat EKG at next visit (previously 470ms)        RTC with Dr. Mcwilliams after echocardiogram

## 2022-11-01 ENCOUNTER — OFFICE VISIT (OUTPATIENT)
Dept: CARDIOLOGY | Facility: MEDICAL CENTER | Age: 52
End: 2022-11-01
Payer: COMMERCIAL

## 2022-11-01 VITALS
BODY MASS INDEX: 24.1 KG/M2 | RESPIRATION RATE: 16 BRPM | HEART RATE: 102 BPM | HEIGHT: 63 IN | SYSTOLIC BLOOD PRESSURE: 98 MMHG | DIASTOLIC BLOOD PRESSURE: 64 MMHG | WEIGHT: 136 LBS | OXYGEN SATURATION: 98 %

## 2022-11-01 DIAGNOSIS — Z98.890 S/P TRICUSPID VALVE REPAIR: ICD-10-CM

## 2022-11-01 DIAGNOSIS — E61.1 IRON DEFICIENCY: ICD-10-CM

## 2022-11-01 DIAGNOSIS — Z87.74 S/P PATENT FORAMEN OVALE CLOSURE: ICD-10-CM

## 2022-11-01 DIAGNOSIS — I33.0 INFECTIVE ENDOCARDITIS OF TRICUSPID VALVE: ICD-10-CM

## 2022-11-01 DIAGNOSIS — Z98.890 S/P AORTIC VALVE REPAIR: ICD-10-CM

## 2022-11-01 PROCEDURE — 99214 OFFICE O/P EST MOD 30 MIN: CPT | Performed by: PHYSICIAN ASSISTANT

## 2022-11-01 RX ORDER — PHENOL 1.4 %
15 AEROSOL, SPRAY (ML) MUCOUS MEMBRANE
COMMUNITY
Start: 2022-10-10

## 2022-11-01 RX ORDER — LIDOCAINE 50 MG/G
PATCH TOPICAL
COMMUNITY
Start: 2022-10-11 | End: 2023-07-19

## 2022-11-01 RX ORDER — SUMATRIPTAN 100 MG/1
100 TABLET, FILM COATED ORAL
COMMUNITY
Start: 2022-10-10 | End: 2022-11-01

## 2022-11-01 RX ORDER — PANTOPRAZOLE SODIUM 40 MG/1
TABLET, DELAYED RELEASE ORAL
COMMUNITY
Start: 2022-10-11 | End: 2022-11-01 | Stop reason: SDUPTHER

## 2022-11-01 RX ORDER — PANTOPRAZOLE SODIUM 40 MG/1
40 TABLET, DELAYED RELEASE ORAL
Qty: 30 TABLET | Refills: 2 | Status: SHIPPED | OUTPATIENT
Start: 2022-11-01 | End: 2023-02-23

## 2022-11-01 RX ORDER — FERROUS GLUCONATE 324(38)MG
324 TABLET ORAL DAILY
COMMUNITY
Start: 2022-10-11 | End: 2022-12-07

## 2022-11-01 RX ORDER — OXYCODONE HYDROCHLORIDE 5 MG/1
TABLET ORAL
COMMUNITY
Start: 2022-10-24 | End: 2023-08-29

## 2022-11-01 RX ORDER — TEMAZEPAM 15 MG/1
CAPSULE ORAL
COMMUNITY
Start: 2022-10-11 | End: 2022-12-07

## 2022-11-01 ASSESSMENT — ENCOUNTER SYMPTOMS
COUGH: 0
ORTHOPNEA: 0
BLOOD IN STOOL: 0
FEVER: 0
HEARTBURN: 0
SHORTNESS OF BREATH: 1
DIZZINESS: 0
CHILLS: 0
PND: 0
NERVOUS/ANXIOUS: 1
PALPITATIONS: 0

## 2022-11-01 ASSESSMENT — FIBROSIS 4 INDEX: FIB4 SCORE: 0.62

## 2022-11-01 NOTE — PATIENT INSTRUCTIONS
May stop the Carafate once you have finished the current bottle.   Continue the pantoprazole  Complete clopidogrel (plavix) 90 day (3mo course) Around Dec 16th

## 2022-11-03 RX ORDER — METOPROLOL SUCCINATE 25 MG/1
12.5 TABLET, EXTENDED RELEASE ORAL DAILY
Qty: 45 TABLET | Refills: 3 | Status: SHIPPED | OUTPATIENT
Start: 2022-11-03 | End: 2023-02-23

## 2022-11-28 ENCOUNTER — NON-PROVIDER VISIT (OUTPATIENT)
Dept: CARDIOLOGY | Facility: MEDICAL CENTER | Age: 52
End: 2022-11-28
Payer: COMMERCIAL

## 2022-11-28 DIAGNOSIS — Z98.890 S/P TRICUSPID VALVE REPAIR: Primary | ICD-10-CM

## 2022-11-28 PROCEDURE — G0423 INTENS CARDIAC REHAB NO EXER: HCPCS | Mod: 59 | Performed by: INTERNAL MEDICINE

## 2022-11-28 PROCEDURE — G0422 INTENS CARDIAC REHAB W/EXERC: HCPCS | Performed by: INTERNAL MEDICINE

## 2022-11-28 NOTE — PROGRESS NOTES
Patient arrived for initial 1:1 Intensive Cardiac Rehabilitation Consultation and Education session with the Registered Nurse.  A total of 60 minutes was spent with the patient during which time a focused cardiovascular assessment was completed and musculoskeletal limitations were addressed in preparation to safely start the exercise portion of the program.  Education regarding the program was explained including exercise goals, precautions, and target heart rate during exercise.  Nutrition goals were reviewed and patient was introduced to the [facility] Program.  Stress management goals were dicussed and patient concerns and questions were answered at this time. Patient arrived for education at 1130 and visit was concluded at 1300. Exercise time was from 1200 to 1300.

## 2022-11-29 ENCOUNTER — NON-PROVIDER VISIT (OUTPATIENT)
Dept: CARDIOLOGY | Facility: MEDICAL CENTER | Age: 52
End: 2022-11-29
Payer: COMMERCIAL

## 2022-11-29 DIAGNOSIS — Z98.890 S/P TRICUSPID VALVE REPAIR: ICD-10-CM

## 2022-11-29 PROCEDURE — G0423 INTENS CARDIAC REHAB NO EXER: HCPCS | Mod: 59 | Performed by: INTERNAL MEDICINE

## 2022-11-29 PROCEDURE — G0422 INTENS CARDIAC REHAB W/EXERC: HCPCS | Performed by: INTERNAL MEDICINE

## 2022-11-30 ENCOUNTER — NON-PROVIDER VISIT (OUTPATIENT)
Dept: CARDIOLOGY | Facility: MEDICAL CENTER | Age: 52
End: 2022-11-30
Payer: COMMERCIAL

## 2022-11-30 DIAGNOSIS — Z98.890 S/P TRICUSPID VALVE REPAIR: ICD-10-CM

## 2022-11-30 PROCEDURE — G0423 INTENS CARDIAC REHAB NO EXER: HCPCS | Mod: 59 | Performed by: INTERNAL MEDICINE

## 2022-11-30 PROCEDURE — G0422 INTENS CARDIAC REHAB W/EXERC: HCPCS | Performed by: INTERNAL MEDICINE

## 2022-11-30 NOTE — PROGRESS NOTES
Sarah Buckner attended Intensive Cardiac Rehab today from 0900 to 1100. During her time she exercised and attended class. Her education today was a cooking school titled: [employer]. Patient received handouts and class discussion pertaining to the topic.

## 2022-12-01 ENCOUNTER — NON-PROVIDER VISIT (OUTPATIENT)
Dept: CARDIOLOGY | Facility: MEDICAL CENTER | Age: 52
End: 2022-12-01
Payer: COMMERCIAL

## 2022-12-01 DIAGNOSIS — Z98.890 S/P TRICUSPID VALVE REPAIR: ICD-10-CM

## 2022-12-01 PROCEDURE — G0422 INTENS CARDIAC REHAB W/EXERC: HCPCS | Performed by: INTERNAL MEDICINE

## 2022-12-01 PROCEDURE — G0423 INTENS CARDIAC REHAB NO EXER: HCPCS | Mod: 59 | Performed by: INTERNAL MEDICINE

## 2022-12-01 NOTE — PROGRESS NOTES
Sarah Buckner attended Intensive Cardiac Rehab today from 0900 to 1100. During her time she exercised and attended class.   Her education today was a workshop titled: Reducing Stress. Patient received handouts and class discussion pertaining to the topic.

## 2022-12-02 ENCOUNTER — TELEPHONE (OUTPATIENT)
Dept: CARDIOLOGY | Facility: MEDICAL CENTER | Age: 52
End: 2022-12-02
Payer: COMMERCIAL

## 2022-12-02 NOTE — TELEPHONE ENCOUNTER
JA    Called pt twice to rescheduling apt with JA on 12/05, since JA will be out of office.  LVM both times to reschedule, second VM letting pt know that apt was cancelled and to call back scheduling to reschedule apt.     Thank you

## 2022-12-06 ENCOUNTER — TELEPHONE (OUTPATIENT)
Dept: CARDIOLOGY | Facility: MEDICAL CENTER | Age: 52
End: 2022-12-06

## 2022-12-06 ENCOUNTER — NON-PROVIDER VISIT (OUTPATIENT)
Dept: CARDIOLOGY | Facility: MEDICAL CENTER | Age: 52
End: 2022-12-06
Payer: COMMERCIAL

## 2022-12-06 DIAGNOSIS — Z98.890 S/P TRICUSPID VALVE REPAIR: ICD-10-CM

## 2022-12-06 PROCEDURE — G0422 INTENS CARDIAC REHAB W/EXERC: HCPCS | Performed by: INTERNAL MEDICINE

## 2022-12-06 PROCEDURE — G0423 INTENS CARDIAC REHAB NO EXER: HCPCS | Mod: 59 | Performed by: INTERNAL MEDICINE

## 2022-12-06 NOTE — PROGRESS NOTES
Sarah Buckner attended Intensive Cardiac Rehab today from 0900 to 1100. During her time she exercised and attended class. Her education today was a video titled: Targeting Nutritional Priorities. Patient received handouts and class discussion pertaining to the topic.

## 2022-12-07 NOTE — TELEPHONE ENCOUNTER
---------------------------------------  Called pt [phone]  Relayed JM recommendation. Pt in agreement. Non-provider appt scheduled at  12/8/22 8:15am. Provided address:    [address] [facility]   [id number]  Pt verbalized understanding and is appreciative of call.

## 2022-12-08 ENCOUNTER — NON-PROVIDER VISIT (OUTPATIENT)
Dept: CARDIOLOGY | Facility: MEDICAL CENTER | Age: 52
End: 2022-12-08
Payer: COMMERCIAL

## 2022-12-08 DIAGNOSIS — Z01.818 PRE-OPERATIVE CLEARANCE: ICD-10-CM

## 2022-12-08 DIAGNOSIS — Z98.890 S/P TRICUSPID VALVE REPAIR: ICD-10-CM

## 2022-12-08 LAB — EKG IMPRESSION: NORMAL

## 2022-12-08 PROCEDURE — 93000 ELECTROCARDIOGRAM COMPLETE: CPT | Mod: 59 | Performed by: INTERNAL MEDICINE

## 2022-12-08 PROCEDURE — G0422 INTENS CARDIAC REHAB W/EXERC: HCPCS | Performed by: INTERNAL MEDICINE

## 2022-12-08 PROCEDURE — G0423 INTENS CARDIAC REHAB NO EXER: HCPCS | Mod: 59 | Performed by: INTERNAL MEDICINE

## 2022-12-08 NOTE — PROGRESS NOTES
Sarah Buckner attended Intensive Cardiac Rehab today from 0900 to 1100. During her time she exercised and attended class. Her education today was a workshop titled: Targeting Your Nutritional Priorities. Patient received handouts and class discussion pertaining to the topic.

## 2022-12-08 NOTE — PROGRESS NOTES
Patient was here today for EKG per Cardiac Rehab. EKG performed and transferred into patients chart. Patient has been informed that EKG is in her chart and Cardiac Rehab will have access to those records.

## 2022-12-09 ENCOUNTER — TELEPHONE (OUTPATIENT)
Dept: CARDIOLOGY | Facility: MEDICAL CENTER | Age: 52
End: 2022-12-09
Payer: COMMERCIAL

## 2022-12-09 NOTE — TELEPHONE ENCOUNTER
FARA        Caller: Sarah Buckner      Topic/issue: Patient was asking for a call back to review the results of her latest testing and was asking for a call back due to some concerns she had      Callback Number: [phone]        Thank you    -Justen MORFIN

## 2022-12-12 ENCOUNTER — TELEPHONE (OUTPATIENT)
Dept: CARDIOLOGY | Facility: MEDICAL CENTER | Age: 52
End: 2022-12-12
Payer: COMMERCIAL

## 2022-12-12 ENCOUNTER — PATIENT MESSAGE (OUTPATIENT)
Dept: CARDIOLOGY | Facility: MEDICAL CENTER | Age: 52
End: 2022-12-12
Payer: COMMERCIAL

## 2022-12-12 NOTE — TELEPHONE ENCOUNTER
Phone Number Called: [phone]    Call outcome: Spoke to patient regarding message below.    Message: Called to return patient's phone call regarding her latest EKG results in [facility].     Patient denies chest pain, shortness of breath, dizziness, etc. Patient states she feels generally well. Patient reports feeling off one day last week and heart rate was in the 50s. Patient reports having to stop rehab/therapy from becoming lightheaded on the treadmill. Patient reports she drank water, and symptoms subsided.     Patient reports feeling good today, but patient is concerned with latest EKG results.     To: FARA  Please advise. Thank you!

## 2022-12-12 NOTE — TELEPHONE ENCOUNTER
To DANIEL as member of care team.   Pt called back asking again for feedback on EKG. JA out of the office today, pt had recent tricuspid valve repair and after reviewing her EKG report she is anxious about the results. Pt states that her pulse was recently high at cardiac rehab and she thinks this is the reason for the EKG recommendation, she is asking for feedback today. Explained FARA out today and due to staffing, can take 3-5 days for feedback, please advise.

## 2022-12-12 NOTE — TELEPHONE ENCOUNTER
FARA.    Caller: Sarah Buckner     Topic/issue: Pt is very concerned on her test results and would like a call back to discuss them .    Callback Number: [phone] (home)      Thank you,    Brittany LYNCH

## 2022-12-13 ENCOUNTER — PATIENT MESSAGE (OUTPATIENT)
Dept: CARDIOLOGY | Facility: MEDICAL CENTER | Age: 52
End: 2022-12-13

## 2022-12-13 ENCOUNTER — NON-PROVIDER VISIT (OUTPATIENT)
Dept: CARDIOLOGY | Facility: MEDICAL CENTER | Age: 52
End: 2022-12-13
Payer: COMMERCIAL

## 2022-12-13 DIAGNOSIS — Z98.890 S/P TRICUSPID VALVE REPAIR: ICD-10-CM

## 2022-12-13 PROCEDURE — G0422 INTENS CARDIAC REHAB W/EXERC: HCPCS | Performed by: INTERNAL MEDICINE

## 2022-12-13 PROCEDURE — G0423 INTENS CARDIAC REHAB NO EXER: HCPCS | Mod: 59 | Performed by: INTERNAL MEDICINE

## 2022-12-13 NOTE — TELEPHONE ENCOUNTER
[facility] Dr. Mcwilliams, pt was very anxious during earlier call today. Called pt back, we went over Dr. Mcwilliams's message and I answered her questions, pt needs nothing further at this time and appreciates call back.

## 2022-12-13 NOTE — TELEPHONE ENCOUNTER
Sent her a response, stay hydrated, keep up rehab. We can do a heart rhythm monitor if concerned for arrhythmias. We can also do a work-up for sinus tachy if this continues when she's at rest.

## 2022-12-13 NOTE — PROGRESS NOTES
Sarah Buckner attended Intensive Cardiac Rehab today from 0900 to 1100. During her time she exercised and attended class. Her education today was a workshop titled: Biomechanics, Balance and Fall Prevention. Patient received handouts and class discussion pertaining to the topic.

## 2022-12-15 ENCOUNTER — NON-PROVIDER VISIT (OUTPATIENT)
Dept: CARDIOLOGY | Facility: MEDICAL CENTER | Age: 52
End: 2022-12-15
Payer: COMMERCIAL

## 2022-12-15 DIAGNOSIS — Z98.890 S/P TRICUSPID VALVE REPAIR: ICD-10-CM

## 2022-12-15 PROCEDURE — G0423 INTENS CARDIAC REHAB NO EXER: HCPCS | Mod: 59 | Performed by: INTERNAL MEDICINE

## 2022-12-15 PROCEDURE — G0422 INTENS CARDIAC REHAB W/EXERC: HCPCS | Performed by: INTERNAL MEDICINE

## 2022-12-15 NOTE — PROGRESS NOTES
Sarah Buckner attended Intensive Cardiac Rehab today from 0900 to 1100. During her time she exercised and attended class. Her education today was a VIDEO titled: BIOMECHANICAL LIMITATIONS. Patient received handouts and class discussion pertaining to the topic.

## 2022-12-20 ENCOUNTER — NON-PROVIDER VISIT (OUTPATIENT)
Dept: CARDIOLOGY | Facility: MEDICAL CENTER | Age: 52
End: 2022-12-20
Payer: COMMERCIAL

## 2022-12-20 DIAGNOSIS — Z98.890 S/P TRICUSPID VALVE REPAIR: ICD-10-CM

## 2022-12-20 PROCEDURE — G0422 INTENS CARDIAC REHAB W/EXERC: HCPCS | Performed by: INTERNAL MEDICINE

## 2022-12-20 PROCEDURE — G0423 INTENS CARDIAC REHAB NO EXER: HCPCS | Mod: 59 | Performed by: INTERNAL MEDICINE

## 2022-12-20 NOTE — PROGRESS NOTES
Sarah Buckner attended Intensive Cardiac Rehab today from 0900 to 1100. During her time she exercised and attended class. Her education today was a video titled: Fueling a Healthy Body. Patient received handouts and class discussion pertaining to the topic.

## 2022-12-28 ENCOUNTER — NON-PROVIDER VISIT (OUTPATIENT)
Dept: CARDIOLOGY | Facility: MEDICAL CENTER | Age: 52
End: 2022-12-28
Payer: COMMERCIAL

## 2022-12-28 DIAGNOSIS — Z98.890 S/P TRICUSPID VALVE REPAIR: ICD-10-CM

## 2022-12-28 PROCEDURE — G0423 INTENS CARDIAC REHAB NO EXER: HCPCS | Mod: 59 | Performed by: INTERNAL MEDICINE

## 2022-12-28 PROCEDURE — G0422 INTENS CARDIAC REHAB W/EXERC: HCPCS | Performed by: INTERNAL MEDICINE

## 2023-01-05 NOTE — PROGRESS NOTES
"Infectious Disease Progress Note    Author: Aaron Yeh M.D. Date & Time of service: 2022  11:05 AM    Chief Complaint:  TV endocarditis    Interval History:  51 y.o. female admitted 2022 as transfer from [place] for KEO. Blood cultures +Efaecalis. TTE with vegetation TV and acute anemia Hgb  down to 6.4 (baseline 13-14)   AF WBC 12.9 Patient alert today. Additional history obtained.  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  Normally active and regularly able to 50+#.  States she felt well until end of May after a 3 hour car ride had the sudden onset of back pain-she saw her PCP, spine and was referred to pain management.  She did not want to take all the narcotics she was given so had been self treating with NSAIDS.  The pain in her back was initially upper back but would change-sometimes low back and varied from side to side.  She was concerned that \"something else\" was going on. MRI done 2022 showed possible myelofibrosis.  On , started feeling feverish, very fatigued, and noted pallor \"I looked sick... like a zombie\"    T-max 100.6, white count 10.4, tolerating antimicrobials.  Cardiology plan as below    Labs Reviewed, Medications Reviewed, and Radiology Reviewed.    Review of Systems:  Review of Systems   Constitutional:  Positive for malaise/fatigue. Negative for fever.   Musculoskeletal:  Positive for back pain.   Neurological:  Positive for weakness.     Hemodynamics:  Temp (24hrs), Av.2 °C (99 °F), Min:36.6 °C (97.8 °F), Max:38.2 °C (100.7 °F)  Temperature: 36.8 °C (98.3 °F)  Pulse  Av.8  Min: 78  Max: 144   Blood Pressure: 125/80       Physical Exam:  Physical Exam  Vitals and nursing note reviewed.   Constitutional:       General: She is not in acute distress.     Appearance: She is not ill-appearing, toxic-appearing or diaphoretic.   HENT:      Nose: No rhinorrhea.   Eyes:      General: No scleral icterus.     Extraocular Movements: Extraocular " movements intact.      Pupils: Pupils are equal, round, and reactive to light.   Cardiovascular:      Rate and Rhythm: Tachycardia present.      Heart sounds: Murmur heard.   Abdominal:      General: There is no distension.      Palpations: Abdomen is soft.      Tenderness: There is no abdominal tenderness.   Skin:     Coloration: Skin is pale. Skin is not jaundiced.      Comments: No osler or Janeway lesions   Neurological:      General: No focal deficit present.      Mental Status: She is alert and oriented to person, place, and time.       Meds:    Current Facility-Administered Medications:     omeprazole    thiamine    traMADol    ampicillin    cefTRIAXone (ROCEPHIN) IV    HYDROmorphone    oxyCODONE immediate-release    diphenhydrAMINE    SUMAtriptan    acetaminophen    benzonatate    dextrose 50%    guaiFENesin ER    senna-docusate **AND** polyethylene glycol/lytes **AND** magnesium hydroxide **AND** bisacodyl    ondansetron    ondansetron    promethazine    promethazine    prochlorperazine    Pharmacy Consult Request    Labs:  Recent Labs     09/11/22  1308 09/12/22  0034 09/13/22 0034   WBC 12.9* 10.8 10.4   RBC 3.55* 3.25* 3.12*   HEMOGLOBIN 10.1* 9.1* 8.9*   HEMATOCRIT 30.7* 28.1* 27.5*   MCV 86.5 86.5 88.1   MCH 28.5 28.0 28.5   RDW 50.3* 50.2* 51.2*   PLATELETCT 538* 492* 529*   MPV 9.6 9.3 9.5   NEUTSPOLYS 89.00* 71.60 70.80   LYMPHOCYTES 9.30* 19.40* 19.40*   MONOCYTES 1.70 7.10 8.40   EOSINOPHILS 0.00 0.10 0.10   BASOPHILS 0.00 0.30 0.20       Recent Labs     09/11/22  0129 09/12/22  0034 09/13/22  0034   SODIUM 131* 136 135   POTASSIUM 3.8 3.5* 3.9   CHLORIDE 100 102 102   CO2 23 25 27   GLUCOSE 94 107* 111*   BUN 4* 2* 2*       Recent Labs     09/11/22  0129 09/12/22 0034 09/13/22 0034   ALBUMIN 2.2* 2.5* 2.4*   TBILIRUBIN 0.4 0.5 0.5   ALKPHOSPHAT 592* 545* 454*   TOTPROTEIN 6.0 6.2 6.2   ALTSGPT 32 31 21   ASTSGOT 22 24 21   CREATININE 0.47* 0.39* 0.40*         Imaging:  CT-CHEST (THORAX)  WITH    Result Date: 9/8/2022   CT of the chest, abdomen and pelvis HISTORY/REASON FOR EXAM:  concern for malignancy TECHNIQUE/EXAM DESCRIPTION: CT scan of the chest, abdomen and pelvis with contrast.  Both automated exposure control and Automated adjustment of tube current based on patient size are utilized.  9/8/2022 8:33 AM. Total DLP: 470.34 mGy*cm COMPARISON: None. FINDINGS: Chest: There are multifocal areas of consolidation involving predominantly the upper lobes and to a lesser degree the lower lobes.  One of them is cavitary in the left lower lobe measuring 1.3 cm.  No pleural effusion or pneumothorax. The heart is normal in size.  There is trace pericardial effusion. The great vessel origins are unremarkable.  The aorta is normal in caliber. No pathologically enlarged adenopathy. There are no concerning osseous lesions.  No fractures.  Bilateral breast implants. Abdomen pelvis: Solid organs: The liver, pancreas and adrenal glands are unremarkable.  There is a cyst in the anterior spleen. The gallbladder is present. Kidneys and Bladder: There is no nephrolithiasis or hydronephrosis.  Mild focal scarring left upper pole kidney. No enhancing renal masses. The kidneys enhance symmetrically. No ureteral or bladder stones are seen. The bladder is grossly unremarkable. Bowel and Appendix: No dilated bowel. No perienteric inflammatory changes. No extraluminal air or free fluid. The appendix is visualized and appears normal. No pelvic masses. Lymphatics:  No abdominal, pelvic, or retroperitoneal lymphadenopathy. Other: No concerning osseous lesions. No fractures.     1.  Multifocal consolidation concerning for pneumonia, with small cavitary lesion in the left lower lobe. 2.  No acute abnormality or evidence of malignancy to the abdomen or pelvis. [facility] 9/8/2022 9:00 AM DLP Reporting Thresholds for Incorrect/Repeated Exams - DLP in mGy*cm Head/Neck:  0-year-old 3840, 1-year-old 5880, 5-year-old 5595,  10-year-old 66451 and adult 43512 Head:  0-year-old 4540, 1-year-old 7460, 5-year-old 39595, 10-year-old 93202 and adult 64585 Neck:  0-year-old 2940, 1-year-old 4160, 5-year-old 4550, 10-year-old 6320 and adult 8470 Chest:  0-year-old 550, 1-year-old 830, 5-year-old 1200, 10-year-old 3840 and adult 3570 Abd/pelvis:  0-year-old 440, 1-year-old 720, 5-year-old 1080, 10-year-old 3330 and adult 3330 Trunk(C/A/P):  0-year-old 490, 1-year-old 770, 5-year-old 1140, 10-year-old 3570 and adult 3330    CT-HEAD W/O    Result Date: 9/7/2022   CT head without contrast HISTORY/REASON FOR EXAM:  Headache, new or worsening (Age >= 50y). TECHNIQUE/EXAM DESCRIPTION: CT scan of the brain without contrast. Both automated exposure control and Automated adjustment of tube current based on patient size are utilized. 9/7/2022 4:19 PM. CT scan of the brain was carried out without contrast in the normal manner. Total DLP: 766.10 mGy*cm COMPARISON: None. FINDINGS: The brain parenchyma is unremarkable. The gray-white matter differentiation is well preserved throughout. There is no evidence of mass, mass effect, hemorrhage, or extraaxial fluid collection.  There is no midline shift. The orbits and calvarium are intact. No evidence of fracture. The visualized paranasal sinuses are clear. The mastoid air cells are well pneumatized.     No acute intracranial abnormality. [facility] 9/7/2022 4:23 PM DLP Reporting Thresholds for Incorrect/Repeated Exams - DLP in mGy*cm Head/Neck:  0-year-old 3840, 1-year-old 5880, 5-year-old 8770, 10-year-old 47765 and adult 71342 Head:  0-year-old 4540, 1-year-old 7460, 5-year-old 97703, 10-year-old 89029 and adult 87721 Neck:  0-year-old 2940, 1-year-old 4160, 5-year-old 4550, 10-year-old 6320 and adult 8470 Chest:  0-year-old 550, 1-year-old 830, 5-year-old 1200, 10-year-old 3840 and adult 3570 Abd/pelvis:  0-year-old 440, 1-year-old 720, 5-year-old 1080, 10-year-old 3330 and adult 3330 Trunk(C/A/P):   0-year-old 490, 1-year-old 770, 5-year-old 1140, 10-year-old 3570 and adult 3330    DX-CHEST-PORTABLE (1 VIEW)    Result Date: 9/7/2022  HISTORY/REASON FOR EXAM:  Cough. TECHNIQUE/EXAM DESCRIPTION AND NUMBER OF VIEWS: Portable chest (one view), 9/7/2022 4:22 PM. COMPARISON: None. FINDINGS: There is mild multifocal groundglass consolidation left upper lobe and right upper lobe, to a lesser degree left lower lobe.  No pleural effusion or pneumothorax. The cardiomediastinal silhouette is normal in size. The bones are intact.     Multifocal bilateral consolidation concerning for pneumonia. Jazzy Huberbury 9/7/2022 8:06 PM    [id number]    Result Date: 9/10/2022  9/10/2022 1:54 PM HISTORY/REASON FOR EXAM:  Bacterial endocarditis. TECHNIQUE/EXAM DESCRIPTION AND NUMBER OF VIEWS:  1 view(s) of the mandible. COMPARISON:  None. FINDINGS: No evidence of fracture. No lytic or sclerotic lesion. There are multiple dental caries.     Multiple dental caries.    US-ABDOMEN COMPLETE SURVEY    Result Date: 9/7/2022  HISTORY/REASON FOR EXAM: Abnormal Labs; elevated liver function tests, with elevated alk phos. TECHNIQUE/EXAM DESCRIPTION: Ultrasound abdomen complete.9/7/2022 7:57 PM COMPARISON: None FINDINGS: Pancreas: The tail is suboptimally visualized due to overlying bowel gas. Aorta and IVC: Normal in caliber. Liver: Normal.  The Liver Measures 19.6 cm. The portal vein is patent with flow in the proper direction into the liver.  This is normal. Gallbladder: Normal. Common bile duct: 3 mm, which is normal. Spleen: Normal in size and echogenicity. Kidneys: The kidneys are normal in size and echogenicity. There is No ascites.     Mild hepatomegaly.  Otherwise unremarkable exam. Jazzy Huberbury 9/7/2022 8:27 PM    CT ABDOMEN-PELVIS WITH IV CONTRAST    Result Date: 9/8/2022   CT of the chest, abdomen and pelvis HISTORY/REASON FOR EXAM:  concern for malignancy TECHNIQUE/EXAM DESCRIPTION: CT scan of the chest, abdomen and  pelvis with contrast.  Both automated exposure control and Automated adjustment of tube current based on patient size are utilized.  9/8/2022 8:33 AM. Total DLP: 470.34 mGy*cm COMPARISON: None. FINDINGS: Chest: There are multifocal areas of consolidation involving predominantly the upper lobes and to a lesser degree the lower lobes.  One of them is cavitary in the left lower lobe measuring 1.3 cm.  No pleural effusion or pneumothorax. The heart is normal in size.  There is trace pericardial effusion. The great vessel origins are unremarkable.  The aorta is normal in caliber. No pathologically enlarged adenopathy. There are no concerning osseous lesions.  No fractures.  Bilateral breast implants. Abdomen pelvis: Solid organs: The liver, pancreas and adrenal glands are unremarkable.  There is a cyst in the anterior spleen. The gallbladder is present. Kidneys and Bladder: There is no nephrolithiasis or hydronephrosis.  Mild focal scarring left upper pole kidney. No enhancing renal masses. The kidneys enhance symmetrically. No ureteral or bladder stones are seen. The bladder is grossly unremarkable. Bowel and Appendix: No dilated bowel. No perienteric inflammatory changes. No extraluminal air or free fluid. The appendix is visualized and appears normal. No pelvic masses. Lymphatics:  No abdominal, pelvic, or retroperitoneal lymphadenopathy. Other: No concerning osseous lesions. No fractures.     1.  Multifocal consolidation concerning for pneumonia, with small cavitary lesion in the left lower lobe. 2.  No acute abnormality or evidence of malignancy to the abdomen or pelvis. [facility] [place] [facility] 9/8/2022 9:00 AM DLP Reporting Thresholds for Incorrect/Repeated Exams - DLP in mGy*cm Head/Neck:  0-year-old 3840, 1-year-old 5880, 5-year-old 8770, 10-year-old 97014 and adult 94730 Head:  0-year-old 4540, 1-year-old 7460, 5-year-old 09864, 10-year-old 23100 and adult 13574 Neck:  0-year-old 2940, 1-year-old 4160, 5-year-old 4550,  10-year-old 6320 and adult 8470 Chest:  0-year-old 550, 1-year-old 830, 5-year-old 1200, 10-year-old 3840 and adult 3570 Abd/pelvis:  0-year-old 440, 1-year-old 720, 5-year-old 1080, 10-year-old 3330 and adult 3330 Trunk(C/A/P):  0-year-old 490, 1-year-old 770, 5-year-old 1140, 10-year-old 3570 and adult 3330    EC-ECHOCARDIOGRAM COMPLETE W/O CONT    Result Date: 2022  Transthoracic Echo Report Echocardiography Laboratory CONCLUSIONS No prior study is available for comparison. Technically difficult study - adequate information is obtained. Probable vegetation seen on the tricuspid valve in subcostal view. Normal left ventricular systolic function. The left ventricular ejection fraction is visually estimated to be 60%. Normal diastolic function. The right ventricle is normal in size and systolic function. Mild aortic insufficiency. These findings were communicated to the medical service. RUDDY REBOLLEDO Exam Date:         2022                    14:12 Exam Location:     Echo Lab Priority:          Routine Ordering Physician:        CATHI CAMARENA Referring Physician: Sonographer:               Sissy Ontiveros Age:    51     Gender:    F MRN:    [id number] :    1970 BSA:    1.65   Ht (in):    64.6   Wt (lb):    132 Exam Type:     Complete Indications:     Bacteremia ICD Codes: CPT Codes:       58412 BP:   113    /   77     HR:   117 Technical Quality:       Technically difficult study -                          adequate information is obtained MEASUREMENTS  (Male / Female) Normal Values 2D ECHO LV Diastolic Diameter PLAX        4.2 cm                4.2 - 5.9 / 3.9 - 5.3 cm LV Systolic Diameter PLAX         3 cm                  2.1 - 4.0 cm IVS Diastolic Thickness           0.67 cm               LVPW Diastolic Thickness          0.68 cm               LVOT Diameter                     2 cm                  Aortic Root Diameter              2.6 cm                 Estimated LV Ejection Fraction    60 %                  IVC Diameter                      1.8 cm                DOPPLER AV Peak Velocity                  2.3 m/s               AV Peak Gradient                  21.6 mmHg             AV Mean Gradient                  14.3 mmHg             AI Pressure Half Time             500 ms                LVOT Peak Velocity                1.4 m/s               AV Area Cont Eq vti               1.5 cm2               MV Velocity Time Integral         13.7 cm               Mitral E Point Velocity           0.61 m/s              Mitral E to A Ratio               0.65                  MV Pressure Half Time             45.5 ms               MV Area PHT                       4.8 cm2               MV Deceleration Time              157 ms                TR Peak Velocity                  186 cm/s              Right Atrial Pressure             8 mmHg                Right Ventricular Systolic Press  21.8 mmHg             PV Peak Velocity                  1 m/s                 PV Peak Gradient                  4.3 mmHg              RVOT Peak Velocity                0.65 m/s              * Indicates values subject to auto-interpretation LV EF:  60    % FINDINGS Left Ventricle The left ventricular ejection fraction is visually estimated to be 60%. Normal left ventricular systolic function. Normal left ventricular wall thickness. Normal left ventricular chamber size. Abnormal regional wall motion. Abnormal septal motion. Normal diastolic function. Right Ventricle The right ventricle is normal in size and systolic function. Right Atrium The right atrium is normal in size. Normal inferior vena cava size without inspiratory collapse. Left Atrium The left atrium is normal in size. Left atrial volume index is 10 mL/sq m. Mitral Valve Structurally normal mitral valve without significant stenosis. Trace mitral regurgitation. Aortic Valve Mild aortic insufficiency. Pressure half time is 500 msec. Aortic  valve sclerosis without significant stenosis. Tricuspid aortic valve. Tricuspid Valve Structurally normal tricuspid valve without significant stenosis. Mild tricuspid regurgitation. Right atrial pressure is estimated to be 8 mmHg. Estimated right ventricular systolic pressure is 20 mmHg. Probable vegetation seen on the tricuspid valve in subcostal view. Pulmonic Valve Structurally normal pulmonic valve without significant stenosis or regurgitation. Pericardium Plural effusion present. Aorta Normal aortic root for body surface area. The ascending aorta diameter is 2.9 cm. Mark Sykes MD (Electronically Signed) Final Date:     08 September 2022                 17:56    MRI 7/31/2022  FINDINGS:  Spinal column:  There is decreased T1/T2 signal noted within the vertebral marrow homogeneously throughout the spinal column, raising the question of infiltrative disease, such as myelofibrosis.     No destructive osseous process is appreciated.     Vertebral body morphology is normal.     No fracture is identified..     Spinal cord:     The conus medullaris is somewhat low, noted the level of L2-L3.     I see no evidence of syrinx.     Distal spinal cord appears unremarkable.     Cauda equina appear unremarkable.     Intervertebral disks:     At the level of L1-L2, the intervertebral disk, the central spinal canal, and the neural foramina appear normal.     At the level of L2-L3,  there is mild diffuse disc desiccation. There is minimal facet arthrosis.     Central spinal canal and neural foramina appear normal.     At the level of L3-L4, there is mild disc desiccation. There is a minimal circumferential disc bulge as well as minimal facet arthrosis. There is no significant foraminal, or central canal narrowing.     At the level of L4-L5, there is mild disc desiccation.     There is mild facet arthrosis.     Central spinal canal and neural foramina appear normal.     At the level of L5-S1, there is mild disc space  "narrowing.  There is mild disc desiccation.  Central spinal canal and neural foramina appear normal.     IMPRESSION:     I see no evidence of destructive osseous process and no evidence of significant narrowing of central spinal canal or the neural foramina.       Micro:  Results       Procedure Component Value Units Date/Time    BLOOD CULTURE [[id number]] Collected: 09/13/22 0815    Order Status: Sent Specimen: Blood from Peripheral Updated: 09/13/22 0840    Narrative:      Per Hospital Policy: Only change Specimen Src: to \"Line\" if  specified by physician order.    BLOOD CULTURE [[id number]]     Order Status: Sent Specimen: Blood from Peripheral     BLOOD CULTURE [[id number]] Collected: 09/10/22 1651    Order Status: Completed Specimen: Blood from Peripheral Updated: 09/11/22 0742     Significant Indicator NEG     Source BLD     Site PERIPHERAL     Culture Result No Growth  Note: Blood cultures are incubated for 5 days and  are monitored continuously.Positive blood cultures  are called to the RN and reported as soon as  they are identified.      Narrative:      Per Hospital Policy: Only change Specimen Src: to \"Line\" if  specified by physician order.  Left AC    BLOOD CULTURE [[id number]] Collected: 09/10/22 1651    Order Status: Completed Specimen: Blood from Peripheral Updated: 09/11/22 0742     Significant Indicator NEG     Source BLD     Site PERIPHERAL     Culture Result No Growth  Note: Blood cultures are incubated for 5 days and  are monitored continuously.Positive blood cultures  are called to the RN and reported as soon as  they are identified.      Narrative:      Per Hospital Policy: Only change Specimen Src: to \"Line\" if  specified by physician order.  Right AC    MRSA By PCR (Amp) [[id number]]     Order Status: Canceled Specimen: Respirate from Nares             Assessment/Hospital course:  This is a 51-year-old female patient who presented to [facility] with several weeks of fevers, malaise, cough, TTE " noted a vegetation of the tricuspid valve.  She was transferred to [facility] for KEO.    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  E faecalis bacteremia  Neck and back pain     PLAN:   -Cardiology has evaluated patient, reviewed the echo, discussed with cardiothoracic surgery and the patient and have deferred KEO at this time, planning to get this done at the end of antibiotic therapy  -GI was consulted to look for potential source and colonoscopy was performed, sigmoid polypectomy performed  -Given neck, mid and lower back pain, recommend MRI of the whole spine  -Continue dual beta-lactam therapy with IV ampicillin 2 g every 4 hours + IV ceftriaxone 2 g every 12 hours for 6 weeks through 10/21/2022  -At least weekly CBC with differential, CMP while on IV antibiotics.  Repeat KEO at end of therapy per cardiology    ID clinic follow-up in 4 to 6 weeks.  Cardiology follow-up in 6 to 8 weeks    Disposition: Will need placement for IV antibiotic plan as above.  MRI of the spine is pending  Need for PICC line: Yes, okay to place    Discussed with Dr. Colbert.  ID will follow.  Please call with questions.   [Abdominal Pain] : abdominal pain [Negative] : Heme/Lymph

## 2023-01-09 ENCOUNTER — TELEPHONE (OUTPATIENT)
Dept: CARDIOLOGY | Facility: MEDICAL CENTER | Age: 53
End: 2023-01-09
Payer: COMMERCIAL

## 2023-01-09 NOTE — TELEPHONE ENCOUNTER
01/09 - NGOC for Pt to C/B and schedule an appt w/ DANIEL on 01/10 @. Told her to ask for Amanda [facility]

## 2023-01-10 ENCOUNTER — TELEMEDICINE (OUTPATIENT)
Dept: CARDIOLOGY | Facility: MEDICAL CENTER | Age: 53
End: 2023-01-10
Payer: COMMERCIAL

## 2023-01-10 VITALS
OXYGEN SATURATION: 98 % | HEIGHT: 63 IN | DIASTOLIC BLOOD PRESSURE: 75 MMHG | SYSTOLIC BLOOD PRESSURE: 136 MMHG | WEIGHT: 146 LBS | HEART RATE: 106 BPM | BODY MASS INDEX: 25.87 KG/M2

## 2023-01-10 DIAGNOSIS — N20.0 CALCULUS OF KIDNEY: ICD-10-CM

## 2023-01-10 DIAGNOSIS — Z87.74 S/P PATENT FORAMEN OVALE CLOSURE: ICD-10-CM

## 2023-01-10 DIAGNOSIS — Z98.890 S/P AORTIC VALVE REPAIR: ICD-10-CM

## 2023-01-10 DIAGNOSIS — Z98.890 S/P TRICUSPID VALVE REPAIR: ICD-10-CM

## 2023-01-10 DIAGNOSIS — D72.819 LEUKOPENIA, UNSPECIFIED TYPE: ICD-10-CM

## 2023-01-10 DIAGNOSIS — I33.0 INFECTIVE ENDOCARDITIS OF TRICUSPID VALVE: ICD-10-CM

## 2023-01-10 DIAGNOSIS — E78.5 DYSLIPIDEMIA: ICD-10-CM

## 2023-01-10 DIAGNOSIS — I76 SEPTIC EMBOLISM (HCC): ICD-10-CM

## 2023-01-10 PROBLEM — D72.829 LEUKOCYTOSIS: Status: RESOLVED | Noted: 2022-09-07 | Resolved: 2023-01-10

## 2023-01-10 PROBLEM — D64.9 ANEMIA: Status: RESOLVED | Noted: 2022-09-07 | Resolved: 2023-01-10

## 2023-01-10 PROBLEM — N17.9 AKI (ACUTE KIDNEY INJURY) (HCC): Status: RESOLVED | Noted: 2022-09-07 | Resolved: 2023-01-10

## 2023-01-10 PROBLEM — R50.9 FEVER: Status: RESOLVED | Noted: 2022-09-07 | Resolved: 2023-01-10

## 2023-01-10 PROCEDURE — 99214 OFFICE O/P EST MOD 30 MIN: CPT | Mod: 95 | Performed by: INTERNAL MEDICINE

## 2023-01-10 RX ORDER — ZOLPIDEM TARTRATE 5 MG/1
TABLET ORAL
COMMUNITY
Start: 2023-01-08 | End: 2023-07-19

## 2023-01-10 ASSESSMENT — FIBROSIS 4 INDEX: FIB4 SCORE: 0.91

## 2023-01-10 NOTE — PATIENT INSTRUCTIONS
Please stop plavix.     Please increase metoprolol to 25mg once a day.     Please get fasting blood tests later this month.

## 2023-01-10 NOTE — PROGRESS NOTES
Cardiology Virtual Follow-up Consultation Note    Date of note:    1/10/2023    Primary Care Provider: Joan Aponte D.O.  Referring Provider: Joan Aponte D.O.    Patient Name: Sarah Buckner   YOB: 1970  MRN:              [id number]    Chief Complaint: tachycardia    History of Present Illness: Sarah Buckner is a 52 y.o. female whose current medical problems include endocarditis who is here for follow-up.    She was admitted 9/2022 with E. 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  She also had a patent foramen ovale.  On 9/16 she underwent radical tricuspid valve repair, patent foramen ovale closure, aortic valve repair by Dr. Bradley.  Postoperative echo showed successful repair of tricuspid valve with mild TR centrally, and a mean gradient of 1 mmHg across the valve.  No change in mild central AI.  Improvement of left ventricular ejection fraction with no inotropic support.     She had a largely uncomplicated recovery postsurgery requiring diuresis, anxiolytics.  She has a prolonged QT.     She had recent follow-up with Dr. Yeh, infectious Disease, she completed her antibiotic therapy 10/31.  Repeat blood cultures due in 4 weeks.  She did have a colonoscopy prior to her surgery due to the finding of E facaecalis bacteremia and had a polyp removed at that time. Pathology showed tubular adenoma.    Interim Events:  Doing cardiac rehab, really enjoying it. On incline of 7 at 3.5 for 30 minutes. Medium resistance bands.     Doing physical therapy for shoulder soon.     Works at [employer], ready to go back to work.     One episode of lightheadedness on treadmill, resolved very fast, no pre-syncope or syncope.     ROS  Constitution: Negative for chills, fever and night sweats.   HENT: Negative for nosebleeds.    Eyes: Negative for  vision loss in left eye and vision loss in right eye.   Respiratory: Negative for hemoptysis.    Gastrointestinal: Negative for hematemesis, hematochezia and melena.   Genitourinary: Negative for hematuria.   Neurological: Negative for focal weakness, numbness and paresthesias.         Past Medical History:   Diagnosis Date    Encounter for weaning from ventilator (HCC) 9/16/2022    Hypertension     Migraine     Pleural effusion 9/18/2022    PONV (postoperative nausea and vomiting)          Past Surgical History:   Procedure Laterality Date    TRICUSPID VALVE REPAIR  9/16/2022    Procedure: TRICUSPID VALVE REPAIR, PATENT FORAMEN OVALE CLOSURE, TRANSESOPHAGEAL ESCHOCARDIOGRAM;  Surgeon: Truong Bradley D.O.;  Location: SURGERY [facility];  Service: Cardiothoracic    ECHOCARDIOGRAM, TRANSESOPHAGEAL, INTRAOPERATIVE  9/16/2022    Procedure: ECHOCARDIOGRAM, TRANSESOPHAGEAL, INTRAOPERATIVE;  Surgeon: Truong Bradley D.O.;  Location: SURGERY [facility];  Service: Cardiothoracic    IA UPPER GI ENDOSCOPY,DIAGNOSIS N/A 9/12/2022    Procedure: GASTROSCOPY;  Surgeon: Danyelle Curiel M.D.;  Location: SURGERY [facility];  Service: Gastroenterology    IA COLONOSCOPY,DIAGNOSTIC N/A 9/12/2022    Procedure: COLONOSCOPY;  Surgeon: Danyelle Curiel M.D.;  Location: SURGERY SAME DAY [facility];  Service: Gastroenterology    IA UPPER GI ENDOSCOPY,BIOPSY N/A 9/12/2022    Procedure: GASTROSCOPY, WITH BIOPSY;  Surgeon: Danyelle Curiel M.D.;  Location: SURGERY SAME DAY [facility];  Service: Gastroenterology    COLONOSCOPY WITH POLYP N/A 9/12/2022    Procedure: COLONOSCOPY, WITH POLYPECTOMY;  Surgeon: Danyelle Curiel M.D.;  Location: SURGERY SAME DAY [facility];  Service: Gastroenterology    IA LAP,RMV  ADNEXAL STRUCTURE Bilateral 12/8/2020    Procedure: SALPINGO-OOPHORECTOMY, BILATERAL, LAPAROSCOPIC;  Surgeon: Brayan Hunt M.D.;  Location: SURGERY [facility];  Service: Gynecology    VAGINAL HYSTERECTOMY  SCOPE TOTAL N/A 12/8/2020    Procedure: LAPAROSCOPIC  SUBTOTAL HYSTERECTOMY;  Surgeon: Brayan Hunt M.D.;  Location: SURGERY [facility];  Service: Gynecology    CYSTOSCOPY N/A 12/8/2020    Procedure: CYSTOSCOPY;  Surgeon: Brayan Hunt M.D.;  Location: SURGERY [facility];  Service: Gynecology    LUMPECTOMY      breast reduction, implants     OTHER ORTHOPEDIC SURGERY      orif right wrist hardware    PRIMARY C SECTION      3         Current Outpatient Medications   Medication Sig Dispense Refill    zolpidem (AMBIEN) 5 MG Tab       levothyroxine (SYNTHROID) 75 MCG Tab Take 1 Tablet by mouth every morning on an empty stomach. 90 Tablet 0    escitalopram (LEXAPRO) 10 MG Tab Take 1 Tablet by mouth every day. 90 Tablet 0    traMADol (ULTRAM) 50 MG Tab Take 1 Tablet by mouth every four hours as needed for Moderate Pain or Severe Pain for up to 90 days. 180 Tablet 0    cyclobenzaprine (FLEXERIL) 10 mg Tab Take 1 Tablet by mouth 3 times a day as needed for Muscle Spasms. 90 Tablet 0    metoprolol SR (TOPROL XL) 25 MG TABLET SR 24 HR Take 0.5 Tablets by mouth every day. 45 Tablet 3    lidocaine (LIDODERM) 5 % Patch APPLY ONE PATCH TOPICALLY TO CLEAN, DRY SKIN. LEAVE ON FOR 12 HOURS THEN REMOVE. MUST WAIT AT LEAST 12 HOURS BEFORE APPLYING PATCH(ES) AGAIN.      Melatonin 10 MG Tab Take 10 mg by mouth.      oxyCODONE immediate-release (ROXICODONE) 5 MG Tab TAKE 1 TABLET BY MOUTH EVERY 6 HOURS AS NEEDED FOR SEVERE PAIN FOR UP TO 7 DAYS      pantoprazole (PROTONIX) 40 MG Tablet Delayed Response Take 1 Tablet by mouth 1/2 hour before dinner. 30 Tablet 2    aspirin EC 81 MG EC tablet Take 1 Tablet by mouth every day. 30 Tablet 0    clopidogrel (PLAVIX) 75 MG Tab Take 1 Tablet by mouth every day. 30 Tablet 0    gabapentin (NEURONTIN) 100 MG Cap Take 1 Capsule by mouth 3 times a day. (Patient not taking: Reported on 12/7/2022) 90 Capsule 0    sumatriptan (IMITREX) 100 MG tablet TAKE 1 TABLET BY MOUTH AS NEEDED FOR MIGRAINE,  "REPEAT IN 3 HOURS AS NEEDED . DO NOT EXCEED 3 TABS PER WEEK       No current facility-administered medications for this visit.         Allergies   Allergen Reactions    Morphine Itching     Severe itchiness  \"It makes me itch\"    Hydrocodone-Acetaminophen Unspecified     hallucinations           Family History   Problem Relation Age of Onset    Clotting Disorder Mother          Social History     Socioeconomic History    Marital status:      Spouse name: Not on file    Number of children: Not on file    Years of education: Not on file    Highest education level: Not on file   Occupational History    Not on file   Tobacco Use    Smoking status: Never    Smokeless tobacco: Never   Vaping Use    Vaping Use: Never used   Substance and Sexual Activity    Alcohol use: Yes     Alcohol/week: 2.4 oz     Types: 4 Glasses of wine per week    Drug use: Never    Sexual activity: Not on file   Other Topics Concern    Not on file   Social History Narrative    Not on file     Social Determinants of Health     Financial Resource Strain: Not on file   Food Insecurity: Not on file   Transportation Needs: Not on file   Physical Activity: Not on file   Stress: Not on file   Social Connections: Not on file   Intimate Partner Violence: Not on file   Housing Stability: Not on file         Physical Exam:  Ambulatory Vitals  /75   Pulse (!) 106   Ht 1.6 m (5' 3\")   Wt 66.2 kg (146 lb)   SpO2 98%    Oxygen Therapy:  Pulse Oximetry: 98 %  BP Readings from Last 4 Encounters:   01/10/23 136/75   12/13/22 100/60   12/07/22 130/82   11/01/22 (!) 98/64       Weight/BMI: Body mass index is 25.86 kg/m².  Wt Readings from Last 4 Encounters:   01/10/23 66.2 kg (146 lb)   12/13/22 64.9 kg (143 lb)   12/07/22 64.9 kg (143 lb)   11/01/22 61.7 kg (136 lb)       General: No apparent distress  Eyes: nl conjunctiva  Neck: JVP appeared normal from afar  Lungs: normal respiratory effort  Neurological: Moving upper extremities.   Psychiatric: " Appropriate affect, A/O x 3, intact judgement and insight  Skin: no visible rashes      Lab Data Review:  Lab Results   Component Value Date/Time    CHOLSTRLTOT 202 (H) 12/09/2022 07:31 AM     (H) 12/09/2022 07:31 AM    HDL 42.0 12/09/2022 07:31 AM    TRIGLYCERIDE 149 12/09/2022 07:31 AM       Lab Results   Component Value Date/Time    SODIUM 138 12/09/2022 07:31 AM    POTASSIUM 4.3 12/09/2022 07:31 AM    CHLORIDE 102 12/09/2022 07:31 AM    CO2 26 12/09/2022 07:31 AM    GLUCOSE 94 12/09/2022 07:31 AM    BUN 10 12/09/2022 07:31 AM    CREATININE 0.7 12/09/2022 07:31 AM    GLOMRATE 125 10/11/2022 04:35 AM     Lab Results   Component Value Date/Time    ALKPHOSPHAT 199 (H) 12/09/2022 07:31 AM    ASTSGOT 54 (H) 12/09/2022 07:31 AM    ALTSGPT 68 12/09/2022 07:31 AM    TBILIRUBIN 0.7 12/09/2022 07:31 AM      Lab Results   Component Value Date/Time    WBC 4.4 (L) 12/09/2022 07:31 AM     No components found for: HBGA1C  No components found for: TROPONIN  No results found for: BNP      Cardiac Imaging and Procedures Review:    EKG dated 12/8/2022 : My personal interpretation is sinus tachycardia, FLO, RBBB, LPFB    Echo dated 9/8/2022:   CONCLUSIONS  No prior study is available for comparison.   Technically difficult study - adequate information is obtained.      Probable vegetation seen on the tricuspid valve in subcostal view.  Normal left ventricular systolic function.  The left ventricular ejection fraction is visually estimated to be 60%.  Normal diastolic function.  The right ventricle is normal in size and systolic function.  Mild aortic insufficiency.    KEO 9/14/2022:  Echocardiography Laboratory  CONCLUSIONS  There is a large, mobile echodensity on the tricuspid valve associated   with potential leaflet perforation consistent with endocarditis.  There is severe tricuspid regurgitation.  There is a small, mobile echodensity on the aortic valve consistent   with endocarditis.  Patent foramen ovale identified  with color Doppler.  Normal left and right ventricular systolic function.      Compared to the previous echocardiogram performed on 9/8/2022: There is   now severe tricuspid regurgitation.       Radiology test Review:  CT chest 9/8/2022:  IMPRESSION:     1.  Multifocal consolidation concerning for pneumonia, with small cavitary lesion in the left lower lobe.     2.  No acute abnormality or evidence of malignancy to the abdomen or pelvis.      OR Procedure 9/2022:  Procedure(s):  RADICAL TRICUSPID VALVE REPAIR, PATENT FORAMEN OVALE CLOSURE, AORTIC VALVE REPAIR,   TRANSESOPHAGEAL ESCHOCARDIOGRAM - Wound Class: Clean with Drain  ECHOCARDIOGRAM, TRANSESOPHAGEAL, INTRAOPERATIVE - Wound Class: None      Medical Decision Making:  Tricuspid valve and aortic valve endocarditis secondary to E. Faecalis bacteremia likely secondary to kidney stone removal vs gastritis vs colonic polyp s/p removal.   S/P Triscupid repair and debridement of aortic valve with residual mild TR and AI.   Valvular cardiomyopathy  - cont aspirin 81mg. Stop plavix as it's been 3 months post-surgery.   - no ischemic work-up pre-op, no symptoms.   - continue low dose BB for now.   - repeat TTE scheduled for next week. Repeat EKO immediately post op interpreted by Dr. Bradley shows resolution of LV dysfunction  - SBE prophylaxis for any dental procedure or airway procedure.  Her ID MD had also recommended prophylaxis prior to colonoscopies. I did recommend she follow-up with her urologist to try and prevent future renal stones.      Leukopenia - recheck.      Gastritis secondary to NSAID use  -Complete 30-day course of Carafate then can stop.  Continue pantoprazole and follow-up with her primary for further recommendations     Prolonged QT  - repeat EKG at next visit (previously 470ms)    Septic Emboli - complicated by PNA. Currently asymptomatic.   -CTM.     Dyslipidemia -   Recheck. Will do ASCVD score, consider CCS.     Tachycardia - unclear  cause  -remain hydrated  -check echo  -consider 1 week ziopatch.     Work - ok to return back to work without restrictions at this point.        Return in about 4 weeks (around 2/7/2023). - appointment is already scheduled.     This evaluation was conducted via Zoom using secure and encrypted videoconferencing technology. The patient was in their home in the [facility].    The patient's identity was confirmed and verbal consent was obtained for this virtual visit.      All Mcwilliams MD, [facility], [facility].  [address] [address]  Phone: [phone]  Fax: [phone]

## 2023-01-13 ENCOUNTER — TELEPHONE (OUTPATIENT)
Dept: CARDIOLOGY | Facility: MEDICAL CENTER | Age: 53
End: 2023-01-13
Payer: COMMERCIAL

## 2023-01-13 NOTE — TELEPHONE ENCOUNTER
[2:21 PM] Sana Henderson MRN: [id number] saw Dr. Mcwilliams and he released her back to work. She needs a letter emailed to [email or web address] before Tuesday releasing her to work.     Received above message. Per JM last TM visit notes 01/10/23:    Work - ok to return back to work without restrictions at this point.    Letter drafted and sent to e-mail per request.

## 2023-01-16 ENCOUNTER — APPOINTMENT (OUTPATIENT)
Dept: CARDIOLOGY | Facility: MEDICAL CENTER | Age: 53
End: 2023-01-16
Attending: PHYSICIAN ASSISTANT
Payer: COMMERCIAL

## 2023-02-23 ENCOUNTER — OFFICE VISIT (OUTPATIENT)
Dept: CARDIOLOGY | Facility: MEDICAL CENTER | Age: 53
End: 2023-02-23
Payer: COMMERCIAL

## 2023-02-23 VITALS
WEIGHT: 153.8 LBS | HEIGHT: 63 IN | BODY MASS INDEX: 27.25 KG/M2 | HEART RATE: 69 BPM | OXYGEN SATURATION: 96 % | DIASTOLIC BLOOD PRESSURE: 74 MMHG | SYSTOLIC BLOOD PRESSURE: 120 MMHG | RESPIRATION RATE: 18 BRPM

## 2023-02-23 DIAGNOSIS — Z98.890 S/P TRICUSPID VALVE REPAIR: ICD-10-CM

## 2023-02-23 DIAGNOSIS — G43.109 MIGRAINE WITH AURA AND WITHOUT STATUS MIGRAINOSUS, NOT INTRACTABLE: ICD-10-CM

## 2023-02-23 DIAGNOSIS — Z98.890 S/P AORTIC VALVE REPAIR: ICD-10-CM

## 2023-02-23 DIAGNOSIS — I33.0 INFECTIVE ENDOCARDITIS OF TRICUSPID VALVE: ICD-10-CM

## 2023-02-23 DIAGNOSIS — Z87.74 S/P PATENT FORAMEN OVALE CLOSURE: ICD-10-CM

## 2023-02-23 DIAGNOSIS — I76 SEPTIC EMBOLISM (HCC): ICD-10-CM

## 2023-02-23 PROCEDURE — 99214 OFFICE O/P EST MOD 30 MIN: CPT | Performed by: INTERNAL MEDICINE

## 2023-02-23 RX ORDER — METOPROLOL SUCCINATE 25 MG/1
25 TABLET, EXTENDED RELEASE ORAL DAILY
Qty: 100 TABLET | Refills: 3 | Status: SHIPPED | OUTPATIENT
Start: 2023-02-23 | End: 2023-05-11

## 2023-02-23 ASSESSMENT — FIBROSIS 4 INDEX: FIB4 SCORE: 0.98

## 2023-02-23 NOTE — PROGRESS NOTES
Cardiology Follow-up Consultation Note    Date of note: 2/23/2023  Primary Care Provider: Joan Aponte D.O.  Referring Provider: Joan Aponte D.O.    Patient Name: Sarah Buckner   YOB: 1970  MRN:              [id number]    Chief Complaint: chest tightness    History of Present Illness: Sarah Buckner is a 52 y.o. female whose current medical problems include endocarditis who is here for follow-up.    She was admitted 9/2022 with E. 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  She also had a patent foramen ovale.  On 9/16 she underwent radical tricuspid valve repair, patent foramen ovale closure, aortic valve repair by Dr. Bradley.  Postoperative echo showed successful repair of tricuspid valve with mild TR centrally, and a mean gradient of 1 mmHg across the valve.  No change in mild central AI.  Improvement of left ventricular ejection fraction with no inotropic support.     She had a largely uncomplicated recovery postsurgery requiring diuresis, anxiolytics.  She has a prolonged QT.     She had recent follow-up with Dr. Yeh, infectious Disease, she completed her antibiotic therapy 10/31.  Repeat blood cultures due in 4 weeks.  She did have a colonoscopy prior to her surgery due to the finding of E facaecalis bacteremia and had a polyp removed at that time. Pathology showed tubular adenoma.    At our visit, 1/10/2023:  Doing cardiac rehab, really enjoying it. On incline of 7 at 3.5 for 30 minutes. Medium resistance bands.     Doing physical therapy for shoulder soon.     Works at [employer], ready to go back to work.     One episode of lightheadedness on treadmill, resolved very fast, no pre-syncope or syncope.     Interim Events:  Has some episodes of feeling like she needs to take a deep breath and then has pleuritic moderate  substernal chest pressure. At work she is very active and not having chest pressure with exertion.     No fevers/chills.     + 10 pound weight gain, some likely due to diet and decreased exercise being back at work.     Tachycardia much improved on higher doses of metoprolol.     ROS  + migraines, headaches    + shoulder pain, doing PT.     Constitution: Negative for chills, fever and night sweats.   HENT: Negative for nosebleeds.    Eyes: Negative for vision loss in left eye and vision loss in right eye.   Respiratory: Negative for hemoptysis.    Gastrointestinal: Negative for hematemesis, hematochezia and melena.   Genitourinary: Negative for hematuria.   Neurological: Negative for focal weakness, numbness and paresthesias.           Past Medical History:   Diagnosis Date    TAZ (acute kidney injury) (Formerly [facility]) 9/7/2022    Anemia 9/7/2022    Encounter for weaning from ventilator (Formerly [facility]) 9/16/2022    Hypertension     Migraine     Pleural effusion 9/18/2022    PONV (postoperative nausea and vomiting)          Past Surgical History:   Procedure Laterality Date    TRICUSPID VALVE REPAIR  9/16/2022    Procedure: TRICUSPID VALVE REPAIR, PATENT FORAMEN OVALE CLOSURE, TRANSESOPHAGEAL ESCHOCARDIOGRAM;  Surgeon: Truong Bradley D.O.;  Location: SURGERY [facility];  Service: Cardiothoracic    ECHOCARDIOGRAM, TRANSESOPHAGEAL, INTRAOPERATIVE  9/16/2022    Procedure: ECHOCARDIOGRAM, TRANSESOPHAGEAL, INTRAOPERATIVE;  Surgeon: Truong Bradley D.O.;  Location: [facility];  Service: Cardiothoracic    KS UPPER GI ENDOSCOPY,DIAGNOSIS N/A 9/12/2022    Procedure: GASTROSCOPY;  Surgeon: Danyelle Curiel M.D.;  Location: SURGERY SAME DAY [facility];  Service: Gastroenterology    KS COLONOSCOPY,DIAGNOSTIC N/A 9/12/2022    Procedure: COLONOSCOPY;  Surgeon: Danyelle Curiel M.D.;  Location: SURGERY SAME DAY [facility];  Service: Gastroenterology    KS UPPER GI ENDOSCOPY,BIOPSY N/A 9/12/2022    Procedure: GASTROSCOPY, WITH  BIOPSY;  Surgeon: Danyelle Curiel M.D.;  Location: SURGERY SAME DAY [facility];  Service: Gastroenterology    COLONOSCOPY WITH POLYP N/A 9/12/2022    Procedure: COLONOSCOPY, WITH POLYPECTOMY;  Surgeon: Danyelle Curiel M.D.;  Location: SURGERY SAME DAY [facility];  Service: Gastroenterology    MS LAP,RMV  ADNEXAL STRUCTURE Bilateral 12/8/2020    Procedure: SALPINGO-OOPHORECTOMY, BILATERAL, LAPAROSCOPIC;  Surgeon: Brayan Hunt M.D.;  Location: SURGERY [facility];  Service: Gynecology    VAGINAL HYSTERECTOMY SCOPE TOTAL N/A 12/8/2020    Procedure: LAPAROSCOPIC  SUBTOTAL HYSTERECTOMY;  Surgeon: Brayan Hunt M.D.;  Location: SURGERY [facility];  Service: Gynecology    CYSTOSCOPY N/A 12/8/2020    Procedure: CYSTOSCOPY;  Surgeon: Brayan Hunt M.D.;  Location: SURGERY [facility];  Service: Gynecology    LUMPECTOMY      breast reduction, implants     OTHER ORTHOPEDIC SURGERY      orif right wrist hardware    PRIMARY C SECTION      3         Current Outpatient Medications   Medication Sig Dispense Refill    amoxicillin (AMOXIL) 500 MG Cap TK FOUR CS PO 1 HOUR B DAPP      zolpidem (AMBIEN) 5 MG Tab       levothyroxine (SYNTHROID) 75 MCG Tab Take 1 Tablet by mouth every morning on an empty stomach. 90 Tablet 0    escitalopram (LEXAPRO) 10 MG Tab Take 1 Tablet by mouth every day. 90 Tablet 0    traMADol (ULTRAM) 50 MG Tab Take 1 Tablet by mouth every four hours as needed for Moderate Pain or Severe Pain for up to 90 days. 180 Tablet 0    cyclobenzaprine (FLEXERIL) 10 mg Tab Take 1 Tablet by mouth 3 times a day as needed for Muscle Spasms. 90 Tablet 0    metoprolol SR (TOPROL XL) 25 MG TABLET SR 24 HR Take 0.5 Tablets by mouth every day. (Patient taking differently: Take 12.5 mg by mouth every day. One tablet daily) 45 Tablet 3    lidocaine (LIDODERM) 5 % Patch APPLY ONE PATCH TOPICALLY TO CLEAN, DRY SKIN. LEAVE ON FOR 12 HOURS THEN REMOVE. MUST WAIT AT LEAST 12 HOURS BEFORE APPLYING PATCH(ES) AGAIN.       "Melatonin 10 MG Tab Take 15 mg by mouth.      oxyCODONE immediate-release (ROXICODONE) 5 MG Tab TAKE 1 TABLET BY MOUTH EVERY 6 HOURS AS NEEDED FOR SEVERE PAIN FOR UP TO 7 DAYS      aspirin EC 81 MG EC tablet Take 1 Tablet by mouth every day. 30 Tablet 0    sumatriptan (IMITREX) 100 MG tablet TAKE 1 TABLET BY MOUTH AS NEEDED FOR MIGRAINE, REPEAT IN 3 HOURS AS NEEDED . DO NOT EXCEED 3 TABS PER WEEK      pantoprazole (PROTONIX) 40 MG Tablet Delayed Response Take 1 Tablet by mouth 1/2 hour before dinner. (Patient not taking: Reported on 2/23/2023) 30 Tablet 2     No current facility-administered medications for this visit.         Allergies   Allergen Reactions    Morphine Itching     Severe itchiness  \"It makes me itch\"    Hydrocodone-Acetaminophen Unspecified     hallucinations           Family History   Problem Relation Age of Onset    Clotting Disorder Mother          Social History     Socioeconomic History    Marital status:      Spouse name: Not on file    Number of children: Not on file    Years of education: Not on file    Highest education level: Not on file   Occupational History    Not on file   Tobacco Use    Smoking status: Never    Smokeless tobacco: Never   Vaping Use    Vaping Use: Never used   Substance and Sexual Activity    Alcohol use: Yes     Alcohol/week: 2.4 oz     Types: 4 Glasses of wine per week    Drug use: Never    Sexual activity: Not on file   Other Topics Concern    Not on file   Social History Narrative    Not on file     Social Determinants of Health     Financial Resource Strain: Not on file   Food Insecurity: Not on file   Transportation Needs: Not on file   Physical Activity: Not on file   Stress: Not on file   Social Connections: Not on file   Intimate Partner Violence: Not on file   Housing Stability: Not on file         Physical Exam:  Ambulatory Vitals  /74 (BP Location: Left arm, Patient Position: Sitting, BP Cuff Size: Adult)   Pulse 69   Resp 18   Ht 1.6 m (5' " "3\")   Wt 69.8 kg (153 lb 12.8 oz)   SpO2 96%    Oxygen Therapy:  Pulse Oximetry: 96 %  BP Readings from Last 4 Encounters:   02/23/23 120/74   01/30/23 120/72   01/10/23 136/75   12/13/22 100/60       Weight/BMI: Body mass index is 27.24 kg/m².  Wt Readings from Last 4 Encounters:   02/23/23 69.8 kg (153 lb 12.8 oz)   01/30/23 67.6 kg (149 lb)   01/10/23 66.2 kg (146 lb)   12/13/22 64.9 kg (143 lb)     General: No apparent distress  Eyes: nl conjunctiva  ENT: OP covered by mask.   Neck: JVP with v waves up to 11-12 cm H2O, no carotid bruits  Lungs: normal respiratory effort, CTAB  Heart: RRR, 2/6 systolic murmur,  no rubs or gallops, trace edema bilateral lower extremities. No LV/RV heave on cardiac palpatation. 2+ bilateral radial pulses.  2+ bilateral dp pulses.   Abdomen: soft, non tender, non distended, no masses, normal bowel sounds.  No HSM.  Extremities/MSK: no clubbing, no cyanosis  Neurological: No focal sensory deficits  Psychiatric: Appropriate affect, A/O x 3, intact judgement and insight  Skin: Warm extremities  \    Lab Data Review:  Lab Results   Component Value Date/Time    CHOLSTRLTOT 176 02/04/2023 07:48 AM     (H) 02/04/2023 07:48 AM    HDL 61.0 (H) 02/04/2023 07:48 AM    TRIGLYCERIDE 70 02/04/2023 07:48 AM       Lab Results   Component Value Date/Time    SODIUM 137 02/04/2023 07:48 AM    POTASSIUM 4.8 02/04/2023 07:48 AM    CHLORIDE 104 02/04/2023 07:48 AM    CO2 25 02/04/2023 07:48 AM    GLUCOSE 87 02/04/2023 07:48 AM    BUN 16 02/04/2023 07:48 AM    CREATININE 0.7 02/04/2023 07:48 AM    GLOMRATE 125 10/11/2022 04:35 AM     Lab Results   Component Value Date/Time    ALKPHOSPHAT 196 (H) 02/04/2023 07:48 AM    ASTSGOT 52 (H) 02/04/2023 07:48 AM    ALTSGPT 61 02/04/2023 07:48 AM    TBILIRUBIN 1.0 02/04/2023 07:48 AM      Lab Results   Component Value Date/Time    WBC 4.8 02/04/2023 07:48 AM     No components found for: HBGA1C  No components found for: TROPONIN  No results found for: " BNP      Cardiac Imaging and Procedures Review:    EKG dated 12/8/2022 : My personal interpretation is sinus tachycardia, FLO, RBBB, LPFB    Echo dated 9/8/2022:   CONCLUSIONS  No prior study is available for comparison.   Technically difficult study - adequate information is obtained.      Probable vegetation seen on the tricuspid valve in subcostal view.  Normal left ventricular systolic function.  The left ventricular ejection fraction is visually estimated to be 60%.  Normal diastolic function.  The right ventricle is normal in size and systolic function.  Mild aortic insufficiency.    KEO 9/14/2022:  Echocardiography Laboratory  CONCLUSIONS  There is a large, mobile echodensity on the tricuspid valve associated   with potential leaflet perforation consistent with endocarditis.  There is severe tricuspid regurgitation.  There is a small, mobile echodensity on the aortic valve consistent   with endocarditis.  Patent foramen ovale identified with color Doppler.  Normal left and right ventricular systolic function.      Compared to the previous echocardiogram performed on 9/8/2022: There is   now severe tricuspid regurgitation.       Radiology test Review:  CT chest 9/8/2022:  IMPRESSION:     1.  Multifocal consolidation concerning for pneumonia, with small cavitary lesion in the left lower lobe.     2.  No acute abnormality or evidence of malignancy to the abdomen or pelvis.      OR Procedure 9/2022:  Procedure(s):  RADICAL TRICUSPID VALVE REPAIR, PATENT FORAMEN OVALE CLOSURE, AORTIC VALVE REPAIR,   TRANSESOPHAGEAL ESCHOCARDIOGRAM - Wound Class: Clean with Drain  ECHOCARDIOGRAM, TRANSESOPHAGEAL, INTRAOPERATIVE - Wound Class: None      Medical Decision Making:  Tricuspid valve and aortic valve endocarditis secondary to E. Faecalis bacteremia likely secondary to kidney stone removal vs gastritis vs colonic polyp s/p removal.   S/P Triscupid repair and debridement of aortic valve with residual mild TR and AI.    Valvular cardiomyopathy  - cont aspirin 81mg. Stop plavix as it's been 3 months post-surgery.   - no ischemic work-up pre-op, no symptoms.   - continue low dose BB for now.   - there is now evidence of significant TR on her exam, discussed a more expedited follow-up echo at [facility] as we are booked out to May here at [facility]. If she can't get one there soon, will place state order at [facility].   - SBE prophylaxis for any dental procedure or airway procedure.  Her ID MD had also recommended prophylaxis prior to colonoscopies. I did recommend she follow-up with her urologist to try and prevent future renal stones.    -s/p a couple weeks of rehab.        Chest pain - very likely MSK related.   CTM, ER precautions.     Tachycardia -   -check echo  -continue metoprolol for now, will try and wean off as things stabilize.     Gastritis secondary to NSAID use  -off PPI and carafate now. Minimize NSAID use.     Septic Emboli - complicated by PNA. Currently asymptomatic.   -CTM.     Dyslipidemia -   ASCVD score 0.9%. no indication for aspirin or statin from a prevention standpoint.     Work - ok to return back to work without restrictions at this point.        Return in about 3 months (around 5/23/2023).     All Mcwilliams MD, [facility]  [facility], [address]  [address] [place] [id number]  Phone: [phone]  Fax: [phone]

## 2023-02-24 PROBLEM — J18.9 PNEUMONIA: Status: RESOLVED | Noted: 2022-09-07 | Resolved: 2023-02-24

## 2023-02-24 PROBLEM — E87.6 HYPOKALEMIA: Status: RESOLVED | Noted: 2022-09-07 | Resolved: 2023-02-24

## 2023-03-13 ENCOUNTER — TELEPHONE (OUTPATIENT)
Dept: CARDIOLOGY | Facility: MEDICAL CENTER | Age: 53
End: 2023-03-13
Payer: COMMERCIAL

## 2023-03-13 NOTE — TELEPHONE ENCOUNTER
DANIEL    Caller: Sarah Buckner    Topic/issue: ECHO    Patient states that she has seen the results of this test and she is concerned. She would like to discuss the findings with our team. Please advise.    Thank you,  Teddy EIL    Callback Number: [phone] (home)

## 2023-03-13 NOTE — TELEPHONE ENCOUNTER
To JM: please review recent echo done on 03/10/2023. Pt is wanting to discuss results with you. Please advise if any new recommendations. Thank you!

## 2023-05-11 ENCOUNTER — OFFICE VISIT (OUTPATIENT)
Dept: CARDIOLOGY | Facility: MEDICAL CENTER | Age: 53
End: 2023-05-11
Payer: COMMERCIAL

## 2023-05-11 VITALS
HEIGHT: 63 IN | OXYGEN SATURATION: 97 % | BODY MASS INDEX: 28 KG/M2 | HEART RATE: 73 BPM | WEIGHT: 158 LBS | DIASTOLIC BLOOD PRESSURE: 62 MMHG | SYSTOLIC BLOOD PRESSURE: 122 MMHG | RESPIRATION RATE: 14 BRPM

## 2023-05-11 DIAGNOSIS — I33.0 INFECTIVE ENDOCARDITIS OF TRICUSPID VALVE: ICD-10-CM

## 2023-05-11 DIAGNOSIS — I76 SEPTIC EMBOLISM (HCC): ICD-10-CM

## 2023-05-11 DIAGNOSIS — R53.82 CHRONIC FATIGUE: ICD-10-CM

## 2023-05-11 DIAGNOSIS — Z87.74 S/P PATENT FORAMEN OVALE CLOSURE: ICD-10-CM

## 2023-05-11 DIAGNOSIS — R79.89 ELEVATED LIVER FUNCTION TESTS: ICD-10-CM

## 2023-05-11 DIAGNOSIS — Z98.890 S/P AORTIC VALVE REPAIR: ICD-10-CM

## 2023-05-11 DIAGNOSIS — Z98.890 S/P TRICUSPID VALVE REPAIR: ICD-10-CM

## 2023-05-11 PROCEDURE — 99214 OFFICE O/P EST MOD 30 MIN: CPT | Performed by: INTERNAL MEDICINE

## 2023-05-11 RX ORDER — FUROSEMIDE 20 MG/1
20 TABLET ORAL
Qty: 60 TABLET | Refills: 3 | Status: SHIPPED | OUTPATIENT
Start: 2023-05-11

## 2023-05-11 RX ORDER — METOPROLOL SUCCINATE 25 MG/1
12.5 TABLET, EXTENDED RELEASE ORAL DAILY
Qty: 50 TABLET | Refills: 3 | Status: SHIPPED | OUTPATIENT
Start: 2023-05-11 | End: 2024-01-02 | Stop reason: SDUPTHER

## 2023-05-11 RX ORDER — POTASSIUM CHLORIDE 750 MG/1
10 TABLET, FILM COATED, EXTENDED RELEASE ORAL
Qty: 60 TABLET | Refills: 3 | Status: SHIPPED | OUTPATIENT
Start: 2023-05-11

## 2023-05-11 ASSESSMENT — FIBROSIS 4 INDEX: FIB4 SCORE: 0.98

## 2023-05-11 NOTE — PATIENT INSTRUCTIONS
Please decrease your metoprolol to 12.5mg once a day.     Please monitor your weight daily.  If your weight increases by 2 pounds in one day, or 5 pounds over 3 days, please take an extra dose of lasix 20mg and potassium 10mEq  If this happens twice in a row, please call the office for further instructions.

## 2023-05-11 NOTE — PROGRESS NOTES
Cardiology Follow-up Consultation Note    Date of note: 5/11/2023  Primary Care Provider: Joan Aponte D.O.  Referring Provider: Joan Aponte D.O.    Patient Name: Sarah Buckner   YOB: 1970  MRN:              [id number]    Chief Complaint: palpitations    History of Present Illness: Sarah Buckner is a 52 y.o. female whose current medical problems include endocarditis who is here for follow-up.    She was admitted 9/2022 with E. 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  She also had a patent foramen ovale.  On 9/16 she underwent radical tricuspid valve repair, patent foramen ovale closure, aortic valve repair by Dr. Bradley.  Postoperative echo showed successful repair of tricuspid valve with mild TR centrally, and a mean gradient of 1 mmHg across the valve.  No change in mild central AI.  Improvement of left ventricular ejection fraction with no inotropic support.     She had a largely uncomplicated recovery postsurgery requiring diuresis, anxiolytics.  She has a prolonged QT.     She had recent follow-up with Dr. Yeh, infectious Disease, she completed her antibiotic therapy 10/31.  Repeat blood cultures due in 4 weeks.  She did have a colonoscopy prior to her surgery due to the finding of E facaecalis bacteremia and had a polyp removed at that time. Pathology showed tubular adenoma.    At our visit, 1/10/2023:  Doing cardiac rehab, really enjoying it. On incline of 7 at 3.5 for 30 minutes. Medium resistance bands.     Doing physical therapy for shoulder soon.     Works at [employer], ready to go back to work.     One episode of lightheadedness on treadmill, resolved very fast, no pre-syncope or syncope.     At our visit, 2/23/2023:  Has some episodes of feeling like she needs to take a deep breath and then has pleuritic moderate  substernal chest pressure. At work she is very active and not having chest pressure with exertion.     No fevers/chills.     + 10 pound weight gain, some likely due to diet and decreased exercise being back at work.     Tachycardia much improved on higher doses of metoprolol.     Interim Events:  Severe fatigue, is working more and thinks this may be the cause.     Has had two episodes of moderate severity palpitations, did not check heart rate, resolved within minutes with rest.     Occasional leg swelling, resolves.     Weight up around 5 pounds, not working out as much.     HR in the 70s at baseline.     Did travel to [facility] and logged 20 miles of walking without symptoms.       ROS  + potential depression, will discuss with PCP.     No fevers.       Past Medical History:   Diagnosis Date    TAZ (acute kidney injury) ([facility]) 9/7/2022    Anemia 9/7/2022    Encounter for weaning from ventilator ([facility]) 9/16/2022    Hypertension     Migraine     Pleural effusion 9/18/2022    Pneumonia 9/7/2022    PONV (postoperative nausea and vomiting)          Past Surgical History:   Procedure Laterality Date    TRICUSPID VALVE REPAIR  9/16/2022    Procedure: TRICUSPID VALVE REPAIR, PATENT FORAMEN OVALE CLOSURE, TRANSESOPHAGEAL ESCHOCARDIOGRAM;  Surgeon: Truong Bradley D.O.;  Location: SURGERY [facility];  Service: Cardiothoracic    ECHOCARDIOGRAM, TRANSESOPHAGEAL, INTRAOPERATIVE  9/16/2022    Procedure: ECHOCARDIOGRAM, TRANSESOPHAGEAL, INTRAOPERATIVE;  Surgeon: Truong Bradley D.O.;  Location: SURGERY [facility];  Service: Cardiothoracic    GA UPPER GI ENDOSCOPY,DIAGNOSIS N/A 9/12/2022    Procedure: GASTROSCOPY;  Surgeon: Danyelle Curiel M.D.;  Location: SURGERY SAME DAY [facility];  Service: Gastroenterology    GA COLONOSCOPY,DIAGNOSTIC N/A 9/12/2022    Procedure: COLONOSCOPY;  Surgeon: Danyelle Curiel M.D.;  Location: SURGERY SAME DAY [facility];  Service: Gastroenterology    GA UPPER GI ENDOSCOPY,BIOPSY N/A  9/12/2022    Procedure: GASTROSCOPY, WITH BIOPSY;  Surgeon: Danyelle Curiel M.D.;  Location: SURGERY SAME DAY [facility];  Service: Gastroenterology    COLONOSCOPY WITH POLYP N/A 9/12/2022    Procedure: COLONOSCOPY, WITH POLYPECTOMY;  Surgeon: Danyelle Curiel M.D.;  Location: SURGERY SAME DAY [facility];  Service: Gastroenterology    CA LAP,RMV  ADNEXAL STRUCTURE Bilateral 12/8/2020    Procedure: SALPINGO-OOPHORECTOMY, BILATERAL, LAPAROSCOPIC;  Surgeon: Brayan Hunt M.D.;  Location: SURGERY [facility];  Service: Gynecology    VAGINAL HYSTERECTOMY SCOPE TOTAL N/A 12/8/2020    Procedure: LAPAROSCOPIC  SUBTOTAL HYSTERECTOMY;  Surgeon: Brayan Hunt M.D.;  Location: SURGERY [facility];  Service: Gynecology    CYSTOSCOPY N/A 12/8/2020    Procedure: CYSTOSCOPY;  Surgeon: Brayan Hunt M.D.;  Location: SURGERY [facility];  Service: Gynecology    LUMPECTOMY      breast reduction, implants     OTHER ORTHOPEDIC SURGERY      orif right wrist hardware    PRIMARY C SECTION      3         Current Outpatient Medications   Medication Sig Dispense Refill    furosemide (LASIX) 20 MG Tab Take 1 Tablet by mouth 1 time a day as needed (weight gain of over 2 pounds in one day (water weight)). 60 Tablet 3    potassium chloride ER (KLOR-CON) 10 MEQ tablet Take 1 Tablet by mouth 1 time a day as needed (weight gain of over 2 pounds in one day (water weight)). 60 Tablet 3    metoprolol SR (TOPROL XL) 25 MG TABLET SR 24 HR Take 0.5 Tablets by mouth every day. 50 Tablet 3    levothyroxine (SYNTHROID) 75 MCG Tab Take 1 Tablet by mouth every morning on an empty stomach. Needs appointment before further refills 30 Tablet 0    escitalopram (LEXAPRO) 10 MG Tab Take 1 Tablet by mouth every day. 90 Tablet 0    amoxicillin (AMOXIL) 500 MG Cap TK FOUR CS PO 1 HOUR B DAPP      zolpidem (AMBIEN) 5 MG Tab       cyclobenzaprine (FLEXERIL) 10 mg Tab Take 1 Tablet by mouth 3 times a day as needed for Muscle Spasms. 90 Tablet 0    lidocaine  "(LIDODERM) 5 % Patch APPLY ONE PATCH TOPICALLY TO CLEAN, DRY SKIN. LEAVE ON FOR 12 HOURS THEN REMOVE. MUST WAIT AT LEAST 12 HOURS BEFORE APPLYING PATCH(ES) AGAIN.      Melatonin 10 MG Tab Take 15 mg by mouth.      oxyCODONE immediate-release (ROXICODONE) 5 MG Tab TAKE 1 TABLET BY MOUTH EVERY 6 HOURS AS NEEDED FOR SEVERE PAIN FOR UP TO 7 DAYS      aspirin EC 81 MG EC tablet Take 1 Tablet by mouth every day. 30 Tablet 0    sumatriptan (IMITREX) 100 MG tablet TAKE 1 TABLET BY MOUTH AS NEEDED FOR MIGRAINE, REPEAT IN 3 HOURS AS NEEDED . DO NOT EXCEED 3 TABS PER WEEK       No current facility-administered medications for this visit.         Allergies   Allergen Reactions    Morphine Itching     Severe itchiness  \"It makes me itch\"    Hydrocodone-Acetaminophen Unspecified     hallucinations           Family History   Problem Relation Age of Onset    Clotting Disorder Mother          Social History     Socioeconomic History    Marital status:      Spouse name: Not on file    Number of children: Not on file    Years of education: Not on file    Highest education level: Not on file   Occupational History    Not on file   Tobacco Use    Smoking status: Never    Smokeless tobacco: Never   Vaping Use    Vaping Use: Never used   Substance and Sexual Activity    Alcohol use: Yes     Alcohol/week: 3.6 oz     Types: 6 Glasses of wine per week    Drug use: Never    Sexual activity: Not on file   Other Topics Concern    Not on file   Social History Narrative    Not on file     Social Determinants of Health     Financial Resource Strain: Not on file   Food Insecurity: Not on file   Transportation Needs: Not on file   Physical Activity: Not on file   Stress: Not on file   Social Connections: Not on file   Intimate Partner Violence: Not on file   Housing Stability: Not on file         Physical Exam:  Ambulatory Vitals  /62 (BP Location: Left arm, Patient Position: Sitting, BP Cuff Size: Adult)   Pulse 73   Resp 14   Ht " "1.6 m (5' 3\")   Wt 71.7 kg (158 lb)   SpO2 97%    Oxygen Therapy:  Pulse Oximetry: 97 %  BP Readings from Last 4 Encounters:   05/11/23 122/62   02/23/23 120/74   01/30/23 120/72   01/10/23 136/75       Weight/BMI: Body mass index is 27.99 kg/m².  Wt Readings from Last 4 Encounters:   05/11/23 71.7 kg (158 lb)   02/23/23 69.8 kg (153 lb 12.8 oz)   01/30/23 67.6 kg (149 lb)   01/10/23 66.2 kg (146 lb)     General: No apparent distress  Eyes: nl conjunctiva  ENT: OP covered by mask.   Neck: JVP with v waves up to 10-11 cm H2O, no carotid bruits  Lungs: normal respiratory effort, CTAB  Heart: RRR, 2/6 systolic murmur,  no rubs or gallops, trace edema bilateral lower extremities. No LV/RV heave on cardiac palpatation. 2+ bilateral radial pulses.  2+ bilateral dp pulses.   Abdomen: soft, non tender, non distended, no masses, normal bowel sounds.  No HSM.  Extremities/MSK: no clubbing, no cyanosis  Neurological: No focal sensory deficits  Psychiatric: Appropriate affect, A/O x 3, intact judgement and insight  Skin: Warm extremities    Exam repeated in full and unchanged except for as noted above.      Lab Data Review:  Lab Results   Component Value Date/Time    CHOLSTRLTOT 176 02/04/2023 07:48 AM     (H) 02/04/2023 07:48 AM    HDL 61.0 (H) 02/04/2023 07:48 AM    TRIGLYCERIDE 70 02/04/2023 07:48 AM       Lab Results   Component Value Date/Time    SODIUM 137 02/04/2023 07:48 AM    POTASSIUM 4.8 02/04/2023 07:48 AM    CHLORIDE 104 02/04/2023 07:48 AM    CO2 25 02/04/2023 07:48 AM    GLUCOSE 87 02/04/2023 07:48 AM    BUN 16 02/04/2023 07:48 AM    CREATININE 0.7 02/04/2023 07:48 AM    GLOMRATE 125 10/11/2022 04:35 AM     Lab Results   Component Value Date/Time    ALKPHOSPHAT 196 (H) 02/04/2023 07:48 AM    ASTSGOT 52 (H) 02/04/2023 07:48 AM    ALTSGPT 61 02/04/2023 07:48 AM    TBILIRUBIN 1.0 02/04/2023 07:48 AM      Lab Results   Component Value Date/Time    WBC 4.8 02/04/2023 07:48 AM     No components found for: " HBGA1C  No components found for: TROPONIN  No results found for: BNP      Cardiac Imaging and Procedures Review:    EKG dated 12/8/2022 : My personal interpretation is sinus tachycardia, FLO, RBBB, LPFB    Echo dated 9/8/2022:   CONCLUSIONS  No prior study is available for comparison.   Technically difficult study - adequate information is obtained.      Probable vegetation seen on the tricuspid valve in subcostal view.  Normal left ventricular systolic function.  The left ventricular ejection fraction is visually estimated to be 60%.  Normal diastolic function.  The right ventricle is normal in size and systolic function.  Mild aortic insufficiency.    KEO 9/14/2022:  Echocardiography Laboratory  CONCLUSIONS  There is a large, mobile echodensity on the tricuspid valve associated   with potential leaflet perforation consistent with endocarditis.  There is severe tricuspid regurgitation.  There is a small, mobile echodensity on the aortic valve consistent   with endocarditis.  Patent foramen ovale identified with color Doppler.  Normal left and right ventricular systolic function.      Compared to the previous echocardiogram performed on 9/8/2022: There is   now severe tricuspid regurgitation.     Echo 3/2023:  CONCLUSIONS  Compared to the prior study on 09/08/2022 there has been interval   tricuspid and aortic valve repair on 9/16/2022.  Normal left ventricular size, thickness, and systolic function.  The left ventricular ejection fraction is visually estimated to be 60%.  The right ventricle is normal in size and systolic function.  Mild mitral regurgitation.  Post aortic valve repair.  Mild aortic insufficiency.  Post tricuspid valve repair.  Mild tricuspid stenosis.  Moderate tricuspid regurgitation.  Estimated right ventricular systolic pressure is 25 mmHg.      Radiology test Review:  CT chest 9/8/2022:  IMPRESSION:     1.  Multifocal consolidation concerning for pneumonia, with small cavitary lesion in the  left lower lobe.     2.  No acute abnormality or evidence of malignancy to the abdomen or pelvis.      OR Procedure 9/2022:  Procedure(s):  RADICAL TRICUSPID VALVE REPAIR, PATENT FORAMEN OVALE CLOSURE, AORTIC VALVE REPAIR,   TRANSESOPHAGEAL ESCHOCARDIOGRAM - Wound Class: Clean with Drain  ECHOCARDIOGRAM, TRANSESOPHAGEAL, INTRAOPERATIVE - Wound Class: None      Medical Decision Making:  Tricuspid valve and aortic valve endocarditis secondary to E. Faecalis bacteremia likely secondary to kidney stone removal vs gastritis vs colonic polyp s/p removal.   S/P Triscupid repair and debridement of aortic valve with residual mild TR and AI.   Valvular cardiomyopathy  - cont aspirin 81mg.  - no ischemic work-up pre-op, no symptoms.   - continue low dose BB for now.   -echo showed moderate TR, will recheck in a year 3/2024.   - SBE prophylaxis for any dental procedure or airway procedure.  Her ID MD had also recommended prophylaxis prior to colonoscopies. I did recommend she follow-up with her urologist to try and prevent future renal stones.    -s/p a couple weeks of rehab.   -does appear to be a bit volume overloaded, will start lasix/kcl prn. Asked her to take a dose this week.        Chest pain - very likely MSK related.   CTM, ER precautions. Resolved.     Tachycardia - resolved    Palpitations - could be SVT, PACs/PVCs/ or a fib. Brief  -continue metoprolol, decreasing dose secondary to fatigue and will try and wean off, but can increase or switch to diltiazem if symptoms palpitations worsen. Would also recheck zio.     Gastritis secondary to NSAID use  -off PPI and carafate now. Minimize NSAID use.     Septic Emboli - complicated by PNA. Currently asymptomatic.   -CTM.     Dyslipidemia -   ASCVD score 0.9%. no indication for aspirin or statin from a prevention standpoint.     Work - ok to return back to work without restrictions at this point.      Fatigue - could be multifactorial  -decrease metoprolol  -agree with  depression assessment/Rx by PCP  -increase exercise.     Return in about 6 months (around 11/11/2023).     All Mcwilliams MD, [facility] V[facility], [facility]  [address] [place] [id number]  Phone: [phone]  Fax: [phone]

## 2023-08-22 ENCOUNTER — TELEPHONE (OUTPATIENT)
Dept: CARDIOLOGY | Facility: MEDICAL CENTER | Age: 53
End: 2023-08-22
Payer: COMMERCIAL

## 2023-08-22 NOTE — TELEPHONE ENCOUNTER
Phone Number Called: [phone]    Call outcome: Did not leave a detailed message. Requested patient to call back.    Message: Called to discuss symptoms and provider ER precautions again

## 2023-08-22 NOTE — TELEPHONE ENCOUNTER
DANIEL    Caller: Sarah Buckner     Topic/issue: Pt was returning call.    Callback Number: [phone] (home)      Thank you    Brittany LYNCH

## 2023-08-22 NOTE — TELEPHONE ENCOUNTER
Caller: Sarah Buckner    Reported Symptoms:     HIGH BP  HEADACHES  LIGHT HEADED  CLAMMY  JYOTHI Lua states that she has not been feeling well for a while. She states that today she was feeling so unwell that she needed to leave work early. She would like a call back ASAP. Please advise.      **Patient was advised to go to ER; Denied**    Thank you,  Teddy MORFIN.      Recent Blood Pressure/Heart Rate Readin/101 HR: unknown    Callback Number: [phone] (home)

## 2023-08-23 NOTE — TELEPHONE ENCOUNTER
DANIEL/FARA: Please advise  Pt prefers to get advisement instead of going to ER. Pt eventually agreed to go to Urgent care. Pt aware of the risks of waiting to get recommendation from provider.       ==================  (25 Minutes spent on call)  Phone Number Called: [phone]    Call outcome: Spoke to patient regarding message below.      Message: Called to discuss symptoms. Spend time discuss risks of pt waiting to be seen given prior history.  Pt states the headaches have been for the past 2 weeks, but just started check BP over the last 2 days and noticed severely elevated systolic and diastolic number.I advised pt that BP may have been higher longer than the past 2 days which is also a concern.  Today's /101 using BP machine from work. Pt states BP reading from home device is higher. Pt said she has been continuing to check BP and determine if she wants to go to ER. Current BP from home device is 174/103, pt doesn't know if home BP is accurate and wants to get new batteries.  Pt advised the risks of waiting until hearing back from provider vs seeking urgent care or ER. Pt eventually agreeable to urgent care and will see if medication can be provided or if the ER is advised. Pt stated that a lot of the anxiety and hesitation is d/t  local hospital not being able to dx Infective Endocarditis for months.  She does have a lack of del in treatment where she lives but trust [facility]. Pt very appreciative of phone call and agreed to update us in the morning.

## 2023-08-24 NOTE — TELEPHONE ENCOUNTER
Called pt [phone]  Relayed FARA response and questions. Pt reports the followin/24/23 0930 180/103 HR 85  23 /100 HR 82    New medications:  Pristiq 50 mg QD started 23  Lyllana patch started approx 6/15/23  Synthroid increased to 88 mcg started 1923    Pt reports taking Sumatriptan every day for the last week due to increased headaches. Did not take today.    Pt reports s/p UTI approx end on July beginning August with 7 days Abx therapy which has now been completed. Pt reports taking PRN furosemide 2 days ago due to fluid retention secondary to UTI. Pt reports this has now resolved and is urinating normally.     Pt in agreement to hold all NSAIDs and Sumatriptan for now as recommended by FARA.    Pt reports using 2 different BP machines (has been taking BP at work, surgical tech) and home readings vs work readings are very close.     ASA, Flexeril PRN, Pristiq, Furosemide PRN, Potassium PRN, synthroid, patch, metoprolol, zofran PRN Sumatriptan, Trazodone PRN medications verified with name/dose/frequency.    Preferred pharmacy verified. FU with DA scheduled on 23 3:45pm.    Advised pt that FARA is currently out of the office. And message will be sent to DANIEL for recommendations. Pt verbalized understanding and is appreciative of call back.      To JM for recommendations, high priority  Voalte to JM     Total 40 min

## 2023-08-24 NOTE — TELEPHONE ENCOUNTER
------------------------------------------------------------------------------  Called pt [phone]  No answer, left  to call [phone]  Regarding JA recommendations

## 2023-08-24 NOTE — TELEPHONE ENCOUNTER
DANIEL    Caller: Sarah Buckner     Topic/issue: Patient returned call from [place] regarding this encounter. Please advise.     Callback Number: [phone] (home)     Thanks you,    Beverly GRADY

## 2023-08-29 ENCOUNTER — OFFICE VISIT (OUTPATIENT)
Dept: CARDIOLOGY | Facility: MEDICAL CENTER | Age: 53
End: 2023-08-29
Attending: INTERNAL MEDICINE
Payer: COMMERCIAL

## 2023-08-29 VITALS
HEIGHT: 63 IN | OXYGEN SATURATION: 96 % | DIASTOLIC BLOOD PRESSURE: 80 MMHG | BODY MASS INDEX: 28.17 KG/M2 | SYSTOLIC BLOOD PRESSURE: 132 MMHG | RESPIRATION RATE: 16 BRPM | HEART RATE: 91 BPM | WEIGHT: 159 LBS

## 2023-08-29 DIAGNOSIS — Z98.890 S/P AORTIC VALVE REPAIR: ICD-10-CM

## 2023-08-29 DIAGNOSIS — Z87.74 S/P PATENT FORAMEN OVALE CLOSURE: ICD-10-CM

## 2023-08-29 DIAGNOSIS — I33.0 INFECTIVE ENDOCARDITIS OF TRICUSPID VALVE: ICD-10-CM

## 2023-08-29 DIAGNOSIS — Z98.890 S/P TRICUSPID VALVE REPAIR: ICD-10-CM

## 2023-08-29 DIAGNOSIS — I10 ESSENTIAL HYPERTENSION: ICD-10-CM

## 2023-08-29 PROCEDURE — 99214 OFFICE O/P EST MOD 30 MIN: CPT | Performed by: INTERNAL MEDICINE

## 2023-08-29 PROCEDURE — 3075F SYST BP GE 130 - 139MM HG: CPT | Performed by: INTERNAL MEDICINE

## 2023-08-29 PROCEDURE — 3079F DIAST BP 80-89 MM HG: CPT | Performed by: INTERNAL MEDICINE

## 2023-08-29 PROCEDURE — 99213 OFFICE O/P EST LOW 20 MIN: CPT | Performed by: INTERNAL MEDICINE

## 2023-08-29 RX ORDER — CARVEDILOL 6.25 MG/1
6.25 TABLET ORAL 2 TIMES DAILY WITH MEALS
Qty: 60 TABLET | Refills: 6 | Status: SHIPPED | OUTPATIENT
Start: 2023-08-29 | End: 2023-08-31

## 2023-08-29 ASSESSMENT — FIBROSIS 4 INDEX: FIB4 SCORE: 0.71

## 2023-08-29 NOTE — PROGRESS NOTES
Cardiology Follow-up Consultation Note    Date of note:    8/29/2023    Primary Care Provider: Joan Aponte D.O.    Name:             Sarah Buckner   YOB: 1970  MRN:               [id number]        HISTORY OF PRESENT ILLNESS  Ms. Sarah Buckner is a 52 y.o. female who returns to see us for follow-up    She was admitted 9/2022 with E. 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  She also had a patent foramen ovale.  On 9/16 she underwent radical tricuspid valve repair, patent foramen ovale closure, aortic valve repair by Dr. Bradley.    Last clinic visit: 5/11/2023 with All Mcwilliams M.D.  Is new to me.    Interim History:  Since her last visit, was initially doing well but started noticing elevated blood pressures.  Was at work, had headache, clammy.  Had RN took her BP which was elevated 150/103.  Symptoms persisted.  Since then, has been noticing elevated blood pressure readings.  Does get associated symptoms as well.      Past Medical History:   Diagnosis Date    TAZ (acute kidney injury) (Formerly [facility]) 9/7/2022    Anemia 9/7/2022    Encounter for weaning from ventilator (Formerly [facility]) 9/16/2022    Hypertension     Migraine     Pleural effusion 9/18/2022    Pneumonia 9/7/2022    PONV (postoperative nausea and vomiting)          Past Surgical History:   Procedure Laterality Date    TRICUSPID VALVE REPAIR  9/16/2022    Procedure: TRICUSPID VALVE REPAIR, PATENT FORAMEN OVALE CLOSURE, TRANSESOPHAGEAL ESCHOCARDIOGRAM;  Surgeon: Truong Bradley D.O.;  Location: SURGERY [facility];  Service: Cardiothoracic    ECHOCARDIOGRAM, TRANSESOPHAGEAL, INTRAOPERATIVE  9/16/2022    Procedure: ECHOCARDIOGRAM, TRANSESOPHAGEAL, INTRAOPERATIVE;  Surgeon: Truong Bradley D.O.;  Location: SURGERY [facility];  Service: Cardiothoracic    MT UPPER GI ENDOSCOPY,DIAGNOSIS N/A  9/12/2022    Procedure: GASTROSCOPY;  Surgeon: Danyelle Curiel M.D.;  Location: SURGERY SAME DAY [facility];  Service: Gastroenterology    KY COLONOSCOPY,DIAGNOSTIC N/A 9/12/2022    Procedure: COLONOSCOPY;  Surgeon: Danyelle Curiel M.D.;  Location: SURGERY SAME DAY [facility];  Service: Gastroenterology    KY UPPER GI ENDOSCOPY,BIOPSY N/A 9/12/2022    Procedure: GASTROSCOPY, WITH BIOPSY;  Surgeon: Danyelle Curiel M.D.;  Location: SURGERY SAME DAY [facility];  Service: Gastroenterology    COLONOSCOPY WITH POLYP N/A 9/12/2022    Procedure: COLONOSCOPY, WITH POLYPECTOMY;  Surgeon: Danyelle Curiel M.D.;  Location: SURGERY SAME DAY [facility];  Service: Gastroenterology    KY LAP,RMV  ADNEXAL STRUCTURE Bilateral 12/8/2020    Procedure: SALPINGO-OOPHORECTOMY, BILATERAL, LAPAROSCOPIC;  Surgeon: Brayan Hunt M.D.;  Location: [facility];  Service: Gynecology    VAGINAL HYSTERECTOMY SCOPE TOTAL N/A 12/8/2020    Procedure: LAPAROSCOPIC  SUBTOTAL HYSTERECTOMY;  Surgeon: Brayan Hunt M.D.;  Location: [facility];  Service: Gynecology    CYSTOSCOPY N/A 12/8/2020    Procedure: CYSTOSCOPY;  Surgeon: Brayan Hunt M.D.;  Location: [facility];  Service: Gynecology    LUMPECTOMY      breast reduction, implants     OTHER ORTHOPEDIC SURGERY      orif right wrist hardware    PRIMARY C SECTION      3         Current Outpatient Medications   Medication Sig Dispense Refill    carvedilol (COREG) 6.25 MG Tab Take 1 Tablet by mouth 2 times a day with meals. 60 Tablet 6    cyclobenzaprine (FLEXERIL) 10 mg Tab Take 1 Tablet by mouth 3 times a day as needed for Muscle Spasms. 30 Tablet 0    levothyroxine (SYNTHROID) 88 MCG Tab Take 1 Tablet by mouth every morning on an empty stomach. Needs appointment before further refills 90 Tablet 0    desvenlafaxine succinate (PRISTIQ) 50 MG TABLET SR 24 HR Take 1 Tablet by mouth every day. 90 Tablet 0    ondansetron (ZOFRAN ODT) 4 MG TABLET DISPERSIBLE Take 1  "Tablet by mouth every 6 hours as needed for Nausea/Vomiting. 15 Tablet 0    Cholecalciferol (VITAMIN D) 2000 UNIT Tab 1 tablet      LYLLANA 0.05 MG/24HR PATCH BIWEEKLY APPLY 1 PATCH TOPICALLY TO THE SKIN 2 TIMES A WEEK      sumatriptan (IMITREX) 100 MG tablet TAKE 1 TABLET BY MOUTH AS NEEDED FOR MIGRAINE, REPEAT IN 3 HOURS AS NEEDED . DO NOT EXCEED 3 TABS PER WEEK 10 Tablet 2    traZODone (DESYREL) 50 MG Tab One to three po QPM prn 90 Tablet 0    furosemide (LASIX) 20 MG Tab Take 1 Tablet by mouth 1 time a day as needed (weight gain of over 2 pounds in one day (water weight)). 60 Tablet 3    potassium chloride ER (KLOR-CON) 10 MEQ tablet Take 1 Tablet by mouth 1 time a day as needed (weight gain of over 2 pounds in one day (water weight)). 60 Tablet 3    metoprolol SR (TOPROL XL) 25 MG TABLET SR 24 HR Take 0.5 Tablets by mouth every day. 50 Tablet 3    amoxicillin (AMOXIL) 500 MG Cap TK FOUR CS PO 1 HOUR B DAPP      Melatonin 10 MG Tab Take 15 mg by mouth.      aspirin EC 81 MG EC tablet Take 1 Tablet by mouth every day. 30 Tablet 0     No current facility-administered medications for this visit.         Allergies   Allergen Reactions    Morphine Itching     Severe itchiness  \"It makes me itch\"    Hydrocodone-Acetaminophen Unspecified     hallucinations           Family History   Problem Relation Age of Onset    Clotting Disorder Mother          Social History     Socioeconomic History    Marital status:      Spouse name: Not on file    Number of children: Not on file    Years of education: Not on file    Highest education level: Not on file   Occupational History    Not on file   Tobacco Use    Smoking status: Never    Smokeless tobacco: Never   Vaping Use    Vaping Use: Never used   Substance and Sexual Activity    Alcohol use: Yes     Alcohol/week: 3.6 oz     Types: 6 Glasses of wine per week    Drug use: Never    Sexual activity: Not on file   Other Topics Concern    Not on file   Social History " "Narrative    Not on file     Social Determinants of Health     Financial Resource Strain: Not on file   Food Insecurity: Not on file   Transportation Needs: Not on file   Physical Activity: Not on file   Stress: Not on file   Social Connections: Not on file   Intimate Partner Violence: Not on file   Housing Stability: Not on file         Physical Exam:  Ambulatory Vitals  /80 (BP Location: Left arm, Patient Position: Sitting, BP Cuff Size: Adult)   Pulse 91   Resp 16   Ht 1.6 m (5' 3\")   Wt 72.1 kg (159 lb)   SpO2 96%    Oxygen Therapy:  Pulse Oximetry: 96 %  BP Readings from Last 4 Encounters:   08/29/23 132/80   07/19/23 118/72   07/12/23 133/77   05/24/23 120/82       Weight/BMI: Body mass index is 28.17 kg/m².  Wt Readings from Last 4 Encounters:   08/29/23 72.1 kg (159 lb)   07/19/23 73.5 kg (162 lb)   07/12/23 73.9 kg (163 lb)   05/24/23 71.7 kg (158 lb)       GEN: Well developed, well nourished and in no acute distress.  HEART: no significant JVD, regular rate and rhythm, normal S1 and S2, no murmurs, no third heart sounds, normal cardiac palpation  LUNG: clear to auscultation bilaterally, no wheezing, no crackles, normal respiratory effort on room air  ABDOMEN: soft, non-tender, non-distended, normal bowel sounds throughout  EXTREMITIES: no peripheral edema noted  VASCULAR: no significantly elevated jugular venous pressure, no carotid bruits noted, radial pulses 2+ and equal      Lab Data Review:  Lab Results   Component Value Date/Time    CHOLSTRLTOT 176 02/04/2023 07:48 AM     (H) 02/04/2023 07:48 AM    HDL 61.0 (H) 02/04/2023 07:48 AM    TRIGLYCERIDE 70 02/04/2023 07:48 AM       Lab Results   Component Value Date/Time    SODIUM 137 07/18/2023 07:30 AM    POTASSIUM 4.3 07/18/2023 07:30 AM    CHLORIDE 102 07/18/2023 07:30 AM    CO2 25 07/18/2023 07:30 AM    GLUCOSE 89 07/18/2023 07:30 AM    BUN 18 07/18/2023 07:30 AM    CREATININE 0.9 07/18/2023 07:30 AM    GLOMRATE 125 10/11/2022 04:35 AM "     Lab Results   Component Value Date/Time    ALKPHOSPHAT 144 (H) 07/18/2023 07:30 AM    ASTSGOT 28 07/18/2023 07:30 AM    ALTSGPT 34 07/18/2023 07:30 AM    TBILIRUBIN 0.9 07/18/2023 07:30 AM      Lab Results   Component Value Date/Time    WBC 4.8 02/04/2023 07:48 AM    HEMOGLOBIN 15.2 02/04/2023 07:48 AM     Lab Results   Component Value Date/Time    HBA1C 5.5 06/14/2023 03:00 PM    HBA1C 5.0 09/13/2022 08:15 AM       Cardiac Imaging and Procedures Review:    EKG dated 12/8/2022: My personal interpretation is sinus tachycardia, RBBB/LPFB    Echo dated 3/10/2023, personally interpreted:   Compared to the prior study on 09/08/2022 there has been interval   tricuspid and aortic valve repair on 9/16/2022.  Normal left ventricular size, thickness, and systolic function.  The left ventricular ejection fraction is visually estimated to be 60%.  The right ventricle is normal in size and systolic function.  Mild mitral regurgitation.  Post aortic valve repair.  Mild aortic insufficiency.  Post tricuspid valve repair.  Mild tricuspid stenosis.  Moderate tricuspid regurgitation.  Estimated right ventricular systolic pressure is 25 mmHg.    KEO dated 9/16/2022:   Pre:     LV:  Moderately reduced left ventricular systolic function. EF visually   estimated at 40%.  Global hypokinesis.  Mild concentric left   ventricular hypertrophy (1.0 cm).  A reliable estimation of diastolic   function cannot be made due to tachycardia.    RV:  Mildly dilated right ventricle. Normal right ventricular systolic   function.  Evidence of fluid overlaod in RV.  RA:  Enlarged right atrium.   LA:  The left atrium is not well visualized.   JACKI:  Normal left atrial appendage. No thrombus detected in the left   atrial appendage.   IA Septum:   PFO with a small left to right shunt.  IV Septum:  Bowing of the IV septum towards the LV during diastole   suggestive of fluid overload in the RV.    MV:  Structurally normal mitral valve without significant  stenosis or   regurgitation.  No evidence of vegetations on the mitral valve   apparatus.    AV:  Vegetation seen on the non-coronary cusp of the aortic valve.    Mild centrally directed aortic valve regurgitation.    TV:  Vegetations seen on the septal and posterior leaflets of the   tricuspid valve.  Severe tricuspid valve regurgitation.    PV:  Structurally normal pulmonic valve without significant stenosis or   regurgitation. No evidence of vegetations seen on pulmonic valve,   although this view is limited in KEO.    Pericardium:  No pericardial effusion.   Aorta:  Proximal ascending aorta measured at 3.2 cm.  Grade 2   descending aorta.  Aortic arch wnl's to the extent visualized.       Post:   RV:  No further evidence of fluid overload.    IA Septum:  No further evidence of PFO nor shunt post surgical   ligation.    IV Septum:  No further evidence of bowing of the IV septum.    AV:  Vegetations no longer seen on the aortic valve apparatus.  Mild   centrally directed aortic regurgitation unchanged.    TV:  Successful repair of the tricuspid valve.  Mild tricuspid valve   regurgitation.  No evidence of tricuspid valve stenosis with a mean PG   of 1 mmHg.     Echo dated 9/8/2022:   Probable vegetation seen on the tricuspid valve in subcostal view.  Normal left ventricular systolic function.  The left ventricular ejection fraction is visually estimated to be 60%.  Normal diastolic function.  The right ventricle is normal in size and systolic function.  Mild aortic insufficiency.      Radiology test Review:  US Renal (8/2/2023):   Normal renal ultrasound      Assessment & Plan     1. Infective endocarditis of tricuspid valve        2. S/P tricuspid valve repair        3. S/P patent foramen ovale closure        4. S/P aortic valve repair        5. Essential hypertension  carvedilol (COREG) 6.25 MG Tab          Patient with infective endocarditis s/p tricuspid and aortic valve repair along with PFO closure.  Was  doing well until few weeks ago when she started noticing elevated blood pressure readings with associated symptoms.  Overall, blood pressure has settled down but does get occasional elevated readings.  After discussion, discontinue metoprolol XL and initiate Coreg 6.25 mg twice daily.    Encouraged her to continue monitoring her BP at home on a regular basis with goal 120s/70s.    Continue Lasix as needed for lower extremity edema.    I have independently interpreted test results and discussed results and management with the patient.    All of patient's excellent questions were answered to the best of my knowledge and to her satisfaction.  It was a pleasure seeing Ms. Sarah Buckner in my clinic today. Return in about 3 months (around 11/29/2023). Patient is aware to call the cardiology clinic with any questions or concerns.      Tommie Ca MD  [facility]  [facility], [facility].  [address] [address]  Phone: [phone]  Fax: [phone]    Please note that this dictation was created using voice recognition software. I have made every reasonable attempt to correct obvious errors, but it is possible there are errors of grammar and possibly content that I did not discover before finalizing the note.

## 2023-08-30 NOTE — TELEPHONE ENCOUNTER
Is the patient due for a refill? Yes- pharmacy requesting 90-day supply per insurance.       Was the patient seen the past year? Yes    Date of last office visit: 8/29/2023    Does the patient have an upcoming appointment?  Yes   If yes, When? 11/7/2023    Provider to refill:DA    Does the patients insurance require a 100 day supply?  No

## 2023-08-31 RX ORDER — CARVEDILOL 6.25 MG/1
6.25 TABLET ORAL 2 TIMES DAILY WITH MEALS
Qty: 180 TABLET | Refills: 3 | Status: SHIPPED | OUTPATIENT
Start: 2023-08-31 | End: 2024-01-02

## 2023-11-07 ENCOUNTER — APPOINTMENT (OUTPATIENT)
Dept: CARDIOLOGY | Facility: MEDICAL CENTER | Age: 53
End: 2023-11-07
Attending: INTERNAL MEDICINE
Payer: COMMERCIAL

## 2023-12-07 ENCOUNTER — PATIENT MESSAGE (OUTPATIENT)
Dept: CARDIOLOGY | Facility: MEDICAL CENTER | Age: 53
End: 2023-12-07
Payer: COMMERCIAL

## 2023-12-19 ENCOUNTER — PATIENT MESSAGE (OUTPATIENT)
Dept: CARDIOLOGY | Facility: MEDICAL CENTER | Age: 53
End: 2023-12-19
Payer: COMMERCIAL

## 2024-01-02 ENCOUNTER — OFFICE VISIT (OUTPATIENT)
Dept: CARDIOLOGY | Facility: MEDICAL CENTER | Age: 54
End: 2024-01-02
Attending: NURSE PRACTITIONER
Payer: COMMERCIAL

## 2024-01-02 VITALS
SYSTOLIC BLOOD PRESSURE: 122 MMHG | BODY MASS INDEX: 29.59 KG/M2 | DIASTOLIC BLOOD PRESSURE: 72 MMHG | HEART RATE: 83 BPM | RESPIRATION RATE: 16 BRPM | HEIGHT: 63 IN | OXYGEN SATURATION: 92 % | WEIGHT: 167 LBS

## 2024-01-02 DIAGNOSIS — Z98.890 S/P AORTIC VALVE REPAIR: ICD-10-CM

## 2024-01-02 DIAGNOSIS — Z87.74 S/P PATENT FORAMEN OVALE CLOSURE: ICD-10-CM

## 2024-01-02 DIAGNOSIS — I33.0 INFECTIVE ENDOCARDITIS OF TRICUSPID VALVE: ICD-10-CM

## 2024-01-02 DIAGNOSIS — R00.2 PALPITATIONS: ICD-10-CM

## 2024-01-02 DIAGNOSIS — I10 ESSENTIAL HYPERTENSION, BENIGN: ICD-10-CM

## 2024-01-02 DIAGNOSIS — Z98.890 S/P TRICUSPID VALVE REPAIR: ICD-10-CM

## 2024-01-02 PROBLEM — I76 SEPTIC EMBOLISM (HCC): Status: RESOLVED | Noted: 2023-01-10 | Resolved: 2024-01-02

## 2024-01-02 PROBLEM — R79.89 ELEVATED LIVER FUNCTION TESTS: Status: RESOLVED | Noted: 2022-09-07 | Resolved: 2024-01-02

## 2024-01-02 PROCEDURE — 3078F DIAST BP <80 MM HG: CPT | Performed by: NURSE PRACTITIONER

## 2024-01-02 PROCEDURE — 99214 OFFICE O/P EST MOD 30 MIN: CPT | Performed by: NURSE PRACTITIONER

## 2024-01-02 PROCEDURE — 3074F SYST BP LT 130 MM HG: CPT | Performed by: NURSE PRACTITIONER

## 2024-01-02 PROCEDURE — 99213 OFFICE O/P EST LOW 20 MIN: CPT | Performed by: NURSE PRACTITIONER

## 2024-01-02 RX ORDER — METOPROLOL SUCCINATE 25 MG/1
TABLET, EXTENDED RELEASE ORAL
Qty: 100 TABLET | Refills: 1 | Status: SHIPPED | OUTPATIENT
Start: 2024-01-02

## 2024-01-02 ASSESSMENT — ENCOUNTER SYMPTOMS
HEADACHES: 0
CHILLS: 0
PND: 0
NAUSEA: 0
BRUISES/BLEEDS EASILY: 0
ABDOMINAL PAIN: 0
COUGH: 0
SHORTNESS OF BREATH: 0
DIZZINESS: 0
MYALGIAS: 0
ORTHOPNEA: 0
INSOMNIA: 0
FEVER: 0
PALPITATIONS: 1
LOSS OF CONSCIOUSNESS: 0

## 2024-01-02 ASSESSMENT — FIBROSIS 4 INDEX: FIB4 SCORE: 0.97

## 2024-01-02 NOTE — PROGRESS NOTES
"Chief Complaint   Patient presents with    Follow-Up     History of infective endocarditis, with TV repair and AV repair    HTN (Controlled)    Palpitations       Subjective     Chanell Buckner is a 53 y.o. female who presents today for six month follow-up of history of TV repair, AV repair, PFO closure, HTN and palpitations.    Chanell is a 53 year old female with history of migraine headaches and mild hypertension.    In September, she was admitted to [facility] with infective endocarditis. On 9/16/2022, she has TV repair, PFO closure and AV repair by Dr. Bradley. Her blood cultures were negative and she completed a 6 week antibiotic course.    At follow-up with Dr. Ca in August 2023, Metoprolol was changed to Coreg, for better BP control.    On 9/25/2023, she had tonsillectomy (for ongoing tonsillitis).  On 9/28/2023, she presented to [facility] ER for hemoptysis, now resolved.    She is here today for follow-up. Generally, she is feeling alright; she did have an episode right before [place], where she felt very tired and lightheaded and \"faint.\" She did not check her BP or HR. No overt chest pain, pressure, tightness or discomfort; occasional palpitations, especially with stress. She does have some nausea with the Coreg, and would like to go back to Metoprolol. No syncope; no LE edema. No recent fever or chills.    She works as a surgical tech at [employer].     Past Medical History:   Diagnosis Date    TAZ (acute kidney injury) (HCC)     Anemia     History of endocarditis 09/2022    Infective endocarditis (source unknown), status post TV repair, PFO closure and AV repair.    Hypertension     Migraine     Pneumonia     PONV (postoperative nausea and vomiting)      Past Surgical History:   Procedure Laterality Date    TRICUSPID VALVE REPAIR  9/16/2022    Procedure: TRICUSPID VALVE REPAIR, PATENT FORAMEN OVALE CLOSURE, TRANSESOPHAGEAL ESCHOCARDIOGRAM;  Surgeon: Truong Bradley D.O.;  Location: SURGERY [facility];  " Service: Cardiothoracic    ECHOCARDIOGRAM, TRANSESOPHAGEAL, INTRAOPERATIVE  9/16/2022    Procedure: ECHOCARDIOGRAM, TRANSESOPHAGEAL, INTRAOPERATIVE;  Surgeon: Truong Bradley D.O.;  Location: [facility];  Service: Cardiothoracic    SD UPPER GI ENDOSCOPY,DIAGNOSIS N/A 9/12/2022    Procedure: GASTROSCOPY;  Surgeon: Danyelle Curiel M.D.;  Location: SURGERY SAME DAY [facility];  Service: Gastroenterology    SD COLONOSCOPY,DIAGNOSTIC N/A 9/12/2022    Procedure: COLONOSCOPY;  Surgeon: Danyelle Curiel M.D.;  Location: SURGERY SAME DAY [facility];  Service: Gastroenterology    SD UPPER GI ENDOSCOPY,BIOPSY N/A 9/12/2022    Procedure: GASTROSCOPY, WITH BIOPSY;  Surgeon: Danyelle Curiel M.D.;  Location: SURGERY SAME DAY [facility];  Service: Gastroenterology    COLONOSCOPY WITH POLYP N/A 9/12/2022    Procedure: COLONOSCOPY, WITH POLYPECTOMY;  Surgeon: Danyelle Curiel M.D.;  Location: SURGERY SAME DAY [facility];  Service: Gastroenterology    SD LAP,RMV  ADNEXAL STRUCTURE Bilateral 12/8/2020    Procedure: SALPINGO-OOPHORECTOMY, BILATERAL, LAPAROSCOPIC;  Surgeon: Brayan Hunt M.D.;  Location: [facility];  Service: Gynecology    VAGINAL HYSTERECTOMY SCOPE TOTAL N/A 12/8/2020    Procedure: LAPAROSCOPIC  SUBTOTAL HYSTERECTOMY;  Surgeon: Brayan Hunt M.D.;  Location: [facility];  Service: Gynecology    CYSTOSCOPY N/A 12/8/2020    Procedure: CYSTOSCOPY;  Surgeon: Brayan Hunt M.D.;  Location: [facility];  Service: Gynecology    LUMPECTOMY      breast reduction, implants     OTHER ORTHOPEDIC SURGERY      orif right wrist hardware    PRIMARY C SECTION      3     Family History   Problem Relation Age of Onset    Clotting Disorder Mother      Social History     Socioeconomic History    Marital status:      Spouse name: Not on file    Number of children: Not on file    Years of education: Not on file    Highest education level: Not on file   Occupational History    Not  "on file   Tobacco Use    Smoking status: Never    Smokeless tobacco: Never   Vaping Use    Vaping Use: Never used   Substance and Sexual Activity    Alcohol use: Yes     Alcohol/week: 3.6 oz     Types: 6 Glasses of wine per week    Drug use: Never    Sexual activity: Not on file   Other Topics Concern    Not on file   Social History Narrative    Not on file     Social Determinants of Health     Financial Resource Strain: Not on file   Food Insecurity: Not on file   Transportation Needs: Not on file   Physical Activity: Not on file   Stress: Not on file   Social Connections: Not on file   Intimate Partner Violence: Not on file   Housing Stability: Not on file     Allergies   Allergen Reactions    Morphine Itching     Severe itchiness  \"It makes me itch\"    Hydrocodone-Acetaminophen Unspecified     hallucinations       Outpatient Encounter Medications as of 1/2/2024   Medication Sig Dispense Refill    metoprolol SR (TOPROL XL) 25 MG TABLET SR 24 HR Take 1/2 or 1 tablet PRN sustained palpitations 100 Tablet 1    levothyroxine (SYNTHROID) 88 MCG Tab Take 1 Tablet by mouth every morning on an empty stomach. Must have labs and appointment before further refills 90 Tablet 0    traZODone (DESYREL) 50 MG Tab One to three po QPM prn 90 Tablet 0    ondansetron (ZOFRAN ODT) 4 MG TABLET DISPERSIBLE Take 1 Tablet by mouth every 6 hours as needed for Nausea/Vomiting. Must have appointment before further refills 15 Tablet 0    cyclobenzaprine (FLEXERIL) 10 mg Tab TAKE 1 TABLET BY MOUTH THREE TIMES DAILY AS NEEDED FOR MUSCLE SPASMS 30 Tablet 0    sumatriptan (IMITREX) 100 MG tablet TAKE 1 TABLET BY MOUTH AS NEEDED FOR MIGRAINE, REPEAT IN 3 HOURS AS NEEDED . DO NOT EXCEED 3 TABS PER WEEK 10 Tablet 1    HYDROcodone-acetaminophen 2.5-108 mg/5mL (HYCET) 7.5-325 MG/15ML solution TAKE 10 TO 15 ML BY MOUTH EVERY 4 HOURS FOR 7 DAYS AS NEEDED FOR PAIN      Cholecalciferol (VITAMIN D) 2000 UNIT Tab 1 tablet      LYLLANA 0.05 MG/24HR PATCH " "BIWEEKLY APPLY 1 PATCH TOPICALLY TO THE SKIN 2 TIMES A WEEK      furosemide (LASIX) 20 MG Tab Take 1 Tablet by mouth 1 time a day as needed (weight gain of over 2 pounds in one day (water weight)). 60 Tablet 3    potassium chloride ER (KLOR-CON) 10 MEQ tablet Take 1 Tablet by mouth 1 time a day as needed (weight gain of over 2 pounds in one day (water weight)). 60 Tablet 3    amoxicillin (AMOXIL) 500 MG Cap TK FOUR CS PO 1 HOUR B DAPP      Melatonin 10 MG Tab Take 15 mg by mouth.      aspirin EC 81 MG EC tablet Take 1 Tablet by mouth every day. 30 Tablet 0    [DISCONTINUED] desvenlafaxine succinate (PRISTIQ) 50 MG TABLET SR 24 HR Take 1 Tablet by mouth every day. Must have labs and appointment before further refills (Patient not taking: Reported on 1/2/2024) 90 Tablet 0    [DISCONTINUED] carvedilol (COREG) 6.25 MG Tab TAKE 1 TABLET BY MOUTH TWICE DAILY WITH MEALS (Patient not taking: Reported on 1/2/2024) 180 Tablet 3    [DISCONTINUED] metoprolol SR (TOPROL XL) 25 MG TABLET SR 24 HR Take 0.5 Tablets by mouth every day. (Patient not taking: Reported on 11/29/2023) 50 Tablet 3     No facility-administered encounter medications on file as of 1/2/2024.     Review of Systems   Constitutional:  Negative for chills and fever.   HENT:  Negative for congestion.    Respiratory:  Negative for cough and shortness of breath.    Cardiovascular:  Positive for palpitations. Negative for chest pain, orthopnea, leg swelling and PND.   Gastrointestinal:  Negative for abdominal pain and nausea.   Musculoskeletal:  Negative for myalgias.   Skin:  Negative for rash.   Neurological:  Negative for dizziness, loss of consciousness and headaches.   Endo/Heme/Allergies:  Does not bruise/bleed easily.   Psychiatric/Behavioral:  The patient does not have insomnia.               Objective     /72 (BP Location: Left arm, Patient Position: Sitting, BP Cuff Size: Adult)   Pulse 83   Resp 16   Ht 1.6 m (5' 3\")   Wt 75.8 kg (167 lb)   LMP " 11/23/2020   SpO2 92%   BMI 29.58 kg/m²     Physical Exam  Constitutional:       Appearance: She is well-developed.   HENT:      Head: Normocephalic.   Neck:      Vascular: No JVD.   Cardiovascular:      Rate and Rhythm: Normal rate and regular rhythm.      Heart sounds: Normal heart sounds.      Comments: Well healed sternotomy scar.  Pulmonary:      Effort: Pulmonary effort is normal. No respiratory distress.      Breath sounds: Normal breath sounds. No wheezing or rales.   Abdominal:      General: Bowel sounds are normal. There is no distension.      Palpations: Abdomen is soft.      Tenderness: There is no abdominal tenderness.   Musculoskeletal:         General: Normal range of motion.      Cervical back: Normal range of motion and neck supple.   Skin:     General: Skin is warm and dry.      Findings: No rash.   Neurological:      Mental Status: She is alert and oriented to person, place, and time.   Psychiatric:         Mood and Affect: Mood normal.         Behavior: Behavior normal.       CONCLUSIONS OF TTE OF 3/10/2023:  Compared to the prior study on 09/08/2022 there has been interval   tricuspid and aortic valve repair on 9/16/2022.  Normal left ventricular size, thickness, and systolic function.  The left ventricular ejection fraction is visually estimated to be 60%.  The right ventricle is normal in size and systolic function.  Mild mitral regurgitation.  Post aortic valve repair.  Mild aortic insufficiency.  Post tricuspid valve repair.  Mild tricuspid stenosis.  Moderate tricuspid regurgitation.  Estimated right ventricular systolic pressure is 25 mmHg.    Procedure of 9/16/2022:  RADICAL TRICUSPID VALVE REPAIR, PATENT FORAMEN OVALE CLOSURE, AORTIC VALVE REPAIR,   TRANSESOPHAGEAL ESCHOCARDIOGRAM - Wound Class: Clean with Drain  ECHOCARDIOGRAM, TRANSESOPHAGEAL, INTRAOPERATIVE - Wound Class: None     CONCLUSIONS OF TTE OF 9/8/2022:  No prior study is available for comparison.   Technically difficult  study - adequate information is obtained.   Probable vegetation seen on the tricuspid valve in subcostal view.  Normal left ventricular systolic function.  The left ventricular ejection fraction is visually estimated to be 60%.  Normal diastolic function.  The right ventricle is normal in size and systolic function.    Lab Results   Component Value Date/Time    WBC 13.5 (H) 09/28/2023 10:45 AM    RBC 3.98 (L) 09/28/2023 10:45 AM    HEMOGLOBIN 13.8 09/28/2023 10:45 AM    HEMATOCRIT 41.4 09/28/2023 10:45 AM    .0 (H) 09/28/2023 10:45 AM    MCH 34.7 (H) 09/28/2023 10:45 AM    MCHC 33.3 09/28/2023 10:45 AM    MPV 9.8 09/28/2023 10:45 AM      Lab Results   Component Value Date/Time    SODIUM 136 09/28/2023 10:45 AM    POTASSIUM 3.2 (L) 09/28/2023 10:45 AM    CHLORIDE 100 09/28/2023 10:45 AM    CO2 30 09/28/2023 10:45 AM    GLUCOSE 117 (H) 09/28/2023 10:45 AM    BUN 9 09/28/2023 10:45 AM    CREATININE 0.8 09/28/2023 10:45 AM    GLOMRATE 125 10/11/2022 04:35 AM      Lab Results   Component Value Date/Time    ASTSGOT 28 07/18/2023 07:30 AM    ALTSGPT 34 07/18/2023 07:30 AM       Lab Results   Component Value Date/Time    CHOLSTRLTOT 176 02/04/2023 07:48 AM     (H) 02/04/2023 07:48 AM    HDL 61.0 (H) 02/04/2023 07:48 AM    TRIGLYCERIDE 70 02/04/2023 07:48 AM            Assessment & Plan     1. Infective endocarditis of tricuspid valve  CBC WITHOUT DIFFERENTIAL    Basic Metabolic Panel    EC-ECHOCARDIOGRAM COMPLETE W/O CONT      2. S/P tricuspid valve repair  EC-ECHOCARDIOGRAM COMPLETE W/O CONT    metoprolol SR (TOPROL XL) 25 MG TABLET SR 24 HR      3. S/P aortic valve repair  EC-ECHOCARDIOGRAM COMPLETE W/O CONT    metoprolol SR (TOPROL XL) 25 MG TABLET SR 24 HR      4. S/P patent foramen ovale closure  EC-ECHOCARDIOGRAM COMPLETE W/O CONT      5. Palpitations        6. Essential hypertension, benign            Medical Decision Making: Today's Assessment/Status/Plan:      1. History of infective endocarditis,  status post TV repair, PFO closure and AV repair, to repeat echo. To also check CBC and BMP, given last CBC showed elevated WBCs.    2. Palpitations/borderline elevated BP, to stop Coreg and restart Toprol XL 25mg; can use 1/2 to 1 tablet PRN sustained palpitations.    3. Migraine headaches, initially improved after surgery, now returning. To discuss with PCP; consider referral to neurology.    4. Status post tonsillectomy, now resolved. She did have a prolonged recovery.    As above, repeat echo and labs. Switch Coreg to Toprol XL. Follow-up in 3 months in [place].

## (undated) DEVICE — BLADE STERNUM SAW SURGICAL 32.0 X 6.4 MM STERILE (1/EA)

## (undated) DEVICE — ELECTRODE RADIOLUCNT SOLID GEL DEFIB PADS (12EA/CA)

## (undated) DEVICE — TRAY CATHETER FOLEY URINE METER W/STATLOCK 350ML (10EA/CA)

## (undated) DEVICE — SYRINGE DISP. 60 CC LL - (30/BX, 12BX/CA)**WHEN THESE ARE GONE ORDER #500206**

## (undated) DEVICE — BLADE SURGICAL #15 - (50/BX 3BX/CA)

## (undated) DEVICE — BAG RESUSCITATION DISPOSABLE - WITH MASK (10 EA/CA)

## (undated) DEVICE — D-5-W INJ. 500 ML - (24EA/CA)

## (undated) DEVICE — SUTURE 3-0 PROLENE SH 36 (36PK/BX)"

## (undated) DEVICE — TUBE CHEST 32FR. STRAIGHT - (10EA/CA)

## (undated) DEVICE — CONNECTOR HUBLESS DRAINAGE - ONE WAY (20/BX)

## (undated) DEVICE — WIRE STEEL 5-0 B&S 20 OHS - 5/PK 12PK/BX ITEM. D5329 OR D6625 CAN BE USED AS A SUB

## (undated) DEVICE — SOD. CHL. INJ. 0.9% 1000 ML - (14EA/CA 60CA/PF)

## (undated) DEVICE — SODIUM CHL. INJ. 0.9% 500ML (24EA/CA 50CA/PF)

## (undated) DEVICE — MASK PANORAMIC OXYGEN PRO2 (30EA/CA)

## (undated) DEVICE — SUTURE 5 SURGICAL STEEL V-40 - (12/BX) CCS CURRENT

## (undated) DEVICE — CONTAINER SPECIMEN BAG OR - STERILE 4 OZ W/LID (100EA/CA)

## (undated) DEVICE — POLY UMBILICAL TAPE 1/8X30 - (36/BX)

## (undated) DEVICE — FORCEP RJ4 JUMBO BIOPSY (40EA/BX)

## (undated) DEVICE — BITE BLOCK, DISP.

## (undated) DEVICE — SET EXTENSION WITH 2 PORTS (48EA/CA) ***PART #2C8610 IS A SUBSTITUTE*****

## (undated) DEVICE — FILTER BLOOD TRANSFUSION - (40/CA) (PALL)

## (undated) DEVICE — SYSTEM CHEST DRAIN ADULT/PEDS W/AUTO TRANSFUSION CAPABILITY SAHARA (6EA/CA)

## (undated) DEVICE — ADHESIVE DERMABOND HVD MINI (12EA/BX)

## (undated) DEVICE — MICRODRIP PRIMARY VENTED 60 (48EA/CA) THIS WAS PART #2C8428 WHICH WAS DISCONTINUED

## (undated) DEVICE — GOWN WARMING STANDARD FLEX - (30/CA)

## (undated) DEVICE — LACTATED RINGERS INJ 1000 ML - (14EA/CA 60CA/PF)

## (undated) DEVICE — Device

## (undated) DEVICE — TOWELS CLOTH SURGICAL - (4/PK 20PK/CA)

## (undated) DEVICE — SYSTEM PEEL & PLACE 13CM INCISIONS

## (undated) DEVICE — SPONGE DRAIN 4 X 4IN 6-PLY - (2/PK25PK/BX12BX/CS)

## (undated) DEVICE — PACK CV DRAPING/BASIN 2PART - (1/CA)

## (undated) DEVICE — SUTURE 4-0 30CM STRATAFIX SPIRAL PS-2 (12EA/BX)

## (undated) DEVICE — KIT RADIAL ARTERY 20GA W/MAX BARRIER AND BIOPATCH  (5EA/CA) #10740 IS FOR THE SET RADIAL ARTERIAL

## (undated) DEVICE — SLEEVE, VASO, THIGH, MED

## (undated) DEVICE — CATHETER THERMALDILUTION SWAN - (5EA/CA)

## (undated) DEVICE — ELECTRODE DUAL RETURN W/ CORD - (50/PK)

## (undated) DEVICE — SUTURE 4-0 PROLENE PS-2 18 (36PK/BX)"

## (undated) DEVICE — SUTURE 7-0 PROLENE 24BV175-6 - EP8735H (36/BX)"

## (undated) DEVICE — TUBE CONNECT SUCTION CLEAR 120 X 1/4" (50EA/CA)"

## (undated) DEVICE — SUTURE OHS

## (undated) DEVICE — SUCTION INSTRUMENT YANKAUER OPEN TIP W/O VENT (50EA/CA)

## (undated) DEVICE — GLOVE BIOGEL INDICATOR SZ 8.5 SURGICAL PF LTX - (50/BX 4BX/CA)

## (undated) DEVICE — SODIUM CHL IRRIGATION 0.9% 1000ML (12EA/CA)

## (undated) DEVICE — CORDS BIPOLAR COAGULATION - 12FT STERILE DISP. (10EA/BX)

## (undated) DEVICE — ORGANIZER SUTURE GABBAY-FRAT - ER STERILE (3/SET 4ST/BX)

## (undated) DEVICE — CONTAINER, SPECIMEN, STERILE

## (undated) DEVICE — SUTURE 5-0 PROLENE C-1 D/A 24 (36PK/BX)"

## (undated) DEVICE — GLOVE BIOGEL SZ 8 SURGICAL PF LTX - (50PR/BX 4BX/CA)

## (undated) DEVICE — TRAP POLYP E-TRAP (25EA/BX)

## (undated) DEVICE — CAPTIVATOR II-15MM ROUND STIFF  (40/BX)

## (undated) DEVICE — SUTURE 4-0 PROLENE RB-1 D/A 36 (36PK/BX)"

## (undated) DEVICE — INSERT STEALTH #5 - (10/BX)

## (undated) DEVICE — STAPLER SKIN DISP - (6/BX 10BX/CA) VISISTAT

## (undated) DEVICE — SET LEADWIRE 5 LEAD BEDSIDE DISPOSABLE ECG (1SET OF 5/EA)

## (undated) DEVICE — CHLORAPREP 26 ML APPLICATOR - ORANGE TINT(25/CA)

## (undated) DEVICE — LEAD PACING TEMP MYO - (12/BX)

## (undated) DEVICE — CANISTER SUCTION RIGID RED 1500CC (40EA/CA)

## (undated) DEVICE — CANISTER SUCTION 3000ML MECHANICAL FILTER AUTO SHUTOFF MEDI-VAC NONSTERILE LF DISP  (40EA/CA)

## (undated) DEVICE — DERMABOND ADVANCED - (12EA/BX)

## (undated) DEVICE — KIT PROCEDURE DOUBLE ENDO ONLY (5/CA)

## (undated) DEVICE — FORCEP RADIAL JAW 4 STANDARD CAPACITY W/NEEDLE 240CM (40EA/BX)

## (undated) DEVICE — SENSOR CEREBRAL AND SOMATIC MONITORING (20/CA)

## (undated) DEVICE — BLADE BEAVER 6900 MINI SHARP ALL AROUND (20/CA)

## (undated) DEVICE — SUTURE 4-0 VICRYL PLUS FS-2 - 27 INCH (36/BX)

## (undated) DEVICE — SUCTION INSTRUMENT YANKAUER BULBOUS TIP W/O VENT (50EA/CA)

## (undated) DEVICE — GLOVE BIOGEL INDICATOR SZ 8 SURGICAL PF LTX - (50/BX 4BX/CA)

## (undated) DEVICE — KIT TOURNIQUET DLP (40EA/PK)

## (undated) DEVICE — SUTURE 2-0 ETHIBOND V-5 - (6/BX)

## (undated) DEVICE — SET BIFURCATED BLOOD - (48EA/CS)

## (undated) DEVICE — BOVIE BLADE COATED - (50/PK)

## (undated) DEVICE — TRANSDUCER BIFURCATED MONITORING KIT (10EA/CA)

## (undated) DEVICE — SUTURE GORE-TEX CV-5 TT-13 (12/BX)

## (undated) DEVICE — DRAIN SILICONE CLOSED WOUND SUCTION CHANNEL 24FR ROUND HUBLESS (10/CA)

## (undated) DEVICE — SUTURE 5-0 PROLENE RB-1 D/A 36 (36PK/BX)"

## (undated) DEVICE — SYS DLV COST CLS RM TEMP - INJECTATE (CO-SET II) (10EA/CA)

## (undated) DEVICE — SENSOR OXIMETER ADULT SPO2 RD SET (20EA/BX)

## (undated) DEVICE — APPLICATOR COTTON TIP 6 IN - STERILE (2EA/PK 100PK/BX)

## (undated) DEVICE — SUTURE 2-0 ETHIBOND V-5 30 (12EA/BX)"

## (undated) DEVICE — SET FLUID WARMING STANDARD FLOW - (10/CA)

## (undated) DEVICE — TUBING CLEARLINK DUO-VENT - C-FLO (48EA/CA)

## (undated) DEVICE — TUBE CHEST 32FR. RIGHT ANGLED (10EA/CA)

## (undated) DEVICE — ARMBOARD  SMALL IV 9 INLONG - (25EA/CA)

## (undated) DEVICE — TUBE CONNECTING SUCTION - CLEAR PLASTIC STERILE 72 IN (50EA/CA)

## (undated) DEVICE — KIT CATHETERIZATION TWO-LUMEN CENTRAL VENOUS PI CVC (5EA/CA)

## (undated) DEVICE — WATER IRRIGATION STERILE 1000ML (12EA/CA)

## (undated) DEVICE — TRAY SURESTEP FOLEY TEMP SENSING 16FR (10EA/CA) ORDER  #18764 FOR TEMP FOLEY ONLY

## (undated) DEVICE — FIBRILLAR SURGICEL 4X4 - 10/CA

## (undated) DEVICE — COVER LIGHT HANDLE FLEXIBLE - SOFT (2EA/PK 80PK/CA)

## (undated) DEVICE — GLOVE BIOGEL SZ 7.5 SURGICAL PF LTX - (50PR/BX 4BX/CA)

## (undated) DEVICE — FILM CASSETTE ENDO

## (undated) DEVICE — TOWEL STOP TIMEOUT SAFETY FLAG (40EA/CA)

## (undated) DEVICE — STOPCOCK MALE 4-WAY - (50/CA)

## (undated) DEVICE — KIT INTROPERCUTANEOUS SHEATH - 8.5 FR W/MAX BARRIER AND BIOPATCH  (5/CA)